# Patient Record
Sex: MALE | Race: WHITE | HISPANIC OR LATINO | Employment: OTHER | URBAN - METROPOLITAN AREA
[De-identification: names, ages, dates, MRNs, and addresses within clinical notes are randomized per-mention and may not be internally consistent; named-entity substitution may affect disease eponyms.]

---

## 2017-01-11 ENCOUNTER — TRANSCRIBE ORDERS (OUTPATIENT)
Dept: ADMINISTRATIVE | Facility: HOSPITAL | Age: 42
End: 2017-01-11

## 2017-01-11 ENCOUNTER — HOSPITAL ENCOUNTER (OUTPATIENT)
Dept: RADIOLOGY | Age: 42
Discharge: HOME | End: 2017-01-11
Payer: COMMERCIAL

## 2017-01-11 ENCOUNTER — ALLSCRIPTS OFFICE VISIT (OUTPATIENT)
Dept: OTHER | Facility: OTHER | Age: 42
End: 2017-01-11

## 2017-01-11 ENCOUNTER — TRANSCRIBE ORDERS (OUTPATIENT)
Dept: ADMINISTRATIVE | Age: 42
End: 2017-01-11

## 2017-01-11 DIAGNOSIS — R68.84 JAW PAIN: ICD-10-CM

## 2017-01-11 DIAGNOSIS — M25.50 PAIN IN JOINT: ICD-10-CM

## 2017-01-11 DIAGNOSIS — M25.562 PAIN IN LEFT KNEE: ICD-10-CM

## 2017-01-11 DIAGNOSIS — R06.83 SNORING: Primary | ICD-10-CM

## 2017-01-11 DIAGNOSIS — G43.909 MIGRAINE WITHOUT STATUS MIGRAINOSUS, NOT INTRACTABLE: ICD-10-CM

## 2017-01-11 DIAGNOSIS — R79.1 ABNORMAL COAGULATION PROFILE: ICD-10-CM

## 2017-01-11 DIAGNOSIS — Z13.220 ENCOUNTER FOR SCREENING FOR LIPOID DISORDERS: ICD-10-CM

## 2017-01-11 DIAGNOSIS — D72.829 ELEVATED WHITE BLOOD CELL COUNT: ICD-10-CM

## 2017-01-11 DIAGNOSIS — R73.9 HYPERGLYCEMIA: ICD-10-CM

## 2017-01-11 DIAGNOSIS — M79.671 PAIN OF RIGHT FOOT: ICD-10-CM

## 2017-01-11 DIAGNOSIS — M65.331 TRIGGER MIDDLE FINGER OF RIGHT HAND: ICD-10-CM

## 2017-01-11 DIAGNOSIS — M65.341 TRIGGER RING FINGER OF RIGHT HAND: ICD-10-CM

## 2017-01-11 PROCEDURE — 73650 X-RAY EXAM OF HEEL: CPT

## 2017-01-11 PROCEDURE — 73562 X-RAY EXAM OF KNEE 3: CPT

## 2017-01-11 PROCEDURE — 70110 X-RAY EXAM OF JAW 4/> VIEWS: CPT

## 2017-01-17 ENCOUNTER — ALLSCRIPTS OFFICE VISIT (OUTPATIENT)
Dept: OTHER | Facility: OTHER | Age: 42
End: 2017-01-17

## 2017-01-18 ENCOUNTER — APPOINTMENT (OUTPATIENT)
Dept: LAB | Age: 42
End: 2017-01-18
Payer: COMMERCIAL

## 2017-01-18 ENCOUNTER — TRANSCRIBE ORDERS (OUTPATIENT)
Dept: ADMINISTRATIVE | Age: 42
End: 2017-01-18

## 2017-01-18 DIAGNOSIS — Z13.220 ENCOUNTER FOR SCREENING FOR LIPOID DISORDERS: ICD-10-CM

## 2017-01-18 DIAGNOSIS — R68.84 JAW PAIN: ICD-10-CM

## 2017-01-18 DIAGNOSIS — G43.909 MIGRAINE WITHOUT STATUS MIGRAINOSUS, NOT INTRACTABLE: ICD-10-CM

## 2017-01-18 DIAGNOSIS — M25.562 PAIN IN LEFT KNEE: ICD-10-CM

## 2017-01-18 DIAGNOSIS — M79.671 PAIN OF RIGHT FOOT: ICD-10-CM

## 2017-01-18 DIAGNOSIS — R68.84 JAW PAIN: Primary | ICD-10-CM

## 2017-01-18 DIAGNOSIS — M65.341 TRIGGER RING FINGER OF RIGHT HAND: ICD-10-CM

## 2017-01-18 DIAGNOSIS — M65.331 TRIGGER MIDDLE FINGER OF RIGHT HAND: ICD-10-CM

## 2017-01-18 DIAGNOSIS — R79.1 ABNORMAL COAGULATION PROFILE: ICD-10-CM

## 2017-01-18 DIAGNOSIS — M25.50 PAIN IN JOINT: ICD-10-CM

## 2017-01-18 LAB
ALBUMIN SERPL BCP-MCNC: 4.2 G/DL (ref 3.5–5)
ALP SERPL-CCNC: 83 U/L (ref 46–116)
ALT SERPL W P-5'-P-CCNC: 28 U/L (ref 12–78)
ANION GAP SERPL CALCULATED.3IONS-SCNC: 6 MMOL/L (ref 4–13)
AST SERPL W P-5'-P-CCNC: 10 U/L (ref 5–45)
BILIRUB SERPL-MCNC: 0.51 MG/DL (ref 0.2–1)
BUN SERPL-MCNC: 18 MG/DL (ref 5–25)
CALCIUM SERPL-MCNC: 9.1 MG/DL (ref 8.3–10.1)
CHLORIDE SERPL-SCNC: 107 MMOL/L (ref 100–108)
CHOLEST SERPL-MCNC: 210 MG/DL (ref 50–200)
CO2 SERPL-SCNC: 27 MMOL/L (ref 21–32)
CREAT SERPL-MCNC: 0.81 MG/DL (ref 0.6–1.3)
CRP SERPL QL: <3 MG/L
ERYTHROCYTE [DISTWIDTH] IN BLOOD BY AUTOMATED COUNT: 12.3 % (ref 11.6–15.1)
ERYTHROCYTE [SEDIMENTATION RATE] IN BLOOD: 2 MM/HOUR (ref 0–10)
GFR SERPL CREATININE-BSD FRML MDRD: >60 ML/MIN/1.73SQ M
GLUCOSE SERPL-MCNC: 121 MG/DL (ref 65–140)
HCT VFR BLD AUTO: 46.4 % (ref 36.5–49.3)
HDLC SERPL-MCNC: 60 MG/DL (ref 40–60)
HGB BLD-MCNC: 15.8 G/DL (ref 12–17)
INR PPP: 1.02 (ref 0.86–1.16)
LDLC SERPL CALC-MCNC: 124 MG/DL (ref 0–100)
MAGNESIUM SERPL-MCNC: 2.2 MG/DL (ref 1.6–2.6)
MCH RBC QN AUTO: 29.5 PG (ref 26.8–34.3)
MCHC RBC AUTO-ENTMCNC: 34.1 G/DL (ref 31.4–37.4)
MCV RBC AUTO: 87 FL (ref 82–98)
PLATELET # BLD AUTO: 258 THOUSANDS/UL (ref 149–390)
PMV BLD AUTO: 10.3 FL (ref 8.9–12.7)
POTASSIUM SERPL-SCNC: 4.2 MMOL/L (ref 3.5–5.3)
PROT SERPL-MCNC: 7.7 G/DL (ref 6.4–8.2)
PROTHROMBIN TIME: 13.5 SECONDS (ref 12–14.3)
RBC # BLD AUTO: 5.35 MILLION/UL (ref 3.88–5.62)
SODIUM SERPL-SCNC: 140 MMOL/L (ref 136–145)
TRIGL SERPL-MCNC: 131 MG/DL
WBC # BLD AUTO: 13.66 THOUSAND/UL (ref 4.31–10.16)

## 2017-01-18 PROCEDURE — 85027 COMPLETE CBC AUTOMATED: CPT

## 2017-01-18 PROCEDURE — 85652 RBC SED RATE AUTOMATED: CPT

## 2017-01-18 PROCEDURE — 86618 LYME DISEASE ANTIBODY: CPT

## 2017-01-18 PROCEDURE — 80053 COMPREHEN METABOLIC PANEL: CPT

## 2017-01-18 PROCEDURE — 85610 PROTHROMBIN TIME: CPT

## 2017-01-18 PROCEDURE — 86140 C-REACTIVE PROTEIN: CPT

## 2017-01-18 PROCEDURE — 80061 LIPID PANEL: CPT

## 2017-01-18 PROCEDURE — 36415 COLL VENOUS BLD VENIPUNCTURE: CPT

## 2017-01-18 PROCEDURE — 83735 ASSAY OF MAGNESIUM: CPT

## 2017-01-20 LAB
B BURGDOR IGG SER IA-ACNC: 0.26
B BURGDOR IGM SER IA-ACNC: 0.42

## 2017-01-23 ENCOUNTER — ALLSCRIPTS OFFICE VISIT (OUTPATIENT)
Dept: OTHER | Facility: OTHER | Age: 42
End: 2017-01-23

## 2017-01-24 ENCOUNTER — HOSPITAL ENCOUNTER (OUTPATIENT)
Dept: RADIOLOGY | Age: 42
Discharge: HOME | End: 2017-01-24
Payer: COMMERCIAL

## 2017-01-24 DIAGNOSIS — R68.84 JAW PAIN: ICD-10-CM

## 2017-01-24 PROCEDURE — 70487 CT MAXILLOFACIAL W/DYE: CPT

## 2017-01-24 RX ADMIN — IOHEXOL 100 ML: 350 INJECTION, SOLUTION INTRAVENOUS at 14:18

## 2017-01-27 ENCOUNTER — HOSPITAL ENCOUNTER (OUTPATIENT)
Dept: SLEEP CENTER | Facility: CLINIC | Age: 42
Discharge: HOME | End: 2017-01-27
Payer: COMMERCIAL

## 2017-01-27 DIAGNOSIS — R06.83 SNORING: ICD-10-CM

## 2017-01-27 PROCEDURE — 95810 POLYSOM 6/> YRS 4/> PARAM: CPT

## 2017-02-01 ENCOUNTER — TRANSCRIBE ORDERS (OUTPATIENT)
Dept: SLEEP CENTER | Facility: CLINIC | Age: 42
End: 2017-02-01

## 2017-02-01 DIAGNOSIS — G47.33 OSA (OBSTRUCTIVE SLEEP APNEA): Primary | ICD-10-CM

## 2017-02-14 ENCOUNTER — APPOINTMENT (OUTPATIENT)
Dept: LAB | Age: 42
End: 2017-02-14
Payer: COMMERCIAL

## 2017-02-14 DIAGNOSIS — D72.829 ELEVATED WHITE BLOOD CELL COUNT: ICD-10-CM

## 2017-02-14 DIAGNOSIS — R73.9 HYPERGLYCEMIA: ICD-10-CM

## 2017-02-14 LAB
BASOPHILS # BLD AUTO: 0.03 THOUSANDS/ΜL (ref 0–0.1)
BASOPHILS NFR BLD AUTO: 0 % (ref 0–1)
EOSINOPHIL # BLD AUTO: 0.32 THOUSAND/ΜL (ref 0–0.61)
EOSINOPHIL NFR BLD AUTO: 4 % (ref 0–6)
ERYTHROCYTE [DISTWIDTH] IN BLOOD BY AUTOMATED COUNT: 12.4 % (ref 11.6–15.1)
EST. AVERAGE GLUCOSE BLD GHB EST-MCNC: 105 MG/DL
HBA1C MFR BLD: 5.3 % (ref 4.2–6.3)
HCT VFR BLD AUTO: 48 % (ref 36.5–49.3)
HGB BLD-MCNC: 16 G/DL (ref 12–17)
LYMPHOCYTES # BLD AUTO: 2.12 THOUSANDS/ΜL (ref 0.6–4.47)
LYMPHOCYTES NFR BLD AUTO: 25 % (ref 14–44)
MCH RBC QN AUTO: 29.7 PG (ref 26.8–34.3)
MCHC RBC AUTO-ENTMCNC: 33.3 G/DL (ref 31.4–37.4)
MCV RBC AUTO: 89 FL (ref 82–98)
MONOCYTES # BLD AUTO: 0.44 THOUSAND/ΜL (ref 0.17–1.22)
MONOCYTES NFR BLD AUTO: 5 % (ref 4–12)
NEUTROPHILS # BLD AUTO: 5.43 THOUSANDS/ΜL (ref 1.85–7.62)
NEUTS SEG NFR BLD AUTO: 66 % (ref 43–75)
NRBC BLD AUTO-RTO: 0 /100 WBCS
PLATELET # BLD AUTO: 262 THOUSANDS/UL (ref 149–390)
PMV BLD AUTO: 10.3 FL (ref 8.9–12.7)
RBC # BLD AUTO: 5.38 MILLION/UL (ref 3.88–5.62)
WBC # BLD AUTO: 8.36 THOUSAND/UL (ref 4.31–10.16)

## 2017-02-14 PROCEDURE — 85025 COMPLETE CBC W/AUTO DIFF WBC: CPT

## 2017-02-14 PROCEDURE — 36415 COLL VENOUS BLD VENIPUNCTURE: CPT

## 2017-02-14 PROCEDURE — 83036 HEMOGLOBIN GLYCOSYLATED A1C: CPT

## 2017-02-15 ENCOUNTER — ALLSCRIPTS OFFICE VISIT (OUTPATIENT)
Dept: OTHER | Facility: OTHER | Age: 42
End: 2017-02-15

## 2017-02-27 ENCOUNTER — TRANSCRIBE ORDERS (OUTPATIENT)
Dept: SLEEP CENTER | Facility: CLINIC | Age: 42
End: 2017-02-27

## 2017-02-27 ENCOUNTER — HOSPITAL ENCOUNTER (OUTPATIENT)
Dept: SLEEP CENTER | Facility: CLINIC | Age: 42
Discharge: HOME | End: 2017-02-27
Payer: COMMERCIAL

## 2017-02-27 DIAGNOSIS — G47.33 OBSTRUCTIVE SLEEP APNEA (ADULT) (PEDIATRIC): Primary | ICD-10-CM

## 2017-02-27 DIAGNOSIS — G47.33 OSA (OBSTRUCTIVE SLEEP APNEA): ICD-10-CM

## 2017-03-15 ENCOUNTER — HOSPITAL ENCOUNTER (OUTPATIENT)
Dept: SLEEP CENTER | Facility: CLINIC | Age: 42
Discharge: HOME | End: 2017-03-15
Payer: COMMERCIAL

## 2017-03-15 DIAGNOSIS — G47.33 OBSTRUCTIVE SLEEP APNEA (ADULT) (PEDIATRIC): ICD-10-CM

## 2017-03-15 PROCEDURE — 95811 POLYSOM 6/>YRS CPAP 4/> PARM: CPT

## 2017-03-27 ENCOUNTER — HOSPITAL ENCOUNTER (OUTPATIENT)
Dept: SLEEP CENTER | Facility: CLINIC | Age: 42
Discharge: HOME | End: 2017-03-27
Payer: COMMERCIAL

## 2017-03-27 ENCOUNTER — TRANSCRIBE ORDERS (OUTPATIENT)
Dept: SLEEP CENTER | Facility: CLINIC | Age: 42
End: 2017-03-27

## 2017-03-27 DIAGNOSIS — Z99.89 OSA ON CPAP: Primary | ICD-10-CM

## 2017-03-27 DIAGNOSIS — G47.33 OSA ON CPAP: Primary | ICD-10-CM

## 2017-03-27 DIAGNOSIS — G47.33 OBSTRUCTIVE SLEEP APNEA (ADULT) (PEDIATRIC): ICD-10-CM

## 2017-04-07 ENCOUNTER — ALLSCRIPTS OFFICE VISIT (OUTPATIENT)
Dept: OTHER | Facility: OTHER | Age: 42
End: 2017-04-07

## 2017-08-10 ENCOUNTER — APPOINTMENT (OUTPATIENT)
Dept: LAB | Age: 42
End: 2017-08-10
Payer: COMMERCIAL

## 2017-08-10 ENCOUNTER — TRANSCRIBE ORDERS (OUTPATIENT)
Dept: ADMINISTRATIVE | Age: 42
End: 2017-08-10

## 2017-08-10 DIAGNOSIS — Z00.8 HEALTH EXAMINATION IN POPULATION SURVEY: ICD-10-CM

## 2017-08-10 DIAGNOSIS — Z00.8 HEALTH EXAMINATION IN POPULATION SURVEY: Primary | ICD-10-CM

## 2017-08-10 LAB
CHOLEST SERPL-MCNC: 191 MG/DL (ref 50–200)
EST. AVERAGE GLUCOSE BLD GHB EST-MCNC: 117 MG/DL
HBA1C MFR BLD: 5.7 % (ref 4.2–6.3)
HDLC SERPL-MCNC: 50 MG/DL (ref 40–60)
LDLC SERPL CALC-MCNC: 116 MG/DL (ref 0–100)
TRIGL SERPL-MCNC: 126 MG/DL

## 2017-08-10 PROCEDURE — 80061 LIPID PANEL: CPT

## 2017-08-10 PROCEDURE — 36415 COLL VENOUS BLD VENIPUNCTURE: CPT

## 2017-08-10 PROCEDURE — 83036 HEMOGLOBIN GLYCOSYLATED A1C: CPT

## 2017-09-05 ENCOUNTER — ALLSCRIPTS OFFICE VISIT (OUTPATIENT)
Dept: OTHER | Facility: OTHER | Age: 42
End: 2017-09-05

## 2017-09-05 DIAGNOSIS — G47.33 OBSTRUCTIVE SLEEP APNEA: ICD-10-CM

## 2017-09-05 DIAGNOSIS — G43.909 MIGRAINE WITHOUT STATUS MIGRAINOSUS, NOT INTRACTABLE: ICD-10-CM

## 2017-09-25 ENCOUNTER — TRANSCRIBE ORDERS (OUTPATIENT)
Dept: SLEEP CENTER | Facility: CLINIC | Age: 42
End: 2017-09-25

## 2017-09-25 DIAGNOSIS — G47.33 OSA ON CPAP: Primary | ICD-10-CM

## 2017-09-25 DIAGNOSIS — Z99.89 OSA ON CPAP: Primary | ICD-10-CM

## 2017-09-30 ENCOUNTER — TRANSCRIBE ORDERS (OUTPATIENT)
Dept: SLEEP CENTER | Facility: CLINIC | Age: 42
End: 2017-09-30

## 2017-09-30 ENCOUNTER — HOSPITAL ENCOUNTER (OUTPATIENT)
Dept: SLEEP CENTER | Facility: CLINIC | Age: 42
Discharge: HOME | End: 2017-09-30
Payer: COMMERCIAL

## 2017-09-30 DIAGNOSIS — G47.33 OSA (OBSTRUCTIVE SLEEP APNEA): Primary | ICD-10-CM

## 2017-09-30 DIAGNOSIS — Z99.89 OSA ON CPAP: ICD-10-CM

## 2017-09-30 DIAGNOSIS — G47.33 OSA ON CPAP: ICD-10-CM

## 2018-01-03 ENCOUNTER — TRANSCRIBE ORDERS (OUTPATIENT)
Dept: LAB | Facility: CLINIC | Age: 43
End: 2018-01-03

## 2018-01-03 DIAGNOSIS — G47.33 OBSTRUCTIVE SLEEP APNEA: ICD-10-CM

## 2018-01-03 DIAGNOSIS — G43.909 MIGRAINE WITHOUT STATUS MIGRAINOSUS, NOT INTRACTABLE: ICD-10-CM

## 2018-01-04 ENCOUNTER — APPOINTMENT (OUTPATIENT)
Dept: LAB | Facility: CLINIC | Age: 43
End: 2018-01-04
Payer: COMMERCIAL

## 2018-01-04 LAB
ALBUMIN SERPL BCP-MCNC: 3.8 G/DL (ref 3.5–5)
ALP SERPL-CCNC: 115 U/L (ref 46–116)
ALT SERPL W P-5'-P-CCNC: 46 U/L (ref 12–78)
ANION GAP SERPL CALCULATED.3IONS-SCNC: 4 MMOL/L (ref 4–13)
AST SERPL W P-5'-P-CCNC: 23 U/L (ref 5–45)
BASOPHILS # BLD AUTO: 0.02 THOUSANDS/ΜL (ref 0–0.1)
BASOPHILS NFR BLD AUTO: 0 % (ref 0–1)
BILIRUB SERPL-MCNC: 0.51 MG/DL (ref 0.2–1)
BUN SERPL-MCNC: 24 MG/DL (ref 5–25)
CALCIUM SERPL-MCNC: 9.4 MG/DL (ref 8.3–10.1)
CHLORIDE SERPL-SCNC: 105 MMOL/L (ref 100–108)
CO2 SERPL-SCNC: 29 MMOL/L (ref 21–32)
CREAT SERPL-MCNC: 0.84 MG/DL (ref 0.6–1.3)
EOSINOPHIL # BLD AUTO: 0.15 THOUSAND/ΜL (ref 0–0.61)
EOSINOPHIL NFR BLD AUTO: 2 % (ref 0–6)
ERYTHROCYTE [DISTWIDTH] IN BLOOD BY AUTOMATED COUNT: 12.4 % (ref 11.6–15.1)
GFR SERPL CREATININE-BSD FRML MDRD: 106 ML/MIN/1.73SQ M
GLUCOSE P FAST SERPL-MCNC: 95 MG/DL (ref 65–99)
HCT VFR BLD AUTO: 45.9 % (ref 36.5–49.3)
HGB BLD-MCNC: 15.6 G/DL (ref 12–17)
LYMPHOCYTES # BLD AUTO: 1.72 THOUSANDS/ΜL (ref 0.6–4.47)
LYMPHOCYTES NFR BLD AUTO: 17 % (ref 14–44)
MCH RBC QN AUTO: 29.9 PG (ref 26.8–34.3)
MCHC RBC AUTO-ENTMCNC: 34 G/DL (ref 31.4–37.4)
MCV RBC AUTO: 88 FL (ref 82–98)
MONOCYTES # BLD AUTO: 0.89 THOUSAND/ΜL (ref 0.17–1.22)
MONOCYTES NFR BLD AUTO: 9 % (ref 4–12)
NEUTROPHILS # BLD AUTO: 7.29 THOUSANDS/ΜL (ref 1.85–7.62)
NEUTS SEG NFR BLD AUTO: 72 % (ref 43–75)
NRBC BLD AUTO-RTO: 0 /100 WBCS
PLATELET # BLD AUTO: 301 THOUSANDS/UL (ref 149–390)
PMV BLD AUTO: 10.2 FL (ref 8.9–12.7)
POTASSIUM SERPL-SCNC: 4.4 MMOL/L (ref 3.5–5.3)
PROT SERPL-MCNC: 8 G/DL (ref 6.4–8.2)
RBC # BLD AUTO: 5.21 MILLION/UL (ref 3.88–5.62)
SODIUM SERPL-SCNC: 138 MMOL/L (ref 136–145)
WBC # BLD AUTO: 10.09 THOUSAND/UL (ref 4.31–10.16)

## 2018-01-04 PROCEDURE — 80053 COMPREHEN METABOLIC PANEL: CPT

## 2018-01-04 PROCEDURE — 85025 COMPLETE CBC W/AUTO DIFF WBC: CPT

## 2018-01-05 ENCOUNTER — APPOINTMENT (OUTPATIENT)
Dept: RADIOLOGY | Age: 43
End: 2018-01-05
Payer: COMMERCIAL

## 2018-01-05 ENCOUNTER — GENERIC CONVERSION - ENCOUNTER (OUTPATIENT)
Dept: INTERNAL MEDICINE CLINIC | Facility: CLINIC | Age: 43
End: 2018-01-05

## 2018-01-05 ENCOUNTER — ALLSCRIPTS OFFICE VISIT (OUTPATIENT)
Dept: OTHER | Facility: OTHER | Age: 43
End: 2018-01-05

## 2018-01-05 ENCOUNTER — TRANSCRIBE ORDERS (OUTPATIENT)
Dept: ADMINISTRATIVE | Age: 43
End: 2018-01-05

## 2018-01-05 DIAGNOSIS — G43.909 MIGRAINE WITHOUT STATUS MIGRAINOSUS, NOT INTRACTABLE: ICD-10-CM

## 2018-01-05 DIAGNOSIS — M62.838 OTHER MUSCLE SPASM: ICD-10-CM

## 2018-01-05 DIAGNOSIS — R07.89 OTHER CHEST PAIN: ICD-10-CM

## 2018-01-05 DIAGNOSIS — K21.9 GASTRO-ESOPHAGEAL REFLUX DISEASE WITHOUT ESOPHAGITIS: ICD-10-CM

## 2018-01-05 PROCEDURE — 72050 X-RAY EXAM NECK SPINE 4/5VWS: CPT

## 2018-01-06 ENCOUNTER — TRANSCRIBE ORDERS (OUTPATIENT)
Dept: SLEEP CENTER | Facility: CLINIC | Age: 43
End: 2018-01-06

## 2018-01-06 ENCOUNTER — HOSPITAL ENCOUNTER (OUTPATIENT)
Dept: SLEEP CENTER | Facility: CLINIC | Age: 43
Discharge: HOME | End: 2018-01-07
Payer: COMMERCIAL

## 2018-01-06 DIAGNOSIS — G47.33 OSA (OBSTRUCTIVE SLEEP APNEA): Primary | ICD-10-CM

## 2018-01-06 NOTE — PROGRESS NOTES
Follow-Up Note - [address]  38 y o  male  :1975  EMD:[id number]    CC: I saw this patient for follow-up in clinic today for his Sleep Disordered Breathing, Coexisting Sleep and Medical Problems  Past medical, Family, Social History, ROS and medications were reviewed  Herewith my findings in summary  Full Details are available on request      HPI:  With respect to  positive airway pressure therapy Compliance data confirms use > 4 hours 27% of the time  However, on days used he averages almost 5 hours per night  He was unable to use CPAP for 16 out of the past 30 days because of respiratory infection  The AHI is 1 4/hour  pressure of 13 5 cm H2Oat 90th percentile  He reports:   - tolerating PAP with no adverse effects and experiencing no majordifficulties; Soraya Haskell - and is benefiting from use; he has less drowsiness and no headaches       Sleep Routine: Penny Mora reports getting sufficient sleep; he is having no difficulty initiating or maintaining sleep  Soraya Haskell He awakens feeling   refreshed  He denied excessive drowsiness and He rated himself at 9/24 on the [place] sleepiness scale  However, he is no longer dozing off inadvertently as in the past   He has GERD for which she was started on Prilosec  Mood is stable on medication  On Exam:   Physical findings are essentially unchanged from previous  Impression :   1  Obstructive Sleep Apnea  2  Insomnia  3  Headaches  4  GERD  5  The above are improved  6  Rhinosinusitis  7  Comorbidities: autlined previously]    Plan:  1  Treatment with  PAP is medically necessary and Penny Mora is agreable to continue use  2  Pressure setting: [Continue at 12-16 cm in auto titrating mode]   3  Instruction on care of equipment and strategies to improve comfort with use of PAP were discussed  A prescription to replace supplies as needed was provided and care coordinated with the DME provider     4  Need for compliance with therapy and weight reduction were emphasized  5  Follow-up is advised in [1 year] or sooner if needed [to monitor progress and to adjust therapy]  Thank you for allowing me to participate in the care of this patient      Sincerely,    Wendy Gutierres MD  Board Certified Specialist

## 2018-01-12 VITALS
BODY MASS INDEX: 35 KG/M2 | OXYGEN SATURATION: 98 % | HEART RATE: 67 BPM | TEMPERATURE: 97.1 F | HEIGHT: 67 IN | DIASTOLIC BLOOD PRESSURE: 68 MMHG | WEIGHT: 223 LBS | SYSTOLIC BLOOD PRESSURE: 116 MMHG

## 2018-01-13 VITALS
TEMPERATURE: 97.5 F | HEART RATE: 66 BPM | OXYGEN SATURATION: 98 % | SYSTOLIC BLOOD PRESSURE: 112 MMHG | DIASTOLIC BLOOD PRESSURE: 78 MMHG | BODY MASS INDEX: 34.09 KG/M2 | WEIGHT: 217.2 LBS | HEIGHT: 67 IN

## 2018-01-13 VITALS
HEIGHT: 67 IN | OXYGEN SATURATION: 98 % | BODY MASS INDEX: 33.93 KG/M2 | WEIGHT: 216.2 LBS | SYSTOLIC BLOOD PRESSURE: 110 MMHG | RESPIRATION RATE: 16 BRPM | HEART RATE: 64 BPM | DIASTOLIC BLOOD PRESSURE: 78 MMHG | TEMPERATURE: 97.8 F

## 2018-01-14 VITALS
RESPIRATION RATE: 18 BRPM | BODY MASS INDEX: 35.25 KG/M2 | HEART RATE: 84 BPM | WEIGHT: 224.6 LBS | DIASTOLIC BLOOD PRESSURE: 80 MMHG | HEIGHT: 67 IN | TEMPERATURE: 98.8 F | OXYGEN SATURATION: 98 % | SYSTOLIC BLOOD PRESSURE: 130 MMHG

## 2018-01-14 VITALS
DIASTOLIC BLOOD PRESSURE: 71 MMHG | BODY MASS INDEX: 35.94 KG/M2 | SYSTOLIC BLOOD PRESSURE: 120 MMHG | HEART RATE: 55 BPM | WEIGHT: 229 LBS | HEIGHT: 67 IN

## 2018-01-14 VITALS
SYSTOLIC BLOOD PRESSURE: 110 MMHG | BODY MASS INDEX: 33.65 KG/M2 | OXYGEN SATURATION: 98 % | DIASTOLIC BLOOD PRESSURE: 74 MMHG | TEMPERATURE: 98.7 F | HEIGHT: 68 IN | HEART RATE: 64 BPM | WEIGHT: 222 LBS | RESPIRATION RATE: 14 BRPM

## 2018-01-23 VITALS
BODY MASS INDEX: 33.19 KG/M2 | HEART RATE: 74 BPM | SYSTOLIC BLOOD PRESSURE: 118 MMHG | OXYGEN SATURATION: 98 % | WEIGHT: 219 LBS | HEIGHT: 68 IN | DIASTOLIC BLOOD PRESSURE: 82 MMHG | TEMPERATURE: 97.8 F

## 2018-03-07 NOTE — PROGRESS NOTES
History of Present Illness    Revaccination   Vaccine Information: Vaccine(s) Given (names): Adacel  Spoke with patient regarding vaccine out of temperature range and risks and benefits of revaccination  Action(s): Pt will be revaccinated  Appointment scheduled: [id number] QS  Other Information: [id number] kz - Patient contacted  Appointment scheduled  kz    Revaccination Completed: [id number]  Active Problems    1  Trigger middle finger of right hand (727 03) (M65 [id number])   2  Trigger ring finger of right hand (727 03) (M65 341)    Immunizations  Influenza --- Nathalie Grow: Permanently Deferred: Pt refuses, [id number]; Norma Bacon: Temporarily Deferred:  Pt requests deferral   Tdap --- Nathalie Grow: 11-Feb-2014     Current Meds   1  Naproxen 500 MG Oral Tablet; TAKE 1 TABLET EVERY 12 HOURS WITH FOOD AS   NEEDED   2  NyQuil LIQD    Allergies    1   No Known Drug Allergies    Future Appointments    Date/Time Provider Specialty Site   01/11/2017 03:45 PM Jake Guerin [facility]   01/23/2017 09:00 AM Jake Guerin [facility] Internal Medicine [facility]     Signatures   Electronically signed by : Tripp Wilhelm MD; Jan 12 2017  8:50AM EST

## 2018-03-08 ENCOUNTER — OFFICE VISIT (OUTPATIENT)
Dept: INTERNAL MEDICINE CLINIC | Facility: CLINIC | Age: 43
End: 2018-03-08
Payer: COMMERCIAL

## 2018-03-08 VITALS
SYSTOLIC BLOOD PRESSURE: 112 MMHG | WEIGHT: 221.2 LBS | HEIGHT: 69 IN | OXYGEN SATURATION: 98 % | DIASTOLIC BLOOD PRESSURE: 78 MMHG | TEMPERATURE: 98.7 F | BODY MASS INDEX: 32.76 KG/M2 | HEART RATE: 74 BPM

## 2018-03-08 DIAGNOSIS — G43.909 MIGRAINE WITHOUT STATUS MIGRAINOSUS, NOT INTRACTABLE, UNSPECIFIED MIGRAINE TYPE: Primary | ICD-10-CM

## 2018-03-08 DIAGNOSIS — F32.89 OTHER DEPRESSION: ICD-10-CM

## 2018-03-08 DIAGNOSIS — M62.838 NECK MUSCLE SPASM: ICD-10-CM

## 2018-03-08 DIAGNOSIS — G47.33 MILD OBSTRUCTIVE SLEEP APNEA: ICD-10-CM

## 2018-03-08 DIAGNOSIS — K21.9 GASTROESOPHAGEAL REFLUX DISEASE, ESOPHAGITIS PRESENCE NOT SPECIFIED: ICD-10-CM

## 2018-03-08 DIAGNOSIS — F41.8 DEPRESSION WITH ANXIETY: ICD-10-CM

## 2018-03-08 PROBLEM — M25.562 CHRONIC PAIN OF LEFT KNEE: Status: ACTIVE | Noted: 2017-01-11

## 2018-03-08 PROBLEM — M25.50 JOINT PAIN: Status: ACTIVE | Noted: 2017-01-11

## 2018-03-08 PROBLEM — G89.29 HEEL PAIN, CHRONIC, RIGHT: Status: ACTIVE | Noted: 2017-01-11

## 2018-03-08 PROBLEM — R07.89 ATYPICAL CHEST PAIN: Status: ACTIVE | Noted: 2018-01-05

## 2018-03-08 PROBLEM — H93.8X1 CONGESTION OF RIGHT EAR: Status: ACTIVE | Noted: 2018-03-08

## 2018-03-08 PROBLEM — M77.12 LATERAL EPICONDYLITIS OF LEFT ELBOW: Status: ACTIVE | Noted: 2017-01-11

## 2018-03-08 PROBLEM — R68.84 PAIN IN MANDIBLE: Status: ACTIVE | Noted: 2017-01-11

## 2018-03-08 PROBLEM — G89.29 CHRONIC PAIN OF LEFT KNEE: Status: ACTIVE | Noted: 2017-01-11

## 2018-03-08 PROBLEM — M79.671 HEEL PAIN, CHRONIC, RIGHT: Status: ACTIVE | Noted: 2017-01-11

## 2018-03-08 PROBLEM — R07.89 ATYPICAL CHEST PAIN: Status: RESOLVED | Noted: 2018-01-05 | Resolved: 2018-03-08

## 2018-03-08 PROBLEM — R79.1 ELEVATED INR: Status: ACTIVE | Noted: 2017-01-11

## 2018-03-08 PROCEDURE — 99214 OFFICE O/P EST MOD 30 MIN: CPT | Performed by: PHYSICIAN ASSISTANT

## 2018-03-08 RX ORDER — TOPIRAMATE 50 MG/1
50 TABLET, FILM COATED ORAL DAILY
Qty: 90 TABLET | Refills: 1 | Status: SHIPPED | OUTPATIENT
Start: 2018-03-08 | End: 2020-08-21 | Stop reason: ALTCHOICE

## 2018-03-08 RX ORDER — METHOCARBAMOL 500 MG/1
500 TABLET, FILM COATED ORAL DAILY
Qty: 30 TABLET | Refills: 0 | Status: ON HOLD | OUTPATIENT
Start: 2018-03-08 | End: 2018-12-26 | Stop reason: ALTCHOICE

## 2018-03-08 RX ORDER — BUTALBITAL, ACETAMINOPHEN AND CAFFEINE 50; 325; 40 MG/1; MG/1; MG/1
1 TABLET ORAL DAILY PRN
Qty: 30 TABLET | Refills: 0 | Status: SHIPPED | OUTPATIENT
Start: 2018-03-08

## 2018-03-08 RX ORDER — OMEPRAZOLE 20 MG/1
20 TABLET, DELAYED RELEASE ORAL DAILY
Qty: 90 TABLET | Refills: 1 | Status: SHIPPED | OUTPATIENT
Start: 2018-03-08 | End: 2018-08-28 | Stop reason: ALTCHOICE

## 2018-03-08 RX ORDER — FLUOXETINE 20 MG/1
1 TABLET, FILM COATED ORAL DAILY
COMMUNITY
Start: 2017-01-23 | End: 2018-03-08

## 2018-03-08 RX ORDER — TOPIRAMATE 50 MG/1
1 TABLET, FILM COATED ORAL DAILY
COMMUNITY
Start: 2017-02-15 | End: 2018-03-08 | Stop reason: SDUPTHER

## 2018-03-08 RX ORDER — FLUOXETINE HYDROCHLORIDE 40 MG/1
40 CAPSULE ORAL DAILY
Qty: 30 CAPSULE | Refills: 5 | Status: SHIPPED | OUTPATIENT
Start: 2018-03-08 | End: 2018-08-28 | Stop reason: ALTCHOICE

## 2018-03-08 RX ORDER — METHOCARBAMOL 500 MG/1
1 TABLET, FILM COATED ORAL DAILY
COMMUNITY
Start: 2018-01-05 | End: 2018-03-08 | Stop reason: SDUPTHER

## 2018-03-08 RX ORDER — BUTALBITAL, ACETAMINOPHEN AND CAFFEINE 50; 325; 40 MG/1; MG/1; MG/1
1 TABLET ORAL DAILY PRN
COMMUNITY
End: 2018-03-08 | Stop reason: SDUPTHER

## 2018-03-08 RX ORDER — OMEPRAZOLE 20 MG/1
1 TABLET, DELAYED RELEASE ORAL DAILY
COMMUNITY
Start: 2018-01-05 | End: 2018-03-08 | Stop reason: SDUPTHER

## 2018-03-08 RX ORDER — FLUOXETINE 20 MG/1
20 TABLET, FILM COATED ORAL DAILY
Qty: 90 TABLET | Refills: 1 | Status: CANCELLED | OUTPATIENT
Start: 2018-03-08
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
## 2018-03-08 NOTE — ASSESSMENT & PLAN NOTE
Chronic stable improved with Topamax which he is taking 50 mg daily    Continue with Fioricet as needed for breakthrough

## 2018-03-08 NOTE — ASSESSMENT & PLAN NOTE
Continue with neck stretches and range-of-motion exercises  Can use Robaxin as needed    Discussed if back muscle spasms persist may need physical therapy

## 2018-03-08 NOTE — ASSESSMENT & PLAN NOTE
Does not appear infected  Start claritin D to be used as directed for next 5 days to help with symptoms  May use ocean nasal spray or OTC flonase to help with congestion as well
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
## 2018-03-08 NOTE — ASSESSMENT & PLAN NOTE
Symptoms well controlled at this time with Prilosec  Discussed with time gradual titration from Prilosec once daily to as needed Zantac use     May restart Prilosec if GERD symptoms return after switching to Zantac

## 2018-03-08 NOTE — ASSESSMENT & PLAN NOTE
Patient previously had a large degree of anxiety that had improved after initiating Prozac therapy  Patient has been on 20 mg daily for some time and tolerating well but still getting some breakthrough symptoms  Discussed with patient increasing to 40 mg daily patient verbalized understanding and is willing  We will increase to 40 mg daily  New scripts sent to pharmacy  Report back if any worsening of symptoms otherwise follow up with labs

## 2018-03-16 ENCOUNTER — OFFICE VISIT (OUTPATIENT)
Dept: URGENT CARE | Facility: CLINIC | Age: 43
End: 2018-03-16
Payer: COMMERCIAL

## 2018-03-16 VITALS
SYSTOLIC BLOOD PRESSURE: 135 MMHG | WEIGHT: 220.8 LBS | BODY MASS INDEX: 34.65 KG/M2 | RESPIRATION RATE: 16 BRPM | OXYGEN SATURATION: 97 % | DIASTOLIC BLOOD PRESSURE: 79 MMHG | HEART RATE: 71 BPM | HEIGHT: 67 IN | TEMPERATURE: 97.8 F

## 2018-03-16 DIAGNOSIS — J06.9 UPPER RESPIRATORY TRACT INFECTION, UNSPECIFIED TYPE: Primary | ICD-10-CM

## 2018-03-16 LAB — S PYO AG THROAT QL: NEGATIVE

## 2018-03-16 PROCEDURE — 99214 OFFICE O/P EST MOD 30 MIN: CPT | Performed by: PHYSICIAN ASSISTANT

## 2018-03-16 PROCEDURE — 87430 STREP A AG IA: CPT | Performed by: PHYSICIAN ASSISTANT

## 2018-03-16 PROCEDURE — 99213 OFFICE O/P EST LOW 20 MIN: CPT | Performed by: PHYSICIAN ASSISTANT

## 2018-03-16 PROCEDURE — 87070 CULTURE OTHR SPECIMN AEROBIC: CPT | Performed by: PHYSICIAN ASSISTANT

## 2018-03-16 PROCEDURE — S9088 SERVICES PROVIDED IN URGENT: HCPCS | Performed by: PHYSICIAN ASSISTANT

## 2018-03-16 RX ORDER — DEXTROMETHORPHAN HYDROBROMIDE AND PROMETHAZINE HYDROCHLORIDE 15; 6.25 MG/5ML; MG/5ML
5 SYRUP ORAL 4 TIMES DAILY PRN
Qty: 118 ML | Refills: 0 | Status: SHIPPED | OUTPATIENT
Start: 2018-03-16 | End: 2018-04-04 | Stop reason: ALTCHOICE

## 2018-03-16 NOTE — PATIENT INSTRUCTIONS
Negative Rapid strep here  Post nasal drip, viral infection  Need to dry that up with sudafed otc  Will check throat culture and call if it is positive  Follow up with PCP in 3-5 days  Proceed to  ER if symptoms worsen

## 2018-03-16 NOTE — PROGRESS NOTES
[facility]        NAME: Gregor Pardo is a 38 y o  male  : 1975    MRN: [id number]  DATE: 2018  TIME: 10:19 AM    Assessment and Plan   Upper respiratory tract infection, unspecified type [J06 9]  1  Upper respiratory tract infection, unspecified type           Patient Instructions     Negative Rapid strep here  Post nasal drip, viral infection  Need to dry that up with sudafed otc  Will check throat culture and call if it is positive  Follow up with PCP in 3-5 days  Proceed to  ER if symptoms worsen  Chief Complaint     Chief Complaint   Patient presents with    Sore Throat     started last night    Cough     started last night    Cold Like Symptoms     chest congestion         History of Present Illness       38year-old male complains of sore throat cough and congestion for 2 days  He had a fever yesterday  Taking DayQuil with help  No nausea vomiting  Says his chest feels congested  Start hurts to swallow  No other medicines over-the-counter          Review of Systems   Review of Systems      Current Medications       Current Outpatient Prescriptions:     butalbital-acetaminophen-caffeine (FIORICET,ESGIC) -40 mg per tablet, Take 1 tablet by mouth daily as needed for migraine For on set of headache, Disp: 30 tablet, Rfl: 0    FLUoxetine (PROzac) 40 MG capsule, Take 1 capsule (40 mg total) by mouth daily, Disp: 30 capsule, Rfl: 5    methocarbamol (ROBAXIN) 500 mg tablet, Take 1 tablet (500 mg total) by mouth daily, Disp: 30 tablet, Rfl: 0    omeprazole (PRILOSEC OTC) 20 MG tablet, Take 1 tablet (20 mg total) by mouth daily, Disp: 90 tablet, Rfl: 1    topiramate (TOPAMAX) 50 MG tablet, Take 1 tablet (50 mg total) by mouth daily, Disp: 90 tablet, Rfl: 1    Current Allergies     Allergies as of 2018    (No Known Allergies)            The following portions of the patient's history were reviewed and updated as appropriate: allergies, current medications, past family history, past medical history, past social history, past surgical history and problem list      Past Medical History:   Diagnosis Date    Depression with anxiety     last assessed: 5/12/2014    Febrile illness     last assessed: 12/2/2015    Fracture, finger     last assessed: 5/22/2015    Leukocytosis     last assessed: 1/23/2017    Sciatica, left side     last assessed: 12/3/2014    Steroid-induced hyperglycemia     last assessed: 2/15/2017       History reviewed  No pertinent surgical history  Family History   Problem Relation Age of Onset    Diabetes Mother     Hypertension Mother     Diabetes Father     Gout Father     Hypertension Father     Cancer Maternal Grandmother     Rosacea Family          Medications have been verified  Objective   /79   Pulse 71   Temp 97 8 °F (36 6 °C) (Tympanic)   Resp 16   Ht 5' 7" (1 702 m)   Wt 100 kg (220 lb 12 8 oz)   SpO2 97%   BMI 34 58 kg/m²        Physical Exam     Physical Exam   Constitutional: He appears well-developed and well-nourished  No distress  HENT:   Right Ear: Tympanic membrane, external ear and ear canal normal    Left Ear: Tympanic membrane, external ear and ear canal normal    Nose: Nose normal  Right sinus exhibits no maxillary sinus tenderness and no frontal sinus tenderness  Left sinus exhibits no maxillary sinus tenderness and no frontal sinus tenderness  Mouth/Throat: Oropharynx is clear and moist  No oropharyngeal exudate, posterior oropharyngeal edema or posterior oropharyngeal erythema  Mild postnasal drip no erythema or exudates   Eyes: Conjunctivae and EOM are normal  Pupils are equal, round, and reactive to light  No scleral icterus  Neck: Normal range of motion  Neck supple  Cardiovascular: Normal rate, regular rhythm and normal heart sounds  Pulmonary/Chest: Effort normal and breath sounds normal  No respiratory distress  He has no wheezes  He has no rales  Abdominal: Soft   Bowel sounds are normal  He exhibits no distension and no mass  There is no tenderness  There is no rebound and no guarding  Lymphadenopathy:     He has no cervical adenopathy  Skin: Skin is warm and dry  No rash noted

## 2018-03-18 LAB — BACTERIA THROAT CULT: NORMAL

## 2018-04-04 ENCOUNTER — APPOINTMENT (EMERGENCY)
Dept: RADIOLOGY | Facility: HOSPITAL | Age: 43
End: 2018-04-04
Payer: COMMERCIAL

## 2018-04-04 ENCOUNTER — APPOINTMENT (EMERGENCY)
Dept: CT IMAGING | Facility: HOSPITAL | Age: 43
End: 2018-04-04
Payer: COMMERCIAL

## 2018-04-04 ENCOUNTER — HOSPITAL ENCOUNTER (EMERGENCY)
Facility: HOSPITAL | Age: 43
Discharge: HOME | End: 2018-04-04
Attending: EMERGENCY MEDICINE | Admitting: EMERGENCY MEDICINE
Payer: COMMERCIAL

## 2018-04-04 VITALS
HEART RATE: 77 BPM | HEIGHT: 67 IN | DIASTOLIC BLOOD PRESSURE: 78 MMHG | SYSTOLIC BLOOD PRESSURE: 146 MMHG | TEMPERATURE: 99.3 F | WEIGHT: 225 LBS | RESPIRATION RATE: 16 BRPM | OXYGEN SATURATION: 98 % | BODY MASS INDEX: 35.31 KG/M2

## 2018-04-04 DIAGNOSIS — M79.18 MUSCULAR ABDOMINAL PAIN IN RIGHT FLANK: Primary | ICD-10-CM

## 2018-04-04 LAB
BILIRUB UR QL STRIP: NEGATIVE
CLARITY UR: CLEAR
COLOR UR: YELLOW
GLUCOSE UR STRIP-MCNC: NEGATIVE MG/DL
HGB UR QL STRIP.AUTO: NEGATIVE
KETONES UR STRIP-MCNC: NEGATIVE MG/DL
LEUKOCYTE ESTERASE UR QL STRIP: NEGATIVE
NITRITE UR QL STRIP: NEGATIVE
PH UR STRIP.AUTO: 5.5 [PH] (ref 4.5–8)
PROT UR STRIP-MCNC: NEGATIVE MG/DL
SP GR UR STRIP.AUTO: 1.02 (ref 1–1.03)
UROBILINOGEN UR QL STRIP.AUTO: 0.2 E.U./DL

## 2018-04-04 PROCEDURE — 71046 X-RAY EXAM CHEST 2 VIEWS: CPT

## 2018-04-04 PROCEDURE — 74176 CT ABD & PELVIS W/O CONTRAST: CPT

## 2018-04-04 PROCEDURE — 99284 EMERGENCY DEPT VISIT MOD MDM: CPT

## 2018-04-04 PROCEDURE — 96372 THER/PROPH/DIAG INJ SC/IM: CPT

## 2018-04-04 PROCEDURE — 81003 URINALYSIS AUTO W/O SCOPE: CPT

## 2018-04-04 RX ORDER — KETOROLAC TROMETHAMINE 30 MG/ML
15 INJECTION, SOLUTION INTRAMUSCULAR; INTRAVENOUS ONCE
Status: COMPLETED | OUTPATIENT
Start: 2018-04-04 | End: 2018-04-04

## 2018-04-04 RX ADMIN — KETOROLAC TROMETHAMINE 15 MG: 30 INJECTION, SOLUTION INTRAMUSCULAR at 16:47

## 2018-04-04 NOTE — DISCHARGE INSTRUCTIONS
Musculoskeletal Pain   WHAT YOU NEED TO KNOW:   Muscle pain can be dull, achy, or sharp  You may have pain and tenderness to the touch as well  It can occur anywhere on your body and is often brought on by exercise  Muscle pain may occur from an injury, such as a sprain, tendonitis, or bone fracture  Muscle pain can also be the result of medical conditions, such as polymyositis, fibromyalgia, and connective tissue disorders  DISCHARGE INSTRUCTIONS:   Self care:   · Rest  as directed and avoid activity that causes pain  You may be able to return to normal activity when you can move without pain  Follow directions for rest and activity  You are at risk for injury for 3 weeks after your symptoms go away  · Ice  your painful muscle area to decrease pain and swelling  Use an ice pack, or put ice in a plastic bag and cover it with a towel  Always  put a cloth between the ice and your skin  Apply the ice as often as directed for the first 24 to 48 hours  · Compression  with a splint, brace, or elastic bandage helps decrease pain and swelling  This may be needed for muscle pain in arms or legs  A splint, brace, or bandage will also help protect the painful area when you move around  · Elevate  a painful arm or leg to reduce swelling and pain  Elevate your limb while you are sitting or lying down  Prop a painful leg on pillows to keep it above the level of your heart  Medicines:   · NSAIDs  help decrease swelling and pain or fever  This medicine is available with or without a doctor's order  NSAIDs can cause stomach bleeding or kidney problems in certain people  If you take blood thinner medicine, always ask your healthcare provider if NSAIDs are safe for you  Always read the medicine label and follow directions  · Acetaminophen  is used to decrease pain  It is available without a doctor's order  Ask your healthcare provider how much to take and when to take it  Follow directions   Acetaminophen can cause liver damage if not taken correctly  Do not take more than one medicine that contains acetaminophen unless directed  · Muscle relaxers  help relax your muscles to decrease pain and muscle spasms  · Steroids  may be given to decrease redness, pain, and swelling  · Take your medicine as directed  Contact your healthcare provider if you think your medicine is not helping or if you have side effects  Tell him if you are allergic to any medicine  Keep a list of the medicines, vitamins, and herbs you take  Include the amounts, and when and why you take them  Bring the list or the pill bottles to follow-up visits  Carry your medicine list with you in case of an emergency  Follow up with your healthcare provider as directed: You may need more tests to help healthcare providers find the cause of your muscle pain  You may need physical therapy to learn muscle strengthening exercises  Write down your questions so you remember to ask them during your visits  Contact your healthcare provider if:   · You have a fever  · You have trouble sleeping because of your pain  · Your painful area becomes more tender, red, and warm to the touch  · You have decreased movement of the painful area  · You have questions or concerns about your condition or care  Return to the emergency department if:   · You have increased severe pain when you move the painful muscle area  · You lose feeling in your painful muscle area  · You have new or worse swelling in the painful area  Your skin may feel tight  · You have increased muscle pain or pain that does not improve with treatment  © 2017 [employer] Information is for End User's use only and may not be sold, redistributed or otherwise used for commercial purposes  All illustrations and images included in CareNotes® are the copyrighted property of [employer]  or Kirk Barlow  The above information is an  only  It is not intended as medical advice for individual conditions or treatments  Talk to your doctor, nurse or pharmacist before following any medical regimen to see if it is safe and effective for you

## 2018-04-04 NOTE — ED PROVIDER NOTES
History  Chief Complaint   Patient presents with    Back Pain     fall 2 weeks ago on the ice, patient c/o having right sided progressive pain since that time  no hitting head and denies LOC  fell onto left side  worse pain with inspiration     This is a 40-year-old male patient slipped on ice 2 weeks ago landing on his right side  He states he has pain on his right flank that is been increasing with time and now has a cough without production  Any type of movement seems to hurt his right flank  He states in his may take a deep breath it hurts in his flank  He has taken nothing for the pain nothing seems to make it better  He does not have any urgency frequency dysuria hematuria  No difficulty breathing no chest pain  No fever no chills no headache blurred vision double vision  Patient denies any pain over his right chest wall  At this time patient will have a CT of the abdomen and pelvis due to the flank pain  Also have a chest x-ray to make sure he did not developed a pneumonia  Prior to Admission Medications   Prescriptions Last Dose Informant Patient Reported? Taking?    FLUoxetine (PROzac) 40 MG capsule 4/3/2018 at Unknown time  No Yes   Sig: Take 1 capsule (40 mg total) by mouth daily   butalbital-acetaminophen-caffeine (FIORICET,ESGIC) -40 mg per tablet Past Week at Unknown time  No Yes   Sig: Take 1 tablet by mouth daily as needed for migraine For on set of headache   methocarbamol (ROBAXIN) 500 mg tablet More than a month at Unknown time  No No   Sig: Take 1 tablet (500 mg total) by mouth daily   omeprazole (PRILOSEC OTC) 20 MG tablet 4/3/2018 at Unknown time  No Yes   Sig: Take 1 tablet (20 mg total) by mouth daily   topiramate (TOPAMAX) 50 MG tablet 4/4/2018 at Unknown time  No Yes   Sig: Take 1 tablet (50 mg total) by mouth daily      Facility-Administered Medications: None       Past Medical History:   Diagnosis Date    Depression with anxiety     last assessed: 5/12/2014  Febrile illness     last assessed: 12/2/2015    Fracture, finger     last assessed: 5/22/2015    Leukocytosis     last assessed: 1/23/2017    Migraine     Sciatica, left side     last assessed: 12/3/2014    Steroid-induced hyperglycemia     last assessed: 2/15/2017       History reviewed  No pertinent surgical history  Family History   Problem Relation Age of Onset    Diabetes Mother     Hypertension Mother     Diabetes Father     Gout Father     Hypertension Father     Cancer Maternal Grandmother     Rosacea Family      I have reviewed and agree with the history as documented  Social History   Substance Use Topics    Smoking status: Never Smoker    Smokeless tobacco: Never Used    Alcohol use Yes      Comment: social (1 time per year)        Review of Systems   All other systems reviewed and are negative  Physical Exam  ED Triage Vitals [04/04/18 1512]   Temperature Pulse Respirations Blood Pressure SpO2   99 3 °F (37 4 °C) 77 16 146/78 98 %      Temp Source Heart Rate Source Patient Position - Orthostatic VS BP Location FiO2 (%)   Oral Monitor Lying Left arm --      Pain Score       8           Orthostatic Vital Signs  Vitals:    04/04/18 1512   BP: 146/78   Pulse: 77   Patient Position - Orthostatic VS: Lying       Physical Exam   Constitutional: He appears well-developed and well-nourished  HENT:   Head: Normocephalic and atraumatic  Right Ear: External ear normal    Left Ear: External ear normal    Nose: Nose normal    Mouth/Throat: Oropharynx is clear and moist    Eyes: Conjunctivae are normal  Pupils are equal, round, and reactive to light  Neck: Normal range of motion  Neck supple  Cardiovascular: Normal rate and regular rhythm  Pulmonary/Chest: Effort normal and breath sounds normal    Abdominal: Soft  Bowel sounds are normal  There is no tenderness  Neurological: He is alert  Skin: Skin is warm  Psychiatric: He has a normal mood and affect   His behavior is normal    Nursing note and vitals reviewed  ED Medications  Medications   ketorolac (TORADOL) injection 15 mg (15 mg Intramuscular Given 4/4/18 1647)       Diagnostic Studies  Results Reviewed     Procedure Component Value Units Date/Time    ED Urine Macroscopic [[id number]]  (Normal) Collected:  04/04/18 1651    Lab Status:  Final result Specimen:  Urine Updated:  04/04/18 1652     Color, UA Yellow     Clarity, UA Clear     pH, UA 5 5     Leukocytes, UA Negative     Nitrite, UA Negative     Protein, UA Negative mg/dl      Glucose, UA Negative mg/dl      Ketones, UA Negative mg/dl      Urobilinogen, UA 0 2 E U /dl      Bilirubin, UA Negative     Blood, UA Negative     Specific Gravity, UA 1 025    Narrative:       CL[facility] RESULT                 CT renal stone study abdomen pelvis without contrast   Final Result by Uday Ponce MD (04/04 1704)      No acute findings  A low dose renal stone protocol was performed therefore portions of the upper abdomen are excluded  Workstation performed: [id number]         XR chest 2 views   Final Result by Katty Ruiz MD (04/04 1648)      No acute cardiopulmonary disease  Workstation performed: [id number]                    Procedures  Procedures       Phone Contacts  ED Phone Contact    ED Course  ED Course                                MDM  Number of Diagnoses or Management Options  Diagnosis management comments: This is a 40-year-old male patient who fell 2 weeks shows complaining of right flank pain and has a cough  He was given 15 mg Toradol IM this the 1st time he medicate himself for pain and he has no pain whatsoever  His urine is negative his CT scan is negative and chest x-ray shows no acute findings  I believe this is more of a musculoskeletal pulled    He was given medications for home and follow up with his family doctor    CritCare Time    Disposition  Final diagnoses:   Muscular abdominal pain in right flank     Time reflects when diagnosis was documented in both MDM as applicable and the Disposition within this note     Time User Action Codes Description Comment    4/4/2018  5:28 PM [place], [address] [M79 1] Muscular abdominal pain in right flank       ED Disposition     ED Disposition Condition Comment    Discharge  [facility] discharge to home/self care  Condition at discharge: Good        Follow-up Information     Follow up With Specialties Details Why Rubén Sarkar MD Internal Medicine Schedule an appointment as soon as possible for a visit  [address] [place] [address]  [phone]          Patient's Medications   Discharge Prescriptions    DICLOFENAC SODIUM (VOLTAREN) 50 MG EC TABLET    Take 1 tablet (50 mg total) by mouth 2 (two) times a day       Start Date: 4/4/2018  End Date: --       Order Dose: 50 mg       Quantity: 10 tablet    Refills: 0     No discharge procedures on file      ED Provider  Electronically Signed by           Bibi Hope PA-C  04/04/18 5896

## 2018-08-21 ENCOUNTER — TRANSCRIBE ORDERS (OUTPATIENT)
Dept: ADMINISTRATIVE | Facility: HOSPITAL | Age: 43
End: 2018-08-21

## 2018-08-21 ENCOUNTER — LAB (OUTPATIENT)
Dept: LAB | Facility: HOSPITAL | Age: 43
End: 2018-08-21

## 2018-08-21 DIAGNOSIS — Z00.8 HEALTH EXAMINATION IN POPULATION SURVEYS: ICD-10-CM

## 2018-08-21 DIAGNOSIS — Z00.8 HEALTH EXAMINATION IN POPULATION SURVEYS: Primary | ICD-10-CM

## 2018-08-21 LAB
CHOLEST SERPL-MCNC: 206 MG/DL (ref 50–200)
HDLC SERPL-MCNC: 49 MG/DL (ref 40–60)
LDLC SERPL CALC-MCNC: 91 MG/DL (ref 0–100)
NONHDLC SERPL-MCNC: 157 MG/DL
TRIGL SERPL-MCNC: 332 MG/DL

## 2018-08-21 PROCEDURE — 83036 HEMOGLOBIN GLYCOSYLATED A1C: CPT

## 2018-08-21 PROCEDURE — 36415 COLL VENOUS BLD VENIPUNCTURE: CPT

## 2018-08-21 PROCEDURE — 80061 LIPID PANEL: CPT

## 2018-08-22 LAB
EST. AVERAGE GLUCOSE BLD GHB EST-MCNC: 108 MG/DL
HBA1C MFR BLD: 5.4 % (ref 4.2–6.3)

## 2018-08-24 NOTE — PROGRESS NOTES
Please contact patient and see that he has follow up scheduled in 1-2 weeks to discuss lab results  Also have him start low fat low cholesterol diet  Can take OTC fish oil to help reduce triglycerides

## 2018-08-28 ENCOUNTER — OFFICE VISIT (OUTPATIENT)
Dept: INTERNAL MEDICINE CLINIC | Age: 43
End: 2018-08-28
Payer: COMMERCIAL

## 2018-08-28 VITALS
HEIGHT: 68 IN | SYSTOLIC BLOOD PRESSURE: 102 MMHG | OXYGEN SATURATION: 97 % | TEMPERATURE: 98.2 F | WEIGHT: 220.2 LBS | DIASTOLIC BLOOD PRESSURE: 68 MMHG | HEART RATE: 79 BPM | BODY MASS INDEX: 33.37 KG/M2

## 2018-08-28 DIAGNOSIS — E78.1 HYPERTRIGLYCERIDEMIA: ICD-10-CM

## 2018-08-28 DIAGNOSIS — K21.9 GASTROESOPHAGEAL REFLUX DISEASE WITHOUT ESOPHAGITIS: ICD-10-CM

## 2018-08-28 DIAGNOSIS — J06.9 VIRAL URI WITH COUGH: Primary | ICD-10-CM

## 2018-08-28 DIAGNOSIS — G43.709 CHRONIC MIGRAINE WITHOUT AURA WITHOUT STATUS MIGRAINOSUS, NOT INTRACTABLE: ICD-10-CM

## 2018-08-28 PROBLEM — R68.84 PAIN IN MANDIBLE: Status: RESOLVED | Noted: 2017-01-11 | Resolved: 2018-08-28

## 2018-08-28 PROBLEM — H93.8X1 CONGESTION OF RIGHT EAR: Status: RESOLVED | Noted: 2018-03-08 | Resolved: 2018-08-28

## 2018-08-28 PROCEDURE — 99214 OFFICE O/P EST MOD 30 MIN: CPT | Performed by: INTERNAL MEDICINE

## 2018-08-28 PROCEDURE — 3008F BODY MASS INDEX DOCD: CPT | Performed by: INTERNAL MEDICINE

## 2018-08-28 NOTE — ASSESSMENT & PLAN NOTE
Will defer on starting antibiotic therapy at this time  Recommend continuing with over-the-counter medications for symptom relief

## 2018-08-28 NOTE — ASSESSMENT & PLAN NOTE
Elevation likely due to a nonfasting blood work  In reviewing previous years lipid panels, he has been pretty consistent and low risk for cardiovascular complications  He has not made any significant changes in his lifestyle  Recommend continuing lifestyle modifications and defer on starting any lipid lowering medications at this time  Will recheck lipid panel in 8-10 months

## 2018-08-28 NOTE — PROGRESS NOTES
Assessment/Plan:    Viral URI with cough    Will defer on starting antibiotic therapy at this time  Recommend continuing with over-the-counter medications for symptom relief  Hypertriglyceridemia   Elevation likely due to a nonfasting blood work  In reviewing previous years lipid panels, he has been pretty consistent and low risk for cardiovascular complications  He has not made any significant changes in his lifestyle  Recommend continuing lifestyle modifications and defer on starting any lipid lowering medications at this time  Will recheck lipid panel in 8-10 months  GERD (gastroesophageal reflux disease)    Continue with over-the-counter antacid as needed  Diagnoses and all orders for this visit:    Viral URI with cough    Hypertriglyceridemia    Chronic migraine without aura without status migrainosus, not intractable    Gastroesophageal reflux disease without esophagitis        Time spent during encounter: 25 minutes (reviewing labs and counseling on lifestyle modifications)  Subjective:      Patient ID: Jeff La is a 38 y o  male  26-year-old male is seen today for evaluation for abnormal labs  In reviewing laboratory studies, A1c was 5 4% however he did have elevation in his lipid to which his total cholesterol was 206 and triglycerides were elevated at 332  He does admit to not fasting prior to laboratory studies and submitted labs in the afternoon after work  He also reports having symptoms of sore throat since yesterday  Sore Throat    This is a new problem  The current episode started yesterday  The problem has been unchanged  There has been no fever  The patient is experiencing no pain  Associated symptoms include coughing and headaches  Pertinent negatives include no abdominal pain, congestion, diarrhea, drooling, ear discharge, ear pain, hoarse voice, plugged ear sensation, neck pain, shortness of breath, stridor, swollen glands, trouble swallowing or vomiting  Treatments tried: DayQuil  The treatment provided mild relief  Migraine    This is a chronic problem  The current episode started more than 1 year ago  The problem occurs monthly  The problem has been unchanged  The pain is located in the bilateral region  The pain does not radiate  The quality of the pain is described as band-like and throbbing  The pain is mild  Associated symptoms include coughing and a sore throat  Pertinent negatives include no abdominal pain, dizziness, ear pain, fever, nausea, neck pain, numbness, rhinorrhea, sinus pressure, swollen glands, vomiting or weakness  Treatments tried: Topiramate for prophylaxis, Fioricet for breakthrough headaches  The treatment provided moderate relief  His past medical history is significant for migraine headaches  Heartburn   He complains of coughing and a sore throat  He reports no abdominal pain, no chest pain, no choking, no hoarse voice, no nausea or no wheezing  This is a chronic problem  The current episode started more than 1 year ago  The problem occurs occasionally  The problem has been unchanged  The symptoms are aggravated by certain foods  Pertinent negatives include no fatigue  He has tried an antacid for the symptoms  The treatment provided moderate relief  The following portions of the patient's history were reviewed and updated as appropriate: allergies, current medications, past family history, past medical history, past social history, past surgical history and problem list     Review of Systems   Constitutional: Negative for activity change, appetite change, chills, diaphoresis, fatigue and fever  HENT: Positive for postnasal drip and sore throat  Negative for congestion, drooling, ear discharge, ear pain, hoarse voice, rhinorrhea, sinus pain, sinus pressure, sneezing and trouble swallowing  Eyes: Negative for visual disturbance  Respiratory: Positive for cough   Negative for apnea, choking, chest tightness, shortness of breath, wheezing and stridor  Cardiovascular: Negative for chest pain, palpitations and leg swelling  Gastrointestinal: Negative for abdominal distention, abdominal pain, anal bleeding, blood in stool, constipation, diarrhea, nausea and vomiting  Endocrine: Negative for cold intolerance and heat intolerance  Genitourinary: Negative for difficulty urinating, dysuria and hematuria  Musculoskeletal: Negative  Negative for neck pain  Skin: Negative  Neurological: Positive for headaches  Negative for dizziness, weakness, light-headedness and numbness  Hematological: Negative for adenopathy  Psychiatric/Behavioral: Negative for agitation, sleep disturbance and suicidal ideas  All other systems reviewed and are negative  Past Medical History:   Diagnosis Date    Depression with anxiety     last assessed: 5/12/2014    Febrile illness     last assessed: 12/2/2015    Fracture, finger     last assessed: 5/22/2015    Leukocytosis     last assessed: 1/23/2017    Migraine     Sciatica, left side     last assessed: 12/3/2014    Steroid-induced hyperglycemia     last assessed: 2/15/2017         Current Outpatient Prescriptions:     butalbital-acetaminophen-caffeine (FIORICET,ESGIC) -40 mg per tablet, Take 1 tablet by mouth daily as needed for migraine For on set of headache, Disp: 30 tablet, Rfl: 0    methocarbamol (ROBAXIN) 500 mg tablet, Take 1 tablet (500 mg total) by mouth daily, Disp: 30 tablet, Rfl: 0    topiramate (TOPAMAX) 50 MG tablet, Take 1 tablet (50 mg total) by mouth daily, Disp: 90 tablet, Rfl: 1    No Known Allergies    Social History   No past surgical history on file    Family History   Problem Relation Age of Onset    Diabetes Mother     Hypertension Mother     Diabetes Father     Gout Father     Hypertension Father     Cancer Maternal Grandmother     Rosacea Family        Objective:  /68 (BP Location: Left arm, Patient Position: Sitting, Cuff Size: Standard)   Pulse 79   Temp 98 2 °F (36 8 °C) (Tympanic)   Ht 5' 7 72" (1 72 m)   Wt 99 9 kg (220 lb 3 2 oz)   SpO2 97%   BMI 33 76 kg/m²     Recent Results (from the past 1344 hour(s))   Hemoglobin A1C    Collection Time: 08/21/18  4:42 PM   Result Value Ref Range    Hemoglobin A1C 5 4 4 2 - 6 3 %     mg/dl   Lipid panel    Collection Time: 08/21/18  4:42 PM   Result Value Ref Range    Cholesterol 206 (H) 50 - 200 mg/dL    Triglycerides 332 (H) <=150 mg/dL    HDL, Direct 49 40 - 60 mg/dL    LDL Calculated 91 0 - 100 mg/dL    Non-HDL-Chol (CHOL-HDL) 157 mg/dl            Physical Exam   Constitutional: He is oriented to person, place, and time  He appears well-developed and well-nourished  No distress  HENT:   Head: Normocephalic and atraumatic  Eyes: Conjunctivae and EOM are normal  Pupils are equal, round, and reactive to light  Right eye exhibits no discharge  Left eye exhibits no discharge  No scleral icterus  Neck: Normal range of motion  Neck supple  No JVD present  No thyromegaly present  Cardiovascular: Normal rate, regular rhythm, normal heart sounds and intact distal pulses  Exam reveals no gallop and no friction rub  No murmur heard  Pulmonary/Chest: Effort normal and breath sounds normal  No respiratory distress  He has no wheezes  He has no rales  He exhibits no tenderness  Abdominal: Soft  Bowel sounds are normal  He exhibits no distension and no mass  There is no tenderness  There is no rebound and no guarding  Musculoskeletal: Normal range of motion  He exhibits no edema, tenderness or deformity  Lymphadenopathy:     He has no cervical adenopathy  Neurological: He is alert and oriented to person, place, and time  He has normal reflexes  No cranial nerve deficit  Coordination normal    Skin: Skin is warm and dry  No rash noted  He is not diaphoretic  No erythema  No pallor  Psychiatric: He has a normal mood and affect   His behavior is normal  Judgment and thought content normal    Nursing note and vitals reviewed

## 2018-11-13 ENCOUNTER — OFFICE VISIT (OUTPATIENT)
Dept: OBGYN CLINIC | Facility: CLINIC | Age: 43
End: 2018-11-13
Payer: COMMERCIAL

## 2018-11-13 VITALS
HEART RATE: 67 BPM | SYSTOLIC BLOOD PRESSURE: 111 MMHG | HEIGHT: 68 IN | WEIGHT: 218 LBS | BODY MASS INDEX: 33.04 KG/M2 | DIASTOLIC BLOOD PRESSURE: 69 MMHG

## 2018-11-13 DIAGNOSIS — M65.341 TRIGGER RING FINGER OF RIGHT HAND: Primary | ICD-10-CM

## 2018-11-13 DIAGNOSIS — M65.331 TRIGGER MIDDLE FINGER OF RIGHT HAND: ICD-10-CM

## 2018-11-13 PROCEDURE — 99213 OFFICE O/P EST LOW 20 MIN: CPT | Performed by: ORTHOPAEDIC SURGERY

## 2018-11-13 PROCEDURE — 20550 NJX 1 TENDON SHEATH/LIGAMENT: CPT | Performed by: ORTHOPAEDIC SURGERY

## 2018-11-13 RX ORDER — LIDOCAINE HYDROCHLORIDE 10 MG/ML
0.5 INJECTION, SOLUTION INFILTRATION; PERINEURAL
Status: COMPLETED | OUTPATIENT
Start: 2018-11-13 | End: 2018-11-13

## 2018-11-13 RX ORDER — TRIAMCINOLONE ACETONIDE 40 MG/ML
20 INJECTION, SUSPENSION INTRA-ARTICULAR; INTRAMUSCULAR
Status: COMPLETED | OUTPATIENT
Start: 2018-11-13 | End: 2018-11-13

## 2018-11-13 RX ADMIN — LIDOCAINE HYDROCHLORIDE 0.5 ML: 10 INJECTION, SOLUTION INFILTRATION; PERINEURAL at 08:49

## 2018-11-13 RX ADMIN — TRIAMCINOLONE ACETONIDE 20 MG: 40 INJECTION, SUSPENSION INTRA-ARTICULAR; INTRAMUSCULAR at 08:49

## 2018-11-13 NOTE — PATIENT INSTRUCTIONS

## 2018-11-13 NOTE — PROGRESS NOTES
CHIEF COMPLAINT:  No chief complaint on file  SUBJECTIVE:  Angelica Chan is a 38y o  year old RHD male who presents to the office for clicking and locking of his right ring and long fingers  Patient states this has been ongoing since 2015  Patient states that he is in so much pain he is no longer able to grab things  Pt states that the symptoms worsen when he is driving  He states that his fingers get stuck around the steering wheel  Patient was previously scheduled for surgery with Dr Newell Given but canceled due to his work schedule  Patient has had 2 cortisone injections for each trigger finger by Dr Nweell Given in the past       PAST MEDICAL HISTORY:  Past Medical History:   Diagnosis Date    Depression with anxiety     last assessed: 5/12/2014    Febrile illness     last assessed: 12/2/2015    Fracture, finger     last assessed: 5/22/2015    Leukocytosis     last assessed: 1/23/2017    Migraine     Sciatica, left side     last assessed: 12/3/2014    Steroid-induced hyperglycemia     last assessed: 2/15/2017       PAST SURGICAL HISTORY:  History reviewed  No pertinent surgical history      FAMILY HISTORY:  Family History   Problem Relation Age of Onset    Diabetes Mother     Hypertension Mother     Diabetes Father     Gout Father     Hypertension Father     Cancer Maternal Grandmother     Rosacea Family        SOCIAL HISTORY:  Social History   Substance Use Topics    Smoking status: Never Smoker    Smokeless tobacco: Never Used    Alcohol use Yes      Comment: social (1 time per year)       MEDICATIONS:    Current Outpatient Prescriptions:     butalbital-acetaminophen-caffeine (FIORICET,ESGIC) -40 mg per tablet, Take 1 tablet by mouth daily as needed for migraine For on set of headache, Disp: 30 tablet, Rfl: 0    methocarbamol (ROBAXIN) 500 mg tablet, Take 1 tablet (500 mg total) by mouth daily, Disp: 30 tablet, Rfl: 0    topiramate (TOPAMAX) 50 MG tablet, Take 1 tablet (50 mg total) by mouth daily, Disp: 90 tablet, Rfl: 1    ALLERGIES:  No Known Allergies    REVIEW OF SYSTEMS:  Review of Systems   Constitutional: Negative for chills, fever and unexpected weight change  HENT: Negative for hearing loss, nosebleeds and sore throat  Eyes: Negative for pain, redness and visual disturbance  Respiratory: Negative for cough, shortness of breath and wheezing  Cardiovascular: Negative for chest pain, palpitations and leg swelling  Gastrointestinal: Negative for abdominal pain, nausea and vomiting  Endocrine: Negative for polydipsia and polyuria  Genitourinary: Negative for dysuria and hematuria  Musculoskeletal: Negative for arthralgias, joint swelling and myalgias  Skin: Negative for rash and wound  Neurological: Negative for light-headedness, numbness and headaches  Psychiatric/Behavioral: Negative for decreased concentration, dysphoric mood and suicidal ideas  The patient is not nervous/anxious  VITALS:  Vitals:    11/13/18 0819   BP: 111/69   Pulse: 67       LABS:  HgA1c:   Lab Results   Component Value Date    HGBA1C 5 4 08/21/2018     BMP:   Lab Results   Component Value Date    GLUCOSE 120 12/02/2015    CALCIUM 9 4 01/04/2018     12/02/2015    K 4 4 01/04/2018    CO2 29 01/04/2018     01/04/2018    BUN 24 01/04/2018    CREATININE 0 84 01/04/2018       _____________________________________________________  PHYSICAL EXAMINATION:  General: well developed and well nourished, alert, oriented times 3 and appears comfortable  Psychiatric: Normal  HEENT: Trachea Midline, No torticollis  Pulmonary: No audible wheezing or respiratory distress   Skin: No masses, erthema, lacerations, fluctation, ulcerations  Neurovascular: Sensation Intact to the Median, Ulnar, Radial Nerve, Motor Intact to the Median, Ulnar, Radial Nerve and Pulses Intact    MUSCULOSKELETAL EXAMINATION:  right ring finger:  Positive palpable nodule over the A1 pulley    Positive tenderness greater than the long finger to palpation over A1 pulley  Negative catching  Positive clicking  right long finger:  Positive palpable nodule over the A1 pulley  Positive tenderness to palpation over A1 pulley  Negative catching  Negative clicking         ___________________________________________________  STUDIES REVIEWED:  No studies reviewed         PROCEDURES PERFORMED:  Hand/upper extremity injection  Date/Time: 11/13/2018 8:49 AM  Consent given by: patient  Site marked: site marked  Timeout: Immediately prior to procedure a time out was called to verify the correct patient, procedure, equipment, support staff and site/side marked as required   Supporting Documentation  Indications: tendon swelling and pain   Procedure Details  Condition:trigger finger Location: long finger - R long A1   Preparation: Patient was prepped and draped in the usual sterile fashion  Ultrasound guidance: no  Medications administered: 0 5 mL lidocaine 1 %; 20 mg triamcinolone acetonide 40 mg/mL  Patient tolerance: patient tolerated the procedure well with no immediate complications  Dressing:  Sterile dressing applied   Hand/upper extremity injection  Date/Time: 11/13/2018 8:49 AM  Consent given by: patient  Site marked: site marked  Timeout: Immediately prior to procedure a time out was called to verify the correct patient, procedure, equipment, support staff and site/side marked as required   Supporting Documentation  Indications: tendon swelling and pain   Procedure Details  Condition:trigger finger Location: ring finger - R ring A1   Preparation: Patient was prepped and draped in the usual sterile fashion  Ultrasound guidance: no  Medications administered: 0 5 mL lidocaine 1 %; 20 mg triamcinolone acetonide 40 mg/mL  Patient tolerance: patient tolerated the procedure well with no immediate complications  Dressing:  Sterile dressing applied            _____________________________________________________  ASSESSMENT/PLAN:    Trigger finger of the right ring and long fingers  * CSI was administered today in right ring and long finger A1 pulleys without complications  * Pt was advised to apply heat for stiffness   * Pt will follow up in 2 weeks        Follow Up:  No Follow-up on file  To Do Next Visit:  Re-evaluation of current issue    General Discussions:  Trigger Finger: The anatomy and physiology of trigger finger was discussed with the patient today in the office  Edema and increased contact pressure within the flexor tendons at the A1 pulley can cause pain, crepitation, and triggering or locking of the digit resulting in limitation of function  Symptoms can occur at anytime but are typically worse in the morning or after a brief rest from repetitive activity  Treatment options include resting/nighttime MP blocking splints to decrease edema, oral anti-inflammatory medications, home or formal therapy exercises, up to 2 steroid injections within the tendon sheath, or surgical release  While majority of patients do respond to conservative treatment, up to 20% may require surgical release         Scribe Attestation    I,:   Radha Rob am acting as a scribe while in the presence of the attending physician :        I,:   Sarahy Sal MD personally performed the services described in this documentation    as scribed in my presence :

## 2018-11-21 ENCOUNTER — TELEPHONE (OUTPATIENT)
Dept: INTERNAL MEDICINE CLINIC | Age: 43
End: 2018-11-21

## 2018-11-21 DIAGNOSIS — K04.7 DENTAL ABSCESS: Primary | ICD-10-CM

## 2018-11-21 RX ORDER — AMOXICILLIN 875 MG/1
875 TABLET, COATED ORAL 2 TIMES DAILY
Qty: 20 TABLET | Refills: 0 | Status: SHIPPED | OUTPATIENT
Start: 2018-11-21 | End: 2018-12-01

## 2018-11-28 ENCOUNTER — OFFICE VISIT (OUTPATIENT)
Dept: OBGYN CLINIC | Facility: CLINIC | Age: 43
End: 2018-11-28
Payer: COMMERCIAL

## 2018-11-28 VITALS
WEIGHT: 218 LBS | HEIGHT: 68 IN | BODY MASS INDEX: 33.04 KG/M2 | HEART RATE: 66 BPM | DIASTOLIC BLOOD PRESSURE: 73 MMHG | SYSTOLIC BLOOD PRESSURE: 134 MMHG

## 2018-11-28 DIAGNOSIS — M65.331 TRIGGER MIDDLE FINGER OF RIGHT HAND: Primary | ICD-10-CM

## 2018-11-28 DIAGNOSIS — M65.341 TRIGGER RING FINGER OF RIGHT HAND: ICD-10-CM

## 2018-11-28 PROCEDURE — 99212 OFFICE O/P EST SF 10 MIN: CPT | Performed by: ORTHOPAEDIC SURGERY

## 2018-11-28 NOTE — PROGRESS NOTES
CHIEF COMPLAINT:  Chief Complaint   Patient presents with    Right Hand - Clicking, Locking       SUBJECTIVE:  Shandra Jacinto is a 38y o  year old RHD   male who presents for f/u to right long and ring trigger fingers  He had a cortisone injection into both right long and ring finger A1 pulleys 2 weeks ago  He states this was his second injection into these fingers  Sometimes the long finger still triggers  It is not painful  PAST MEDICAL HISTORY:  Past Medical History:   Diagnosis Date    Depression with anxiety     last assessed: 5/12/2014    Febrile illness     last assessed: 12/2/2015    Fracture, finger     last assessed: 5/22/2015    Leukocytosis     last assessed: 1/23/2017    Migraine     Sciatica, left side     last assessed: 12/3/2014    Steroid-induced hyperglycemia     last assessed: 2/15/2017       PAST SURGICAL HISTORY:  History reviewed  No pertinent surgical history      FAMILY HISTORY:  Family History   Problem Relation Age of Onset    Diabetes Mother     Hypertension Mother     Diabetes Father     Gout Father     Hypertension Father     Cancer Maternal Grandmother     Rosacea Family        SOCIAL HISTORY:  Social History   Substance Use Topics    Smoking status: Never Smoker    Smokeless tobacco: Never Used    Alcohol use Yes      Comment: social (1 time per year)       MEDICATIONS:    Current Outpatient Prescriptions:     amoxicillin (AMOXIL) 875 mg tablet, Take 1 tablet (875 mg total) by mouth 2 (two) times a day for 10 days, Disp: 20 tablet, Rfl: 0    butalbital-acetaminophen-caffeine (FIORICET,ESGIC) -40 mg per tablet, Take 1 tablet by mouth daily as needed for migraine For on set of headache, Disp: 30 tablet, Rfl: 0    methocarbamol (ROBAXIN) 500 mg tablet, Take 1 tablet (500 mg total) by mouth daily, Disp: 30 tablet, Rfl: 0    topiramate (TOPAMAX) 50 MG tablet, Take 1 tablet (50 mg total) by mouth daily, Disp: 90 tablet, Rfl: 1    ALLERGIES:  No Known Allergies    REVIEW OF SYSTEMS:  Review of Systems   Constitutional: Negative for chills, fever and unexpected weight change  HENT: Negative for hearing loss, nosebleeds and sore throat  Eyes: Negative for pain, redness and visual disturbance  Respiratory: Negative for cough, shortness of breath and wheezing  Cardiovascular: Negative for chest pain, palpitations and leg swelling  Gastrointestinal: Negative for abdominal pain, nausea and vomiting  Endocrine: Negative for polydipsia and polyuria  Genitourinary: Negative for dysuria and hematuria  Musculoskeletal: Negative for arthralgias, joint swelling and myalgias  Skin: Negative for rash and wound  Neurological: Negative for dizziness, numbness and headaches  Psychiatric/Behavioral: Negative for decreased concentration and suicidal ideas  The patient is not nervous/anxious  VITALS:  Vitals:    11/28/18 1034   BP: 134/73   Pulse: 66       LABS:  HgA1c:   Lab Results   Component Value Date    HGBA1C 5 4 08/21/2018     BMP:   Lab Results   Component Value Date    GLUCOSE 120 12/02/2015    CALCIUM 9 4 01/04/2018     12/02/2015    K 4 4 01/04/2018    CO2 29 01/04/2018     01/04/2018    BUN 24 01/04/2018    CREATININE 0 84 01/04/2018       _____________________________________________________  PHYSICAL EXAMINATION:  General: well developed and well nourished, alert, oriented times 3 and appears comfortable  Psychiatric: Normal  HEENT: Trachea Midline, No torticollis  Pulmonary: No audible wheezing or respiratory distress   Skin: No masses, erthema, lacerations, fluctation, ulcerations  Neurovascular: Sensation Intact to the Median, Ulnar, Radial Nerve, Motor Intact to the Median, Ulnar, Radial Nerve and Pulses Intact    MUSCULOSKELETAL EXAMINATION:  right ring and long fingers:  Negative palpable nodule over the A1 pulley  Negative tenderness to palpation over A1 pulley  Positive catching  Positive clicking    Long finger was worse than the ring finger      ___________________________________________________  STUDIES REVIEWED:  No studies reviewed  PROCEDURES PERFORMED:  Procedures  No Procedures performed today    _____________________________________________________  ASSESSMENT/PLAN:    Right long and ring trigger fingers  * Great improvement after cortisone injection given 2 weeks ago  He still has some catching and clicking, but it is no longer painful  Surgical treatment was discussed but the patient declined at this time  He will call us if he changes his mind  * Topical heat and NSAIDS may be helpful    Diagnoses and all orders for this visit:    Trigger middle finger of right hand    Trigger ring finger of right hand        Follow Up:  Return if symptoms worsen or fail to improve  To Do Next Visit:  Re-evaluation of current issue    General Discussions:  Trigger Finger: The anatomy and physiology of trigger finger was discussed with the patient today in the office  Edema and increased contact pressure within the flexor tendons at the A1 pulley can cause pain, crepitation, and triggering or locking of the digit resulting in limitation of function  Symptoms can occur at anytime but are typically worse in the morning or after a brief rest from repetitive activity  Treatment options include resting/nighttime MP blocking splints to decrease edema, oral anti-inflammatory medications, home or formal therapy exercises, up to 2 steroid injections within the tendon sheath, or surgical release  While majority of patients do respond to conservative treatment, up to 20% may require surgical release       Scribe Attestation    I,:   Eduardo Hwang PA-C am acting as a scribe while in the presence of the attending physician :        I,:   Evan Warner MD personally performed the services described in this documentation    as scribed in my presence :

## 2018-12-20 ENCOUNTER — OFFICE VISIT (OUTPATIENT)
Dept: OBGYN CLINIC | Facility: CLINIC | Age: 43
End: 2018-12-20
Payer: COMMERCIAL

## 2018-12-20 VITALS
HEART RATE: 60 BPM | WEIGHT: 218 LBS | BODY MASS INDEX: 33.04 KG/M2 | HEIGHT: 68 IN | DIASTOLIC BLOOD PRESSURE: 80 MMHG | SYSTOLIC BLOOD PRESSURE: 125 MMHG

## 2018-12-20 DIAGNOSIS — M65.341 TRIGGER RING FINGER OF RIGHT HAND: Primary | ICD-10-CM

## 2018-12-20 DIAGNOSIS — M65.331 TRIGGER MIDDLE FINGER OF RIGHT HAND: ICD-10-CM

## 2018-12-20 PROCEDURE — 99214 OFFICE O/P EST MOD 30 MIN: CPT | Performed by: ORTHOPAEDIC SURGERY

## 2018-12-20 RX ORDER — LIDOCAINE HYDROCHLORIDE AND EPINEPHRINE 10; 10 MG/ML; UG/ML
10 INJECTION, SOLUTION INFILTRATION; PERINEURAL ONCE
Status: CANCELLED | OUTPATIENT
Start: 2018-12-20 | End: 2018-12-20

## 2018-12-20 RX ORDER — HYDROCODONE BITARTRATE AND ACETAMINOPHEN 5; 325 MG/1; MG/1
1 TABLET ORAL EVERY 6 HOURS PRN
Qty: 20 TABLET | Refills: 0 | Status: SHIPPED | OUTPATIENT
Start: 2018-12-20 | End: 2018-12-30

## 2018-12-20 NOTE — PROGRESS NOTES
CHIEF COMPLAINT:  Chief Complaint   Patient presents with    Right Hand - Follow-up       SUBJECTIVE:  Gaby Bella is a 37y o  year old RHD male  who presents to the office with recurrent pain clicking and locking of his right ring and long fingers  Pt states that this has been going on for about 5 years  Pt has 2 prior CSI before being seen by our practice  We administered a CSI on 11/13/18 which relieved most of the pain a, clicking and locking  Pt states that the pain has now returned with occasional clicking and locking  The patient denies any cardiac or pulmonary issues  Denies diabetes  Denies any history of MI, gastric ulcers, kidney or liver issues  Denies blood thinners  PAST MEDICAL HISTORY:  Past Medical History:   Diagnosis Date    Depression with anxiety     last assessed: 5/12/2014    Febrile illness     last assessed: 12/2/2015    Fracture, finger     last assessed: 5/22/2015    Leukocytosis     last assessed: 1/23/2017    Migraine     Sciatica, left side     last assessed: 12/3/2014    Steroid-induced hyperglycemia     last assessed: 2/15/2017       PAST SURGICAL HISTORY:  History reviewed  No pertinent surgical history      FAMILY HISTORY:  Family History   Problem Relation Age of Onset    Diabetes Mother     Hypertension Mother     Diabetes Father     Gout Father     Hypertension Father     Cancer Maternal Grandmother     Rosacea Family        SOCIAL HISTORY:  Social History   Substance Use Topics    Smoking status: Never Smoker    Smokeless tobacco: Never Used    Alcohol use Yes      Comment: social (1 time per year)       MEDICATIONS:    Current Outpatient Prescriptions:     butalbital-acetaminophen-caffeine (FIORICET,ESGIC) -40 mg per tablet, Take 1 tablet by mouth daily as needed for migraine For on set of headache, Disp: 30 tablet, Rfl: 0    methocarbamol (ROBAXIN) 500 mg tablet, Take 1 tablet (500 mg total) by mouth daily, Disp: 30 tablet, Rfl: 0    topiramate (TOPAMAX) 50 MG tablet, Take 1 tablet (50 mg total) by mouth daily, Disp: 90 tablet, Rfl: 1    ALLERGIES:  No Known Allergies    REVIEW OF SYSTEMS:  Review of Systems   Constitutional: Negative for chills, fever and unexpected weight change  HENT: Negative for hearing loss, nosebleeds and sore throat  Eyes: Negative for pain, redness and visual disturbance  Respiratory: Negative for cough, shortness of breath and wheezing  Cardiovascular: Negative for chest pain, palpitations and leg swelling  Gastrointestinal: Negative for abdominal pain, nausea and vomiting  Endocrine: Negative for polydipsia and polyuria  Genitourinary: Negative for dysuria and hematuria  Musculoskeletal: Negative for arthralgias, joint swelling and myalgias  Skin: Negative for rash and wound  Neurological: Negative for light-headedness, numbness and headaches  Psychiatric/Behavioral: Negative for decreased concentration, dysphoric mood and suicidal ideas  The patient is not nervous/anxious  VITALS:  Vitals:    12/20/18 1023   BP: 125/80   Pulse: 60       LABS:  HgA1c:   Lab Results   Component Value Date    HGBA1C 5 4 08/21/2018     BMP:   Lab Results   Component Value Date    GLUCOSE 120 12/02/2015    CALCIUM 9 4 01/04/2018     12/02/2015    K 4 4 01/04/2018    CO2 29 01/04/2018     01/04/2018    BUN 24 01/04/2018    CREATININE 0 84 01/04/2018       _____________________________________________________  PHYSICAL EXAMINATION:  General: well developed and well nourished, alert, oriented times 3 and appears comfortable  Psychiatric: Normal  HEENT: Trachea Midline, No torticollis  Cardiac:  Regular rate and rhythm  Pulmonary: No respiratory distress  Lung sounds clear to auscultation       Skin: No masses, erthema, lacerations, fluctation, ulcerations  Neurovascular: Sensation Intact to the Median, Ulnar, Radial Nerve, Motor Intact to the Median, Ulnar, Radial Nerve and Pulses Intact    MUSCULOSKELETAL EXAMINATION:  right long finger:  Positive palpable nodule over the A1 pulley  Positive tenderness to palpation over A1 pulley  Positive catching and locking  Positive clicking  right ring finger:  Positive palpable nodule over the A1 pulley  Positive tenderness to palpation over A1 pulley  Negative catching  Positive clicking         ___________________________________________________  STUDIES REVIEWED:  No studies reviewed  PROCEDURES PERFORMED:  Procedures  No Procedures performed today    _____________________________________________________  ASSESSMENT/PLAN:    Trigger finger of the right ring and long fingers   *surgery scheduled: trigger finger release right ring and long fingers   *Anesthesia: local only   * PT/OT ordered for wound care   * Post op medication was sent to pharmacy    Surgery medication instructions: You will stop eating and drinking at midnight the night before your surgery, but you may continue to take your normal medications with a small sip of water  In the morning on the day of your surgery, we would like you to take the following medications (as long as you have never been told to avoid Tylenol or NSAIDs like ibuprofen, Naproxen, Aleve, Advil, etc):   Ibuprofen 600mg one tablet by mouth   Tylenol 500mg one tablet by mouth    After surgery, we would like you to take Ibuprofen 600mg one tablet by mouth every 6 hours with food (at breakfast, lunch and dinner)  AND Tylenol 500 mg one tablet by mouth every 6 hours  (at breakfast, lunch and dinner) for 5-7 days after your surgery  Please take these medication EVERYDAY after surgery for 5-7 days, and not just as needed  You can take these medications at the same time  Taking these medications after surgery will limit your need for prescription pain medication        We will also prescribe a narcotic pain medication for a limited time after surgery that you can take as needed for moderate or severe pain          Diagnoses and all orders for this visit:    Trigger ring finger of right hand  -     Ambulatory referral to PT/OT hand therapy    Trigger middle finger of right hand  -     Ambulatory referral to PT/OT hand therapy        Follow Up:  No Follow-up on file  To Do Next Visit:  Re-evaluation of current issue remove sutures        Operative Discussions:  Trigger Finger Release: The anatomy and physiology of trigger finger was discussed with the patient today in the office  Edema and increased contact pressure within the flexor tendons at the A1 pulley can cause pain, crepitation, and limitation of function  Treatment options include resting MP blocking splints to decrease edema, oral anti-inflammatory medications, home or formal therapy exercises, up to 2 steroid injections or surgical release  While majority of patients do respond to conservative treatment, up to 20% may require surgical release  The patient has elected release of the trigger finger  The patient has elected to undergo a release of the A1 pulley (trigger finger)  A small incision will be made over the palmar aspect of the hand, the tendon sheath holding the flexor tendons will be released  In the postoperative period, light activities are allowed immediately, driving is allowed when narcotic medication has stopped, and the incision may get wet after 2 days  Heavy activities (lifting more than approximately 10 pounds) will be allowed after the follow up appointment in 1-2 weeks  While the pain and discomfort within the wrist typically improves rapidly, some residual discomfort may be present for up to 6 weeks  The nodule that is typically palpable in the palmar aspect of the hand will not be removed, as this would necessitate removal of a portion of the flexor tendon, however the catching, clicking, and locking should resolve  Approximate success rate is 98%  The risks and benefits of the procedure were explained to the patient, which include, but are not limited to: Bleeding, infection, recurrence, pain, scar, damage to tendons, damage to nerves, and damage to blood vessels, need for future surgery and complications related to anesthesia  If bony work is done, risks also include malunion and nonunion  These risks, along with alternative conservative treatment options, and postoperative protocols were voiced back and understood by the patient  All questions were answered to the patient's satisfaction  The patient agrees to comply with a standard postoperative protocol, and is willing to proceed  Education was provided via written and auditory forms  There were no barriers to learning  Written handouts regarding wound care, incision and scar care, and general preoperative information, as well as risks and benefits were provided to the patient      Scribe Attestation    I,:   Shweta Pavon am acting as a scribe while in the presence of the attending physician :        I,:   Jennifer Mae MD personally performed the services described in this documentation    as scribed in my presence :

## 2018-12-20 NOTE — H&P
CHIEF COMPLAINT:  Chief Complaint   Patient presents with    Right Hand - Follow-up       SUBJECTIVE:  Maricruz Larose is a 37y o  year old RHD male  who presents to the office with recurrent pain clicking and locking of his right ring and long fingers  Pt states that this has been going on for about 5 years  Pt has 2 prior CSI before being seen by our practice  We administered a CSI on 11/13/18 which relieved most of the pain a, clicking and locking  Pt states that the pain has now returned with occasional clicking and locking  The patient denies any cardiac or pulmonary issues  Denies diabetes  Denies any history of MI, gastric ulcers, kidney or liver issues  Denies blood thinners  PAST MEDICAL HISTORY:  Past Medical History:   Diagnosis Date    Depression with anxiety     last assessed: 5/12/2014    Febrile illness     last assessed: 12/2/2015    Fracture, finger     last assessed: 5/22/2015    Leukocytosis     last assessed: 1/23/2017    Migraine     Sciatica, left side     last assessed: 12/3/2014    Steroid-induced hyperglycemia     last assessed: 2/15/2017       PAST SURGICAL HISTORY:  History reviewed  No pertinent surgical history      FAMILY HISTORY:  Family History   Problem Relation Age of Onset    Diabetes Mother     Hypertension Mother     Diabetes Father     Gout Father     Hypertension Father     Cancer Maternal Grandmother     Rosacea Family        SOCIAL HISTORY:  Social History   Substance Use Topics    Smoking status: Never Smoker    Smokeless tobacco: Never Used    Alcohol use Yes      Comment: social (1 time per year)       MEDICATIONS:    Current Outpatient Prescriptions:     butalbital-acetaminophen-caffeine (FIORICET,ESGIC) -40 mg per tablet, Take 1 tablet by mouth daily as needed for migraine For on set of headache, Disp: 30 tablet, Rfl: 0    methocarbamol (ROBAXIN) 500 mg tablet, Take 1 tablet (500 mg total) by mouth daily, Disp: 30 tablet, Rfl: 0    topiramate (TOPAMAX) 50 MG tablet, Take 1 tablet (50 mg total) by mouth daily, Disp: 90 tablet, Rfl: 1    ALLERGIES:  No Known Allergies    REVIEW OF SYSTEMS:  Review of Systems   Constitutional: Negative for chills, fever and unexpected weight change  HENT: Negative for hearing loss, nosebleeds and sore throat  Eyes: Negative for pain, redness and visual disturbance  Respiratory: Negative for cough, shortness of breath and wheezing  Cardiovascular: Negative for chest pain, palpitations and leg swelling  Gastrointestinal: Negative for abdominal pain, nausea and vomiting  Endocrine: Negative for polydipsia and polyuria  Genitourinary: Negative for dysuria and hematuria  Musculoskeletal: Negative for arthralgias, joint swelling and myalgias  Skin: Negative for rash and wound  Neurological: Negative for light-headedness, numbness and headaches  Psychiatric/Behavioral: Negative for decreased concentration, dysphoric mood and suicidal ideas  The patient is not nervous/anxious  VITALS:  Vitals:    12/20/18 1023   BP: 125/80   Pulse: 60       LABS:  HgA1c:   Lab Results   Component Value Date    HGBA1C 5 4 08/21/2018     BMP:   Lab Results   Component Value Date    GLUCOSE 120 12/02/2015    CALCIUM 9 4 01/04/2018     12/02/2015    K 4 4 01/04/2018    CO2 29 01/04/2018     01/04/2018    BUN 24 01/04/2018    CREATININE 0 84 01/04/2018       _____________________________________________________  PHYSICAL EXAMINATION:  General: well developed and well nourished, alert, oriented times 3 and appears comfortable  Psychiatric: Normal  HEENT: Trachea Midline, No torticollis  Cardiac:  Regular rate and rhythm  Pulmonary: No respiratory distress  Lung sounds clear to auscultation       Skin: No masses, erthema, lacerations, fluctation, ulcerations  Neurovascular: Sensation Intact to the Median, Ulnar, Radial Nerve, Motor Intact to the Median, Ulnar, Radial Nerve and Pulses Intact    MUSCULOSKELETAL EXAMINATION:  right long finger:  Positive palpable nodule over the A1 pulley  Positive tenderness to palpation over A1 pulley  Positive catching and locking  Positive clicking  right ring finger:  Positive palpable nodule over the A1 pulley  Positive tenderness to palpation over A1 pulley  Negative catching  Positive clicking         ___________________________________________________  STUDIES REVIEWED:  No studies reviewed  PROCEDURES PERFORMED:  Procedures  No Procedures performed today    _____________________________________________________  ASSESSMENT/PLAN:    Trigger finger of the right ring and long fingers   *surgery scheduled: trigger finger release right ring and long fingers   *Anesthesia: local only   * PT/OT ordered for wound care   * Post op medication was sent to pharmacy    Surgery medication instructions: You will stop eating and drinking at midnight the night before your surgery, but you may continue to take your normal medications with a small sip of water  In the morning on the day of your surgery, we would like you to take the following medications (as long as you have never been told to avoid Tylenol or NSAIDs like ibuprofen, Naproxen, Aleve, Advil, etc):   Ibuprofen 600mg one tablet by mouth   Tylenol 500mg one tablet by mouth    After surgery, we would like you to take Ibuprofen 600mg one tablet by mouth every 6 hours with food (at breakfast, lunch and dinner)  AND Tylenol 500 mg one tablet by mouth every 6 hours  (at breakfast, lunch and dinner) for 5-7 days after your surgery  Please take these medication EVERYDAY after surgery for 5-7 days, and not just as needed  You can take these medications at the same time  Taking these medications after surgery will limit your need for prescription pain medication        We will also prescribe a narcotic pain medication for a limited time after surgery that you can take as needed for moderate or severe pain          Diagnoses and all orders for this visit:    Trigger ring finger of right hand  -     Ambulatory referral to PT/OT hand therapy    Trigger middle finger of right hand  -     Ambulatory referral to PT/OT hand therapy        Follow Up:  No Follow-up on file  To Do Next Visit:  Re-evaluation of current issue remove sutures        Operative Discussions:  Trigger Finger Release: The anatomy and physiology of trigger finger was discussed with the patient today in the office  Edema and increased contact pressure within the flexor tendons at the A1 pulley can cause pain, crepitation, and limitation of function  Treatment options include resting MP blocking splints to decrease edema, oral anti-inflammatory medications, home or formal therapy exercises, up to 2 steroid injections or surgical release  While majority of patients do respond to conservative treatment, up to 20% may require surgical release  The patient has elected release of the trigger finger  The patient has elected to undergo a release of the A1 pulley (trigger finger)  A small incision will be made over the palmar aspect of the hand, the tendon sheath holding the flexor tendons will be released  In the postoperative period, light activities are allowed immediately, driving is allowed when narcotic medication has stopped, and the incision may get wet after 2 days  Heavy activities (lifting more than approximately 10 pounds) will be allowed after the follow up appointment in 1-2 weeks  While the pain and discomfort within the wrist typically improves rapidly, some residual discomfort may be present for up to 6 weeks  The nodule that is typically palpable in the palmar aspect of the hand will not be removed, as this would necessitate removal of a portion of the flexor tendon, however the catching, clicking, and locking should resolve  Approximate success rate is 98%  The risks and benefits of the procedure were explained to the patient, which include, but are not limited to: Bleeding, infection, recurrence, pain, scar, damage to tendons, damage to nerves, and damage to blood vessels, need for future surgery and complications related to anesthesia  If bony work is done, risks also include malunion and nonunion  These risks, along with alternative conservative treatment options, and postoperative protocols were voiced back and understood by the patient  All questions were answered to the patient's satisfaction  The patient agrees to comply with a standard postoperative protocol, and is willing to proceed  Education was provided via written and auditory forms  There were no barriers to learning  Written handouts regarding wound care, incision and scar care, and general preoperative information, as well as risks and benefits were provided to the patient      Scribe Attestation    I,:   Patel Ferrell am acting as a scribe while in the presence of the attending physician :        I,:   Joel Esparza MD personally performed the services described in this documentation    as scribed in my presence :

## 2018-12-24 ENCOUNTER — APPOINTMENT (EMERGENCY)
Dept: CT IMAGING | Facility: HOSPITAL | Age: 43
End: 2018-12-24
Payer: COMMERCIAL

## 2018-12-24 ENCOUNTER — HOSPITAL ENCOUNTER (EMERGENCY)
Facility: HOSPITAL | Age: 43
Discharge: HOME | End: 2018-12-24
Attending: EMERGENCY MEDICINE
Payer: COMMERCIAL

## 2018-12-24 VITALS
SYSTOLIC BLOOD PRESSURE: 127 MMHG | RESPIRATION RATE: 78 BRPM | BODY MASS INDEX: 34.15 KG/M2 | OXYGEN SATURATION: 97 % | HEIGHT: 67 IN | WEIGHT: 217.59 LBS | HEART RATE: 65 BPM | TEMPERATURE: 98 F | DIASTOLIC BLOOD PRESSURE: 77 MMHG

## 2018-12-24 DIAGNOSIS — R07.89 CHEST WALL PAIN: Primary | ICD-10-CM

## 2018-12-24 LAB
ALBUMIN SERPL BCP-MCNC: 3.7 G/DL (ref 3.5–5)
ALP SERPL-CCNC: 84 U/L (ref 46–116)
ALT SERPL W P-5'-P-CCNC: 24 U/L (ref 12–78)
ANION GAP SERPL CALCULATED.3IONS-SCNC: 8 MMOL/L (ref 4–13)
APTT PPP: 31 SECONDS (ref 26–38)
AST SERPL W P-5'-P-CCNC: 18 U/L (ref 5–45)
ATRIAL RATE: 60 BPM
BASOPHILS # BLD AUTO: 0.04 THOUSANDS/ΜL (ref 0–0.1)
BASOPHILS NFR BLD AUTO: 1 % (ref 0–1)
BILIRUB DIRECT SERPL-MCNC: 0.11 MG/DL (ref 0–0.2)
BILIRUB SERPL-MCNC: 0.5 MG/DL (ref 0.2–1)
BUN SERPL-MCNC: 21 MG/DL (ref 5–25)
CALCIUM SERPL-MCNC: 9 MG/DL (ref 8.3–10.1)
CHLORIDE SERPL-SCNC: 105 MMOL/L (ref 100–108)
CO2 SERPL-SCNC: 28 MMOL/L (ref 21–32)
CREAT SERPL-MCNC: 0.89 MG/DL (ref 0.6–1.3)
EOSINOPHIL # BLD AUTO: 0.32 THOUSAND/ΜL (ref 0–0.61)
EOSINOPHIL NFR BLD AUTO: 5 % (ref 0–6)
ERYTHROCYTE [DISTWIDTH] IN BLOOD BY AUTOMATED COUNT: 12.1 % (ref 11.6–15.1)
GFR SERPL CREATININE-BSD FRML MDRD: 103 ML/MIN/1.73SQ M
GLUCOSE SERPL-MCNC: 97 MG/DL (ref 65–140)
HCT VFR BLD AUTO: 47.3 % (ref 36.5–49.3)
HGB BLD-MCNC: 15.7 G/DL (ref 12–17)
IMM GRANULOCYTES # BLD AUTO: 0.02 THOUSAND/UL (ref 0–0.2)
IMM GRANULOCYTES NFR BLD AUTO: 0 % (ref 0–2)
INR PPP: 0.96 (ref 0.86–1.17)
LYMPHOCYTES # BLD AUTO: 1.93 THOUSANDS/ΜL (ref 0.6–4.47)
LYMPHOCYTES NFR BLD AUTO: 29 % (ref 14–44)
MCH RBC QN AUTO: 29.1 PG (ref 26.8–34.3)
MCHC RBC AUTO-ENTMCNC: 33.2 G/DL (ref 31.4–37.4)
MCV RBC AUTO: 88 FL (ref 82–98)
MONOCYTES # BLD AUTO: 0.56 THOUSAND/ΜL (ref 0.17–1.22)
MONOCYTES NFR BLD AUTO: 8 % (ref 4–12)
NEUTROPHILS # BLD AUTO: 3.87 THOUSANDS/ΜL (ref 1.85–7.62)
NEUTS SEG NFR BLD AUTO: 57 % (ref 43–75)
NRBC BLD AUTO-RTO: 0 /100 WBCS
P AXIS: 48 DEGREES
PLATELET # BLD AUTO: 234 THOUSANDS/UL (ref 149–390)
PMV BLD AUTO: 9.7 FL (ref 8.9–12.7)
POTASSIUM SERPL-SCNC: 3.9 MMOL/L (ref 3.5–5.3)
PR INTERVAL: 146 MS
PROT SERPL-MCNC: 6.9 G/DL (ref 6.4–8.2)
PROTHROMBIN TIME: 12.7 SECONDS (ref 11.8–14.2)
QRS AXIS: -33 DEGREES
QRSD INTERVAL: 102 MS
QT INTERVAL: 428 MS
QTC INTERVAL: 428 MS
RBC # BLD AUTO: 5.4 MILLION/UL (ref 3.88–5.62)
SODIUM SERPL-SCNC: 141 MMOL/L (ref 136–145)
T WAVE AXIS: 12 DEGREES
TROPONIN I SERPL-MCNC: <0.02 NG/ML
TROPONIN I SERPL-MCNC: <0.02 NG/ML
VENTRICULAR RATE: 60 BPM
WBC # BLD AUTO: 6.74 THOUSAND/UL (ref 4.31–10.16)

## 2018-12-24 PROCEDURE — 36415 COLL VENOUS BLD VENIPUNCTURE: CPT | Performed by: PHYSICIAN ASSISTANT

## 2018-12-24 PROCEDURE — 85610 PROTHROMBIN TIME: CPT | Performed by: PHYSICIAN ASSISTANT

## 2018-12-24 PROCEDURE — 99285 EMERGENCY DEPT VISIT HI MDM: CPT

## 2018-12-24 PROCEDURE — 80048 BASIC METABOLIC PNL TOTAL CA: CPT | Performed by: PHYSICIAN ASSISTANT

## 2018-12-24 PROCEDURE — 84484 ASSAY OF TROPONIN QUANT: CPT | Performed by: PHYSICIAN ASSISTANT

## 2018-12-24 PROCEDURE — 80076 HEPATIC FUNCTION PANEL: CPT | Performed by: PHYSICIAN ASSISTANT

## 2018-12-24 PROCEDURE — 96360 HYDRATION IV INFUSION INIT: CPT

## 2018-12-24 PROCEDURE — 85025 COMPLETE CBC W/AUTO DIFF WBC: CPT | Performed by: PHYSICIAN ASSISTANT

## 2018-12-24 PROCEDURE — 85730 THROMBOPLASTIN TIME PARTIAL: CPT | Performed by: PHYSICIAN ASSISTANT

## 2018-12-24 PROCEDURE — 71275 CT ANGIOGRAPHY CHEST: CPT

## 2018-12-24 PROCEDURE — 93010 ELECTROCARDIOGRAM REPORT: CPT | Performed by: INTERNAL MEDICINE

## 2018-12-24 PROCEDURE — 93005 ELECTROCARDIOGRAM TRACING: CPT

## 2018-12-24 RX ORDER — HYDROCODONE BITARTRATE AND ACETAMINOPHEN 5; 325 MG/1; MG/1
1 TABLET ORAL EVERY 6 HOURS PRN
Qty: 12 TABLET | Refills: 0 | Status: ON HOLD | OUTPATIENT
Start: 2018-12-24 | End: 2018-12-26 | Stop reason: SDUPTHER

## 2018-12-24 RX ADMIN — SODIUM CHLORIDE 1000 ML: 0.9 INJECTION, SOLUTION INTRAVENOUS at 06:51

## 2018-12-24 RX ADMIN — IOHEXOL 85 ML: 350 INJECTION, SOLUTION INTRAVENOUS at 07:47

## 2018-12-24 NOTE — ED PROVIDER NOTES
History  Chief Complaint   Patient presents with    Chest Pain     Pt presents to ED with 2 days of chest pain worsening today  Pt states pain is a tightness and radiates to L arm  Pt denies sob, n/v, or dizziness      37 y o  male with past medical history significant for depression, GERD, migraine headaches and hypertriglyceridemia presents to ED with chief complaint of chest pain  Onset of symptoms reported as 2 days ago  Location of symptoms reported left chest  Quality is reported as tightness  Severity is reported as moderate  Associated symptoms: denies cough, denies sob, denies diaphoresis, denies dizziness or syncope, denies lower extremity edema, denies fevers, denies nausea or vomiting  Modifying factors: deep breath exacerbates pain  Context: denies recent fall, injury or trauma  Denies prior similar episodes in the past   Is a non smoker  Reports father with history of cardiac arrhythmia but no premature CAD in 1st degree family member  Reviewed past medical history and visits via EPIC:  Patient last seen in ED on 4/4/2018 for evaluation of muscular flank pain  History provided by:  Patient   used: No        Prior to Admission Medications   Prescriptions Last Dose Informant Patient Reported? Taking?    HYDROcodone-acetaminophen (NORCO) 5-325 mg per tablet   No No   Sig: Take 1 tablet by mouth every 6 (six) hours as needed for pain for up to 10 days To be taken after surgery Max Daily Amount: 4 tablets   butalbital-acetaminophen-caffeine (FIORICET,ESGIC) -40 mg per tablet  Self No No   Sig: Take 1 tablet by mouth daily as needed for migraine For on set of headache   methocarbamol (ROBAXIN) 500 mg tablet  Self No No   Sig: Take 1 tablet (500 mg total) by mouth daily   topiramate (TOPAMAX) 50 MG tablet  Self No No   Sig: Take 1 tablet (50 mg total) by mouth daily      Facility-Administered Medications: None       Past Medical History:   Diagnosis Date    Depression with anxiety     last assessed: 5/12/2014    Febrile illness     last assessed: 12/2/2015    Fracture, finger     last assessed: 5/22/2015    Leukocytosis     last assessed: 1/23/2017    Migraine     Sciatica, left side     last assessed: 12/3/2014    Steroid-induced hyperglycemia     last assessed: 2/15/2017       History reviewed  No pertinent surgical history  Family History   Problem Relation Age of Onset    Diabetes Mother     Hypertension Mother     Diabetes Father     Gout Father     Hypertension Father     Cancer Maternal Grandmother     Rosacea Family      I have reviewed and agree with the history as documented  Social History   Substance Use Topics    Smoking status: Never Smoker    Smokeless tobacco: Never Used    Alcohol use Yes      Comment: social (1 time per year)        Review of Systems   Constitutional: Negative for activity change, appetite change, chills, diaphoresis, fatigue, fever and unexpected weight change  HENT: Negative for congestion, dental problem, drooling, ear discharge, ear pain, facial swelling, hearing loss, mouth sores, nosebleeds, postnasal drip, rhinorrhea, sinus pain, sinus pressure, sneezing, sore throat, tinnitus, trouble swallowing and voice change  Eyes: Negative for photophobia, pain, discharge, redness, itching and visual disturbance  Respiratory: Positive for chest tightness  Negative for apnea, cough, choking, shortness of breath, wheezing and stridor  Cardiovascular: Positive for chest pain  Negative for palpitations and leg swelling  Gastrointestinal: Negative for abdominal distention, abdominal pain, anal bleeding, blood in stool, constipation, diarrhea, nausea and vomiting  Endocrine: Negative for cold intolerance, heat intolerance, polydipsia, polyphagia and polyuria  Genitourinary: Negative for decreased urine volume, difficulty urinating, dysuria, flank pain, frequency, hematuria, testicular pain and urgency  Musculoskeletal: Negative for arthralgias, back pain, gait problem, joint swelling, myalgias, neck pain and neck stiffness  Skin: Negative for color change, pallor, rash and wound  Allergic/Immunologic: Negative for environmental allergies, food allergies and immunocompromised state  Neurological: Negative for dizziness, tremors, seizures, syncope, facial asymmetry, speech difficulty, weakness, light-headedness, numbness and headaches  Hematological: Negative for adenopathy  Does not bruise/bleed easily  Psychiatric/Behavioral: Negative for agitation, behavioral problems, confusion, decreased concentration, hallucinations, sleep disturbance and suicidal ideas  The patient is not hyperactive  All other systems reviewed and are negative  Physical Exam  Physical Exam   Constitutional: He is oriented to person, place, and time  He appears well-developed and well-nourished  No distress  /79 (BP Location: Right arm)   Pulse 57   Temp 98 °F (36 7 °C) (Oral)   Resp 16   Ht 5' 7" (1 702 m)   Wt 98 7 kg (217 lb 9 5 oz)   SpO2 97%   BMI 34 08 kg/m²      HENT:   Head: Normocephalic and atraumatic  Right Ear: External ear normal    Left Ear: External ear normal    Nose: Nose normal    Mouth/Throat: Oropharynx is clear and moist  No oropharyngeal exudate  Eyes: Pupils are equal, round, and reactive to light  Conjunctivae and EOM are normal  Right eye exhibits no discharge  Left eye exhibits no discharge  No scleral icterus  Neck: Normal range of motion  Neck supple  No JVD present  No tracheal deviation present  No thyromegaly present  Cardiovascular: Normal rate, regular rhythm, S1 normal, S2 normal and intact distal pulses  Pulmonary/Chest: Effort normal and breath sounds normal  No stridor  No respiratory distress  He has no wheezes  He has no rales  He exhibits tenderness  There is tenderness to palpation to left anterior lower chest all  No overlying rash  No bony crepitus  Abdominal: Soft  Bowel sounds are normal  He exhibits no distension and no mass  There is no tenderness  There is no rebound and no guarding  No hernia  Musculoskeletal: Normal range of motion  He exhibits no edema, tenderness or deformity  Lymphadenopathy:     He has no cervical adenopathy  Neurological: He is alert and oriented to person, place, and time  He displays normal reflexes  No cranial nerve deficit or sensory deficit  He exhibits normal muscle tone  Coordination normal    Skin: Skin is warm and dry  Capillary refill takes less than 2 seconds  No rash noted  He is not diaphoretic  No erythema  No pallor  Psychiatric: He has a normal mood and affect  His behavior is normal  Judgment and thought content normal    Nursing note and vitals reviewed        Vital Signs  ED Triage Vitals [12/24/18 0622]   Temperature Pulse Respirations Blood Pressure SpO2   98 °F (36 7 °C) 57 16 119/79 97 %      Temp Source Heart Rate Source Patient Position - Orthostatic VS BP Location FiO2 (%)   Oral Monitor Lying Right arm --      Pain Score       8           Vitals:    12/24/18 0622 12/24/18 0800 12/24/18 1039   BP: 119/79 131/76 127/77   Pulse: 57 65    Patient Position - Orthostatic VS: Lying Lying Lying       Visual Acuity      ED Medications  Medications   sodium chloride 0 9 % bolus 1,000 mL (0 mL Intravenous Stopped 12/24/18 0751)   iohexol (OMNIPAQUE) 350 MG/ML injection (MULTI-DOSE) 85 mL (85 mL Intravenous Given 12/24/18 0747)       Diagnostic Studies  Results Reviewed     Procedure Component Value Units Date/Time    Troponin I repeat in 3 hrs [[id number]]  (Normal) Collected:  12/24/18 0948    Lab Status:  Final result Specimen:  Blood from Hand, Right Updated:  12/24/18 1019     Troponin I <0 02 ng/mL     Troponin I [[id number]]  (Normal) Collected:  12/24/18 0649    Lab Status:  Final result Specimen:  Blood from Arm, Right Updated:  12/24/18 0719     Troponin I <0 02 ng/mL     Protime-INR [[id number]] (Normal) Collected:  12/24/18 0649    Lab Status:  Final result Specimen:  Blood from Arm, Right Updated:  12/24/18 0717     Protime 12 7 seconds      INR 0 96    APTT [[id number]]  (Normal) Collected:  12/24/18 0649    Lab Status:  Final result Specimen:  Blood from Arm, Right Updated:  12/24/18 0717     PTT 31 seconds     Hepatic function panel [[id number]]  (Normal) Collected:  12/24/18 0649    Lab Status:  Final result Specimen:  Blood from Arm, Right Updated:  12/24/18 0716     Total Bilirubin 0 50 mg/dL      Bilirubin, Direct 0 11 mg/dL      Alkaline Phosphatase 84 U/L      AST 18 U/L      ALT 24 U/L      Total Protein 6 9 g/dL      Albumin 3 7 g/dL     Basic metabolic panel [[id number]] Collected:  12/24/18 0649    Lab Status:  Final result Specimen:  Blood from Arm, Right Updated:  12/24/18 0716     Sodium 141 mmol/L      Potassium 3 9 mmol/L      Chloride 105 mmol/L      CO2 28 mmol/L      ANION GAP 8 mmol/L      BUN 21 mg/dL      Creatinine 0 89 mg/dL      Glucose 97 mg/dL      Calcium 9 0 mg/dL      eGFR 103 ml/min/1 73sq m     Narrative:         National Kidney Disease Education Program recommendations are as follows:  GFR calculation is accurate only with a steady state creatinine  Chronic Kidney disease less than 60 ml/min/1 73 sq  meters  Kidney failure less than 15 ml/min/1 73 sq  meters      CBC and differential [[id number]] Collected:  12/24/18 0649    Lab Status:  Final result Specimen:  Blood from Arm, Right Updated:  12/24/18 0658     WBC 6 74 Thousand/uL      RBC 5 40 Million/uL      Hemoglobin 15 7 g/dL      Hematocrit 47 3 %      MCV 88 fL      MCH 29 1 pg      MCHC 33 2 g/dL      RDW 12 1 %      MPV 9 7 fL      Platelets 406 Thousands/uL      nRBC 0 /100 WBCs      Neutrophils Relative 57 %      Immat GRANS % 0 %      Lymphocytes Relative 29 %      Monocytes Relative 8 %      Eosinophils Relative 5 %      Basophils Relative 1 %      Neutrophils Absolute 3 87 Thousands/µL      Immature Grans Absolute 0 02 Thousand/uL      Lymphocytes Absolute 1 93 Thousands/µL      Monocytes Absolute 0 56 Thousand/µL      Eosinophils Absolute 0 32 Thousand/µL      Basophils Absolute 0 04 Thousands/µL                  CTA ED chest PE study   Final Result by Lazarus Harts, MD (12/24 3980)      No pulmonary embolus or CT evidence of acute intrathoracic process                    Workstation performed: [id number]                    Procedures  ECG 12 Lead Documentation  Date/Time: 12/24/2018 6:27 AM  Performed by: Shannon Oro  Authorized by: Shannon Oro     Indications / Diagnosis:  C/p  ECG reviewed by me, the ED Provider: yes    Patient location:  ED  Previous ECG:     Previous ECG:  Compared to current    Comparison ECG info:  Dec 2, 2015    Similarity:  No change    Comparison to cardiac monitor: Yes    Interpretation:     Interpretation: normal    Rate:     ECG rate:  60    ECG rate assessment: normal    Rhythm:     Rhythm: sinus rhythm    Ectopy:     Ectopy: none    QRS:     QRS axis:  Left    QRS intervals:  Normal  Conduction:     Conduction: abnormal      Abnormal conduction: incomplete RBBB    ST segments:     ST segments:  Normal  T waves:     T waves: normal      ECG 12 Lead Documentation  Date/Time: 12/24/2018 9:53 AM  Performed by: Shannon Oro  Authorized by: Bennye Halsted P     Indications / Diagnosis:  C/p  ECG reviewed by me, the ED Provider: yes    Patient location:  ED  Previous ECG:     Previous ECG:  Compared to current    Similarity:  No change    Comparison to cardiac monitor: Yes    Interpretation:     Interpretation: normal    Rate:     ECG rate:  56    ECG rate assessment: bradycardic    Rhythm:     Rhythm: sinus bradycardia    Ectopy:     Ectopy: none    QRS:     QRS axis:  Normal    QRS intervals:  Normal  Conduction:     Conduction: abnormal      Abnormal conduction: incomplete RBBB    ST segments:     ST segments:  Normal  T waves:     T waves: normal             Phone Contacts  ED Phone Contact    ED Course  ED Course as of Dec 24 1529   Mon Dec 24, 2018   1007 Repeat ekg unchanged from initial ekg  1031 Re-peat troponin normal no change  Discussed risks benefits with patient  Discussed low risk of MACE based on cardiac decision making and lab/ct/ekg results today  Discussed will treat for musculoskeletal chest wall pain but recommend folow up with PCP and cardiology in 2-3 days for further evaluation of symptoms  Reviewed reasons to return to ed  Patient verbalized understanding of diagnosis and agreement with discharge plan of care as well as understanding of reasons to return to ed  HEART Risk Score      Most Recent Value   History  0 Filed at: 12/24/2018 0803   ECG  1 Filed at: 12/24/2018 0803   Age  0 Filed at: 12/24/2018 0803   Risk Factors  1 Filed at: 12/24/2018 0803   Troponin  0 Filed at: 12/24/2018 0803   Heart Score Risk Calculator   History  0 Filed at: 12/24/2018 0803   ECG  1 Filed at: 12/24/2018 0803   Age  0 Filed at: 12/24/2018 0803   Risk Factors  1 Filed at: 12/24/2018 0803   Troponin  0 Filed at: 12/24/2018 0803   HEART Score  2 Filed at: 12/24/2018 6873   HEART Score  2 Filed at: 12/24/2018 0803                    EMMETT Risk Score      Most Recent Value   Age >= 72  0 Filed at: 12/24/2018 8317   Known CAD (stenosis >= 50%)  0 Filed at: 12/24/2018 5083   Recent (<=24 hrs) Service Angina  1 Filed at: 12/24/2018 0803   ST Deviation >= 0 5 mm  0 Filed at: 12/24/2018 0803   3+ CAD Risk Factors (FHx, HTN, HLP, DM, Smoker)  0 Filed at: 12/24/2018 3303   Aspirin Use Past 7 Days  0 Filed at: 12/24/2018 7400   Elevated Cardiac Markers  0 Filed at: 12/24/2018 0803   EMMETT Risk Score (Calculated)  1 Filed at: 12/24/2018 6492              MDM  Number of Diagnoses or Management Options  Diagnosis management comments: Ddx includes but is not limited to:  1  ACS/USA  2  Pneumothorax  3  Pneumonia  4  Pleural effusion  5  Pericarditis  6  Pericardial effusion  7   Chest wall pain/costochondritis  8  Esophageal spasm  9  Pulmonary embolism  10  Bronchitis/bronchospasm  11  Aortic dissection  Plan cardiac workup including ekg, labs, ct chest   Determination of disposition to made based upon risk factors, workup results and patient's ED coarse  Amount and/or Complexity of Data Reviewed  Clinical lab tests: ordered and reviewed  Tests in the radiology section of CPT®: ordered and reviewed  Tests in the medicine section of CPT®: ordered and reviewed  Discussion of test results with the performing providers: yes  Review and summarize past medical records: yes  Independent visualization of images, tracings, or specimens: yes    Patient Progress  Patient progress: stable    CritCare Time    Disposition  Final diagnoses:   Chest wall pain     Time reflects when diagnosis was documented in both MDM as applicable and the Disposition within this note     Time User Action Codes Description Comment    12/24/2018 10:33 AM Mary Jo Hooker Add [R07 [id number]] Chest wall pain       ED Disposition     ED Disposition Condition Comment    Discharge  Ling Russo discharge to home/self care      Condition at discharge: Stable        Follow-up Information     Follow up With Specialties Details Why Contact Info Additional Information    Komal Khan PA-C Internal Medicine, Physician Assistant Call in 2 days for further evaluation of symptoms [address]  [facility]        [address] [facility] Emergency Department Emergency Medicine Go to If symptoms worsen [address] [facility] [address]  [phone] [place] ED, [address] [place], [place], [address] [facility],  Cardiology Call in 2 days for further evaluation of symptoms [address] [facility] Emergency Department Emergency Medicine Go to If symptoms worsen [address] [address] [address] ED, [address] [facility], [place], [place], [address]          Discharge Medication List as of 12/24/2018 10:34 AM      START taking these medications    Details   !! HYDROcodone-acetaminophen (NORCO) 5-325 mg per tablet Take 1 tablet by mouth every 6 (six) hours as needed for pain (chest wall pain/initial rx ) for up to 3 days Max Daily Amount: 4 tablets, Starting Mon 12/24/2018, Until Thu 12/27/2018, Print       !! - Potential duplicate medications found  Please discuss with provider  CONTINUE these medications which have NOT CHANGED    Details   butalbital-acetaminophen-caffeine (FIORICET,ESGIC) -40 mg per tablet Take 1 tablet by mouth daily as needed for migraine For on set of headache, Starting Thu 3/8/2018, Print      !! HYDROcodone-acetaminophen (NORCO) 5-325 mg per tablet Take 1 tablet by mouth every 6 (six) hours as needed for pain for up to 10 days To be taken after surgery Max Daily Amount: 4 tablets, Starting Thu 12/20/2018, Until Sun 12/30/2018, Normal      methocarbamol (ROBAXIN) 500 mg tablet Take 1 tablet (500 mg total) by mouth daily, Starting Thu 3/8/2018, Normal      topiramate (TOPAMAX) 50 MG tablet Take 1 tablet (50 mg total) by mouth daily, Starting Thu 3/8/2018, Normal       !! - Potential duplicate medications found  Please discuss with provider  No discharge procedures on file      ED Provider  Electronically Signed by           Larissa Merlos PA-C  12/24/18 2616

## 2018-12-24 NOTE — DISCHARGE INSTRUCTIONS
Chest Wall Pain   WHAT YOU NEED TO KNOW:   Chest wall pain may be caused by problems with the muscles, cartilage, or bones of the chest wall  Chest wall pain may also be caused by pain that spreads to your chest from another part of your body  The pain may be aching, severe, dull, or sharp  It may come and go, or it may be constant  The pain may be worse when you move in certain ways, breathe deeply, or cough  DISCHARGE INSTRUCTIONS:   Call 911 if:   · You have any of the following signs of a heart attack:      ¨ Squeezing, pressure, or pain in your chest that lasts longer than 5 minutes or returns    ¨ Discomfort or pain in your back, neck, jaw, stomach, or arm     ¨ Trouble breathing    ¨ Nausea or vomiting    ¨ Lightheadedness or a sudden cold sweat, especially with chest pain or trouble breathing    Return to the emergency department if:   · You have severe pain  Contact your healthcare provider if:   · You develop a rash  · You have other new symptoms  · Your pain does not improve, even with treatment  · You have questions or concerns about your condition or care  Medicines: You may need any of the following:  · NSAIDs , such as ibuprofen, help decrease swelling, pain, and fever  This medicine is available with or without a doctor's order  NSAIDs can cause stomach bleeding or kidney problems in certain people  If you take blood thinner medicine, always ask your healthcare provider if NSAIDs are safe for you  Always read the medicine label and follow directions  · Acetaminophen  decreases pain  It is available without a doctor's order  Ask how much to take and how often to take it  Follow directions  Acetaminophen can cause liver damage if not taken correctly  · A cream  may be applied to your chest to decrease pain  · Take your medicine as directed  Contact your healthcare provider if you think your medicine is not helping or if you have side effects   Tell him of her if you are allergic to any medicine  Keep a list of the medicines, vitamins, and herbs you take  Include the amounts, and when and why you take them  Bring the list or the pill bottles to follow-up visits  Carry your medicine list with you in case of an emergency  Follow up with your healthcare provider as directed:  Write down your questions so you remember to ask them during your visits  Self-care:   · Rest  as needed  Avoid activities that make your chest wall pain worse  · Apply heat  on your chest for 20 to 30 minutes every 2 hours for as many days as directed  Heat helps decrease pain and muscle spasms  · Apply ice  on your chest for 15 to 20 minutes every hour or as directed  Use an ice pack, or put crushed ice in a plastic bag  Cover it with a towel  Ice helps prevent tissue damage and decreases swelling and pain  © 2017 [employer]  Information is for End User's use only and may not be sold, redistributed or otherwise used for commercial purposes  All illustrations and images included in CareNotes® are the copyrighted property of A D [employer] M , Inc  or [employer]low  The above information is an  only  It is not intended as medical advice for individual conditions or treatments  Talk to your doctor, nurse or pharmacist before following any medical regimen to see if it is safe and effective for you

## 2018-12-26 ENCOUNTER — HOSPITAL ENCOUNTER (OUTPATIENT)
Facility: HOSPITAL | Age: 43
Setting detail: OUTPATIENT SURGERY
Discharge: HOME | End: 2018-12-26
Attending: ORTHOPAEDIC SURGERY | Admitting: ORTHOPAEDIC SURGERY
Payer: COMMERCIAL

## 2018-12-26 VITALS
SYSTOLIC BLOOD PRESSURE: 123 MMHG | HEART RATE: 57 BPM | HEIGHT: 67 IN | BODY MASS INDEX: 34.91 KG/M2 | RESPIRATION RATE: 17 BRPM | OXYGEN SATURATION: 97 % | WEIGHT: 222.44 LBS | DIASTOLIC BLOOD PRESSURE: 78 MMHG | TEMPERATURE: 97.7 F

## 2018-12-26 PROCEDURE — 26055 INCISE FINGER TENDON SHEATH: CPT | Performed by: ORTHOPAEDIC SURGERY

## 2018-12-26 RX ORDER — ACETAMINOPHEN 500 MG
500 TABLET ORAL EVERY 6 HOURS PRN
COMMUNITY
End: 2022-03-04 | Stop reason: ALTCHOICE

## 2018-12-26 RX ORDER — TRAMADOL HYDROCHLORIDE 50 MG/1
50 TABLET ORAL EVERY 6 HOURS SCHEDULED
Status: DISCONTINUED | OUTPATIENT
Start: 2018-12-26 | End: 2018-12-26 | Stop reason: HOSPADM

## 2018-12-26 RX ORDER — MAGNESIUM HYDROXIDE 1200 MG/15ML
LIQUID ORAL AS NEEDED
Status: DISCONTINUED | OUTPATIENT
Start: 2018-12-26 | End: 2018-12-26 | Stop reason: HOSPADM

## 2018-12-26 RX ORDER — ONDANSETRON 2 MG/ML
4 INJECTION INTRAMUSCULAR; INTRAVENOUS EVERY 6 HOURS PRN
Status: DISCONTINUED | OUTPATIENT
Start: 2018-12-26 | End: 2018-12-26 | Stop reason: HOSPADM

## 2018-12-26 RX ORDER — LIDOCAINE HYDROCHLORIDE AND EPINEPHRINE 10; 10 MG/ML; UG/ML
10 INJECTION, SOLUTION INFILTRATION; PERINEURAL ONCE
Status: DISCONTINUED | OUTPATIENT
Start: 2018-12-26 | End: 2018-12-26

## 2018-12-26 RX ORDER — ACETAMINOPHEN 325 MG/1
650 TABLET ORAL EVERY 6 HOURS PRN
Status: DISCONTINUED | OUTPATIENT
Start: 2018-12-26 | End: 2018-12-26 | Stop reason: HOSPADM

## 2018-12-26 RX ADMIN — SODIUM BICARBONATE: 84 INJECTION, SOLUTION INTRAVENOUS at 08:26

## 2018-12-26 NOTE — DISCHARGE INSTRUCTIONS
After surgery, we would like you to take Ibuprofen 600mg one tablet by mouth every 6 hours with food (at breakfast, lunch and dinner)  AND Tylenol 500 mg one tablet by mouth every 6 hours  (at breakfast, lunch and dinner) for 5-7 days after your surgery  Please take these medication EVERYDAY after surgery for 5-7 days, and not just as needed  You can take these medications at the same time  Taking these medications after surgery will limit your need for prescription pain medication  We will also prescribe a narcotic pain medication for a limited time after surgery that you can take as needed for moderate or severe pain  Please follow up with Physical therapy to work on ROM  Trigger Finger   WHAT YOU NEED TO KNOW:   Trigger finger is when your finger or thumb gets stuck in a bent position and snaps, pops, or clicks when you straighten it  DISCHARGE INSTRUCTIONS:   Medicines:   · NSAIDs:  These medicines decrease pain and swelling  NSAIDs can be bought without a doctor's order  Ask which medicine is right for you and how much to take  Take as directed  NSAIDs can cause stomach bleeding or kidney problems if not taken correctly  · Take your medicine as directed  Contact your healthcare provider if you think your medicine is not helping or if you have side effects  Tell him of her if you are allergic to any medicine  Keep a list of the medicines, vitamins, and herbs you take  Include the amounts, and when and why you take them  Bring the list or the pill bottles to follow-up visits  Carry your medicine list with you in case of an emergency  Follow up with your healthcare provider or hand specialist as directed:  Write down your questions so you remember to ask them during your visits  Physical therapy:  A physical therapist teaches you exercises to help improve movement and strength, and to decrease pain     Splint:  You may need to wear a splint for up to 6 weeks to keep your finger straight  This will help your finger joints rest and prevent you from bending your finger while you sleep  Contact your healthcare provider or hand specialist if:   · Your symptoms do not go away or they return, even after treatment  · The pain, swelling, or stiffness interferes with your daily activities  · You have more trouble moving your finger  · Your finger is tingling  · You have questions or concerns about your condition or care  Return to the emergency department if:   · You cannot move your finger at all  · Your finger is numb  © 2017 [address] [facility] Information is for End User's use only and may not be sold, redistributed or otherwise used for commercial purposes  All illustrations and images included in CareNotes® are the copyrighted property of A [employer] M [employer]  or [employer]low  The above information is an  only  It is not intended as medical advice for individual conditions or treatments  Talk to your doctor, nurse or pharmacist before following any medical regimen to see if it is safe and effective for you

## 2018-12-26 NOTE — OP NOTE
PATIENT NAME: Paddy Tam RECORD NO:  [id number]    PROCEDURE DATE:  18    :  1975    SURGEON:  FAUZIA Carter , Ph D     Maria Esther Cheek: Sanaz Benson DIAGNOSIS:  Trigger digit right Long and Ring Fingers    POSTOPERATIVE DIAGNOSIS: Trigger digit right Long and Ring Fingers    PROCEDURE PERFORMED:  Trigger release right Long and Ring Fingers    ANESTHESIA:  local     COMPLICATIONS: None     TOURNIQUET TIME: none used     DISPOSITION: Patient was sent to the PACU in stable condition  INDICATIONS: Patient is an 37 y o  male with symptomatic triggering of the right Long and Ring Fingers  The patient failed conservative management and opted for surgical release  After informing the patient of the risks and benefits of trigger digit release, consent was obtained for surgical intervention  PROCEDURE:  The patient was identified in the preoperative screening area  Consent was signed and verified after identifying the correct operative site  The patient was taken back to the operating room where MAC sedation and local infiltration of the surgical site was performed  The patients right upper extremity was prepped and draped in normal sterile fashion with chlorhexidine solution  The right arm was then elevated, exsanguinated with an Esmarch and tourniquet placed about the forearm was insufflated to 250 mmHg  The Esmarch was removed  A 1 5cm incision was made centered over the A-1 pulley on the palmar surface of the Long Finger  The subcutaneous tissue was dissected bluntly down to the level of the flexor sheath taking care to protect the neurovascular structures  The A-1 pulley was identified and was released from proximal to distal   Once release was completed the tendons were examined and found to be intact  Following release of the long finger of the ring finger then was released in a similar fashion    Following releases, the tourniquet was deflated and the digits demonstrated good capillary refill and color  The patient demonstrated flexion and extension of the digits without evidence of triggering  The wounds were irrigated with copious amounts of saline solution and was closed with nylon suture in an interrupted horizontal mattress fashion  Findings  Long Finger: There was no significant longitudinal degenerative tears of the FDS tendon  The synovium appeared Moderately thickened  and was left Undisturbed  The A-1 pulley was moderately thickened  Ring Finger: There was no significant longitudinal degenerative tears of the FDS tendon  The synovium appeared Normal  and was left Undisturbed  The A-1 pulley was moderately thickened  The patient tolerated the procedure well  The wound was dressed in a sterile dressing  The patient was then sent to the PACU in stable condition  As no first assistant was provided by the hospital, it was elected to use a physician's assistant to stabilize the extremity and aid in instrumentation          Perri Gleason MD 12/26/18 8:38 AM

## 2018-12-26 NOTE — H&P (VIEW-ONLY)
CHIEF COMPLAINT:  Chief Complaint   Patient presents with    Right Hand - Follow-up       SUBJECTIVE:  Javier Hylton is a 37y o  year old RHD male  who presents to the office with recurrent pain clicking and locking of his right ring and long fingers  Pt states that this has been going on for about 5 years  Pt has 2 prior CSI before being seen by our practice  We administered a CSI on 11/13/18 which relieved most of the pain a, clicking and locking  Pt states that the pain has now returned with occasional clicking and locking  The patient denies any cardiac or pulmonary issues  Denies diabetes  Denies any history of MI, gastric ulcers, kidney or liver issues  Denies blood thinners  PAST MEDICAL HISTORY:  Past Medical History:   Diagnosis Date    Depression with anxiety     last assessed: 5/12/2014    Febrile illness     last assessed: 12/2/2015    Fracture, finger     last assessed: 5/22/2015    Leukocytosis     last assessed: 1/23/2017    Migraine     Sciatica, left side     last assessed: 12/3/2014    Steroid-induced hyperglycemia     last assessed: 2/15/2017       PAST SURGICAL HISTORY:  History reviewed  No pertinent surgical history      FAMILY HISTORY:  Family History   Problem Relation Age of Onset    Diabetes Mother     Hypertension Mother     Diabetes Father     Gout Father     Hypertension Father     Cancer Maternal Grandmother     Rosacea Family        SOCIAL HISTORY:  Social History   Substance Use Topics    Smoking status: Never Smoker    Smokeless tobacco: Never Used    Alcohol use Yes      Comment: social (1 time per year)       MEDICATIONS:    Current Outpatient Prescriptions:     butalbital-acetaminophen-caffeine (FIORICET,ESGIC) -40 mg per tablet, Take 1 tablet by mouth daily as needed for migraine For on set of headache, Disp: 30 tablet, Rfl: 0    methocarbamol (ROBAXIN) 500 mg tablet, Take 1 tablet (500 mg total) by mouth daily, Disp: 30 tablet, Rfl: 0    topiramate (TOPAMAX) 50 MG tablet, Take 1 tablet (50 mg total) by mouth daily, Disp: 90 tablet, Rfl: 1    ALLERGIES:  No Known Allergies    REVIEW OF SYSTEMS:  Review of Systems   Constitutional: Negative for chills, fever and unexpected weight change  HENT: Negative for hearing loss, nosebleeds and sore throat  Eyes: Negative for pain, redness and visual disturbance  Respiratory: Negative for cough, shortness of breath and wheezing  Cardiovascular: Negative for chest pain, palpitations and leg swelling  Gastrointestinal: Negative for abdominal pain, nausea and vomiting  Endocrine: Negative for polydipsia and polyuria  Genitourinary: Negative for dysuria and hematuria  Musculoskeletal: Negative for arthralgias, joint swelling and myalgias  Skin: Negative for rash and wound  Neurological: Negative for light-headedness, numbness and headaches  Psychiatric/Behavioral: Negative for decreased concentration, dysphoric mood and suicidal ideas  The patient is not nervous/anxious  VITALS:  Vitals:    12/20/18 1023   BP: 125/80   Pulse: 60       LABS:  HgA1c:   Lab Results   Component Value Date    HGBA1C 5 4 08/21/2018     BMP:   Lab Results   Component Value Date    GLUCOSE 120 12/02/2015    CALCIUM 9 4 01/04/2018     12/02/2015    K 4 4 01/04/2018    CO2 29 01/04/2018     01/04/2018    BUN 24 01/04/2018    CREATININE 0 84 01/04/2018       _____________________________________________________  PHYSICAL EXAMINATION:  General: well developed and well nourished, alert, oriented times 3 and appears comfortable  Psychiatric: Normal  HEENT: Trachea Midline, No torticollis  Cardiac:  Regular rate and rhythm  Pulmonary: No respiratory distress  Lung sounds clear to auscultation       Skin: No masses, erthema, lacerations, fluctation, ulcerations  Neurovascular: Sensation Intact to the Median, Ulnar, Radial Nerve, Motor Intact to the Median, Ulnar, Radial Nerve and Pulses Intact    MUSCULOSKELETAL EXAMINATION:  right long finger:  Positive palpable nodule over the A1 pulley  Positive tenderness to palpation over A1 pulley  Positive catching and locking  Positive clicking  right ring finger:  Positive palpable nodule over the A1 pulley  Positive tenderness to palpation over A1 pulley  Negative catching  Positive clicking         ___________________________________________________  STUDIES REVIEWED:  No studies reviewed  PROCEDURES PERFORMED:  Procedures  No Procedures performed today    _____________________________________________________  ASSESSMENT/PLAN:    Trigger finger of the right ring and long fingers   *surgery scheduled: trigger finger release right ring and long fingers   *Anesthesia: local only   * PT/OT ordered for wound care   * Post op medication was sent to pharmacy    Surgery medication instructions: You will stop eating and drinking at midnight the night before your surgery, but you may continue to take your normal medications with a small sip of water  In the morning on the day of your surgery, we would like you to take the following medications (as long as you have never been told to avoid Tylenol or NSAIDs like ibuprofen, Naproxen, Aleve, Advil, etc):   Ibuprofen 600mg one tablet by mouth   Tylenol 500mg one tablet by mouth    After surgery, we would like you to take Ibuprofen 600mg one tablet by mouth every 6 hours with food (at breakfast, lunch and dinner)  AND Tylenol 500 mg one tablet by mouth every 6 hours  (at breakfast, lunch and dinner) for 5-7 days after your surgery  Please take these medication EVERYDAY after surgery for 5-7 days, and not just as needed  You can take these medications at the same time  Taking these medications after surgery will limit your need for prescription pain medication        We will also prescribe a narcotic pain medication for a limited time after surgery that you can take as needed for moderate or severe pain          Diagnoses and all orders for this visit:    Trigger ring finger of right hand  -     Ambulatory referral to PT/OT hand therapy    Trigger middle finger of right hand  -     Ambulatory referral to PT/OT hand therapy        Follow Up:  No Follow-up on file  To Do Next Visit:  Re-evaluation of current issue remove sutures        Operative Discussions:  Trigger Finger Release: The anatomy and physiology of trigger finger was discussed with the patient today in the office  Edema and increased contact pressure within the flexor tendons at the A1 pulley can cause pain, crepitation, and limitation of function  Treatment options include resting MP blocking splints to decrease edema, oral anti-inflammatory medications, home or formal therapy exercises, up to 2 steroid injections or surgical release  While majority of patients do respond to conservative treatment, up to 20% may require surgical release  The patient has elected release of the trigger finger  The patient has elected to undergo a release of the A1 pulley (trigger finger)  A small incision will be made over the palmar aspect of the hand, the tendon sheath holding the flexor tendons will be released  In the postoperative period, light activities are allowed immediately, driving is allowed when narcotic medication has stopped, and the incision may get wet after 2 days  Heavy activities (lifting more than approximately 10 pounds) will be allowed after the follow up appointment in 1-2 weeks  While the pain and discomfort within the wrist typically improves rapidly, some residual discomfort may be present for up to 6 weeks  The nodule that is typically palpable in the palmar aspect of the hand will not be removed, as this would necessitate removal of a portion of the flexor tendon, however the catching, clicking, and locking should resolve  Approximate success rate is 98%  The risks and benefits of the procedure were explained to the patient, which include, but are not limited to: Bleeding, infection, recurrence, pain, scar, damage to tendons, damage to nerves, and damage to blood vessels, need for future surgery and complications related to anesthesia  If bony work is done, risks also include malunion and nonunion  These risks, along with alternative conservative treatment options, and postoperative protocols were voiced back and understood by the patient  All questions were answered to the patient's satisfaction  The patient agrees to comply with a standard postoperative protocol, and is willing to proceed  Education was provided via written and auditory forms  There were no barriers to learning  Written handouts regarding wound care, incision and scar care, and general preoperative information, as well as risks and benefits were provided to the patient      Scribe Attestation    I,:   Aminata Rosas am acting as a scribe while in the presence of the attending physician :        I,:   Silver Ojeda MD personally performed the services described in this documentation    as scribed in my presence :

## 2018-12-28 ENCOUNTER — EVALUATION (OUTPATIENT)
Dept: OCCUPATIONAL THERAPY | Facility: CLINIC | Age: 43
End: 2018-12-28
Payer: COMMERCIAL

## 2018-12-28 DIAGNOSIS — M65.341 TRIGGER RING FINGER OF RIGHT HAND: Primary | ICD-10-CM

## 2018-12-28 DIAGNOSIS — M65.331 TRIGGER MIDDLE FINGER OF RIGHT HAND: ICD-10-CM

## 2018-12-28 PROCEDURE — G8990 OTHER PT/OT CURRENT STATUS: HCPCS | Performed by: OCCUPATIONAL THERAPIST

## 2018-12-28 PROCEDURE — 97110 THERAPEUTIC EXERCISES: CPT | Performed by: OCCUPATIONAL THERAPIST

## 2018-12-28 PROCEDURE — G8991 OTHER PT/OT GOAL STATUS: HCPCS | Performed by: OCCUPATIONAL THERAPIST

## 2018-12-28 PROCEDURE — 97165 OT EVAL LOW COMPLEX 30 MIN: CPT | Performed by: OCCUPATIONAL THERAPIST

## 2018-12-28 NOTE — PROGRESS NOTES
OT Evaluation     Today's date: 2018  Patient name: Rosalia Haddad  : 1975  MRN: [id number]  Referring provider: Mick Finley MD  Dx:   Encounter Diagnosis     ICD-10-CM    1  Trigger ring finger of right hand M65 341    2  Trigger middle finger of right hand M65 331        Start Time: 3083  Stop Time: 1000  Total time in clinic (min): 35 minutes    Assessment  Assessment details: 36 yo RHD male POD2  Bulky dressing removed  Scant blood noted when dressing was removed  Sutures intact  No signs/symptoms of infection  Educated on same  AROM improved following edema massage and completion of TGE's  Educated on wound care  Patient did not feel OT was indicated  He is anxious to RTW  Impairments: impaired physical strength, lacks appropriate home exercise program and pain with function  Understanding of Dx/Px/POC: excellent  Plan  Plan details: OT not indicated     Patient would benefit from: OT eval and skilled occupational therapy  Planned modality interventions: thermotherapy: hydrocollator packs  Planned therapy interventions: patient education, manual therapy, massage, therapeutic training and therapeutic exercise  Treatment plan discussed with: patient        Subjective Evaluation    History of Present Illness  Date of surgery: 2018  Mechanism of injury: Patient reports that the cortisone injections were not working and his trigger fingers were getting worse  Recurrent probem    Quality of life: good    Pain  Current pain rating: 3  At worst pain ratin  Location: volar aspect of MP joints  Quality: dull ache  Relieving factors: medications, ice and support    Social Support  Lives in: multiple-level home  Lives with: spouse and young children    Employment status: working  Hand dominance: right  Exercise history: run, lift weights  Life stress: high      Diagnostic Tests  X-ray: normal  Treatments  Previous treatment: injection treatment  Patient Goals  Patient goals for therapy: decreased edema, decreased pain, independence with ADLs/IADLs and return to work          Objective    Flowsheet Rows      Most Recent Value   PT/OT G-Codes   Current Score  54   Projected Score  66   FOTO information reviewed  Yes   Assessment Type  Evaluation   G code set  Other PT/OT Primary   Other PT Primary Current Status ()  CK   Other PT Primary Goal Status ()  CJ          Precautions:  Universal     Daily Treatment Diary     Manual                                                                                   Exercise Diary                                                                                                                                                                                                                                                                                      Modalities

## 2019-01-02 ENCOUNTER — OFFICE VISIT (OUTPATIENT)
Dept: OBGYN CLINIC | Facility: CLINIC | Age: 44
End: 2019-01-02

## 2019-01-02 VITALS
HEIGHT: 67 IN | SYSTOLIC BLOOD PRESSURE: 151 MMHG | BODY MASS INDEX: 34.84 KG/M2 | WEIGHT: 222 LBS | DIASTOLIC BLOOD PRESSURE: 81 MMHG | HEART RATE: 63 BPM

## 2019-01-02 DIAGNOSIS — M65.341 TRIGGER RING FINGER OF RIGHT HAND: Primary | ICD-10-CM

## 2019-01-02 DIAGNOSIS — Z48.89 AFTERCARE FOLLOWING SURGERY: ICD-10-CM

## 2019-01-02 DIAGNOSIS — M65.331 TRIGGER MIDDLE FINGER OF RIGHT HAND: ICD-10-CM

## 2019-01-02 PROCEDURE — 99024 POSTOP FOLLOW-UP VISIT: CPT | Performed by: ORTHOPAEDIC SURGERY

## 2019-01-02 NOTE — PROGRESS NOTES
SUBJECTIVE:  Mary White is a 37y o  year old male who presents for follow up after surgery for trigger release right long and ring fingers done on  12/26/2018  Today patient has some tenderness over the proximal phalanx of his right long and ring fingers  Patient states he no longer has any clicking or locking  Patient is very happy with the results of his surgery  VITALS:  Vitals:    01/02/19 0946   BP: 151/81   Pulse: 63       PHYSICAL EXAMINATION:  General: well developed and well nourished, alert, oriented times 3 and appears comfortable  Psychiatric: Normal    MUSCULOSKELETAL EXAMINATION:  Right hand   Incision: Clean, dry, with sutures intact  Range of Motion:  Normal range of motion of all fingers and wrist  Neurovascular status: Neuro intact, good cap refill      STUDIES REVIEWED:  No studies reviewed  PROCEDURES PERFORMED:  Procedures  No Procedures performed today      ASSESSMENT/PLAN:    Trigger finger release right long and ring fingers performed 12/26/2018  * sutures removed today without complications  * patient may resume all normal activity without restrictions    FOLLOW UP:  No Follow-up on file        TO DO AT NEXT VISIT:  Re-evaluation of current issue      Scribe Attestation    I,:   Geovanna Tse am acting as a scribe while in the presence of the attending physician :        I,:   Evan Warner MD personally performed the services described in this documentation    as scribed in my presence :

## 2019-01-04 ENCOUNTER — TELEPHONE (OUTPATIENT)
Dept: INTERNAL MEDICINE CLINIC | Facility: CLINIC | Age: 44
End: 2019-01-04

## 2019-01-04 NOTE — TELEPHONE ENCOUNTER
Could we please schedule this patient for follow up    Recently seen in ED for CP and needs chronic condition follow up thanks violet

## 2019-01-07 NOTE — TELEPHONE ENCOUNTER
Called patient and request to schedule appt to f/u chroni conditions   Patient said that after he reviews his schedule he will call back to schedule appt

## 2019-01-31 ENCOUNTER — OFFICE VISIT (OUTPATIENT)
Dept: OBGYN CLINIC | Facility: CLINIC | Age: 44
End: 2019-01-31
Payer: COMMERCIAL

## 2019-01-31 ENCOUNTER — APPOINTMENT (OUTPATIENT)
Dept: RADIOLOGY | Facility: CLINIC | Age: 44
End: 2019-01-31
Payer: COMMERCIAL

## 2019-01-31 VITALS
HEART RATE: 78 BPM | HEIGHT: 67 IN | BODY MASS INDEX: 34.84 KG/M2 | WEIGHT: 222 LBS | DIASTOLIC BLOOD PRESSURE: 76 MMHG | SYSTOLIC BLOOD PRESSURE: 122 MMHG

## 2019-01-31 DIAGNOSIS — M65.312 TRIGGER THUMB OF LEFT HAND: Primary | ICD-10-CM

## 2019-01-31 DIAGNOSIS — M79.645 THUMB PAIN, LEFT: ICD-10-CM

## 2019-01-31 PROCEDURE — 99024 POSTOP FOLLOW-UP VISIT: CPT | Performed by: ORTHOPAEDIC SURGERY

## 2019-01-31 PROCEDURE — 20550 NJX 1 TENDON SHEATH/LIGAMENT: CPT | Performed by: ORTHOPAEDIC SURGERY

## 2019-01-31 PROCEDURE — 73140 X-RAY EXAM OF FINGER(S): CPT

## 2019-01-31 RX ORDER — TRIAMCINOLONE ACETONIDE 40 MG/ML
20 INJECTION, SUSPENSION INTRA-ARTICULAR; INTRAMUSCULAR
Status: COMPLETED | OUTPATIENT
Start: 2019-01-31 | End: 2019-01-31

## 2019-01-31 RX ORDER — LIDOCAINE HYDROCHLORIDE 10 MG/ML
0.5 INJECTION, SOLUTION INFILTRATION; PERINEURAL
Status: COMPLETED | OUTPATIENT
Start: 2019-01-31 | End: 2019-01-31

## 2019-01-31 RX ADMIN — TRIAMCINOLONE ACETONIDE 20 MG: 40 INJECTION, SUSPENSION INTRA-ARTICULAR; INTRAMUSCULAR at 13:02

## 2019-01-31 RX ADMIN — LIDOCAINE HYDROCHLORIDE 0.5 ML: 10 INJECTION, SOLUTION INFILTRATION; PERINEURAL at 13:02

## 2019-01-31 NOTE — PROGRESS NOTES
CHIEF COMPLAINT:  Chief Complaint   Patient presents with    Left Thumb - Pain       SUBJECTIVE:  Ling Russo is a 37y o  year old  male who presents to the office for evaluation of his left thumb  Pt statres that he has has pain and a feeling of weakness at the base of his thumb for about 1 week  Pt states that he has tenderness to palpation on the  Proximal aspect of the base of his right thumb  Patient states that he is having burning pain with activity  Patient states he is not taking any medication for this pain  Pt had left ring and long trigger finger release performed on 1226/18   Pt is experiencing a clicking sensation in his right ring finger in the area of his proximal phalanx       PAST MEDICAL HISTORY:  Past Medical History:   Diagnosis Date    Depression with anxiety     last assessed: 5/12/2014    Febrile illness     last assessed: 12/2/2015    Fracture, finger     last assessed: 5/22/2015    Leukocytosis     last assessed: 1/23/2017    Migraine     Sciatica, left side     last assessed: 12/3/2014    Steroid-induced hyperglycemia     last assessed: 2/15/2017       PAST SURGICAL HISTORY:  Past Surgical History:   Procedure Laterality Date    RI INCISE FINGER TENDON SHEATH Right 12/26/2018    Procedure: LONG AND RING TRIGGER FINGER RELEASES;  Surgeon: Criss Amezcua MD;  Location: [facility];  Service: Orthopedics       FAMILY HISTORY:  Family History   Problem Relation Age of Onset    Diabetes Mother     Hypertension Mother     Diabetes Father     Gout Father     Hypertension Father     Cancer Maternal Grandmother     Rosacea Family        SOCIAL HISTORY:  Social History   Substance Use Topics    Smoking status: Never Smoker    Smokeless tobacco: Never Used    Alcohol use Yes      Comment: special occasions beer       MEDICATIONS:    Current Outpatient Prescriptions:     acetaminophen (TYLENOL) 500 mg tablet, Take 500 mg by mouth every 6 (six) hours as needed for mild pain, Disp: , Rfl:     butalbital-acetaminophen-caffeine (FIORICET,ESGIC) -40 mg per tablet, Take 1 tablet by mouth daily as needed for migraine For on set of headache, Disp: 30 tablet, Rfl: 0    topiramate (TOPAMAX) 50 MG tablet, Take 1 tablet (50 mg total) by mouth daily, Disp: 90 tablet, Rfl: 1    ALLERGIES:  No Known Allergies    REVIEW OF SYSTEMS:  Review of Systems   Constitutional: Negative for chills, fever and unexpected weight change  HENT: Negative for hearing loss, nosebleeds and sore throat  Eyes: Negative for pain, redness and visual disturbance  Respiratory: Negative for cough, shortness of breath and wheezing  Cardiovascular: Negative for chest pain, palpitations and leg swelling  Gastrointestinal: Negative for abdominal pain, nausea and vomiting  Endocrine: Negative for polydipsia and polyuria  Genitourinary: Negative for dysuria and hematuria  Musculoskeletal: Negative for arthralgias, joint swelling and myalgias  Skin: Negative for rash and wound  Neurological: Negative for light-headedness, numbness and headaches  Psychiatric/Behavioral: Negative for decreased concentration, dysphoric mood and suicidal ideas  The patient is not nervous/anxious          VITALS:  Vitals:    01/31/19 1023   BP: 122/76   Pulse: 78       LABS:  HgA1c:   Lab Results   Component Value Date    HGBA1C 5 4 08/21/2018     BMP:   Lab Results   Component Value Date    GLUCOSE 120 12/02/2015    CALCIUM 9 0 12/24/2018     12/02/2015    K 3 9 12/24/2018    CO2 28 12/24/2018     12/24/2018    BUN 21 12/24/2018    CREATININE 0 89 12/24/2018       _____________________________________________________  PHYSICAL EXAMINATION:  General: well developed and well nourished, alert, oriented times 3 and appears comfortable  Psychiatric: Normal  HEENT: Trachea Midline, No torticollis  Pulmonary: No audible wheezing or respiratory distress   Skin: No masses, erthema, lacerations, fluctation, ulcerations  Neurovascular: Sensation Intact to the Median, Ulnar, Radial Nerve, Motor Intact to the Median, Ulnar, Radial Nerve and Pulses Intact    MUSCULOSKELETAL EXAMINATION:  right thumb: very minimal palpable nodule over the A1 pulley  Positive tenderness to palpation over A1 pulley  Negative clicking  Negative catching       Left hand  Fully healed incisions from ring and long finger trigger release  No clicking or locking ring or long fingers  Palpable thickening of the flexor tendons noted at level of proximal phalanx       ___________________________________________________  STUDIES REVIEWED:  Images obtained today of the left thumb 3 views demonstrate no fractures or dislocations      PROCEDURES PERFORMED:  Hand/upper extremity injection  Date/Time: 1/31/2019 1:02 PM  Consent given by: patient  Site marked: site marked  Timeout: Immediately prior to procedure a time out was called to verify the correct patient, procedure, equipment, support staff and site/side marked as required   Supporting Documentation  Indications: tendon swelling and pain   Procedure Details  Condition:trigger finger Preparation: Patient was prepped and draped in the usual sterile fashion  Ultrasound guidance: no  Medications administered: 0 5 mL lidocaine 1 %; 20 mg triamcinolone acetonide 40 mg/mL  Patient tolerance: patient tolerated the procedure well with no immediate complications  Dressing:  Sterile dressing applied            _____________________________________________________  ASSESSMENT/PLAN:    Left thumb flexor tendonitis  * CSI was administered into the left thumb A1 pulley without complications  * Pt was advised to apply heat for discomfort  * Pt will follow up in the office in 2 weeks for reevaluation    S/P right ring and long finger trigger release performed on  12/6/18 with relief of pain and locking of the right ring finger and all pain clicking and locking of right long finger  * Pt was advised that if he has increased clicking or locking or pain from the thickening of his right ring finger flexor tendons he is to call the office and we will revaluate him for a CSI or surgical intervention  Follow Up:  Return in about 2 weeks (around 2/14/2019)        To Do Next Visit:  Re-evaluation of current issue      Scribe Attestation    I,:   Adán Amezquita am acting as a scribe while in the presence of the attending physician :        I,:   Harsh Sanford MD personally performed the services described in this documentation    as scribed in my presence :

## 2019-02-14 ENCOUNTER — OFFICE VISIT (OUTPATIENT)
Dept: OBGYN CLINIC | Facility: CLINIC | Age: 44
End: 2019-02-14
Payer: COMMERCIAL

## 2019-02-14 VITALS
HEART RATE: 62 BPM | HEIGHT: 67 IN | SYSTOLIC BLOOD PRESSURE: 125 MMHG | WEIGHT: 222 LBS | DIASTOLIC BLOOD PRESSURE: 76 MMHG | BODY MASS INDEX: 34.84 KG/M2

## 2019-02-14 DIAGNOSIS — M65.312 TRIGGER THUMB OF LEFT HAND: ICD-10-CM

## 2019-02-14 DIAGNOSIS — M65.331 TRIGGER MIDDLE FINGER OF RIGHT HAND: ICD-10-CM

## 2019-02-14 DIAGNOSIS — M65.341 TRIGGER RING FINGER OF RIGHT HAND: Primary | ICD-10-CM

## 2019-02-14 PROCEDURE — 99212 OFFICE O/P EST SF 10 MIN: CPT | Performed by: ORTHOPAEDIC SURGERY

## 2019-02-14 PROCEDURE — 20600 DRAIN/INJ JOINT/BURSA W/O US: CPT | Performed by: ORTHOPAEDIC SURGERY

## 2019-02-14 RX ORDER — TRIAMCINOLONE ACETONIDE 40 MG/ML
20 INJECTION, SUSPENSION INTRA-ARTICULAR; INTRAMUSCULAR
Status: COMPLETED | OUTPATIENT
Start: 2019-02-14 | End: 2019-02-14

## 2019-02-14 RX ORDER — LIDOCAINE HYDROCHLORIDE 10 MG/ML
0.5 INJECTION, SOLUTION INFILTRATION; PERINEURAL
Status: COMPLETED | OUTPATIENT
Start: 2019-02-14 | End: 2019-02-14

## 2019-02-14 RX ADMIN — LIDOCAINE HYDROCHLORIDE 0.5 ML: 10 INJECTION, SOLUTION INFILTRATION; PERINEURAL at 10:04

## 2019-02-14 RX ADMIN — TRIAMCINOLONE ACETONIDE 20 MG: 40 INJECTION, SUSPENSION INTRA-ARTICULAR; INTRAMUSCULAR at 10:04

## 2019-02-14 NOTE — PROGRESS NOTES
CHIEF COMPLAINT:  Chief Complaint   Patient presents with    Left Thumb - Follow-up       SUBJECTIVE:  Macy Parikh is a 37y o  year old  male who presents for follow up to the left trigger thumb s/p cortisone injection 2 weeks ago  He has minimal improvement after the cortisone injection  He has a continued click but no locking  This causes pain when he tightly  an object  On the right, he is s/p right ring and long finger trigger released on 12/6/18 with continued mild pain in the palm  He has been doing epsom soaking          PAST MEDICAL HISTORY:  Past Medical History:   Diagnosis Date    Depression with anxiety     last assessed: 5/12/2014    Febrile illness     last assessed: 12/2/2015    Fracture, finger     last assessed: 5/22/2015    Leukocytosis     last assessed: 1/23/2017    Migraine     Sciatica, left side     last assessed: 12/3/2014    Steroid-induced hyperglycemia     last assessed: 2/15/2017       PAST SURGICAL HISTORY:  Past Surgical History:   Procedure Laterality Date    [place] INCISE FINGER TENDON SHEATH Right 12/26/2018    Procedure: LONG AND RING TRIGGER FINGER RELEASES;  Surgeon: Nelson Crawford MD;  Location: [facility];  Service: Orthopedics       FAMILY HISTORY:  Family History   Problem Relation Age of Onset    Diabetes Mother     Hypertension Mother     Diabetes Father     Gout Father     Hypertension Father     Cancer Maternal Grandmother     Rosacea Family        SOCIAL HISTORY:  Social History     Tobacco Use    Smoking status: Never Smoker    Smokeless tobacco: Never Used   Substance Use Topics    Alcohol use: Yes     Comment: special occasions beer    Drug use: No       MEDICATIONS:    Current Outpatient Medications:     acetaminophen (TYLENOL) 500 mg tablet, Take 500 mg by mouth every 6 (six) hours as needed for mild pain, Disp: , Rfl:     butalbital-acetaminophen-caffeine (FIORICET,ESGIC) -40 mg per tablet, Take 1 tablet by mouth daily as needed for migraine For on set of headache, Disp: 30 tablet, Rfl: 0    topiramate (TOPAMAX) 50 MG tablet, Take 1 tablet (50 mg total) by mouth daily, Disp: 90 tablet, Rfl: 1    ALLERGIES:  No Known Allergies    REVIEW OF SYSTEMS:  Review of Systems   Constitutional: Negative for chills, fever and unexpected weight change  HENT: Negative for hearing loss, nosebleeds and sore throat  Eyes: Negative for pain, redness and visual disturbance  Respiratory: Negative for cough, shortness of breath and wheezing  Cardiovascular: Negative for chest pain, palpitations and leg swelling  Gastrointestinal: Negative for abdominal pain, nausea and vomiting  Endocrine: Negative for polydipsia and polyuria  Genitourinary: Negative for dysuria and hematuria  Musculoskeletal: Negative for arthralgias, joint swelling and myalgias  Skin: Negative for rash and wound  Neurological: Negative for dizziness, numbness and headaches  Psychiatric/Behavioral: Negative for decreased concentration and suicidal ideas  The patient is not nervous/anxious          VITALS:  Vitals:    02/14/19 0929   BP: 125/76   Pulse: 62       LABS:  HgA1c:   Lab Results   Component Value Date    HGBA1C 5 4 08/21/2018     BMP:   Lab Results   Component Value Date    GLUCOSE 120 12/02/2015    CALCIUM 9 0 12/24/2018     12/02/2015    K 3 9 12/24/2018    CO2 28 12/24/2018     12/24/2018    BUN 21 12/24/2018    CREATININE 0 89 12/24/2018       _____________________________________________________  PHYSICAL EXAMINATION:  General: well developed and well nourished, alert, oriented times 3 and appears comfortable  Psychiatric: Normal  HEENT: Trachea Midline, No torticollis  Pulmonary: No audible wheezing or respiratory distress   Skin: No masses, erthema, lacerations, fluctation, ulcerations  Neurovascular: Sensation Intact to the Median, Ulnar, Radial Nerve, Motor Intact to the Median, Ulnar, Radial Nerve and Pulses Intact    MUSCULOSKELETAL EXAMINATION:  Right long finger: No clicking or locking noted  No scar tenderness  Right ring finger  Positive tenderness over the incision on the palm  Scar tissue tenderness  No click or locking  No mass  Left thumb   Positive palpable nodule over the A1 pulley  Positive mild tenderness to palpation over A1 pulley  Positive occasional  clicking  Negative catching    ___________________________________________________  STUDIES REVIEWED:  No studies reviewed  PROCEDURES PERFORMED:  Right ring finger cortisone injection for scar discomfort  Date/Time: 2/14/2019 10:04 AM  Consent given by: patient  Supporting Documentation  Indications: pain   Procedure Details  Condition comment: scar cortisone injection in the palm   Needle size: 25 G  Medications administered: 0 5 mL lidocaine 1 %; 20 mg triamcinolone acetonide 40 mg/mL    Patient tolerance: patient tolerated the procedure well with no immediate complications  Dressing:  Sterile dressing applied              _____________________________________________________  ASSESSMENT/PLAN:  Right ring finger scar tissue tenderness s/p trigger finger release in December 2018  * Cortisone injection given today  * Continue topical heat     Left trigger thumb  * CSI offered, but the patient declined  Declined surgery at this time  Continue topical heat  * F/u prn     Follow Up:  Return if symptoms worsen or fail to improve        To Do Next Visit:  Re-evaluation of current issue        Scribe Attestation    I,:   Bambi Bill PA-C am acting as a scribe while in the presence of the attending physician :        I,:   Ibrahima Harper MD personally performed the services described in this documentation    as scribed in my presence :

## 2019-03-08 ENCOUNTER — TELEPHONE (OUTPATIENT)
Dept: OBGYN CLINIC | Facility: HOSPITAL | Age: 44
End: 2019-03-08

## 2019-03-08 NOTE — TELEPHONE ENCOUNTER
I spoke with patient and he is again unable to bend his middle finger and his having a lot of pain and swelling  Please advise how to proceed  Questioning second opinion?

## 2019-03-08 NOTE — TELEPHONE ENCOUNTER
Patient's wife left a voicemail asking for a call back regarding the patient's hand  They are having some concerns and may want to discuss who would be best for a second opinion  He had surgery on 12/26 and is having a lot of pain

## 2019-03-11 NOTE — TELEPHONE ENCOUNTER
I tried calling the patient back but there was no answer  Please have the patient come in for another evaluation  Thank you

## 2019-07-18 ENCOUNTER — OFFICE VISIT (OUTPATIENT)
Dept: INTERNAL MEDICINE CLINIC | Facility: CLINIC | Age: 44
End: 2019-07-18
Payer: COMMERCIAL

## 2019-07-18 VITALS
WEIGHT: 217.2 LBS | SYSTOLIC BLOOD PRESSURE: 116 MMHG | HEART RATE: 66 BPM | OXYGEN SATURATION: 98 % | BODY MASS INDEX: 32.92 KG/M2 | DIASTOLIC BLOOD PRESSURE: 80 MMHG | HEIGHT: 68 IN | TEMPERATURE: 97.7 F

## 2019-07-18 DIAGNOSIS — E78.1 HYPERTRIGLYCERIDEMIA: ICD-10-CM

## 2019-07-18 DIAGNOSIS — R79.1 ELEVATED INR: ICD-10-CM

## 2019-07-18 DIAGNOSIS — R42 DIZZINESS: Primary | ICD-10-CM

## 2019-07-18 LAB
ALBUMIN SERPL BCP-MCNC: 4 G/DL (ref 3.5–5)
ALP SERPL-CCNC: 89 U/L (ref 46–116)
ALT SERPL W P-5'-P-CCNC: 27 U/L (ref 12–78)
ANION GAP SERPL CALCULATED.3IONS-SCNC: 7 MMOL/L (ref 4–13)
AST SERPL W P-5'-P-CCNC: 12 U/L (ref 5–45)
BASOPHILS # BLD AUTO: 0.02 THOUSANDS/ΜL (ref 0–0.1)
BASOPHILS NFR BLD AUTO: 0 % (ref 0–1)
BILIRUB SERPL-MCNC: 0.59 MG/DL (ref 0.2–1)
BILIRUB UR QL STRIP: NEGATIVE
BUN SERPL-MCNC: 20 MG/DL (ref 5–25)
CALCIUM SERPL-MCNC: 9.3 MG/DL (ref 8.3–10.1)
CHLORIDE SERPL-SCNC: 108 MMOL/L (ref 100–108)
CLARITY UR: CLEAR
CO2 SERPL-SCNC: 24 MMOL/L (ref 21–32)
COLOR UR: NORMAL
CREAT SERPL-MCNC: 0.88 MG/DL (ref 0.6–1.3)
EOSINOPHIL # BLD AUTO: 0.12 THOUSAND/ΜL (ref 0–0.61)
EOSINOPHIL NFR BLD AUTO: 2 % (ref 0–6)
ERYTHROCYTE [DISTWIDTH] IN BLOOD BY AUTOMATED COUNT: 12.7 % (ref 11.6–15.1)
GFR SERPL CREATININE-BSD FRML MDRD: 104 ML/MIN/1.73SQ M
GLUCOSE SERPL-MCNC: 93 MG/DL (ref 65–140)
GLUCOSE UR STRIP-MCNC: NEGATIVE MG/DL
HCT VFR BLD AUTO: 48.2 % (ref 36.5–49.3)
HGB BLD-MCNC: 15.4 G/DL (ref 12–17)
HGB UR QL STRIP.AUTO: NEGATIVE
IMM GRANULOCYTES # BLD AUTO: 0.02 THOUSAND/UL (ref 0–0.2)
IMM GRANULOCYTES NFR BLD AUTO: 0 % (ref 0–2)
INR PPP: 1.01 (ref 0.84–1.19)
KETONES UR STRIP-MCNC: NEGATIVE MG/DL
LEUKOCYTE ESTERASE UR QL STRIP: NEGATIVE
LYMPHOCYTES # BLD AUTO: 1.57 THOUSANDS/ΜL (ref 0.6–4.47)
LYMPHOCYTES NFR BLD AUTO: 25 % (ref 14–44)
MCH RBC QN AUTO: 29.1 PG (ref 26.8–34.3)
MCHC RBC AUTO-ENTMCNC: 32 G/DL (ref 31.4–37.4)
MCV RBC AUTO: 91 FL (ref 82–98)
MONOCYTES # BLD AUTO: 0.52 THOUSAND/ΜL (ref 0.17–1.22)
MONOCYTES NFR BLD AUTO: 8 % (ref 4–12)
NEUTROPHILS # BLD AUTO: 3.94 THOUSANDS/ΜL (ref 1.85–7.62)
NEUTS SEG NFR BLD AUTO: 65 % (ref 43–75)
NITRITE UR QL STRIP: NEGATIVE
NRBC BLD AUTO-RTO: 0 /100 WBCS
PH UR STRIP.AUTO: 6.5 [PH]
PLATELET # BLD AUTO: 261 THOUSANDS/UL (ref 149–390)
PMV BLD AUTO: 9.9 FL (ref 8.9–12.7)
POTASSIUM SERPL-SCNC: 4.1 MMOL/L (ref 3.5–5.3)
PROT SERPL-MCNC: 6.8 G/DL (ref 6.4–8.2)
PROT UR STRIP-MCNC: NEGATIVE MG/DL
PROTHROMBIN TIME: 12.9 SECONDS (ref 11.6–14.5)
RBC # BLD AUTO: 5.3 MILLION/UL (ref 3.88–5.62)
SODIUM SERPL-SCNC: 139 MMOL/L (ref 136–145)
SP GR UR STRIP.AUTO: 1.02 (ref 1–1.03)
TSH SERPL DL<=0.05 MIU/L-ACNC: 1.36 UIU/ML (ref 0.36–3.74)
UROBILINOGEN UR QL STRIP.AUTO: 1 E.U./DL
WBC # BLD AUTO: 6.19 THOUSAND/UL (ref 4.31–10.16)

## 2019-07-18 PROCEDURE — 99214 OFFICE O/P EST MOD 30 MIN: CPT | Performed by: NURSE PRACTITIONER

## 2019-07-18 PROCEDURE — 85025 COMPLETE CBC W/AUTO DIFF WBC: CPT | Performed by: NURSE PRACTITIONER

## 2019-07-18 PROCEDURE — 80053 COMPREHEN METABOLIC PANEL: CPT | Performed by: NURSE PRACTITIONER

## 2019-07-18 PROCEDURE — 85610 PROTHROMBIN TIME: CPT | Performed by: NURSE PRACTITIONER

## 2019-07-18 PROCEDURE — 36415 COLL VENOUS BLD VENIPUNCTURE: CPT | Performed by: NURSE PRACTITIONER

## 2019-07-18 PROCEDURE — 84443 ASSAY THYROID STIM HORMONE: CPT | Performed by: NURSE PRACTITIONER

## 2019-07-18 PROCEDURE — 3008F BODY MASS INDEX DOCD: CPT | Performed by: NURSE PRACTITIONER

## 2019-07-18 PROCEDURE — 81003 URINALYSIS AUTO W/O SCOPE: CPT | Performed by: NURSE PRACTITIONER

## 2019-07-18 NOTE — ASSESSMENT & PLAN NOTE
[place] hallpike maneuver negative, feel that patient's dizziness may be related to dehydration  Will get updated blood work which will consist of CBC, CMP, lipid panel, TSH and will get urinalysis  Advised patient that he should stay well hydrated while working and incorporate Gatorade or electrolyte drink  Discussed red flag warning signs with patient when he should report back to the office

## 2019-07-18 NOTE — ASSESSMENT & PLAN NOTE
Will get updated lipid panel, fasting  Recommend healthy lifestyle choices for your cholesterol  Low fat/low cholesterol diet  Limit/avoid red meat  Eat more lean meat - chicken breast, ground turkey, fish  Exercise 30 mins at least 3 times a week as tolerated

## 2019-07-18 NOTE — PROGRESS NOTES
Assessment/Plan:    Hypertriglyceridemia  Will get updated lipid panel, fasting  Recommend healthy lifestyle choices for your cholesterol  Low fat/low cholesterol diet  Limit/avoid red meat  Eat more lean meat - chicken breast, ground turkey, fish  Exercise 30 mins at least 3 times a week as tolerated  Dizziness  [place] hallpike maneuver negative, feel that patient's dizziness may be related to dehydration  Will get updated blood work which will consist of CBC, CMP, lipid panel, TSH and will get urinalysis  Advised patient that he should stay well hydrated while working and incorporate Gatorade or electrolyte drink  Discussed red flag warning signs with patient when he should report back to the office  BMI Counseling: Body mass index is 33 03 kg/m²  Discussed the patient's BMI with him  The BMI is above average  BMI counseling and education was provided to the patient  Nutrition recommendations include reducing portion sizes, decreasing overall calorie intake, 3-5 servings of fruits/vegetables daily, reducing fast food intake, consuming healthier snacks, decreasing soda and/or juice intake, moderation in carbohydrate intake, increasing intake of lean protein, reducing intake of saturated fat and trans fat and reducing intake of cholesterol  Exercise recommendations include exercising 3-5 times per week  Diagnoses and all orders for this visit:    Dizziness  -     CBC and differential  -     Comprehensive metabolic panel; Future  -     TSH, 3rd generation with Free T4 reflex  -     UA w Reflex to Microscopic w Reflex to Culture    Hypertriglyceridemia  -     Lipid panel          Subjective:      Patient ID: Eduardo King is a 37 y o  male  Pt is here today with a chief complaint of intermittent lightheadedness and dizziness for two weeks  Pt states the dizziness does not change with position  Pt states the dizziness has occurred everyday, but the episodes are variable in length   On average he states he has about 3 episodes per day  Pt describes the dizziness as feeling like he's "had too many beers " He states he feels off balance but reports he has not had any difficulty with gait  He states the dizziness does not occur with drinking alcohol  Pt states he has not had alcohol over the past 2 weeks  Pt works as a  and has been outside in the heat a lot, but states he feels as though he is drinking enough water  Reports drinking at least two gallons of water daily  Pt states he has had dizziness in the past many years ago that was very similar to this but he was never worked up for it because it had resolved on its own  Pt states he occasionally has blurred vision when he has an episode of dizziness  Pt denies any new medications or changes to medications  Pt denies any recent illnesses  Pt denies any nausea, vomiting, headaches, presyncopal episodes, chest pain, palpitations, SOB, slurred speech, weakness, numbness, tingling  Dizziness   This is a new problem  The current episode started 1 to 4 weeks ago  The problem has been unchanged  Associated symptoms include a visual change (blurred vision with episodes)  Pertinent negatives include no abdominal pain, arthralgias, chest pain, chills, congestion, coughing, diaphoresis, fatigue, fever, headaches, myalgias, nausea, neck pain, numbness, rash, sore throat, urinary symptoms, vertigo, vomiting or weakness  Nothing aggravates the symptoms  He has tried nothing for the symptoms  The following portions of the patient's history were reviewed and updated as appropriate: allergies, current medications, past family history, past medical history, past social history, past surgical history and problem list     Review of Systems   Constitutional: Negative for activity change, appetite change, chills, diaphoresis, fatigue and fever     HENT: Negative for congestion, ear discharge, ear pain, postnasal drip, rhinorrhea, sinus pressure, sinus pain and sore throat  Eyes: Negative for pain, discharge, itching and visual disturbance  Respiratory: Negative for cough, chest tightness, shortness of breath and wheezing  Cardiovascular: Negative for chest pain, palpitations and leg swelling  Gastrointestinal: Negative for abdominal pain, constipation, diarrhea, nausea and vomiting  Endocrine: Negative for polydipsia, polyphagia and polyuria  Genitourinary: Negative for difficulty urinating, dysuria and urgency  Musculoskeletal: Negative for arthralgias, back pain, myalgias and neck pain  Skin: Negative for rash and wound  Allergic/Immunologic: Negative for environmental allergies  Neurological: Positive for dizziness and light-headedness  Negative for vertigo, syncope, speech difficulty, weakness, numbness and headaches           Past Medical History:   Diagnosis Date    Depression with anxiety     last assessed: 5/12/2014    Febrile illness     last assessed: 12/2/2015    Fracture, finger     last assessed: 5/22/2015    Leukocytosis     last assessed: 1/23/2017    Migraine     Sciatica, left side     last assessed: 12/3/2014    Steroid-induced hyperglycemia     last assessed: 2/15/2017         Current Outpatient Medications:     acetaminophen (TYLENOL) 500 mg tablet, Take 500 mg by mouth every 6 (six) hours as needed for mild pain, Disp: , Rfl:     butalbital-acetaminophen-caffeine (FIORICET,ESGIC) -40 mg per tablet, Take 1 tablet by mouth daily as needed for migraine For on set of headache, Disp: 30 tablet, Rfl: 0    topiramate (TOPAMAX) 50 MG tablet, Take 1 tablet (50 mg total) by mouth daily, Disp: 90 tablet, Rfl: 1    No Known Allergies    Social History   Past Surgical History:   Procedure Laterality Date    [place] INCISE FINGER TENDON SHEATH Right 12/26/2018    Procedure: LONG AND RING TRIGGER FINGER RELEASES;  Surgeon: Suzanna Godinez MD;  Location: [facility];  Service: Orthopedics     Family History   Problem Relation Age of Onset    Diabetes Mother     Hypertension Mother     Diabetes Father     Gout Father     Hypertension Father     Cancer Maternal Grandmother     Rosacea Family        Objective:  /80 (BP Location: Left arm, Patient Position: Sitting, Cuff Size: Large)   Pulse 66   Temp 97 7 °F (36 5 °C) (Oral)   Ht 5' 8" (1 727 m)   Wt 98 5 kg (217 lb 3 2 oz)   SpO2 98%   BMI 33 03 kg/m²        Physical Exam   Constitutional: He is oriented to person, place, and time  He appears well-developed and well-nourished  No distress  HENT:   Head: Normocephalic and atraumatic  Right Ear: External ear normal    Left Ear: External ear normal    Nose: Nose normal    Mouth/Throat: Oropharynx is clear and moist  No oropharyngeal exudate  Eyes: Pupils are equal, round, and reactive to light  Conjunctivae and EOM are normal  Right eye exhibits no discharge  Left eye exhibits no discharge  No evidence of nystagmus   Neck: Normal range of motion  Neck supple  No thyromegaly present  Cardiovascular: Normal rate, regular rhythm, normal heart sounds and intact distal pulses  Exam reveals no gallop and no friction rub  No murmur heard  Pulmonary/Chest: Effort normal and breath sounds normal  No stridor  No respiratory distress  He has no wheezes  He has no rales  Abdominal: Soft  Bowel sounds are normal  He exhibits no distension  There is no tenderness  Musculoskeletal: Normal range of motion  Strength is intact in upper and lower extremities, gait normal   Lymphadenopathy:     He has no cervical adenopathy  Neurological: He is alert and oriented to person, place, and time  He displays normal reflexes  No cranial nerve deficit or sensory deficit  He exhibits normal muscle tone  Negative [place] hallpike   Skin: Skin is warm and dry  No rash noted  He is not diaphoretic  No erythema  Good skin turgor   Psychiatric: He has a normal mood and affect   His behavior is normal  Judgment and thought content normal

## 2019-07-20 ENCOUNTER — APPOINTMENT (OUTPATIENT)
Dept: LAB | Age: 44
End: 2019-07-20
Payer: COMMERCIAL

## 2019-07-20 LAB
CHOLEST SERPL-MCNC: 185 MG/DL (ref 50–200)
HDLC SERPL-MCNC: 49 MG/DL (ref 40–60)
LDLC SERPL CALC-MCNC: 108 MG/DL (ref 0–100)
NONHDLC SERPL-MCNC: 136 MG/DL
TRIGL SERPL-MCNC: 141 MG/DL

## 2019-07-20 PROCEDURE — 36415 COLL VENOUS BLD VENIPUNCTURE: CPT | Performed by: NURSE PRACTITIONER

## 2019-07-20 PROCEDURE — 80061 LIPID PANEL: CPT | Performed by: NURSE PRACTITIONER

## 2019-08-29 ENCOUNTER — OFFICE VISIT (OUTPATIENT)
Dept: INTERNAL MEDICINE CLINIC | Age: 44
End: 2019-08-29
Payer: COMMERCIAL

## 2019-08-29 VITALS
BODY MASS INDEX: 33.51 KG/M2 | DIASTOLIC BLOOD PRESSURE: 68 MMHG | SYSTOLIC BLOOD PRESSURE: 112 MMHG | HEART RATE: 85 BPM | OXYGEN SATURATION: 96 % | TEMPERATURE: 99.2 F | WEIGHT: 220.4 LBS

## 2019-08-29 DIAGNOSIS — J06.9 VIRAL URI WITH COUGH: Primary | ICD-10-CM

## 2019-08-29 DIAGNOSIS — J01.00 ACUTE NON-RECURRENT MAXILLARY SINUSITIS: ICD-10-CM

## 2019-08-29 PROCEDURE — 99213 OFFICE O/P EST LOW 20 MIN: CPT | Performed by: FAMILY MEDICINE

## 2019-08-29 RX ORDER — AMOXICILLIN 875 MG/1
875 TABLET, COATED ORAL 2 TIMES DAILY
Qty: 14 TABLET | Refills: 0 | Status: SHIPPED | OUTPATIENT
Start: 2019-08-29 | End: 2019-09-05

## 2019-08-29 RX ORDER — AMOXICILLIN 875 MG/1
875 TABLET, COATED ORAL 2 TIMES DAILY
Qty: 14 TABLET | Refills: 0 | Status: SHIPPED | OUTPATIENT
Start: 2019-08-29 | End: 2019-08-29 | Stop reason: SDUPTHER

## 2019-08-29 NOTE — PROGRESS NOTES
Assessment/Plan:    No problem-specific Assessment & Plan notes found for this encounter  Problem List Items Addressed This Visit        Respiratory    Viral URI with cough - Primary    Relevant Medications    amoxicillin (AMOXIL) 875 mg tablet    Acute non-recurrent maxillary sinusitis              Continue with Mucinex D and add over-the-counter Zyrtec or Claritin daily as needed  Complete 1 week of antibiotics for sinus issues  Cough likely triggered from postnasal drip  Okay to do warm water gargles  BMI Counseling: Body mass index is 33 51 kg/m²  Discussed the patient's BMI with him  The BMI is above average  BMI counseling and education was provided to the patient  Exercise recommendations include exercising 3-5 times per week  Subjective:      Patient ID: Arpita Blackburn is a 37 y o  male  HPI  Five day history of upper respiratory tract symptoms with productive cough, face pressure and pain and significant postnasal drip  Had a sore throat but now resolved  Taking over-the-counter Mucinex D with minimal relief and cough did keep him up at night  No shortness of breath but had mild wheezing yesterday  Not a smoker    Works as a  and was working in the brain yesterday which she feels exacerbated his symptoms  The following portions of the patient's history were reviewed and updated as appropriate: allergies, current medications, past family history, past medical history, past social history, past surgical history and problem list     Review of Systems      Constitutional:  Denies fever or chills   Eyes:  Denies change in visual acuity   HENT:  Denies nasal congestion or sore throat   Respiratory:  See HPI  Cardiovascular:  Denies palpitations or chest pain  GI:  Denies abdominal pain, nausea, or vomiting  Integument:  Denies rash   Neurologic:  Denies headache or focal weakness        Objective:      /68 (BP Location: Left arm, Patient Position: Sitting, Cuff Size: Standard) Pulse 85   Temp 99 2 °F (37 3 °C) (Tympanic)   Wt 100 kg (220 lb 6 4 oz) Comment: shoes on  SpO2 96%   BMI 33 51 kg/m²          Physical Exam      Constitutional:  Well developed, well nourished, no acute distress, non-toxic appearance   Eyes:  PERRL, conjunctiva normal , non icteric sclera  HENT:  Atraumatic, oropharynx moist   Postnasal drip noted in posterior pharynx  Neck-  supple , bilateral tympanic membranes without erythema, tender to palpation over right frontal and maxillary sinus    Respiratory:  CTA b/l, normal breath sounds, no rales, no wheezing   Cardiovascular:  RRR, no murmurs, no LE edema b/l  GI:  Soft, nondistended, normal bowel sounds x 4, nontender, no organomegaly, no mass, no rebound, no guarding   Neurologic:  no focal deficits noted   Psychiatric:  Speech and behavior appropriate , AAO x 3  Lymph, no cervical lymph node enlargement

## 2019-09-18 ENCOUNTER — HOSPITAL ENCOUNTER (EMERGENCY)
Facility: HOSPITAL | Age: 44
Discharge: HOME | End: 2019-09-18
Attending: EMERGENCY MEDICINE | Admitting: EMERGENCY MEDICINE
Payer: COMMERCIAL

## 2019-09-18 VITALS
OXYGEN SATURATION: 97 % | TEMPERATURE: 97.5 F | DIASTOLIC BLOOD PRESSURE: 75 MMHG | RESPIRATION RATE: 16 BRPM | SYSTOLIC BLOOD PRESSURE: 123 MMHG | HEART RATE: 67 BPM

## 2019-09-18 DIAGNOSIS — T15.92XA FOREIGN BODY, EYE, LEFT, INITIAL ENCOUNTER: Primary | ICD-10-CM

## 2019-09-18 PROCEDURE — 99283 EMERGENCY DEPT VISIT LOW MDM: CPT

## 2019-09-18 PROCEDURE — 99282 EMERGENCY DEPT VISIT SF MDM: CPT | Performed by: EMERGENCY MEDICINE

## 2019-09-18 RX ORDER — TETRACAINE HYDROCHLORIDE 5 MG/ML
2 SOLUTION OPHTHALMIC ONCE
Status: COMPLETED | OUTPATIENT
Start: 2019-09-18 | End: 2019-09-18

## 2019-09-18 RX ORDER — TOBRAMYCIN 3 MG/ML
1 SOLUTION/ DROPS OPHTHALMIC
Status: DISCONTINUED | OUTPATIENT
Start: 2019-09-18 | End: 2019-09-19 | Stop reason: HOSPADM

## 2019-09-18 RX ADMIN — TETRACAINE HYDROCHLORIDE 2 DROP: 5 SOLUTION OPHTHALMIC at 22:05

## 2019-09-18 RX ADMIN — TOBRAMYCIN 1 DROP: 3 SOLUTION/ DROPS OPHTHALMIC at 22:38

## 2019-09-18 RX ADMIN — FLUORESCEIN SODIUM 1 STRIP: 1 STRIP OPHTHALMIC at 22:05

## 2019-09-19 NOTE — ED PROVIDER NOTES
History  Chief Complaint   Patient presents with    Foreign Body in Eye     states something flew into his left eye tonight and feels like it is still there     HPI  36 yo M presents with left eye foreign body sensation  He states he was sitting outside when a piece of dust flew in his eye just prior to arrival  Eye became red and itching  No visual changes  Prior to Admission Medications   Prescriptions Last Dose Informant Patient Reported? Taking?   acetaminophen (TYLENOL) 500 mg tablet  Self Yes No   Sig: Take 500 mg by mouth every 6 (six) hours as needed for mild pain   butalbital-acetaminophen-caffeine (FIORICET,ESGIC) -40 mg per tablet  Self No No   Sig: Take 1 tablet by mouth daily as needed for migraine For on set of headache   topiramate (TOPAMAX) 50 MG tablet  Self No No   Sig: Take 1 tablet (50 mg total) by mouth daily      Facility-Administered Medications: None       Past Medical History:   Diagnosis Date    Depression with anxiety     last assessed: 5/12/2014    Febrile illness     last assessed: 12/2/2015    Fracture, finger     last assessed: 5/22/2015    Leukocytosis     last assessed: 1/23/2017    Migraine     Sciatica, left side     last assessed: 12/3/2014    Steroid-induced hyperglycemia     last assessed: 2/15/2017       Past Surgical History:   Procedure Laterality Date    [place] INCISE FINGER TENDON SHEATH Right 12/26/2018    Procedure: LONG AND RING TRIGGER FINGER RELEASES;  Surgeon: Mile Hull MD;  Location: [facility];  Service: Orthopedics       Family History   Problem Relation Age of Onset    Diabetes Mother     Hypertension Mother     Diabetes Father     Gout Father     Hypertension Father     Cancer Maternal Grandmother     Rosacea Family      I have reviewed and agree with the history as documented      Social History     Tobacco Use    Smoking status: Never Smoker    Smokeless tobacco: Never Used   Substance Use Topics    Alcohol use: Yes     Comment: special occasions beer    Drug use: No        Review of Systems   Constitutional: Negative for chills and fever  HENT: Negative for dental problem and ear pain  Eyes: Positive for pain and redness  Respiratory: Negative for cough and shortness of breath  Cardiovascular: Negative for chest pain and palpitations  Gastrointestinal: Negative for abdominal pain and nausea  Endocrine: Negative for polydipsia and polyphagia  Genitourinary: Negative for dysuria and frequency  Musculoskeletal: Negative for arthralgias and joint swelling  Skin: Negative for color change and rash  Neurological: Negative for dizziness and headaches  Psychiatric/Behavioral: Negative for behavioral problems and confusion  All other systems reviewed and are negative  Physical Exam  Physical Exam   Constitutional: He is oriented to person, place, and time  He appears well-developed and well-nourished  No distress  HENT:   Head: Atraumatic  Right Ear: External ear normal    Left Ear: External ear normal    Nose: Nose normal    Eyes: Pupils are equal, round, and reactive to light  EOM are normal    R eye with conjunctival injection, IOP 15, FB visualized at 9 o'clock, unable to remove, negative uriel sign   Neck: Normal range of motion  Neck supple  No JVD present  Cardiovascular: Normal rate, regular rhythm and normal heart sounds  No murmur heard  Pulmonary/Chest: Effort normal and breath sounds normal  No respiratory distress  He has no wheezes  Abdominal: Soft  Bowel sounds are normal  He exhibits no distension  There is no tenderness  Musculoskeletal: Normal range of motion  He exhibits no edema  Neurological: He is alert and oriented to person, place, and time  No cranial nerve deficit  Skin: Skin is warm and dry  Capillary refill takes less than 2 seconds  He is not diaphoretic  Psychiatric: He has a normal mood and affect   His behavior is normal    Nursing note and vitals reviewed  Vital Signs  ED Triage Vitals [09/18/19 2136]   Temperature Pulse Respirations Blood Pressure SpO2   97 5 °F (36 4 °C) 67 16 123/75 97 %      Temp Source Heart Rate Source Patient Position - Orthostatic VS BP Location FiO2 (%)   Oral Monitor Sitting Left arm --      Pain Score       8           Vitals:    09/18/19 2136   BP: 123/75   Pulse: 67   Patient Position - Orthostatic VS: Sitting         Visual Acuity  Visual Acuity      Most Recent Value   Visual acuity R eye is  20/40   Visual acuity Left eye is  20/30   Visual acuity in both eyes is  20/25          ED Medications  Medications   tobramycin (TOBREX) 0 3 % ophthalmic solution 1 drop (1 drop Left Eye Given 9/18/19 2238)   tetracaine 0 5 % ophthalmic solution 2 drop (2 drops Both Eyes Given 9/18/19 2205)   fluorescein sodium sterile ophthalmic strip 1 strip (1 strip Both Eyes Given by Other 9/18/19 2205)       Diagnostic Studies  Results Reviewed     None                 No orders to display              Procedures  Procedures       ED Course                               MDM  Foreign body visualized, unable to remove in the ED, normal visual acuity, will Rx antibiotic drops and refer to [facility] in the morning  Disposition  Final diagnoses:   Foreign body, eye, left, initial encounter     Time reflects when diagnosis was documented in both MDM as applicable and the Disposition within this note     Time User Action Codes Description Comment    9/18/2019 10:36 PM Vangie Linn [facility] Foreign body, eye, left, initial encounter       ED Disposition     ED Disposition Condition Date/Time Comment    Discharge Stable Wed Sep 18, 2019 10:36 PM Hair Garcia discharge to home/self care              Follow-up Information     Follow up With Specialties Details Why [address] Ophthalmology Call  for eye appointment [address]  [phone]            Discharge Medication List as of 9/18/2019 10:36 PM      CONTINUE these medications which have NOT CHANGED    Details   acetaminophen (TYLENOL) 500 mg tablet Take 500 mg by mouth every 6 (six) hours as needed for mild pain, Historical Med      butalbital-acetaminophen-caffeine (FIORICET,ESGIC) -40 mg per tablet Take 1 tablet by mouth daily as needed for migraine For on set of headache, Starting u 3/8/2018, Print      topiramate (TOPAMAX) 50 MG tablet Take 1 tablet (50 mg total) by mouth daily, Starting u 3/8/2018, Normal           No discharge procedures on file      ED Provider  Electronically Signed by           Jose Antonio Cali MD  09/18/19 1682

## 2019-09-19 NOTE — ED NOTES
Patient called back and reported he did not get prescription for antibiotic eye drops  Prescription for tobramycin 0 3% solution 2 drops left eye every 4 hours x 5 days  Dispense 7 ml no refills called into [facility] at [phone]  Instructed to complete as directed and follow up with ophthalmology        Amanda Lubin PA-C  09/19/19 [id number]

## 2019-10-07 ENCOUNTER — TELEPHONE (OUTPATIENT)
Dept: INTERNAL MEDICINE CLINIC | Facility: CLINIC | Age: 44
End: 2019-10-07

## 2019-10-07 ENCOUNTER — OFFICE VISIT (OUTPATIENT)
Dept: INTERNAL MEDICINE CLINIC | Facility: CLINIC | Age: 44
End: 2019-10-07
Payer: COMMERCIAL

## 2019-10-07 VITALS
SYSTOLIC BLOOD PRESSURE: 112 MMHG | DIASTOLIC BLOOD PRESSURE: 74 MMHG | OXYGEN SATURATION: 97 % | HEIGHT: 67 IN | HEART RATE: 68 BPM | TEMPERATURE: 98.2 F | BODY MASS INDEX: 34.52 KG/M2

## 2019-10-07 DIAGNOSIS — R22.42 MASS OF LEFT FOOT: Primary | ICD-10-CM

## 2019-10-07 PROBLEM — J06.9 VIRAL URI WITH COUGH: Status: RESOLVED | Noted: 2018-08-28 | Resolved: 2019-10-07

## 2019-10-07 PROBLEM — J01.00 ACUTE NON-RECURRENT MAXILLARY SINUSITIS: Status: RESOLVED | Noted: 2019-08-29 | Resolved: 2019-10-07

## 2019-10-07 PROCEDURE — 99213 OFFICE O/P EST LOW 20 MIN: CPT | Performed by: NURSE PRACTITIONER

## 2019-10-07 RX ORDER — TRIAMCINOLONE ACETONIDE 1 MG/G
CREAM TOPICAL 2 TIMES DAILY
Qty: 30 G | Refills: 0 | Status: SHIPPED | OUTPATIENT
Start: 2019-10-07 | End: 2019-12-05

## 2019-10-07 NOTE — PROGRESS NOTES
Assessment/Plan:       Problem List Items Addressed This Visit        Other    Mass of left foot - Primary     Start triamcinolone cream to affected area twice a day for 10-14 days     Go for xray    Follow up in 2 weeks for re-evaluation     May need to consider soft tissue US and possible referral to derm vs podiatry         Relevant Medications    triamcinolone (KENALOG) 0 1 % cream    Other Relevant Orders    XR foot 3+ vw left            Subjective:      Patient ID: Christiano Garrett is a 37 y o  male  HPI     Patient presents today for mass on the dorsal side of his left foot  He noticed a black discoloration a few years ago  Skin felt rough and callused and continued to worsen over the years  Initially he thought that it was from his work boot  Pver the last few month he started with burning of the area  Whenever something touches it he has severe burning on the foot  He also notes that the lump is significantly larger  He denies any trauma or injury to the foot  He has tried using neosporin, soaking with epsom salt all without relief  The following portions of the patient's history were reviewed and updated as appropriate: allergies, current medications, past family history, past medical history, past social history, past surgical history and problem list     Review of Systems   Constitutional: Negative for chills and fever  Musculoskeletal: Negative for arthralgias, gait problem, joint swelling and myalgias  Skin: Positive for color change (mass with color change on left foot)  Negative for rash           Past Medical History:   Diagnosis Date    Depression with anxiety     last assessed: 5/12/2014    Febrile illness     last assessed: 12/2/2015    Fracture, finger     last assessed: 5/22/2015    Leukocytosis     last assessed: 1/23/2017    Migraine     Sciatica, left side     last assessed: 12/3/2014    Steroid-induced hyperglycemia     last assessed: 2/15/2017         Current Outpatient Medications:     acetaminophen (TYLENOL) 500 mg tablet, Take 500 mg by mouth every 6 (six) hours as needed for mild pain, Disp: , Rfl:     butalbital-acetaminophen-caffeine (FIORICET,ESGIC) -40 mg per tablet, Take 1 tablet by mouth daily as needed for migraine For on set of headache, Disp: 30 tablet, Rfl: 0    topiramate (TOPAMAX) 50 MG tablet, Take 1 tablet (50 mg total) by mouth daily, Disp: 90 tablet, Rfl: 1    No Known Allergies    Social History   Past Surgical History:   Procedure Laterality Date    [place] INCISE FINGER TENDON SHEATH Right 12/26/2018    Procedure: LONG AND RING TRIGGER FINGER RELEASES;  Surgeon: Sarah Carver MD;  Location: [facility];  Service: Orthopedics     Family History   Problem Relation Age of Onset    Diabetes Mother     Hypertension Mother     Diabetes Father     Gout Father     Hypertension Father     Cancer Maternal Grandmother     Rosacea Family        Objective:  /74 (BP Location: Left arm, Patient Position: Sitting, Cuff Size: Large)   Pulse 68   Temp 98 2 °F (36 8 °C) (Oral)   Ht 5' 7" (1 702 m)   SpO2 97%   BMI 34 52 kg/m²      Physical Exam   Constitutional: He is oriented to person, place, and time  He appears well-developed and well-nourished  No distress  HENT:   Head: Normocephalic and atraumatic  Eyes: Pupils are equal, round, and reactive to light  Conjunctivae and EOM are normal    Cardiovascular: Normal rate, regular rhythm and normal heart sounds  No murmur heard  Pulmonary/Chest: Effort normal and breath sounds normal  No respiratory distress  He has no wheezes  Musculoskeletal: He exhibits no edema  Left ankle: Normal         Left foot: There is tenderness  There is normal range of motion  Feet:    Neurological: He is alert and oriented to person, place, and time  Skin: Skin is warm and dry  He is not diaphoretic  Psychiatric: He has a normal mood and affect  His behavior is normal    Vitals reviewed

## 2019-10-08 NOTE — ASSESSMENT & PLAN NOTE
Start triamcinolone cream to affected area twice a day for 10-14 days     Go for xray    Follow up in 2 weeks for re-evaluation     May need to consider soft tissue US and possible referral to derm vs podiatry

## 2019-10-11 ENCOUNTER — APPOINTMENT (OUTPATIENT)
Dept: RADIOLOGY | Age: 44
End: 2019-10-11
Payer: COMMERCIAL

## 2019-10-11 DIAGNOSIS — R22.42 MASS OF LEFT FOOT: ICD-10-CM

## 2019-10-11 PROCEDURE — 73630 X-RAY EXAM OF FOOT: CPT

## 2019-10-30 ENCOUNTER — OFFICE VISIT (OUTPATIENT)
Dept: INTERNAL MEDICINE CLINIC | Facility: CLINIC | Age: 44
End: 2019-10-30
Payer: COMMERCIAL

## 2019-10-30 VITALS
HEART RATE: 67 BPM | OXYGEN SATURATION: 98 % | BODY MASS INDEX: 34.21 KG/M2 | DIASTOLIC BLOOD PRESSURE: 80 MMHG | WEIGHT: 218 LBS | SYSTOLIC BLOOD PRESSURE: 118 MMHG | HEIGHT: 67 IN | TEMPERATURE: 98.3 F

## 2019-10-30 DIAGNOSIS — R22.42 MASS OF LEFT FOOT: ICD-10-CM

## 2019-10-30 DIAGNOSIS — M65.331 TRIGGER MIDDLE FINGER OF RIGHT HAND: ICD-10-CM

## 2019-10-30 DIAGNOSIS — M65.341 TRIGGER RING FINGER OF RIGHT HAND: Primary | ICD-10-CM

## 2019-10-30 PROBLEM — M65.312 TRIGGER THUMB OF LEFT HAND: Status: RESOLVED | Noted: 2019-02-14 | Resolved: 2019-10-30

## 2019-10-30 PROCEDURE — 3008F BODY MASS INDEX DOCD: CPT | Performed by: INTERNAL MEDICINE

## 2019-10-30 PROCEDURE — 99213 OFFICE O/P EST LOW 20 MIN: CPT | Performed by: INTERNAL MEDICINE

## 2019-10-30 NOTE — PROGRESS NOTES
Assessment/Plan:    Trigger ring finger of right hand  Will refer to a hand surgery for further evaluation  Mass of left foot  Will refer to Podiatry for removal        Diagnoses and all orders for this visit:    Trigger ring finger of right hand  -     Ambulatory referral to Hand Surgery; Future    Trigger middle finger of right hand  -     Ambulatory referral to Hand Surgery; Future    Mass of left foot  -     Ambulatory referral to Podiatry; Future                Subjective:      Patient ID: Aguila Herrera is a 37 y o  male  37year old male is seen today for follow up  Regarding left foot swelling/lump, XR showed a soft tissue swelling  He has been applying a topical steroid cream without improvement/decrease in lump size  He does report that is tender with a burning sensation  Denies any erythema or infectious symptoms  He also has been having recurrence of a trigger finger in the right ring finger, digit 4  He did have surgery year over 1 year ago  The following portions of the patient's history were reviewed and updated as appropriate: allergies, current medications, past family history, past medical history, past social history, past surgical history and problem list     Review of Systems   Constitutional: Negative  Negative for chills, fatigue and fever  HENT: Negative for congestion, ear pain, postnasal drip, rhinorrhea and sore throat  Eyes: Negative  Respiratory: Negative for cough, chest tightness, shortness of breath and wheezing  Cardiovascular: Negative for chest pain and palpitations  Gastrointestinal: Negative for abdominal distention, abdominal pain, blood in stool, constipation, diarrhea and nausea  Endocrine: Negative  Genitourinary: Negative for difficulty urinating, dysuria and hematuria  Musculoskeletal: Negative  Skin: Negative  Allergic/Immunologic: Negative for environmental allergies and food allergies  Neurological: Negative  Hematological: Negative for adenopathy  Psychiatric/Behavioral: Negative for agitation, behavioral problems, confusion and sleep disturbance  Past Medical History:   Diagnosis Date    Depression with anxiety     last assessed: 5/12/2014    Febrile illness     last assessed: 12/2/2015    Fracture, finger     last assessed: 5/22/2015    Leukocytosis     last assessed: 1/23/2017    Migraine     Sciatica, left side     last assessed: 12/3/2014    Steroid-induced hyperglycemia     last assessed: 2/15/2017         Current Outpatient Medications:     acetaminophen (TYLENOL) 500 mg tablet, Take 500 mg by mouth every 6 (six) hours as needed for mild pain, Disp: , Rfl:     butalbital-acetaminophen-caffeine (FIORICET,ESGIC) -40 mg per tablet, Take 1 tablet by mouth daily as needed for migraine For on set of headache, Disp: 30 tablet, Rfl: 0    topiramate (TOPAMAX) 50 MG tablet, Take 1 tablet (50 mg total) by mouth daily, Disp: 90 tablet, Rfl: 1    triamcinolone (KENALOG) 0 1 % cream, Apply topically 2 (two) times a day for 10 days, Disp: 30 g, Rfl: 0    No Known Allergies    Social History   Past Surgical History:   Procedure Laterality Date    [place] INCISE FINGER TENDON SHEATH Right 12/26/2018    Procedure: LONG AND RING TRIGGER FINGER RELEASES;  Surgeon: Florian Parker MD;  [facility];  Service: Orthopedics     Family History   Problem Relation Age of Onset    Diabetes Mother     Hypertension Mother     Breast cancer Mother     Diabetes Father     Gout Father     Hypertension Father     Cancer Maternal Grandmother     Rosacea Family        Objective:  /80 (BP Location: Left arm, Patient Position: Sitting, Cuff Size: Standard)   Pulse 67   Temp 98 3 °F (36 8 °C) (Oral)   Ht 5' 7" (1 702 m)   Wt 98 9 kg (218 lb)   SpO2 98%   BMI 34 14 kg/m²     No results found for this or any previous visit (from the past 1344 hour(s))           Physical Exam   Constitutional: He is oriented to person, place, and time  He appears well-developed and well-nourished  No distress  HENT:   Head: Normocephalic and atraumatic  Eyes: Pupils are equal, round, and reactive to light  Conjunctivae and EOM are normal  Right eye exhibits no discharge  Left eye exhibits no discharge  No scleral icterus  Neck: Normal range of motion  Neck supple  No JVD present  No thyromegaly present  Cardiovascular: Normal rate, regular rhythm, normal heart sounds and intact distal pulses  Exam reveals no gallop and no friction rub  No murmur heard  Pulmonary/Chest: Effort normal and breath sounds normal  No respiratory distress  He has no wheezes  He has no rales  He exhibits no tenderness  Abdominal: Soft  Bowel sounds are normal  He exhibits no distension and no mass  There is no tenderness  There is no rebound and no guarding  Musculoskeletal: Normal range of motion  He exhibits no edema, tenderness or deformity  Lymphadenopathy:     He has no cervical adenopathy  Neurological: He is alert and oriented to person, place, and time  He has normal reflexes  No cranial nerve deficit  Coordination normal    Skin: Skin is warm and dry  No rash noted  He is not diaphoretic  No erythema  No pallor  Psychiatric: He has a normal mood and affect  His behavior is normal  Judgment and thought content normal    Nursing note and vitals reviewed

## 2019-12-05 ENCOUNTER — APPOINTMENT (OUTPATIENT)
Dept: RADIOLOGY | Age: 44
End: 2019-12-05
Payer: COMMERCIAL

## 2019-12-05 ENCOUNTER — OFFICE VISIT (OUTPATIENT)
Dept: INTERNAL MEDICINE CLINIC | Age: 44
End: 2019-12-05
Payer: COMMERCIAL

## 2019-12-05 VITALS
OXYGEN SATURATION: 97 % | WEIGHT: 221.4 LBS | HEIGHT: 68 IN | TEMPERATURE: 97.9 F | HEART RATE: 74 BPM | SYSTOLIC BLOOD PRESSURE: 118 MMHG | BODY MASS INDEX: 33.56 KG/M2 | DIASTOLIC BLOOD PRESSURE: 84 MMHG

## 2019-12-05 DIAGNOSIS — M25.521 RIGHT ELBOW PAIN: ICD-10-CM

## 2019-12-05 DIAGNOSIS — M25.521 RIGHT ELBOW PAIN: Primary | ICD-10-CM

## 2019-12-05 PROCEDURE — 73080 X-RAY EXAM OF ELBOW: CPT

## 2019-12-05 PROCEDURE — 3008F BODY MASS INDEX DOCD: CPT | Performed by: INTERNAL MEDICINE

## 2019-12-05 PROCEDURE — 99213 OFFICE O/P EST LOW 20 MIN: CPT | Performed by: INTERNAL MEDICINE

## 2019-12-05 PROCEDURE — 1036F TOBACCO NON-USER: CPT | Performed by: INTERNAL MEDICINE

## 2019-12-05 RX ORDER — MELOXICAM 15 MG/1
15 TABLET ORAL DAILY
Qty: 15 TABLET | Refills: 1 | Status: SHIPPED | OUTPATIENT
Start: 2019-12-05 | End: 2020-08-21 | Stop reason: SDUPTHER

## 2019-12-05 NOTE — PROGRESS NOTES
Assessment/Plan:  No problem-specific Assessment & Plan notes found for this encounter  Diagnoses and all orders for this visit:    Right elbow pain  -     XR elbow 3+ vw right; Future  -     meloxicam (MOBIC) 15 mg tablet; Take 1 tablet (15 mg total) by mouth daily  -     diclofenac sodium (VOLTAREN) 1 %; Apply 2 g topically 4 (four) times a day        Subjective:   Chief Complaint   Patient presents with    Elbow Pain     right side elbow pain, x2 months pain has been getting worse, not currently taking anything for pain, denied trama or injury to area         Patient ID: Gabriel Reed is a 53 y o  male  HPI  This is a very pleasant 53 years young gentleman who is here for the right elbow pain which is slowly getting worse for last 3 months but the it is to the point that the he is having problem with the lifting any weight with the arm  bending, pulling and pushing it   Denying any tick bite denying any trauma, no fever or chills no swelling no other joint in no previous history of joint pain  Associated with the pain is some numbness and tingling in the 3 medial fingers  No loss of of muscles or power  The following portions of the patient's history were reviewed and updated as appropriate: allergies, current medications, past family history, past medical history, past social history, past surgical history and problem list     Review of Systems   Constitutional: Negative for appetite change, fatigue and fever  HENT: Negative for congestion, ear pain, hearing loss, nosebleeds, sneezing, tinnitus and voice change  Eyes: Negative for pain, discharge and redness  Respiratory: Negative for cough, chest tightness and wheezing  Cardiovascular: Negative for chest pain, palpitations and leg swelling  Gastrointestinal: Negative for abdominal pain, blood in stool, constipation, diarrhea, nausea and vomiting  Genitourinary: Negative for difficulty urinating, dysuria, hematuria and urgency  Musculoskeletal: Negative for arthralgias, back pain, gait problem and joint swelling  Right elbow pain   Skin: Negative for rash and wound  Allergic/Immunologic: Negative for environmental allergies  Neurological: Negative for dizziness, tremors, seizures, weakness, light-headedness and numbness  Hematological: Negative for adenopathy  Does not bruise/bleed easily  Psychiatric/Behavioral: Negative for behavioral problems and confusion  The patient is not nervous/anxious            Past Medical History:   Diagnosis Date    Depression with anxiety     last assessed: 5/12/2014    Febrile illness     last assessed: 12/2/2015    Fracture, finger     last assessed: 5/22/2015    Leukocytosis     last assessed: 1/23/2017    Migraine     Sciatica, left side     last assessed: 12/3/2014    Steroid-induced hyperglycemia     last assessed: 2/15/2017         Current Outpatient Medications:     acetaminophen (TYLENOL) 500 mg tablet, Take 500 mg by mouth every 6 (six) hours as needed for mild pain, Disp: , Rfl:     butalbital-acetaminophen-caffeine (FIORICET,ESGIC) -40 mg per tablet, Take 1 tablet by mouth daily as needed for migraine For on set of headache, Disp: 30 tablet, Rfl: 0    meloxicam (MOBIC) 15 mg tablet, Take 1 tablet (15 mg total) by mouth daily, Disp: 15 tablet, Rfl: 1    topiramate (TOPAMAX) 50 MG tablet, Take 1 tablet (50 mg total) by mouth daily (Patient not taking: Reported on 12/5/2019), Disp: 90 tablet, Rfl: 1    No Known Allergies    Social History   Past Surgical History:   Procedure Laterality Date    [place] INCISE FINGER TENDON SHEATH Right 12/26/2018    Procedure: LONG AND RING TRIGGER FINGER RELEASES;  Surgeon: Meaghan Perea MD;  Location: [facility];  Service: Orthopedics     Family History   Problem Relation Age of Onset    Diabetes Mother     Hypertension Mother     Breast cancer Mother     Diabetes Father     Gout Father     Hypertension Father     Cancer Maternal Grandmother     Rosacea Family        Objective:  /84 (BP Location: Left arm, Patient Position: Sitting, Cuff Size: Large)   Pulse 74   Temp 97 9 °F (36 6 °C) (Tympanic)   Ht 5' 8 11" (1 73 m) Comment: shoes on  Wt 100 kg (221 lb 6 4 oz) Comment: shoes on  SpO2 97%   BMI 33 56 kg/m²        Physical Exam   Constitutional: He is oriented to person, place, and time  He appears well-developed and well-nourished  HENT:   Right Ear: External ear normal    Mouth/Throat: Oropharynx is clear and moist    Eyes: Pupils are equal, round, and reactive to light  Conjunctivae and EOM are normal    Neck: Normal range of motion  No JVD present  No thyromegaly present  Cardiovascular: Normal rate, regular rhythm, normal heart sounds and intact distal pulses  Pulmonary/Chest: Breath sounds normal    Abdominal: Soft  Bowel sounds are normal    Musculoskeletal: Normal range of motion  Painful flexion extension both active and passive and against the resistance  Significantly tender at the insertion of triceps  No swelling of the joint itself no olecranon bursitis   Lymphadenopathy:     He has no cervical adenopathy  Neurological: He is alert and oriented to person, place, and time  He has normal reflexes  Skin: Skin is dry  Psychiatric: He has a normal mood and affect  His behavior is normal  Judgment and thought content normal          No results found for this or any previous visit (from the past 672 hour(s))

## 2020-01-02 ENCOUNTER — OFFICE VISIT (OUTPATIENT)
Dept: OBGYN CLINIC | Facility: OTHER | Age: 45
End: 2020-01-02
Payer: COMMERCIAL

## 2020-01-02 ENCOUNTER — APPOINTMENT (OUTPATIENT)
Dept: RADIOLOGY | Facility: OTHER | Age: 45
End: 2020-01-02
Payer: COMMERCIAL

## 2020-01-02 VITALS
BODY MASS INDEX: 35.31 KG/M2 | HEART RATE: 82 BPM | HEIGHT: 68 IN | SYSTOLIC BLOOD PRESSURE: 120 MMHG | WEIGHT: 233 LBS | DIASTOLIC BLOOD PRESSURE: 80 MMHG

## 2020-01-02 DIAGNOSIS — M70.21 OLECRANON BURSITIS OF RIGHT ELBOW: ICD-10-CM

## 2020-01-02 DIAGNOSIS — M65.331 TRIGGER MIDDLE FINGER OF RIGHT HAND: ICD-10-CM

## 2020-01-02 DIAGNOSIS — M65.341 TRIGGER RING FINGER OF RIGHT HAND: ICD-10-CM

## 2020-01-02 DIAGNOSIS — G56.23 CUBITAL TUNNEL SYNDROME OF BOTH UPPER EXTREMITIES: ICD-10-CM

## 2020-01-02 DIAGNOSIS — M25.522 PAIN IN LEFT ELBOW: Primary | ICD-10-CM

## 2020-01-02 DIAGNOSIS — M25.522 PAIN IN LEFT ELBOW: ICD-10-CM

## 2020-01-02 PROCEDURE — 73080 X-RAY EXAM OF ELBOW: CPT

## 2020-01-02 PROCEDURE — 99214 OFFICE O/P EST MOD 30 MIN: CPT | Performed by: ORTHOPAEDIC SURGERY

## 2020-01-02 NOTE — PROGRESS NOTES
Assessment  Diagnoses and all orders for this visit:    Pain in left elbow    Cubital tunnel syndrome of both upper extremities    Pain in right elbow    Olecranon bursitis - right    Trigger finger right long and ring      Discussion and Plan:    The patient has an examination consistent with Bilateral cubital tunnel syndrome  I have discussed with the patient the pathophysiology of this diagnosis and reviewed how the examination correlates with this diagnosis  Treatment options were discussed at length and after discussing these treatment options, the patient was referred to hand therapy  Patient was provided with a hand out instructing him with night time splinting  I recommend that he get an elbow pad for the olecranon bursitis  I will referred the patient to my colleague Dr Herber Zuñiga for further treatment  Subjective:   Patient ID: Dary Pa is a 53 y o  male      HPI    The patient presents with a chief complaint of bilateral elbow pain  The pain began several month(s) ago and is not associated with an acute injury  The patient describes the pain as aching, burning and dull in intensity,  intermittent in timing, and localizes the pain to the posteromedial aspects of the elbow  The pain is worse with prolonged flexion and when leaning on the elbow and relieved by rest   The pain is associated with numbness and tingling in the B/L ring and small fingers  The pain is not associated with constitutional symptoms  The patient is awoken at night by the pain  Patient also had a previous right ring and long trigger finger releases done by Dr Adrianna Gonzales which he states have started to lock up on him again  He states he wakes with the fingers locked           The following portions of the patient's history were reviewed and updated as appropriate: allergies, current medications, past family history, past medical history, past social history, past surgical history and problem list     Review of Systems   Constitutional: Negative for chills and fever  HENT: Negative for drooling and hearing loss  Eyes: Negative for visual disturbance  Respiratory: Negative for cough and shortness of breath  Cardiovascular: Negative for chest pain  Gastrointestinal: Negative for abdominal pain  Skin: Negative for rash  Psychiatric/Behavioral: Negative for agitation  Objective:  /80   Pulse 82   Ht 5' 8 11" (1 73 m)   Wt 106 kg (233 lb)   BMI 35 31 kg/m²       Right Elbow Exam     Tenderness   Right elbow tenderness location: olecranon  Range of Motion   The patient has normal right elbow ROM  Muscle Strength   The patient has normal right elbow strength  Tests   Varus: negative  Valgus: negative  Tinel's sign (cubital tunnel): positive    Other   Erythema: absent  Sensation: normal  Pulse: present      Left Elbow Exam     Tenderness   The patient is experiencing no tenderness  Range of Motion   The patient has normal left elbow ROM  Muscle Strength   The patient has normal left elbow strength  Tests   Varus: negative  Valgus: negative  Tinel's sign (cubital tunnel): positive    Other   Erythema: absent  Sensation: normal  Pulse: present        right long and ring finger:    Positive palpable nodule over the A1 pulley  Negative tenderness to palpation over A1 pulley  Negative catching  Negative clicking  Physical Exam   Constitutional: He appears well-developed and well-nourished  HENT:   Head: Normocephalic  Eyes: Pupils are equal, round, and reactive to light  Pulmonary/Chest: Effort normal    Skin: Skin is warm and dry  Psychiatric: He has a normal mood and affect  Vitals reviewed  I have personally reviewed pertinent films in PACS and my interpretation is as follows    B/L elbow x-rays demonstrate no fracture or dislocation, small olecranon spur with small calcification of the right elbow    Scribe Attestation    I,:   Beata Alvares am acting as a scribe while in the presence of the attending physician :        I,:   Cintia Mahoney MD personally performed the services described in this documentation    as scribed in my presence :

## 2020-01-08 ENCOUNTER — TELEPHONE (OUTPATIENT)
Dept: GASTROENTEROLOGY | Facility: CLINIC | Age: 45
End: 2020-01-08

## 2020-01-08 ENCOUNTER — OFFICE VISIT (OUTPATIENT)
Dept: GASTROENTEROLOGY | Facility: AMBULARY SURGERY CENTER | Age: 45
End: 2020-01-08
Payer: COMMERCIAL

## 2020-01-08 VITALS
BODY MASS INDEX: 35.94 KG/M2 | SYSTOLIC BLOOD PRESSURE: 112 MMHG | HEIGHT: 67 IN | TEMPERATURE: 98.8 F | HEART RATE: 86 BPM | WEIGHT: 229 LBS | RESPIRATION RATE: 18 BRPM | DIASTOLIC BLOOD PRESSURE: 70 MMHG

## 2020-01-08 DIAGNOSIS — R10.13 EPIGASTRIC PAIN: Primary | ICD-10-CM

## 2020-01-08 DIAGNOSIS — K92.1 MELENA: ICD-10-CM

## 2020-01-08 PROCEDURE — 99244 OFF/OP CNSLTJ NEW/EST MOD 40: CPT | Performed by: INTERNAL MEDICINE

## 2020-01-08 RX ORDER — OMEPRAZOLE 40 MG/1
40 CAPSULE, DELAYED RELEASE ORAL DAILY
Qty: 30 CAPSULE | Refills: 3 | Status: SHIPPED | OUTPATIENT
Start: 2020-01-08 | End: 2021-02-26 | Stop reason: SDUPTHER

## 2020-01-08 NOTE — PROGRESS NOTES
Consultation - [facility]) [facility]  America Nixon 53 y o  male MRN: [id number]  Unit/Bed#:  Encounter: [id number]        Consults    ASSESSMENT/PLAN:      1  Epigastric pain/black colored stools-in setting of NSAID use, suspect may have peptic ulcer disease versus gastritis  -will start on omeprazole 40 mg daily, to be taken 30 minutes before breakfast   -stop the use of Mobic, stop the use of any other NSAIDs  Take acetaminophen instead   -check CBC, CMP   - Advised to avoid fatty foods, chocolates, caffeine, alcohol and any other triggering foods  Avoid eating for at least 3 hours before going to bed  -will plan for EGD to assess for peptic ulcer disease   -discussed with patient that should he have recurrent melena, hematemesis, he should go to the emergency room  ______________________________________________________________________    Reason for Consult / Principal Problem: [unfilled]    HPI: America Nixon is a 53y o  year old male with history of migraines, presents for evaluation of epigastric discomfort and black colored stools for the past several months  Patient reports that he had black color stool for almost 2 weeks, this has not resolved  He does report daily Mobic use, no other NSAIDs  Denies hematemesis, coffee-ground emesis  No unintentional weight loss or loss of appetite  Rare alcohol use  He has been taking Tums with mild symptomatic improvement  No other acid suppressive medications  No family history of colon cancer, no previous colonoscopy  He does report having had an EGD many years ago, unsure of the results  Labs done in July were notable for normal liver function tests, hemoglobin of 15, platelets of 459, INR was normal       Review of Systems: The remainder of the review of systems was negative except for the pertinent positives noted in HPI       Historical Information   Past Medical History:   Diagnosis Date    Depression with anxiety     last assessed: 5/12/2014    Febrile illness     last assessed: 12/2/2015    Fracture, finger     last assessed: 5/22/2015    Leukocytosis     last assessed: 1/23/2017    Migraine     Sciatica, left side     last assessed: 12/3/2014    Steroid-induced hyperglycemia     last assessed: 2/15/2017     Past Surgical History:   Procedure Laterality Date    AL INCISE FINGER TENDON SHEATH Right 12/26/2018    Procedure: LONG AND RING TRIGGER FINGER RELEASES;  Surgeon: Jaycob Warner MD;  Location: [facility];  Service: Orthopedics     Social History   Social History     Substance and Sexual Activity   Alcohol Use Yes    Frequency: Monthly or less    Comment: special occasions beer     Social History     Substance and Sexual Activity   Drug Use No     Social History     Tobacco Use   Smoking Status Never Smoker   Smokeless Tobacco Never Used     Family History   Problem Relation Age of Onset    Diabetes Mother     Hypertension Mother     Breast cancer Mother     Diabetes Father     Gout Father     Hypertension Father     Cancer Maternal Grandmother     Rosacea Family        Meds/Allergies       (Not in a hospital admission)  No current facility-administered medications for this visit  No Known Allergies    Objective     Blood pressure 112/70, pulse 86, temperature 98 8 °F (37 1 °C), temperature source Tympanic, resp  rate 18, height 5' 7" (1 702 m), weight 104 kg (229 lb)  [unfilled]    PHYSICAL EXAM     GEN: well nourished, well developed, no acute distress  HEENT: anicteric, MMM,  CV: RRR, no m/r/g  CHEST: CTA b/l, no WRR  ABD: +BS, soft, NT/ND, no hepatosplenomegaly  EXT: no c/c/e  SKIN: no rashes,  NEURO: aaox3    Lab Results:   No visits with results within 1 Day(s) from this visit     Latest known visit with results is:   Office Visit on 07/18/2019   Component Date Value    WBC 07/18/2019 6 19     RBC 07/18/2019 5 30     Hemoglobin 07/18/2019 15 4     Hematocrit 07/18/2019 48 2     MCV 07/18/2019 91     MCH 07/18/2019 29 1     MCHC 07/18/2019 32 0     RDW 07/18/2019 12 7     MPV 07/18/2019 9 9     Platelets 84/24/1215 261     nRBC 07/18/2019 0     Neutrophils Relative 07/18/2019 65     Immat GRANS % 07/18/2019 0     Lymphocytes Relative 07/18/2019 25     Monocytes Relative 07/18/2019 8     Eosinophils Relative 07/18/2019 2     Basophils Relative 07/18/2019 0     Neutrophils Absolute 07/18/2019 3 94     Immature Grans Absolute 07/18/2019 0 02     Lymphocytes Absolute 07/18/2019 1 57     Monocytes Absolute 07/18/2019 0 52     Eosinophils Absolute 07/18/2019 0 12     Basophils Absolute 07/18/2019 0 02     Cholesterol 07/20/2019 185     Triglycerides 07/20/2019 141     HDL, Direct 07/20/2019 49     LDL Calculated 07/20/2019 108*    Non-HDL-Chol (CHOL-HDL) 07/20/2019 136     TSH 3RD GENERATON 07/18/2019 1 360     Color, UA 07/18/2019 Dk Yellow     Clarity, UA 07/18/2019 Clear     Specific Gravity, UA 07/18/2019 1 024     pH, UA 07/18/2019 6 5     Leukocytes, UA 07/18/2019 Negative     Nitrite, UA 07/18/2019 Negative     Protein, UA 07/18/2019 Negative     Glucose, UA 07/18/2019 Negative     Ketones, UA 07/18/2019 Negative     Urobilinogen, UA 07/18/2019 1 0     Bilirubin, UA 07/18/2019 Negative     Blood, UA 07/18/2019 Negative     Sodium 07/18/2019 139     Potassium 07/18/2019 4 1     Chloride 07/18/2019 108     CO2 07/18/2019 24     ANION GAP 07/18/2019 7     BUN 07/18/2019 20     Creatinine 07/18/2019 0 88     Glucose 07/18/2019 93     Calcium 07/18/2019 9 3     AST 07/18/2019 12     ALT 07/18/2019 27     Alkaline Phosphatase 07/18/2019 89     Total Protein 07/18/2019 6 8     Albumin 07/18/2019 4 0     Total Bilirubin 07/18/2019 0 59     eGFR 07/18/2019 104     Protime 07/18/2019 12 9     INR 07/18/2019 1 01      Imaging Studies: I have personally reviewed pertinent films in PACS

## 2020-01-08 NOTE — H&P (VIEW-ONLY)
Consultation - [facility]  Gabriel Reed 53 y o  male MRN: [id number]  Unit/Bed#:  Encounter: [id number]        Consults    ASSESSMENT/PLAN:      1  Epigastric pain/black colored stools-in setting of NSAID use, suspect may have peptic ulcer disease versus gastritis  -will start on omeprazole 40 mg daily, to be taken 30 minutes before breakfast   -stop the use of Mobic, stop the use of any other NSAIDs  Take acetaminophen instead   -check CBC, CMP   - Advised to avoid fatty foods, chocolates, caffeine, alcohol and any other triggering foods  Avoid eating for at least 3 hours before going to bed  -will plan for EGD to assess for peptic ulcer disease   -discussed with patient that should he have recurrent melena, hematemesis, he should go to the emergency room  ______________________________________________________________________    Reason for Consult / Principal Problem: [unfilled]    HPI: Gabriel Reed is a 53y o  year old male with history of migraines, presents for evaluation of epigastric discomfort and black colored stools for the past several months  Patient reports that he had black color stool for almost 2 weeks, this has not resolved  He does report daily Mobic use, no other NSAIDs  Denies hematemesis, coffee-ground emesis  No unintentional weight loss or loss of appetite  Rare alcohol use  He has been taking Tums with mild symptomatic improvement  No other acid suppressive medications  No family history of colon cancer, no previous colonoscopy  He does report having had an EGD many years ago, unsure of the results  Labs done in July were notable for normal liver function tests, hemoglobin of 15, platelets of 648, INR was normal       Review of Systems: The remainder of the review of systems was negative except for the pertinent positives noted in HPI       Historical Information   Past Medical History:   Diagnosis Date    Depression with anxiety     last assessed: 5/12/2014    Febrile illness     last assessed: 12/2/2015    Fracture, finger     last assessed: 5/22/2015    Leukocytosis     last assessed: 1/23/2017    Migraine     Sciatica, left side     last assessed: 12/3/2014    Steroid-induced hyperglycemia     last assessed: 2/15/2017     Past Surgical History:   Procedure Laterality Date    LA INCISE FINGER TENDON SHEATH Right 12/26/2018    Procedure: LONG AND RING TRIGGER FINGER RELEASES;  Surgeon: Inga Jimenez MD;  Location: [facility];  Service: Orthopedics     Social History   Social History     Substance and Sexual Activity   Alcohol Use Yes    Frequency: Monthly or less    Comment: special occasions beer     Social History     Substance and Sexual Activity   Drug Use No     Social History     Tobacco Use   Smoking Status Never Smoker   Smokeless Tobacco Never Used     Family History   Problem Relation Age of Onset    Diabetes Mother     Hypertension Mother     Breast cancer Mother     Diabetes Father     Gout Father     Hypertension Father     Cancer Maternal Grandmother     Rosacea Family        Meds/Allergies       (Not in a hospital admission)  No current facility-administered medications for this visit  No Known Allergies    Objective     Blood pressure 112/70, pulse 86, temperature 98 8 °F (37 1 °C), temperature source Tympanic, resp  rate 18, height 5' 7" (1 702 m), weight 104 kg (229 lb)  [unfilled]    PHYSICAL EXAM     GEN: well nourished, well developed, no acute distress  HEENT: anicteric, MMM,  CV: RRR, no m/r/g  CHEST: CTA b/l, no WRR  ABD: +BS, soft, NT/ND, no hepatosplenomegaly  EXT: no c/c/e  SKIN: no rashes,  NEURO: aaox3    Lab Results:   No visits with results within 1 Day(s) from this visit     Latest known visit with results is:   Office Visit on 07/18/2019   Component Date Value    WBC 07/18/2019 6 19     RBC 07/18/2019 5 30     Hemoglobin 07/18/2019 15 4     Hematocrit 07/18/2019 48 2     MCV 07/18/2019 91     MCH 07/18/2019 29 1     MCHC 07/18/2019 32 0     RDW 07/18/2019 12 7     MPV 07/18/2019 9 9     Platelets 77/75/0813 261     nRBC 07/18/2019 0     Neutrophils Relative 07/18/2019 65     Immat GRANS % 07/18/2019 0     Lymphocytes Relative 07/18/2019 25     Monocytes Relative 07/18/2019 8     Eosinophils Relative 07/18/2019 2     Basophils Relative 07/18/2019 0     Neutrophils Absolute 07/18/2019 3 94     Immature Grans Absolute 07/18/2019 0 02     Lymphocytes Absolute 07/18/2019 1 57     Monocytes Absolute 07/18/2019 0 52     Eosinophils Absolute 07/18/2019 0 12     Basophils Absolute 07/18/2019 0 02     Cholesterol 07/20/2019 185     Triglycerides 07/20/2019 141     HDL, Direct 07/20/2019 49     LDL Calculated 07/20/2019 108*    Non-HDL-Chol (CHOL-HDL) 07/20/2019 136     TSH 3RD GENERATON 07/18/2019 1 360     Color, UA 07/18/2019 Dk Yellow     Clarity, UA 07/18/2019 Clear     Specific Gravity, UA 07/18/2019 1 024     pH, UA 07/18/2019 6 5     Leukocytes, UA 07/18/2019 Negative     Nitrite, UA 07/18/2019 Negative     Protein, UA 07/18/2019 Negative     Glucose, UA 07/18/2019 Negative     Ketones, UA 07/18/2019 Negative     Urobilinogen, UA 07/18/2019 1 0     Bilirubin, UA 07/18/2019 Negative     Blood, UA 07/18/2019 Negative     Sodium 07/18/2019 139     Potassium 07/18/2019 4 1     Chloride 07/18/2019 108     CO2 07/18/2019 24     ANION GAP 07/18/2019 7     BUN 07/18/2019 20     Creatinine 07/18/2019 0 88     Glucose 07/18/2019 93     Calcium 07/18/2019 9 3     AST 07/18/2019 12     ALT 07/18/2019 27     Alkaline Phosphatase 07/18/2019 89     Total Protein 07/18/2019 6 8     Albumin 07/18/2019 4 0     Total Bilirubin 07/18/2019 0 59     eGFR 07/18/2019 104     Protime 07/18/2019 12 9     INR 07/18/2019 1 01      Imaging Studies: I have personally reviewed pertinent films in PACS

## 2020-01-08 NOTE — LETTER
January 10, 2020     Referral [address] [address]    Patient: Maryse Peck   YOB: 1975   Date of Visit: 1/8/2020       Dear Dr Immanuel Nolan:    Thank you for referring Vega Munoz to me for evaluation  Below are my notes for this consultation  If you have questions, please do not hesitate to call me  I look forward to following your patient along with you  Sincerely,        Hallie Coon MD        CC: RONEY Wilson MD  1/8/2020  3:39 PM  Sign at close encounter  Consultation - [facility]  Maryse Peck 53 y o  male MRN: [id number]  Unit/Bed#:  Encounter: [id number]        Consults    ASSESSMENT/PLAN:      1  Epigastric pain/black colored stools-in setting of NSAID use, suspect may have peptic ulcer disease versus gastritis  -will start on omeprazole 40 mg daily, to be taken 30 minutes before breakfast   -stop the use of Mobic, stop the use of any other NSAIDs  Take acetaminophen instead   -check CBC, CMP   - Advised to avoid fatty foods, chocolates, caffeine, alcohol and any other triggering foods  Avoid eating for at least 3 hours before going to bed  -will plan for EGD to assess for peptic ulcer disease   -discussed with patient that should he have recurrent melena, hematemesis, he should go to the emergency room  ______________________________________________________________________    Reason for Consult / Principal Problem: [unfilled]    HPI: Maryse Peck is a 53y o  year old male with history of migraines, presents for evaluation of epigastric discomfort and black colored stools for the past several months  Patient reports that he had black color stool for almost 2 weeks, this has not resolved  He does report daily Mobic use, no other NSAIDs  Denies hematemesis, coffee-ground emesis  No unintentional weight loss or loss of appetite  Rare alcohol use    He has been taking Tums with mild symptomatic improvement  No other acid suppressive medications  No family history of colon cancer, no previous colonoscopy  He does report having had an EGD many years ago, unsure of the results  Labs done in July were notable for normal liver function tests, hemoglobin of 15, platelets of 693, INR was normal       Review of Systems: The remainder of the review of systems was negative except for the pertinent positives noted in HPI  Historical Information   Past Medical History:   Diagnosis Date    Depression with anxiety     last assessed: 5/12/2014    Febrile illness     last assessed: 12/2/2015    Fracture, finger     last assessed: 5/22/2015    Leukocytosis     last assessed: 1/23/2017    Migraine     Sciatica, left side     last assessed: 12/3/2014    Steroid-induced hyperglycemia     last assessed: 2/15/2017     Past Surgical History:   Procedure Laterality Date    ID INCISE FINGER TENDON SHEATH Right 12/26/2018    Procedure: LONG AND RING TRIGGER FINGER RELEASES;  Surgeon: Cheryl Mirza MD;  Location: [facility];  Service: Orthopedics     Social History   Social History     Substance and Sexual Activity   Alcohol Use Yes    Frequency: Monthly or less    Comment: special occasions beer     Social History     Substance and Sexual Activity   Drug Use No     Social History     Tobacco Use   Smoking Status Never Smoker   Smokeless Tobacco Never Used     Family History   Problem Relation Age of Onset    Diabetes Mother     Hypertension Mother     Breast cancer Mother     Diabetes Father     Gout Father     Hypertension Father     Cancer Maternal Grandmother     Rosacea Family        Meds/Allergies       (Not in a hospital admission)  No current facility-administered medications for this visit  No Known Allergies    Objective     Blood pressure 112/70, pulse 86, temperature 98 8 °F (37 1 °C), temperature source Tympanic, resp   rate 18, height 5' 7" (1 702 m), weight 104 kg (229 lb)     [unfilled]    PHYSICAL EXAM     GEN: well nourished, well developed, no acute distress  HEENT: anicteric, MMM,  CV: RRR, no m/r/g  CHEST: CTA b/l, no WRR  ABD: +BS, soft, NT/ND, no hepatosplenomegaly  EXT: no c/c/e  SKIN: no rashes,  NEURO: aaox3    Lab Results:   No visits with results within 1 Day(s) from this visit     Latest known visit with results is:   Office Visit on 07/18/2019   Component Date Value    WBC 07/18/2019 6 19     RBC 07/18/2019 5 30     Hemoglobin 07/18/2019 15 4     Hematocrit 07/18/2019 48 2     MCV 07/18/2019 91     MCH 07/18/2019 29 1     MCHC 07/18/2019 32 0     RDW 07/18/2019 12 7     MPV 07/18/2019 9 9     Platelets 95/26/4405 261     nRBC 07/18/2019 0     Neutrophils Relative 07/18/2019 65     Immat GRANS % 07/18/2019 0     Lymphocytes Relative 07/18/2019 25     Monocytes Relative 07/18/2019 8     Eosinophils Relative 07/18/2019 2     Basophils Relative 07/18/2019 0     Neutrophils Absolute 07/18/2019 3 94     Immature Grans Absolute 07/18/2019 0 02     Lymphocytes Absolute 07/18/2019 1 57     Monocytes Absolute 07/18/2019 0 52     Eosinophils Absolute 07/18/2019 0 12     Basophils Absolute 07/18/2019 0 02     Cholesterol 07/20/2019 185     Triglycerides 07/20/2019 141     HDL, Direct 07/20/2019 49     LDL Calculated 07/20/2019 108*    Non-HDL-Chol (CHOL-HDL) 07/20/2019 136     TSH 3RD GENERATON 07/18/2019 1 360     Color, UA 07/18/2019 Dk Yellow     Clarity, UA 07/18/2019 Clear     Specific Gravity, UA 07/18/2019 1 024     pH, UA 07/18/2019 6 5     Leukocytes, UA 07/18/2019 Negative     Nitrite, UA 07/18/2019 Negative     Protein, UA 07/18/2019 Negative     Glucose, UA 07/18/2019 Negative     Ketones, UA 07/18/2019 Negative     Urobilinogen, UA 07/18/2019 1 0     Bilirubin, UA 07/18/2019 Negative     Blood, UA 07/18/2019 Negative     Sodium 07/18/2019 139     Potassium 07/18/2019 4 1     Chloride 07/18/2019 108     CO2 07/18/2019 24  ANION GAP 07/18/2019 7     BUN 07/18/2019 20     Creatinine 07/18/2019 0 88     Glucose 07/18/2019 93     Calcium 07/18/2019 9 3     AST 07/18/2019 12     ALT 07/18/2019 27     Alkaline Phosphatase 07/18/2019 89     Total Protein 07/18/2019 6 8     Albumin 07/18/2019 4 0     Total Bilirubin 07/18/2019 0 59     eGFR 07/18/2019 104     Protime 07/18/2019 12 9     INR 07/18/2019 1 01      Imaging Studies: I have personally reviewed pertinent films in PACS

## 2020-01-21 RX ORDER — DIPHENOXYLATE HYDROCHLORIDE AND ATROPINE SULFATE 2.5; .025 MG/1; MG/1
1 TABLET ORAL DAILY
COMMUNITY

## 2020-01-21 NOTE — PRE-PROCEDURE INSTRUCTIONS
Pre-Surgery Instructions:   Medication Instructions    acetaminophen (TYLENOL) 500 mg tablet Patient was instructed by Physician and understands   butalbital-acetaminophen-caffeine (FIORICET,ESGIC) -40 mg per tablet Patient was instructed by Physician and understands   meloxicam (MOBIC) 15 mg tablet Patient was instructed by Physician and understands   multivitamin [facility]) TABS Patient was instructed by Physician and understands   omeprazole (PriLOSEC) 40 MG capsule Patient was instructed by Physician and understands   topiramate (TOPAMAX) 50 MG tablet Patient was instructed by Physician and understands

## 2020-01-22 ENCOUNTER — ANESTHESIA (OUTPATIENT)
Dept: GASTROENTEROLOGY | Facility: AMBULARY SURGERY CENTER | Age: 45
End: 2020-01-22

## 2020-01-22 ENCOUNTER — ANESTHESIA EVENT (OUTPATIENT)
Dept: GASTROENTEROLOGY | Facility: AMBULARY SURGERY CENTER | Age: 45
End: 2020-01-22

## 2020-01-22 ENCOUNTER — HOSPITAL ENCOUNTER (OUTPATIENT)
Dept: GASTROENTEROLOGY | Facility: AMBULARY SURGERY CENTER | Age: 45
Setting detail: OUTPATIENT SURGERY
Discharge: HOME | End: 2020-01-22
Attending: INTERNAL MEDICINE | Admitting: INTERNAL MEDICINE
Payer: COMMERCIAL

## 2020-01-22 VITALS
HEART RATE: 55 BPM | TEMPERATURE: 96.3 F | WEIGHT: 229 LBS | BODY MASS INDEX: 35.94 KG/M2 | DIASTOLIC BLOOD PRESSURE: 62 MMHG | RESPIRATION RATE: 18 BRPM | OXYGEN SATURATION: 98 % | SYSTOLIC BLOOD PRESSURE: 113 MMHG | HEIGHT: 67 IN

## 2020-01-22 DIAGNOSIS — K92.1 MELENA: ICD-10-CM

## 2020-01-22 DIAGNOSIS — R10.13 EPIGASTRIC PAIN: ICD-10-CM

## 2020-01-22 PROCEDURE — 88305 TISSUE EXAM BY PATHOLOGIST: CPT | Performed by: PATHOLOGY

## 2020-01-22 PROCEDURE — 43239 EGD BIOPSY SINGLE/MULTIPLE: CPT | Performed by: INTERNAL MEDICINE

## 2020-01-22 RX ORDER — PROPOFOL 10 MG/ML
INJECTION, EMULSION INTRAVENOUS AS NEEDED
Status: DISCONTINUED | OUTPATIENT
Start: 2020-01-22 | End: 2020-01-22 | Stop reason: SURG

## 2020-01-22 RX ORDER — SODIUM CHLORIDE, SODIUM LACTATE, POTASSIUM CHLORIDE, CALCIUM CHLORIDE 600; 310; 30; 20 MG/100ML; MG/100ML; MG/100ML; MG/100ML
125 INJECTION, SOLUTION INTRAVENOUS CONTINUOUS
Status: DISCONTINUED | OUTPATIENT
Start: 2020-01-22 | End: 2020-01-26 | Stop reason: HOSPADM

## 2020-01-22 RX ORDER — LIDOCAINE HYDROCHLORIDE 10 MG/ML
INJECTION, SOLUTION EPIDURAL; INFILTRATION; INTRACAUDAL; PERINEURAL AS NEEDED
Status: DISCONTINUED | OUTPATIENT
Start: 2020-01-22 | End: 2020-01-22 | Stop reason: SURG

## 2020-01-22 RX ORDER — PROPOFOL 10 MG/ML
INJECTION, EMULSION INTRAVENOUS CONTINUOUS PRN
Status: DISCONTINUED | OUTPATIENT
Start: 2020-01-22 | End: 2020-01-22 | Stop reason: SURG

## 2020-01-22 RX ADMIN — PROPOFOL 100 MG: 10 INJECTION, EMULSION INTRAVENOUS at 12:27

## 2020-01-22 RX ADMIN — SODIUM CHLORIDE, SODIUM LACTATE, POTASSIUM CHLORIDE, AND CALCIUM CHLORIDE 125 ML/HR: .6; .31; .03; .02 INJECTION, SOLUTION INTRAVENOUS at 11:39

## 2020-01-22 RX ADMIN — PROPOFOL 150 MCG/KG/MIN: 10 INJECTION, EMULSION INTRAVENOUS at 12:27

## 2020-01-22 RX ADMIN — LIDOCAINE HYDROCHLORIDE 50 MG: 10 INJECTION, SOLUTION EPIDURAL; INFILTRATION; INTRACAUDAL; PERINEURAL at 12:27

## 2020-01-22 NOTE — INTERVAL H&P NOTE
H&P reviewed  After examining the patient I find no changes in the patients condition since the H&P had been written      Vitals:    01/22/20 1129   BP: 116/75   Pulse: 60   Resp: 18   Temp: (!) 96 3 °F (35 7 °C)   SpO2: 98%

## 2020-01-24 PROBLEM — K29.70 GASTRITIS: Status: ACTIVE | Noted: 2020-01-24

## 2020-02-04 ENCOUNTER — TELEPHONE (OUTPATIENT)
Dept: GASTROENTEROLOGY | Facility: CLINIC | Age: 45
End: 2020-02-04

## 2020-02-04 NOTE — LETTER
February 4, 2020     [address] [place] [address]  [id number]      Dear   Violetta Magaña: We have attempted to reach you regarding your results  We ask that you please contact our office upon receipt of this letter to receive your results  Thank you in advance for your cooperation and assistance          Sincerely,   Alicia Quintana [facility] Staff  [phone]

## 2020-02-04 NOTE — TELEPHONE ENCOUNTER
----- Message from Yumiko Croft MD sent at 2/4/2020  8:23 AM EST -----  Please inform the patient that there is no evidence of celiac disease, no evidence of H pylori on gastric biopsies  Continue PPI as previously recommended    No need for repeat EGD

## 2020-02-05 ENCOUNTER — OFFICE VISIT (OUTPATIENT)
Dept: OBGYN CLINIC | Facility: CLINIC | Age: 45
End: 2020-02-05
Payer: COMMERCIAL

## 2020-02-05 VITALS
HEART RATE: 59 BPM | SYSTOLIC BLOOD PRESSURE: 121 MMHG | DIASTOLIC BLOOD PRESSURE: 79 MMHG | BODY MASS INDEX: 36.26 KG/M2 | WEIGHT: 231 LBS | HEIGHT: 67 IN

## 2020-02-05 DIAGNOSIS — M25.521 PAIN IN RIGHT ELBOW: ICD-10-CM

## 2020-02-05 DIAGNOSIS — M70.21 OLECRANON BURSITIS OF RIGHT ELBOW: ICD-10-CM

## 2020-02-05 DIAGNOSIS — G56.23 CUBITAL TUNNEL SYNDROME OF BOTH UPPER EXTREMITIES: Primary | ICD-10-CM

## 2020-02-05 DIAGNOSIS — M25.522 PAIN IN LEFT ELBOW: ICD-10-CM

## 2020-02-05 PROCEDURE — 3008F BODY MASS INDEX DOCD: CPT | Performed by: SURGERY

## 2020-02-05 PROCEDURE — 1036F TOBACCO NON-USER: CPT | Performed by: SURGERY

## 2020-02-05 PROCEDURE — 99214 OFFICE O/P EST MOD 30 MIN: CPT | Performed by: SURGERY

## 2020-02-05 RX ORDER — METHYLPREDNISOLONE 4 MG/1
TABLET ORAL
Qty: 1 EACH | Refills: 1 | Status: SHIPPED | OUTPATIENT
Start: 2020-02-05 | End: 2020-05-15 | Stop reason: ALTCHOICE

## 2020-02-05 RX ORDER — MELOXICAM 15 MG/1
15 TABLET ORAL DAILY
Qty: 30 TABLET | Refills: 3 | Status: SHIPPED | OUTPATIENT
Start: 2020-02-05 | End: 2020-08-21 | Stop reason: ALTCHOICE

## 2020-02-05 NOTE — PROGRESS NOTES
ASSESSMENT/PLAN:      53 y o  male with bilateral ulnar neuritis/cubital tunnel syndrome  OT was ordered today for ulnar nerve glides  Bilateral upper extremity EMG was ordered today  He may continue the use of the towel splint at night  A medrol doespak and Mobic was prescribed today and sent to his pharmacy electronically  He was advised to take the oral steroid fist, then the Mobic once the Oral steroid is completed  He was provided with 1 refill of the Medrol dosepak which he can repeat in aprox  3 weeks time if needed  Again he was advised he would have to stop the Mobic before repeating the oral steroid  Follow up in the office once the EMG is complete  The patient verbalized understanding of exam findings and treatment plan  We engaged in the shared decision-making process and treatment options were discussed at length with the patient  Surgical and conservative management discussed today along with risks and benefits  Diagnoses and all orders for this visit:    Cubital tunnel syndrome of both upper extremities  -     Ambulatory referral to Hand Surgery  -     Ambulatory referral to PT/OT hand therapy; Future  -     EMG 2 Limb Upper Extremity; Future    Pain in right elbow    Pain in left elbow  -     Ambulatory referral to Hand Surgery    Olecranon bursitis of right elbow  -     Ambulatory referral to Hand Surgery    Other orders  -     Cancel: XR elbow 3+ vw right; Future  -     methylPREDNISolone 4 MG tablet therapy pack; Use as directed on package  -     meloxicam (MOBIC) 15 mg tablet; Take 1 tablet (15 mg total) by mouth daily      Follow Up:  Return after EMG        To Do Next Visit:  Re-evaluation of current issue    ____________________________________________________________________________________________________________________________________________      CHIEF COMPLAINT:  Chief Complaint   Patient presents with    Right Elbow - Pain       SUBJECTIVE:  Pillo Corrales is a 53y o  year old RHD male who presents to the office today for bilateral elbow pain with associated numbness and tingling  Nahed states that he has been experiencing numbness and tingling to his ulnar 3 digits for a few months  He states that this is intermitted and is worse at night  He states his right side is worse then his left  He has been wearing a towel splint at night to keep his elbows straight, without improvement in his symptoms  He states that he has not attended OT at this time as he was not aware he was suppose to  He is not taking anything for pain control at this time  I have personally reviewed all the relevant PMH, PSH, SH, FH, Medications and allergies       PAST MEDICAL HISTORY:  Past Medical History:   Diagnosis Date    Elbow tendonitis     right possible bursitis    GERD (gastroesophageal reflux disease)     Migraine        PAST SURGICAL HISTORY:  Past Surgical History:   Procedure Laterality Date    EGD  2005    [place] INCISE FINGER TENDON SHEATH Right 12/26/2018    Procedure: LONG AND RING TRIGGER FINGER RELEASES;  Surgeon: Jaycob Warner MD;  Location: [facility] OR;  Service: Orthopedics       FAMILY HISTORY:  Family History   Problem Relation Age of Onset    Diabetes Mother     Hypertension Mother     Breast cancer Mother     Diabetes Father     Gout Father     Hypertension Father     Cancer Maternal Grandmother     Rosacea Family        SOCIAL HISTORY:  Social History     Tobacco Use    Smoking status: Never Smoker    Smokeless tobacco: Never Used   Substance Use Topics    Alcohol use: Yes     Frequency: Monthly or less     Comment: special occasions beer    Drug use: No       MEDICATIONS:    Current Outpatient Medications:     acetaminophen (TYLENOL) 500 mg tablet, Take 500 mg by mouth every 6 (six) hours as needed for mild pain, Disp: , Rfl:     butalbital-acetaminophen-caffeine (FIORICET,ESGIC) -40 mg per tablet, Take 1 tablet by mouth daily as needed for migraine For on set of headache, Disp: 30 tablet, Rfl: 0    meloxicam (MOBIC) 15 mg tablet, Take 1 tablet (15 mg total) by mouth daily, Disp: 15 tablet, Rfl: 1    multivitamin (THERAGRAN) TABS, Take 1 tablet by mouth daily, Disp: , Rfl:     omeprazole (PriLOSEC) 40 MG capsule, Take 1 capsule (40 mg total) by mouth daily, Disp: 30 capsule, Rfl: 3    topiramate (TOPAMAX) 50 MG tablet, Take 1 tablet (50 mg total) by mouth daily, Disp: 90 tablet, Rfl: 1    ALLERGIES:  No Known Allergies    REVIEW OF SYSTEMS:  Review of Systems   Constitutional: Negative for chills, fever and unexpected weight change  HENT: Negative for hearing loss, nosebleeds and sore throat  Eyes: Negative for pain, redness and visual disturbance  Respiratory: Negative for cough, shortness of breath and wheezing  Cardiovascular: Negative for chest pain, palpitations and leg swelling  Gastrointestinal: Negative for abdominal pain, nausea and vomiting  Endocrine: Negative for polydipsia and polyuria  Genitourinary: Negative for difficulty urinating and hematuria  Musculoskeletal: Negative for arthralgias, joint swelling and myalgias  Skin: Negative for rash and wound  Neurological: Positive for numbness  Negative for dizziness and headaches  Psychiatric/Behavioral: Negative for decreased concentration, dysphoric mood and suicidal ideas  The patient is not nervous/anxious          VITALS:  Vitals:    02/05/20 0821   BP: 121/79   Pulse: 59       LABS:  HgA1c:   Lab Results   Component Value Date    HGBA1C 5 4 08/21/2018     BMP:   Lab Results   Component Value Date    GLUCOSE 120 12/02/2015    CALCIUM 9 3 07/18/2019     12/02/2015    K 4 1 07/18/2019    CO2 24 07/18/2019     07/18/2019    BUN 20 07/18/2019    CREATININE 0 88 07/18/2019       _____________________________________________________  PHYSICAL EXAMINATION:  General: well developed and well nourished, alert, oriented times 3 and appears comfortable  Psychiatric: Normal  HEENT: Normocephalic, Atraumatic Trachea Midline, No torticollis  Pulmonary: No audible wheezing or respiratory distress   Cardiovascular: No pitting edema, 2+ radial pulse   Skin: No masses, erythema, lacerations, fluctation, ulcerations  Neurovascular: Sensation Intact to the Median, Ulnar, Radial Nerve, Motor Intact to the Median, Ulnar, Radial Nerve and Pulses Intact  Musculoskeletal: Normal, except as noted in detailed exam and in HPI  MUSCULOSKELETAL EXAMINATION:    Bilateral Ulnar Nerve Exam:  Negative intrinsic atrophy  Negative  deformity at the elbow  Full range of motion with flexion and extension of the elbow  Positive ulnar nerve compression test at the elbow  Positive tinels over the ulnar nerve at the elbow  Tender to palpation cubital tunnel, right worse then left  Bilateral Carpal Tunnel Exam:  Negative thenar atrophy  Negative phalen's test  Negative carpal tunnel compression  Negative tinels over median nerve at the wrist   Opposition strength 5/5  Abduction strength 5/5       ___________________________________________________  STUDIES REVIEWED:  I have personally reviewed AP lateral and oblique radiographs of bilateral elbow which demonstrates no acute fracture dislocation     Does have small osteophyte formation at the insertion of the triceps        PROCEDURES PERFORMED:  Procedures  No Procedures performed today    _____________________________________________________      [facility]    I,:   Libby Myers am acting as a scribe while in the presence of the attending physician :        I,:   Delfin Frankel, MD personally performed the services described in this documentation    as scribed in my presence :

## 2020-03-02 ENCOUNTER — TELEPHONE (OUTPATIENT)
Dept: NEUROLOGY | Facility: CLINIC | Age: 45
End: 2020-03-02

## 2020-03-10 ENCOUNTER — TELEPHONE (OUTPATIENT)
Dept: OBGYN CLINIC | Facility: CLINIC | Age: 45
End: 2020-03-10

## 2020-03-23 ENCOUNTER — HOSPITAL ENCOUNTER (OUTPATIENT)
Dept: NEUROLOGY | Facility: CLINIC | Age: 45
Discharge: HOME | End: 2020-03-23

## 2020-03-23 DIAGNOSIS — G56.23 CUBITAL TUNNEL SYNDROME OF BOTH UPPER EXTREMITIES: ICD-10-CM

## 2020-03-27 ENCOUNTER — HOSPITAL ENCOUNTER (EMERGENCY)
Facility: HOSPITAL | Age: 45
Discharge: HOME | End: 2020-03-27
Attending: EMERGENCY MEDICINE | Admitting: EMERGENCY MEDICINE
Payer: COMMERCIAL

## 2020-03-27 VITALS
SYSTOLIC BLOOD PRESSURE: 164 MMHG | BODY MASS INDEX: 36.1 KG/M2 | TEMPERATURE: 98.2 F | HEIGHT: 67 IN | OXYGEN SATURATION: 98 % | HEART RATE: 100 BPM | WEIGHT: 230 LBS | DIASTOLIC BLOOD PRESSURE: 90 MMHG | RESPIRATION RATE: 16 BRPM

## 2020-03-27 DIAGNOSIS — S61.412A LACERATION OF LEFT HAND: Primary | ICD-10-CM

## 2020-03-27 PROCEDURE — 99282 EMERGENCY DEPT VISIT SF MDM: CPT

## 2020-03-27 PROCEDURE — 90471 IMMUNIZATION ADMIN: CPT

## 2020-03-27 PROCEDURE — 90715 TDAP VACCINE 7 YRS/> IM: CPT | Performed by: EMERGENCY MEDICINE

## 2020-03-27 PROCEDURE — 12002 RPR S/N/AX/GEN/TRNK2.6-7.5CM: CPT | Performed by: EMERGENCY MEDICINE

## 2020-03-27 PROCEDURE — 99284 EMERGENCY DEPT VISIT MOD MDM: CPT | Performed by: EMERGENCY MEDICINE

## 2020-03-27 RX ORDER — CEPHALEXIN 500 MG/1
500 CAPSULE ORAL EVERY 6 HOURS SCHEDULED
Qty: 20 CAPSULE | Refills: 0 | Status: SHIPPED | OUTPATIENT
Start: 2020-03-27 | End: 2020-04-01

## 2020-03-27 RX ORDER — LIDOCAINE HYDROCHLORIDE 10 MG/ML
10 INJECTION, SOLUTION EPIDURAL; INFILTRATION; INTRACAUDAL; PERINEURAL ONCE
Status: COMPLETED | OUTPATIENT
Start: 2020-03-27 | End: 2020-03-27

## 2020-03-27 RX ADMIN — LIDOCAINE HYDROCHLORIDE 10 ML: 10 INJECTION, SOLUTION EPIDURAL; INFILTRATION; INTRACAUDAL; PERINEURAL at 16:25

## 2020-03-27 RX ADMIN — TETANUS TOXOID, REDUCED DIPHTHERIA TOXOID AND ACELLULAR PERTUSSIS VACCINE, ADSORBED 0.5 ML: 5; 2.5; 8; 8; 2.5 SUSPENSION INTRAMUSCULAR at 16:25

## 2020-03-27 NOTE — ED PROVIDER NOTES
History  Chief Complaint   Patient presents with    Hand Laceration     pt with laceration to left hand from refrigerator     38-year-old male presenting to the emergency department for evaluation of left hand laceration  Patient was moving a refrigerator, cut the hypothenar eminence of his left hand  No numbness weakness tingling  Full range of motion the pinky finger  Unknown when his last tetanus was          Prior to Admission Medications   Prescriptions Last Dose Informant Patient Reported?  Taking?   acetaminophen (TYLENOL) 500 mg tablet  Self Yes No   Sig: Take 500 mg by mouth every 6 (six) hours as needed for mild pain   butalbital-acetaminophen-caffeine (FIORICET,ESGIC) -40 mg per tablet  Self No No   Sig: Take 1 tablet by mouth daily as needed for migraine For on set of headache   meloxicam (MOBIC) 15 mg tablet   No No   Sig: Take 1 tablet (15 mg total) by mouth daily   meloxicam (MOBIC) 15 mg tablet   No No   Sig: Take 1 tablet (15 mg total) by mouth daily   methylPREDNISolone 4 MG tablet therapy pack   No No   Sig: Use as directed on package   multivitamin (THERAGRAN) TABS   Yes No   Sig: Take 1 tablet by mouth daily   omeprazole (PriLOSEC) 40 MG capsule   No No   Sig: Take 1 capsule (40 mg total) by mouth daily   topiramate (TOPAMAX) 50 MG tablet  Self No No   Sig: Take 1 tablet (50 mg total) by mouth daily      Facility-Administered Medications: None       Past Medical History:   Diagnosis Date    Elbow tendonitis     right possible bursitis    GERD (gastroesophageal reflux disease)     Migraine        Past Surgical History:   Procedure Laterality Date    EGD  2005    [place] INCISE FINGER TENDON SHEATH Right 12/26/2018    Procedure: LONG AND RING TRIGGER FINGER RELEASES;  Surgeon: Olive Bravo MD;  Location: [facility] OR;  Service: Orthopedics       Family History   Problem Relation Age of Onset    Diabetes Mother     Hypertension Mother     Breast cancer Mother     Diabetes Father    [facility] Gout Father     Hypertension Father     Cancer Maternal Grandmother     Rosacea Family      I have reviewed and agree with the history as documented  E-Cigarette/Vaping     E-Cigarette/Vaping Substances     Social History     Tobacco Use    Smoking status: Never Smoker    Smokeless tobacco: Never Used   Substance Use Topics    Alcohol use: Yes     Frequency: Monthly or less     Comment: special occasions beer    Drug use: No       Review of Systems   Constitutional: Negative for appetite change, chills, fatigue and fever  HENT: Negative for sneezing and sore throat  Eyes: Negative for visual disturbance  Respiratory: Negative for cough, choking, chest tightness, shortness of breath and wheezing  Cardiovascular: Negative for chest pain and palpitations  Gastrointestinal: Negative for abdominal pain, constipation, diarrhea, nausea and vomiting  Genitourinary: Negative for difficulty urinating and dysuria  Skin: Positive for wound  Neurological: Negative for dizziness, weakness, light-headedness, numbness and headaches  All other systems reviewed and are negative  Physical Exam  Physical Exam   Constitutional: He is oriented to person, place, and time  He appears well-developed and well-nourished  No distress  HENT:   Head: Normocephalic and atraumatic  Mouth/Throat: Oropharynx is clear and moist    Eyes: Pupils are equal, round, and reactive to light  EOM are normal    Neck: No JVD present  No tracheal deviation present  Cardiovascular: Normal rate, regular rhythm, normal heart sounds and intact distal pulses  Exam reveals no gallop and no friction rub  No murmur heard  Pulmonary/Chest: Effort normal and breath sounds normal  No respiratory distress  He has no wheezes  He has no rales  Abdominal: Soft  Bowel sounds are normal  He exhibits no distension  There is no tenderness  There is no rebound and no guarding     Neurological: He is alert and oriented to person, place, and time  No cranial nerve deficit  He exhibits normal muscle tone  Skin: Skin is warm and dry  He is not diaphoretic  No pallor  There is a 3 cm linear laceration over the distal hypothenar eminence  There is no apparent involvement of the tendons or ligaments of the hand  Patient has full flexion both isolated and non isolated of the 5th finger  Psychiatric: He has a normal mood and affect  His behavior is normal    Nursing note and vitals reviewed  Vital Signs  ED Triage Vitals   Temperature Pulse Respirations Blood Pressure SpO2   03/27/20 1542 03/27/20 1542 03/27/20 1542 03/27/20 1544 03/27/20 1542   98 2 °F (36 8 °C) 100 16 164/90 98 %      Temp Source Heart Rate Source Patient Position - Orthostatic VS BP Location FiO2 (%)   03/27/20 1542 03/27/20 1542 -- -- --   Oral Monitor         Pain Score       --                  Vitals:    03/27/20 1542 03/27/20 1544   BP:  164/90   Pulse: 100          Visual Acuity      ED Medications  Medications   lidocaine (PF) (XYLOCAINE-MPF) 1 % injection 10 mL (10 mL Infiltration Given 3/27/20 1625)   tetanus-diphtheria-acellular pertussis (BOOSTRIX) IM injection 0 5 mL (0 5 mL Intramuscular Given 3/27/20 1625)       Diagnostic Studies  Results Reviewed     None                 No orders to display              Procedures  Laceration repair  Date/Time: 3/27/2020 5:03 PM  Performed by: Alfredo Johnson MD  Authorized by: Alfredo Johnson MD   Consent: Verbal consent obtained  Consent given by: patient  Patient identity confirmed: verbally with patient  Body area: upper extremity  Location details: left hand  Laceration length: 3 cm  Contamination: The wound is contaminated    Foreign bodies: no foreign bodies  Tendon involvement: none  Nerve involvement: none  Vascular damage: no  Anesthesia: local infiltration    Anesthesia:  Local Anesthetic: lidocaine 1% without epinephrine    Wound Dehiscence:  Superficial Wound Dehiscence: simple closure      Procedure Details:  Irrigation solution: saline  Irrigation method: jet lavage  Amount of cleaning: extensive  Skin closure: 4-0 nylon  Number of sutures: 4  Technique: simple  Approximation: close  Approximation difficulty: simple  Dressing: 4x4 sterile gauze               ED Course                                 MDM  Number of Diagnoses or Management Options  Diagnosis management comments: 41-year-old male with laceration  Postop described above  Mouth examination, will place on antibiotic prophylaxis against infection, reviewed wound care instructions and return to ER precautions  Will discharge with return for suture removal in 10-14 days         Disposition  Final diagnoses:   Laceration of left hand     Time reflects when diagnosis was documented in both MDM as applicable and the Disposition within this note     Time User Action Codes Description Comment    3/27/2020  5:04 PM Cory Petersen Add [[id number]] Laceration of left hand       ED Disposition     ED Disposition Condition Date/Time Comment    Discharge Stable Fri Mar 27, 2020  5:05 PM Francois Payan discharge to home/self care  Follow-up Information     Follow up With Specialties Details Why Contact Info Additional Information    Cooper Dial PA-C Internal Medicine, Physician Assistant   [address] [place] [address] Emergency Department Emergency Medicine In 10 days For suture removal [address] ED, [address] [place], [place], [address]          Patient's Medications   Discharge Prescriptions    CEPHALEXIN (KEFLEX) 500 MG CAPSULE    Take 1 capsule (500 mg total) by mouth every 6 (six) hours for 5 days       Start Date: 3/27/2020 End Date: 4/1/2020       Order Dose: 500 mg       Quantity: 20 capsule    Refills: 0     No discharge procedures on file      PDMP Review     None          ED Provider  Electronically Signed by           Jaqueline Camilo MD  03/27/20 6547

## 2020-04-09 ENCOUNTER — TELEPHONE (OUTPATIENT)
Dept: NEUROLOGY | Facility: CLINIC | Age: 45
End: 2020-04-09

## 2020-05-15 ENCOUNTER — OFFICE VISIT (OUTPATIENT)
Dept: INTERNAL MEDICINE CLINIC | Facility: CLINIC | Age: 45
End: 2020-05-15
Payer: COMMERCIAL

## 2020-05-15 VITALS
BODY MASS INDEX: 36.26 KG/M2 | HEART RATE: 60 BPM | HEIGHT: 67 IN | SYSTOLIC BLOOD PRESSURE: 118 MMHG | WEIGHT: 231 LBS | TEMPERATURE: 98.1 F | OXYGEN SATURATION: 98 % | DIASTOLIC BLOOD PRESSURE: 80 MMHG

## 2020-05-15 DIAGNOSIS — H57.89 RED EYE: ICD-10-CM

## 2020-05-15 DIAGNOSIS — H53.142 PHOTOPHOBIA OF LEFT EYE: Primary | ICD-10-CM

## 2020-05-15 PROCEDURE — 1036F TOBACCO NON-USER: CPT | Performed by: PHYSICIAN ASSISTANT

## 2020-05-15 PROCEDURE — 3008F BODY MASS INDEX DOCD: CPT | Performed by: PHYSICIAN ASSISTANT

## 2020-05-15 PROCEDURE — 99213 OFFICE O/P EST LOW 20 MIN: CPT | Performed by: PHYSICIAN ASSISTANT

## 2020-06-03 ENCOUNTER — HOSPITAL ENCOUNTER (OUTPATIENT)
Dept: NEUROLOGY | Facility: CLINIC | Age: 45
Discharge: HOME | End: 2020-06-03
Payer: COMMERCIAL

## 2020-06-03 PROCEDURE — 95911 NRV CNDJ TEST 9-10 STUDIES: CPT | Performed by: PSYCHIATRY & NEUROLOGY

## 2020-06-03 PROCEDURE — 95886 MUSC TEST DONE W/N TEST COMP: CPT | Performed by: PSYCHIATRY & NEUROLOGY

## 2020-06-05 NOTE — RESULT ENCOUNTER NOTE
Patient's EMG shows carpal tunnel syndrome  Appears this was ordered by ortho but it does not look like patient kept his ortho follow up  Could you please contact him and instruct him to follow up with ortho to discuss tx options for CTS

## 2020-06-25 ENCOUNTER — OFFICE VISIT (OUTPATIENT)
Dept: OBGYN CLINIC | Facility: CLINIC | Age: 45
End: 2020-06-25
Payer: COMMERCIAL

## 2020-06-25 VITALS
HEIGHT: 67 IN | BODY MASS INDEX: 36.26 KG/M2 | DIASTOLIC BLOOD PRESSURE: 80 MMHG | WEIGHT: 231 LBS | SYSTOLIC BLOOD PRESSURE: 117 MMHG | HEART RATE: 76 BPM

## 2020-06-25 DIAGNOSIS — M77.12 LATERAL EPICONDYLITIS OF LEFT ELBOW: ICD-10-CM

## 2020-06-25 DIAGNOSIS — G56.23 CUBITAL TUNNEL SYNDROME OF BOTH UPPER EXTREMITIES: Primary | ICD-10-CM

## 2020-06-25 DIAGNOSIS — M77.8 ELBOW TENDINITIS: ICD-10-CM

## 2020-06-25 PROCEDURE — 1036F TOBACCO NON-USER: CPT | Performed by: SURGERY

## 2020-06-25 PROCEDURE — 99213 OFFICE O/P EST LOW 20 MIN: CPT | Performed by: SURGERY

## 2020-06-25 PROCEDURE — 3008F BODY MASS INDEX DOCD: CPT | Performed by: SURGERY

## 2020-06-27 ENCOUNTER — HOSPITAL ENCOUNTER (EMERGENCY)
Facility: HOSPITAL | Age: 45
Discharge: HOME | End: 2020-06-28
Attending: EMERGENCY MEDICINE
Payer: COMMERCIAL

## 2020-06-27 DIAGNOSIS — B34.9 VIRAL SYNDROME: ICD-10-CM

## 2020-06-27 DIAGNOSIS — R19.7 DIARRHEA: ICD-10-CM

## 2020-06-27 DIAGNOSIS — R10.9 ABDOMINAL PAIN: Primary | ICD-10-CM

## 2020-06-27 LAB
BASOPHILS # BLD AUTO: 0.02 THOUSANDS/ΜL (ref 0–0.1)
BASOPHILS NFR BLD AUTO: 0 % (ref 0–1)
EOSINOPHIL # BLD AUTO: 0.11 THOUSAND/ΜL (ref 0–0.61)
EOSINOPHIL NFR BLD AUTO: 1 % (ref 0–6)
ERYTHROCYTE [DISTWIDTH] IN BLOOD BY AUTOMATED COUNT: 12.2 % (ref 11.6–15.1)
HCT VFR BLD AUTO: 47.7 % (ref 36.5–49.3)
HGB BLD-MCNC: 15.9 G/DL (ref 12–17)
IMM GRANULOCYTES # BLD AUTO: 0.04 THOUSAND/UL (ref 0–0.2)
IMM GRANULOCYTES NFR BLD AUTO: 0 % (ref 0–2)
LYMPHOCYTES # BLD AUTO: 0.88 THOUSANDS/ΜL (ref 0.6–4.47)
LYMPHOCYTES NFR BLD AUTO: 9 % (ref 14–44)
MCH RBC QN AUTO: 29.1 PG (ref 26.8–34.3)
MCHC RBC AUTO-ENTMCNC: 33.3 G/DL (ref 31.4–37.4)
MCV RBC AUTO: 87 FL (ref 82–98)
MONOCYTES # BLD AUTO: 0.71 THOUSAND/ΜL (ref 0.17–1.22)
MONOCYTES NFR BLD AUTO: 7 % (ref 4–12)
NEUTROPHILS # BLD AUTO: 8.55 THOUSANDS/ΜL (ref 1.85–7.62)
NEUTS SEG NFR BLD AUTO: 83 % (ref 43–75)
NRBC BLD AUTO-RTO: 0 /100 WBCS
PLATELET # BLD AUTO: 224 THOUSANDS/UL (ref 149–390)
PMV BLD AUTO: 10 FL (ref 8.9–12.7)
RBC # BLD AUTO: 5.47 MILLION/UL (ref 3.88–5.62)
WBC # BLD AUTO: 10.31 THOUSAND/UL (ref 4.31–10.16)

## 2020-06-27 PROCEDURE — 36415 COLL VENOUS BLD VENIPUNCTURE: CPT | Performed by: PHYSICIAN ASSISTANT

## 2020-06-27 PROCEDURE — 99284 EMERGENCY DEPT VISIT MOD MDM: CPT

## 2020-06-27 PROCEDURE — 93005 ELECTROCARDIOGRAM TRACING: CPT

## 2020-06-27 PROCEDURE — 85025 COMPLETE CBC W/AUTO DIFF WBC: CPT | Performed by: PHYSICIAN ASSISTANT

## 2020-06-27 PROCEDURE — 80053 COMPREHEN METABOLIC PANEL: CPT | Performed by: PHYSICIAN ASSISTANT

## 2020-06-27 PROCEDURE — 83690 ASSAY OF LIPASE: CPT | Performed by: PHYSICIAN ASSISTANT

## 2020-06-27 PROCEDURE — 96374 THER/PROPH/DIAG INJ IV PUSH: CPT

## 2020-06-27 PROCEDURE — 99284 EMERGENCY DEPT VISIT MOD MDM: CPT | Performed by: PHYSICIAN ASSISTANT

## 2020-06-27 RX ORDER — KETOROLAC TROMETHAMINE 30 MG/ML
30 INJECTION, SOLUTION INTRAMUSCULAR; INTRAVENOUS ONCE
Status: COMPLETED | OUTPATIENT
Start: 2020-06-27 | End: 2020-06-27

## 2020-06-27 RX ADMIN — KETOROLAC TROMETHAMINE 30 MG: 30 INJECTION, SOLUTION INTRAMUSCULAR at 23:45

## 2020-06-28 ENCOUNTER — APPOINTMENT (EMERGENCY)
Dept: CT IMAGING | Facility: HOSPITAL | Age: 45
End: 2020-06-28
Payer: COMMERCIAL

## 2020-06-28 VITALS
SYSTOLIC BLOOD PRESSURE: 124 MMHG | HEIGHT: 67 IN | DIASTOLIC BLOOD PRESSURE: 80 MMHG | RESPIRATION RATE: 19 BRPM | TEMPERATURE: 98.9 F | HEART RATE: 73 BPM | OXYGEN SATURATION: 96 % | WEIGHT: 230.82 LBS | BODY MASS INDEX: 36.23 KG/M2

## 2020-06-28 LAB
ALBUMIN SERPL BCP-MCNC: 3.8 G/DL (ref 3.5–5)
ALP SERPL-CCNC: 90 U/L (ref 46–116)
ALT SERPL W P-5'-P-CCNC: 37 U/L (ref 12–78)
ANION GAP SERPL CALCULATED.3IONS-SCNC: 12 MMOL/L (ref 4–13)
AST SERPL W P-5'-P-CCNC: 16 U/L (ref 5–45)
ATRIAL RATE: 80 BPM
BILIRUB SERPL-MCNC: 0.6 MG/DL (ref 0.2–1)
BILIRUB UR QL STRIP: NEGATIVE
BUN SERPL-MCNC: 14 MG/DL (ref 5–25)
CALCIUM SERPL-MCNC: 9.3 MG/DL (ref 8.3–10.1)
CHLORIDE SERPL-SCNC: 105 MMOL/L (ref 100–108)
CLARITY UR: CLEAR
CO2 SERPL-SCNC: 24 MMOL/L (ref 21–32)
COLOR UR: YELLOW
CREAT SERPL-MCNC: 0.95 MG/DL (ref 0.6–1.3)
GFR SERPL CREATININE-BSD FRML MDRD: 96 ML/MIN/1.73SQ M
GLUCOSE SERPL-MCNC: 115 MG/DL (ref 65–140)
GLUCOSE UR STRIP-MCNC: NEGATIVE MG/DL
HGB UR QL STRIP.AUTO: NEGATIVE
KETONES UR STRIP-MCNC: NEGATIVE MG/DL
LEUKOCYTE ESTERASE UR QL STRIP: NEGATIVE
LIPASE SERPL-CCNC: 124 U/L (ref 73–393)
NITRITE UR QL STRIP: NEGATIVE
P AXIS: 58 DEGREES
PH UR STRIP.AUTO: 5.5 [PH]
POTASSIUM SERPL-SCNC: 3.7 MMOL/L (ref 3.5–5.3)
PR INTERVAL: 144 MS
PROT SERPL-MCNC: 7.1 G/DL (ref 6.4–8.2)
PROT UR STRIP-MCNC: NEGATIVE MG/DL
QRS AXIS: -53 DEGREES
QRSD INTERVAL: 100 MS
QT INTERVAL: 362 MS
QTC INTERVAL: 417 MS
SODIUM SERPL-SCNC: 141 MMOL/L (ref 136–145)
SP GR UR STRIP.AUTO: <=1.005 (ref 1–1.03)
T WAVE AXIS: 33 DEGREES
UROBILINOGEN UR QL STRIP.AUTO: 0.2 E.U./DL
VENTRICULAR RATE: 80 BPM

## 2020-06-28 PROCEDURE — 81003 URINALYSIS AUTO W/O SCOPE: CPT | Performed by: PHYSICIAN ASSISTANT

## 2020-06-28 PROCEDURE — 74177 CT ABD & PELVIS W/CONTRAST: CPT

## 2020-06-28 PROCEDURE — 93010 ELECTROCARDIOGRAM REPORT: CPT | Performed by: INTERNAL MEDICINE

## 2020-06-28 RX ORDER — DICYCLOMINE HCL 20 MG
20 TABLET ORAL 3 TIMES DAILY PRN
Qty: 21 TABLET | Refills: 0 | Status: SHIPPED | OUTPATIENT
Start: 2020-06-28 | End: 2022-03-04 | Stop reason: ALTCHOICE

## 2020-06-28 RX ORDER — ACETAMINOPHEN 325 MG/1
650 TABLET ORAL ONCE
Status: COMPLETED | OUTPATIENT
Start: 2020-06-28 | End: 2020-06-28

## 2020-06-28 RX ORDER — DICYCLOMINE HCL 20 MG
20 TABLET ORAL ONCE
Status: COMPLETED | OUTPATIENT
Start: 2020-06-28 | End: 2020-06-28

## 2020-06-28 RX ADMIN — DICYCLOMINE HYDROCHLORIDE 20 MG: 20 TABLET ORAL at 02:36

## 2020-06-28 RX ADMIN — IOHEXOL 100 ML: 350 INJECTION, SOLUTION INTRAVENOUS at 00:27

## 2020-06-28 RX ADMIN — ACETAMINOPHEN 650 MG: 325 TABLET, FILM COATED ORAL at 02:36

## 2020-06-29 ENCOUNTER — HOSPITAL ENCOUNTER (EMERGENCY)
Facility: HOSPITAL | Age: 45
Discharge: HOME | End: 2020-06-29
Attending: EMERGENCY MEDICINE
Payer: COMMERCIAL

## 2020-06-29 ENCOUNTER — APPOINTMENT (EMERGENCY)
Dept: CT IMAGING | Facility: HOSPITAL | Age: 45
End: 2020-06-29
Payer: COMMERCIAL

## 2020-06-29 VITALS
SYSTOLIC BLOOD PRESSURE: 111 MMHG | RESPIRATION RATE: 18 BRPM | WEIGHT: 231.48 LBS | TEMPERATURE: 98 F | BODY MASS INDEX: 36.26 KG/M2 | HEART RATE: 51 BPM | DIASTOLIC BLOOD PRESSURE: 63 MMHG | OXYGEN SATURATION: 97 %

## 2020-06-29 DIAGNOSIS — R10.12 LEFT UPPER QUADRANT ABDOMINAL PAIN: ICD-10-CM

## 2020-06-29 DIAGNOSIS — K29.70 GASTRITIS: Primary | ICD-10-CM

## 2020-06-29 LAB
ALBUMIN SERPL BCP-MCNC: 3.6 G/DL (ref 3.5–5)
ALP SERPL-CCNC: 78 U/L (ref 46–116)
ALT SERPL W P-5'-P-CCNC: 32 U/L (ref 12–78)
ANION GAP SERPL CALCULATED.3IONS-SCNC: 6 MMOL/L (ref 4–13)
AST SERPL W P-5'-P-CCNC: 14 U/L (ref 5–45)
ATRIAL RATE: 53 BPM
ATRIAL RATE: 60 BPM
BASOPHILS # BLD AUTO: 0.03 THOUSANDS/ΜL (ref 0–0.1)
BASOPHILS NFR BLD AUTO: 0 % (ref 0–1)
BILIRUB DIRECT SERPL-MCNC: 0.09 MG/DL (ref 0–0.2)
BILIRUB SERPL-MCNC: 0.3 MG/DL (ref 0.2–1)
BUN SERPL-MCNC: 10 MG/DL (ref 5–25)
CALCIUM SERPL-MCNC: 9.4 MG/DL (ref 8.3–10.1)
CHLORIDE SERPL-SCNC: 106 MMOL/L (ref 100–108)
CO2 SERPL-SCNC: 28 MMOL/L (ref 21–32)
CREAT SERPL-MCNC: 0.92 MG/DL (ref 0.6–1.3)
EOSINOPHIL # BLD AUTO: 0.18 THOUSAND/ΜL (ref 0–0.61)
EOSINOPHIL NFR BLD AUTO: 2 % (ref 0–6)
ERYTHROCYTE [DISTWIDTH] IN BLOOD BY AUTOMATED COUNT: 12.2 % (ref 11.6–15.1)
GFR SERPL CREATININE-BSD FRML MDRD: 99 ML/MIN/1.73SQ M
GLUCOSE SERPL-MCNC: 107 MG/DL (ref 65–140)
HCT VFR BLD AUTO: 47.4 % (ref 36.5–49.3)
HGB BLD-MCNC: 15.8 G/DL (ref 12–17)
IMM GRANULOCYTES # BLD AUTO: 0.03 THOUSAND/UL (ref 0–0.2)
IMM GRANULOCYTES NFR BLD AUTO: 0 % (ref 0–2)
LACTATE SERPL-SCNC: 1 MMOL/L (ref 0.5–2)
LIPASE SERPL-CCNC: 131 U/L (ref 73–393)
LYMPHOCYTES # BLD AUTO: 1.67 THOUSANDS/ΜL (ref 0.6–4.47)
LYMPHOCYTES NFR BLD AUTO: 19 % (ref 14–44)
MCH RBC QN AUTO: 29.4 PG (ref 26.8–34.3)
MCHC RBC AUTO-ENTMCNC: 33.3 G/DL (ref 31.4–37.4)
MCV RBC AUTO: 88 FL (ref 82–98)
MONOCYTES # BLD AUTO: 0.74 THOUSAND/ΜL (ref 0.17–1.22)
MONOCYTES NFR BLD AUTO: 9 % (ref 4–12)
NEUTROPHILS # BLD AUTO: 6.1 THOUSANDS/ΜL (ref 1.85–7.62)
NEUTS SEG NFR BLD AUTO: 70 % (ref 43–75)
NRBC BLD AUTO-RTO: 0 /100 WBCS
P AXIS: 52 DEGREES
P AXIS: 63 DEGREES
PLATELET # BLD AUTO: 232 THOUSANDS/UL (ref 149–390)
PMV BLD AUTO: 9.7 FL (ref 8.9–12.7)
POTASSIUM SERPL-SCNC: 4 MMOL/L (ref 3.5–5.3)
PR INTERVAL: 134 MS
PR INTERVAL: 150 MS
PROT SERPL-MCNC: 7 G/DL (ref 6.4–8.2)
QRS AXIS: -29 DEGREES
QRS AXIS: -34 DEGREES
QRSD INTERVAL: 90 MS
QRSD INTERVAL: 92 MS
QT INTERVAL: 416 MS
QT INTERVAL: 426 MS
QTC INTERVAL: 390 MS
QTC INTERVAL: 426 MS
RBC # BLD AUTO: 5.37 MILLION/UL (ref 3.88–5.62)
SODIUM SERPL-SCNC: 140 MMOL/L (ref 136–145)
T WAVE AXIS: 13 DEGREES
T WAVE AXIS: 19 DEGREES
TROPONIN I SERPL-MCNC: <0.02 NG/ML
VENTRICULAR RATE: 53 BPM
VENTRICULAR RATE: 60 BPM
WBC # BLD AUTO: 8.75 THOUSAND/UL (ref 4.31–10.16)

## 2020-06-29 PROCEDURE — 36415 COLL VENOUS BLD VENIPUNCTURE: CPT | Performed by: PHYSICIAN ASSISTANT

## 2020-06-29 PROCEDURE — 96374 THER/PROPH/DIAG INJ IV PUSH: CPT

## 2020-06-29 PROCEDURE — 93005 ELECTROCARDIOGRAM TRACING: CPT

## 2020-06-29 PROCEDURE — 96375 TX/PRO/DX INJ NEW DRUG ADDON: CPT

## 2020-06-29 PROCEDURE — 99284 EMERGENCY DEPT VISIT MOD MDM: CPT

## 2020-06-29 PROCEDURE — 83605 ASSAY OF LACTIC ACID: CPT | Performed by: PHYSICIAN ASSISTANT

## 2020-06-29 PROCEDURE — 93010 ELECTROCARDIOGRAM REPORT: CPT | Performed by: INTERNAL MEDICINE

## 2020-06-29 PROCEDURE — 80048 BASIC METABOLIC PNL TOTAL CA: CPT | Performed by: PHYSICIAN ASSISTANT

## 2020-06-29 PROCEDURE — 74177 CT ABD & PELVIS W/CONTRAST: CPT

## 2020-06-29 PROCEDURE — C9113 INJ PANTOPRAZOLE SODIUM, VIA: HCPCS | Performed by: PHYSICIAN ASSISTANT

## 2020-06-29 PROCEDURE — 85025 COMPLETE CBC W/AUTO DIFF WBC: CPT | Performed by: PHYSICIAN ASSISTANT

## 2020-06-29 PROCEDURE — 99285 EMERGENCY DEPT VISIT HI MDM: CPT | Performed by: PHYSICIAN ASSISTANT

## 2020-06-29 PROCEDURE — 96361 HYDRATE IV INFUSION ADD-ON: CPT

## 2020-06-29 PROCEDURE — 80076 HEPATIC FUNCTION PANEL: CPT | Performed by: PHYSICIAN ASSISTANT

## 2020-06-29 PROCEDURE — 84484 ASSAY OF TROPONIN QUANT: CPT | Performed by: PHYSICIAN ASSISTANT

## 2020-06-29 PROCEDURE — 83690 ASSAY OF LIPASE: CPT | Performed by: PHYSICIAN ASSISTANT

## 2020-06-29 RX ORDER — SUCRALFATE 1 G/1
1 TABLET ORAL 4 TIMES DAILY
Qty: 40 TABLET | Refills: 0 | Status: SHIPPED | OUTPATIENT
Start: 2020-06-29 | End: 2020-07-22 | Stop reason: SDUPTHER

## 2020-06-29 RX ORDER — HYDROMORPHONE HCL/PF 1 MG/ML
0.5 SYRINGE (ML) INJECTION ONCE
Status: COMPLETED | OUTPATIENT
Start: 2020-06-29 | End: 2020-06-29

## 2020-06-29 RX ORDER — ONDANSETRON 2 MG/ML
4 INJECTION INTRAMUSCULAR; INTRAVENOUS ONCE
Status: COMPLETED | OUTPATIENT
Start: 2020-06-29 | End: 2020-06-29

## 2020-06-29 RX ORDER — OMEPRAZOLE 20 MG/1
40 CAPSULE, DELAYED RELEASE ORAL DAILY
Qty: 60 CAPSULE | Refills: 0 | Status: SHIPPED | OUTPATIENT
Start: 2020-06-29 | End: 2020-07-22 | Stop reason: SDUPTHER

## 2020-06-29 RX ORDER — PANTOPRAZOLE SODIUM 40 MG/1
40 INJECTION, POWDER, FOR SOLUTION INTRAVENOUS ONCE
Status: COMPLETED | OUTPATIENT
Start: 2020-06-29 | End: 2020-06-29

## 2020-06-29 RX ORDER — SUCRALFATE ORAL 1 G/10ML
1000 SUSPENSION ORAL ONCE
Status: COMPLETED | OUTPATIENT
Start: 2020-06-29 | End: 2020-06-29

## 2020-06-29 RX ORDER — OXYCODONE HYDROCHLORIDE AND ACETAMINOPHEN 5; 325 MG/1; MG/1
1 TABLET ORAL EVERY 6 HOURS PRN
Qty: 15 TABLET | Refills: 0 | Status: SHIPPED | OUTPATIENT
Start: 2020-06-29 | End: 2020-08-21 | Stop reason: ALTCHOICE

## 2020-06-29 RX ADMIN — ONDANSETRON 4 MG: 2 INJECTION INTRAMUSCULAR; INTRAVENOUS at 07:51

## 2020-06-29 RX ADMIN — PANTOPRAZOLE SODIUM 40 MG: 40 INJECTION, POWDER, FOR SOLUTION INTRAVENOUS at 07:50

## 2020-06-29 RX ADMIN — IOHEXOL 100 ML: 350 INJECTION, SOLUTION INTRAVENOUS at 08:30

## 2020-06-29 RX ADMIN — HYDROMORPHONE HYDROCHLORIDE 0.5 MG: 1 INJECTION, SOLUTION INTRAMUSCULAR; INTRAVENOUS; SUBCUTANEOUS at 07:50

## 2020-06-29 RX ADMIN — SODIUM CHLORIDE 1000 ML: 0.9 INJECTION, SOLUTION INTRAVENOUS at 07:51

## 2020-06-29 RX ADMIN — SUCRALFATE 1000 MG: 1 SUSPENSION ORAL at 07:55

## 2020-06-29 NOTE — ED PROVIDER NOTES
History  Chief Complaint   Patient presents with    Abdominal Pain     c/o left upper abdominal pain with diarrhea   states "pain is getting worse"     53 y o  male with past medical history significant for GERD and migraine headaches  presents to ED with chief complaint of abdominal pain  Onset of symptoms reported as 2 days ago  Location of symptoms reported as left side of the abdomen  Quality is reported as sharp pain  Severity is reported as moderate to severe  Associated symptoms: positive for non bloody diarrhea  Denies nausea or vomiting, denies chest pain, denies sob  Denies dysuria, hematuria or urinary retention  Modifying factors: has been taking omeprazole as prescribed without improvement  Has been taking bentyl without improvement  Context: patient reports he developed left sided abdominal pain starting 2 days ago  Reports it was associated with some non bloody diarrhea  Seen in ED early in the morning on 6/27/2020 - had ct which showed some diverticulosis without diverticulitis but no other abnormalities  Treated with bentyl with no improvement in pain  Patient states pain is getting worse  History of GERD with EGD in 1/2020 which showed mild gastritis  Denies recent fall, injury or trauma to the area  Reviewed past medical history and visits via EPIC:  Patient last seen in ed on 6/27/2020 for evaluation of abdominal pain  History provided by:  Patient   used: No        Prior to Admission Medications   Prescriptions Last Dose Informant Patient Reported?  Taking?   acetaminophen (TYLENOL) 500 mg tablet  Self Yes No   Sig: Take 500 mg by mouth every 6 (six) hours as needed for mild pain   butalbital-acetaminophen-caffeine (FIORICET,ESGIC) -40 mg per tablet  Self No No   Sig: Take 1 tablet by mouth daily as needed for migraine For on set of headache   dicyclomine (BENTYL) 20 mg tablet 6/29/2020 at Unknown time  No Yes   Sig: Take 1 tablet (20 mg total) by mouth 3 (three) times a day as needed (for abdominal cramping) for up to 7 days   meloxicam (MOBIC) 15 mg tablet  Self No No   Sig: Take 1 tablet (15 mg total) by mouth daily   meloxicam (MOBIC) 15 mg tablet  Self No No   Sig: Take 1 tablet (15 mg total) by mouth daily   multivitamin (THERAGRAN) TABS  Self Yes No   Sig: Take 1 tablet by mouth daily   omeprazole (PriLOSEC) 40 MG capsule 6/29/2020 at Unknown time Self No Yes   Sig: Take 1 capsule (40 mg total) by mouth daily   topiramate (TOPAMAX) 50 MG tablet  Self No No   Sig: Take 1 tablet (50 mg total) by mouth daily      Facility-Administered Medications: None       Past Medical History:   Diagnosis Date    Elbow tendonitis     right possible bursitis    GERD (gastroesophageal reflux disease)     Migraine        Past Surgical History:   Procedure Laterality Date    EGD  2005    NV INCISE FINGER TENDON SHEATH Right 12/26/2018    Procedure: LONG AND RING TRIGGER FINGER RELEASES;  Surgeon: Nas White MD;  Location: [facility];  Service: Orthopedics       Family History   Problem Relation Age of Onset    Diabetes Mother     Hypertension Mother     Breast cancer Mother     Diabetes Father     Gout Father     Hypertension Father     Cancer Maternal Grandmother     Rosacea Family      I have reviewed and agree with the history as documented  E-Cigarette/Vaping    E-Cigarette Use Never User      E-Cigarette/Vaping Substances     Social History     Tobacco Use    Smoking status: Never Smoker    Smokeless tobacco: Never Used   Substance Use Topics    Alcohol use: Yes     Frequency: Monthly or less     Comment: special occasions beer    Drug use: No       Review of Systems   Constitutional: Negative for activity change, appetite change, chills, diaphoresis, fatigue, fever and unexpected weight change     HENT: Negative for congestion, dental problem, drooling, ear discharge, ear pain, facial swelling, hearing loss, mouth sores, nosebleeds, postnasal drip, rhinorrhea, sinus pressure, sinus pain, sneezing, sore throat, tinnitus, trouble swallowing and voice change  Eyes: Negative for photophobia, pain, discharge, redness, itching and visual disturbance  Respiratory: Negative for apnea, cough, choking, chest tightness, shortness of breath, wheezing and stridor  Cardiovascular: Negative for chest pain, palpitations and leg swelling  Gastrointestinal: Positive for abdominal pain and diarrhea  Negative for abdominal distention, anal bleeding, blood in stool, constipation, nausea and vomiting  Endocrine: Negative for cold intolerance, heat intolerance, polydipsia, polyphagia and polyuria  Genitourinary: Negative for difficulty urinating, dysuria, flank pain, frequency, hematuria and urgency  Musculoskeletal: Negative for arthralgias, back pain, gait problem, joint swelling, myalgias, neck pain and neck stiffness  Skin: Negative for color change, pallor, rash and wound  Allergic/Immunologic: Negative for environmental allergies, food allergies and immunocompromised state  Neurological: Negative for dizziness, tremors, seizures, syncope, facial asymmetry, speech difficulty, weakness, light-headedness, numbness and headaches  Hematological: Negative for adenopathy  Does not bruise/bleed easily  Psychiatric/Behavioral: Negative for agitation, confusion and hallucinations  The patient is not nervous/anxious  All other systems reviewed and are negative  Physical Exam  Physical Exam   Constitutional: He is oriented to person, place, and time  He appears well-developed and well-nourished  No distress  /83 (BP Location: Right arm)   Pulse 58   Temp 98 °F (36 7 °C) (Oral)   Resp 18   Wt 105 kg (231 lb 7 7 oz)   SpO2 96%   BMI 36 26 kg/m²    HENT:   Head: Normocephalic and atraumatic     Right Ear: External ear normal    Left Ear: External ear normal    Nose: Nose normal    Mouth/Throat: Oropharynx is clear and moist  No oropharyngeal exudate  Eyes: Pupils are equal, round, and reactive to light  Conjunctivae and EOM are normal  Right eye exhibits no discharge  Left eye exhibits no discharge  No scleral icterus  Neck: Normal range of motion  Neck supple  No tracheal deviation present  No thyromegaly present  Cardiovascular: Normal rate, regular rhythm and intact distal pulses  Pulmonary/Chest: Effort normal and breath sounds normal  No stridor  No respiratory distress  He has no wheezes  He has no rales  He exhibits no tenderness  Abdominal: Soft  Bowel sounds are normal  He exhibits no distension and no mass  There is tenderness  There is no rebound and no guarding  Tenderness to palpation to LUQ and epigastric area of abdomen  No pulsatile masses  Musculoskeletal: Normal range of motion  He exhibits no edema, tenderness or deformity  Lymphadenopathy:     He has no cervical adenopathy  Neurological: He is alert and oriented to person, place, and time  He displays normal reflexes  No cranial nerve deficit or sensory deficit  He exhibits normal muscle tone  Coordination normal    Skin: Skin is warm and dry  Capillary refill takes less than 2 seconds  No rash noted  He is not diaphoretic  No erythema  No pallor  Psychiatric: He has a normal mood and affect  His behavior is normal  Judgment and thought content normal    Nursing note and vitals reviewed        Vital Signs  ED Triage Vitals   Temperature Pulse Respirations Blood Pressure SpO2   06/29/20 0720 06/29/20 0716 06/29/20 0716 06/29/20 0716 06/29/20 0716   98 °F (36 7 °C) 58 18 116/83 96 %      Temp Source Heart Rate Source Patient Position - Orthostatic VS BP Location FiO2 (%)   06/29/20 0720 06/29/20 0716 06/29/20 0716 06/29/20 0716 --   Oral Monitor Sitting Right arm       Pain Score       06/29/20 0716       9           Vitals:    06/29/20 0716 06/29/20 0815 06/29/20 0945   BP: 116/83 118/63 111/63   Pulse: 58 (!) 52 (!) 51   Patient Position - Orthostatic VS: Sitting  Sitting         Visual Acuity      ED Medications  Medications   sodium chloride 0 9 % bolus 1,000 mL (0 mL Intravenous Stopped 6/29/20 0851)   ondansetron (ZOFRAN) injection 4 mg (4 mg Intravenous Given 6/29/20 0751)   HYDROmorphone (DILAUDID) injection 0 5 mg (0 5 mg Intravenous Given 6/29/20 0750)   sucralfate (CARAFATE) oral suspension 1,000 mg (1,000 mg Oral Given 6/29/20 0755)   pantoprazole (PROTONIX) injection 40 mg (40 mg Intravenous Given 6/29/20 0750)   iohexol (OMNIPAQUE) 350 MG/ML injection (MULTI-DOSE) 100 mL (100 mL Intravenous Given 6/29/20 0830)       Diagnostic Studies  Results Reviewed     Procedure Component Value Units Date/Time    Troponin I [[id number]]  (Normal) Collected:  06/29/20 0802    Lab Status:  Final result Specimen:  Blood from Arm, Right Updated:  06/29/20 0828     Troponin I <0 02 ng/mL     Lactic acid [[id number]]  (Normal) Collected:  06/29/20 0749    Lab Status:  Final result Specimen:  Blood from Arm, Right Updated:  06/29/20 8265     LACTIC ACID 1 0 mmol/L     Narrative:       Result may be elevated if tourniquet was used during collection      Basic metabolic panel [[id number]] Collected:  06/29/20 0749    Lab Status:  Final result Specimen:  Blood from Arm, Right Updated:  06/29/20 0814     Sodium 140 mmol/L      Potassium 4 0 mmol/L      Chloride 106 mmol/L      CO2 28 mmol/L      ANION GAP 6 mmol/L      BUN 10 mg/dL      Creatinine 0 92 mg/dL      Glucose 107 mg/dL      Calcium 9 4 mg/dL      eGFR 99 ml/min/1 73sq m     Narrative:       [place] guidelines for Chronic Kidney Disease (CKD):     Stage 1 with normal or high GFR (GFR > 90 mL/min/1 73 square meters)    Stage 2 Mild CKD (GFR = 60-89 mL/min/1 73 square meters)    Stage 3A Moderate CKD (GFR = 45-59 mL/min/1 73 square meters)    Stage 3B Moderate CKD (GFR = 30-44 mL/min/1 73 square meters)    Stage 4 Severe CKD (GFR = 15-29 mL/min/1 73 square meters)    Stage 5 End Stage CKD (GFR <15 mL/min/1 73 square meters)  Note: GFR calculation is accurate only with a steady state creatinine    Hepatic function panel [[id number]]  (Normal) Collected:  06/29/20 0749    Lab Status:  Final result Specimen:  Blood from Arm, Right Updated:  06/29/20 0814     Total Bilirubin 0 30 mg/dL      Bilirubin, Direct 0 09 mg/dL      Alkaline Phosphatase 78 U/L      AST 14 U/L      ALT 32 U/L      Total Protein 7 0 g/dL      Albumin 3 6 g/dL     Lipase [[id number]]  (Normal) Collected:  06/29/20 0749    Lab Status:  Final result Specimen:  Blood from Arm, Right Updated:  06/29/20 0814     Lipase 131 u/L     CBC and differential [[id number]] Collected:  06/29/20 0749    Lab Status:  Final result Specimen:  Blood from Arm, Right Updated:  06/29/20 0757     WBC 8 75 Thousand/uL      RBC 5 37 Million/uL      Hemoglobin 15 8 g/dL      Hematocrit 47 4 %      MCV 88 fL      MCH 29 4 pg      MCHC 33 3 g/dL      RDW 12 2 %      MPV 9 7 fL      Platelets 942 Thousands/uL      nRBC 0 /100 WBCs      Neutrophils Relative 70 %      Immat GRANS % 0 %      Lymphocytes Relative 19 %      Monocytes Relative 9 %      Eosinophils Relative 2 %      Basophils Relative 0 %      Neutrophils Absolute 6 10 Thousands/µL      Immature Grans Absolute 0 03 Thousand/uL      Lymphocytes Absolute 1 67 Thousands/µL      Monocytes Absolute 0 74 Thousand/µL      Eosinophils Absolute 0 18 Thousand/µL      Basophils Absolute 0 03 Thousands/µL                  CT abdomen pelvis with contrast   Final Result by Carla Ram MD (06/29 2377)      No acute intra-abdominal pathology  Uncomplicated colonic diverticulosis              Workstation performed: [id number]                    Procedures  ECG 12 Lead Documentation Only  Date/Time: 6/29/2020 8:03 AM  Performed by: Laurel Christopher PA-C  Authorized by: Laurel Christopher PA-C     Indications / Diagnosis:  Abd pn  ECG reviewed by me, the ED Provider: yes    Patient location:  ED  Previous ECG:     Previous ECG:  Compared to current    Comparison ECG info:  6/27/20    Similarity:  No change    Comparison to cardiac monitor: Yes    Interpretation:     Interpretation: normal    Rate:     ECG rate:  53    ECG rate assessment: normal    Rhythm:     Rhythm: sinus rhythm    Ectopy:     Ectopy: none    QRS:     QRS axis:  Normal    QRS intervals:  Normal  Conduction:     Conduction: normal    ST segments:     ST segments:  Normal  T waves:     T waves: normal               ED Course       US AUDIT      Most Recent Value   Initial Alcohol Screen: US AUDIT-C    1  How often do you have a drink containing alcohol?  0 Filed at: 06/29/2020 0950   2  How many drinks containing alcohol do you have on a typical day you are drinking? 0 Filed at: 06/29/2020 0950   3a  Male UNDER 65: How often do you have five or more drinks on one occasion? 0 Filed at: 06/29/2020 0950   3b  FEMALE Any Age, or MALE 65+: How often do you have 4 or more drinks on one occassion? 0 Filed at: 06/29/2020 0950   Audit-C Score  0 Filed at: 06/29/2020 3839            HEART Risk Score      Most Recent Value   Heart Score Risk Calculator   History  0 Filed at: 06/30/2020 8847   ECG  0 Filed at: 06/30/2020 1175   Age  1 Filed at: 06/30/2020 0731   Risk Factors  1 Filed at: 06/30/2020 8419   Troponin  0 Filed at: 06/30/2020 4260   HEART Score  2 Filed at: 06/30/2020 5445            MANUEL/DAST-10      Most Recent Value   How many times in the past year have you    Used an illegal drug or used a prescription medication for non-medical reasons?   Never Filed at: 06/29/2020 0950                                MDM  Number of Diagnoses or Management Options  Gastritis: new and requires workup  Left upper quadrant abdominal pain: new and requires workup  Diagnosis management comments: Differential diagnosis includes but is not limited to appendicitis, diverticulitis, gastroenteritis, gastritis, cholecystitis, pancreatitis, mesenteric adenitis, kidney stone, pyelonephritis, UTI  Plan workup including labs, will pursue repeat ct scan abd/pelvis for comparison as pain is getting worse and patient had diveritculosis on prior ct and may have been developing diverticulitis in the early stages  Will treat for gastritis with carafate, protonix, and add dilaudid for pain  Amount and/or Complexity of Data Reviewed  Clinical lab tests: ordered and reviewed  Tests in the radiology section of CPT®: ordered and reviewed  Tests in the medicine section of CPT®: ordered and reviewed  Discussion of test results with the performing providers: yes  Review and summarize past medical records: yes  Independent visualization of images, tracings, or specimens: yes    Risk of Complications, Morbidity, and/or Mortality  General comments: Lab results reviewed  Troponin normal less than 0 02  CBC demonstrates normal white blood cell count of 8 7, hemoglobin of 15 8 and hematocrit of 47 4 normal   No anemia  Basic metabolic panel was reviewed, BUN of 10 and creatinine 0 92 are normal   No renal failure  Hepatic function panel was reviewed, AST 14 ALT of 32 are normal   Lactic acid normal at 1 0  No evidence of ischemia  CT scan of the abdomen pelvis images independently visualized interpreted by me  Radiology report was reviewed:No acute intra-abdominal pathology  Uncomplicated colonic diverticulosis  I discussed all test results with patient at bedside  He was given Dilaudid and Protonix here in the emergency department along with Carafate  He reports improvement in his symptoms  I reviewed all of his test results  I discussed with him his symptoms are most consistent with gastritis  Discussed diagnosis and expectant course  Discussed treatment plan including increasing omeprazole from 20 mg a day to 40 mg a day and adding Carafate  Will add analgesic pain medication  Standard narcotic precautions were given    Discussed need for follow-up with primary care physician and Gastroenterology in 3-5 days for recheck and further evaluation of symptoms  Standard narcotic precautions were given  Reviewed reasons to return to ed  Patient verbalized understanding of diagnosis and agreement with discharge plan of care as well as understanding of reasons to return to ed  I have reasonably determine that electronically prescribing a controlled substance would be impractical for the patient to obtain the controlled substance prescribed by electronic prescription or would cause an untimely delay resulting in an adverse impact on the patient's medical condition   Patient Progress  Patient progress: stable        Disposition  Final diagnoses:   Gastritis   Left upper quadrant abdominal pain     Time reflects when diagnosis was documented in both MDM as applicable and the Disposition within this note     Time User Action Codes Description Comment    6/29/2020  9:24 AM Tanisha Sacks Add [K29 70] Gastritis     6/29/2020  9:24 AM Tanisha Sacks Add [R10 12] Left upper quadrant abdominal pain       ED Disposition     ED Disposition Condition Date/Time Comment    Discharge Stable Mon Jun 29, 2020  9:24 AM Humza Neely discharge to home/self care              Follow-up Information     Follow up With Specialties Details Why Contact Info Additional Information    Mazin Montes PA-C Internal Medicine, Physician Assistant Call in 1 day for further evaluation of symptoms [address] [facility] Emergency Department Emergency Medicine Go to  If symptoms worsen [address] [facility]  [phone] [place] ED, [address] [place], [place], [address] Dawn MD Gastroenterology Call in 1 day for further evaluation of symptoms [address]  [place] [facility] [place] [id number]             Discharge Medication List as of 6/29/2020  9:27 AM START taking these medications    Details   !! omeprazole (PriLOSEC) 20 mg delayed release capsule Take 2 capsules (40 mg total) by mouth daily, Starting Mon 6/29/2020, Until Wed 7/29/2020, Print      oxyCODONE-acetaminophen (PERCOCET) 5-325 mg per tablet Take 1 tablet by mouth every 6 (six) hours as needed for moderate pain (abdominal pain/dx gastritis/initial rx ) Label no driving no etoh  Initial rx  Dx:Max Daily Amount: 4 tablets, Starting Mon 6/29/2020, Print      sucralfate (CARAFATE) 1 g tablet Take 1 tablet (1 g total) by mouth 4 (four) times a day for 10 days Chew/crush or dissolve tablets in water prior to swallowing, Starting Mon 6/29/2020, Until Thu 7/9/2020, Print       !! - Potential duplicate medications found  Please discuss with provider  CONTINUE these medications which have NOT CHANGED    Details   dicyclomine (BENTYL) 20 mg tablet Take 1 tablet (20 mg total) by mouth 3 (three) times a day as needed (for abdominal cramping) for up to 7 days, Starting Sun 6/28/2020, Until Sun 7/5/2020, Normal      !! omeprazole (PriLOSEC) 40 MG capsule Take 1 capsule (40 mg total) by mouth daily, Starting Wed 1/8/2020, Normal      acetaminophen (TYLENOL) 500 mg tablet Take 500 mg by mouth every 6 (six) hours as needed for mild pain, Historical Med      butalbital-acetaminophen-caffeine (FIORICET,ESGIC) -40 mg per tablet Take 1 tablet by mouth daily as needed for migraine For on set of headache, Starting Thu 3/8/2018, Print      !! meloxicam (MOBIC) 15 mg tablet Take 1 tablet (15 mg total) by mouth daily, Starting Thu 12/5/2019, Normal      !! meloxicam (MOBIC) 15 mg tablet Take 1 tablet (15 mg total) by mouth daily, Starting Wed 2/5/2020, Normal      multivitamin (THERAGRAN) TABS Take 1 tablet by mouth daily, Historical Med      topiramate (TOPAMAX) 50 MG tablet Take 1 tablet (50 mg total) by mouth daily, Starting Thu 3/8/2018, Normal       !! - Potential duplicate medications found   Please discuss with provider  No discharge procedures on file      PDMP Review     None          ED Provider  Electronically Signed by           Tari Hidalgo PA-C  06/30/20 [id number]

## 2020-06-30 DIAGNOSIS — Z71.89 COMPLEX CARE COORDINATION: Primary | ICD-10-CM

## 2020-07-08 ENCOUNTER — PATIENT OUTREACH (OUTPATIENT)
Dept: INTERNAL MEDICINE CLINIC | Facility: CLINIC | Age: 45
End: 2020-07-08

## 2020-07-08 NOTE — PROGRESS NOTES
Outpatient Care Management Note:  RE: Called number listed and spoke with pt's wife, Brittanie Caban  She states that he often does not answer during work hours  Pt is independent and she did not feel needed any outreach at this time  She reports that he has been having an acute episode of gastritis, has seen GI in the recent past, but the pain became too much and he went to the ER twice in a short period of time  She states that his issue is being handled with medications and hoping this flare up resolves  She will make an appt with GI for him if it continues

## 2020-07-09 ENCOUNTER — HOSPITAL ENCOUNTER (OUTPATIENT)
Dept: RADIOLOGY | Facility: HOSPITAL | Age: 45
Discharge: HOME | End: 2020-07-09
Attending: SURGERY

## 2020-07-10 ENCOUNTER — APPOINTMENT (OUTPATIENT)
Dept: OCCUPATIONAL THERAPY | Facility: CLINIC | Age: 45
End: 2020-07-10
Payer: COMMERCIAL

## 2020-07-17 ENCOUNTER — EVALUATION (OUTPATIENT)
Dept: OCCUPATIONAL THERAPY | Facility: CLINIC | Age: 45
End: 2020-07-17
Payer: COMMERCIAL

## 2020-07-17 DIAGNOSIS — M77.8 ELBOW TENDINITIS: ICD-10-CM

## 2020-07-17 DIAGNOSIS — M77.12 LATERAL EPICONDYLITIS OF LEFT ELBOW: ICD-10-CM

## 2020-07-17 PROCEDURE — 97140 MANUAL THERAPY 1/> REGIONS: CPT | Performed by: OCCUPATIONAL THERAPIST

## 2020-07-17 PROCEDURE — 97165 OT EVAL LOW COMPLEX 30 MIN: CPT | Performed by: OCCUPATIONAL THERAPIST

## 2020-07-17 PROCEDURE — L3906 WHO W/O JOINTS CF: HCPCS | Performed by: OCCUPATIONAL THERAPIST

## 2020-07-17 NOTE — PROGRESS NOTES
OT Evaluation     Today's date: 2020  Patient name: Khoa Artis  : 1975  MRN: [id number]  Referring provider: Lizet Lanier MD  Dx:   Encounter Diagnosis     ICD-10-CM    1  Elbow tendinitis M77 8 Ambulatory referral to PT/OT hand therapy   2  Lateral epicondylitis of left elbow M77 12 Ambulatory referral to PT/OT hand therapy                  Assessment  Assessment details: Vick Park presents with R elbow pain that started several months ago   He has local pain and tenderness in the R lateral epicondyle and triceps   He has median nerve symptoms from the pronator   He has nocturnal parasthesias   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   He lives wife and children  Impairments: activity intolerance, impaired physical strength, lacks appropriate home exercise program and pain with function  Functional limitations: Pt has pain with gripping , lifting , tool use  Symptom irritability: moderateUnderstanding of Dx/Px/POC: excellent  Goals  STG -1 wks  1- I with HEP  2- Custom orthosis  3- improve worst pain 6/10    LTG- 6 wks   1- no pain with  , lift , tool use  2- improve  to 75#  3- no parasthesias     Plan  Plan details: Plan- Graston , eccentric strengthening , MWM , neural MOB  Patient would benefit from: OT eval, custom splinting and skilled occupational therapy  Planned modality interventions: ultrasound, thermotherapy: hydrocollator packs and cryotherapy  Planned therapy interventions: joint mobilization, manual therapy, activity modification, strengthening, home exercise program, graded exercise, graded activity and functional ROM exercises  Frequency: 2x week  Duration in weeks: 6  Plan of Care beginning date: 2020  Treatment plan discussed with: patient        Subjective Evaluation    History of Present Illness  Date of onset: 2020  Mechanism of injury: Vick Park reports onset of elbow pain and R hand numbness that started 3 mos ago    He works as a    Quality of life: excellent    Pain  Current pain ratin  At best pain rating: 3  Location: R lat ep   Quality: burning and sharp  Relieving factors: medications  Aggravating factors: lifting  Progression: improved    Social Support  Steps to enter house: yes  Stairs in house: yes   Lives in: multiple-level home  Lives with: parents and spouse    Employment status: working  Hand dominance: right    Treatments  No previous or current treatments  Patient Goals  Patient goals for therapy: increased strength, decreased pain and independence with ADLs/IADLs  Patient goal: No pain R arm        Objective     Palpation     Additional Palpation Details  Tender R lat ep and triceps    Neurological Testing     Additional Neurological Details  C/o parasthesias med dist     Active Range of Motion     Right Elbow   Normal active range of motion  Elbow hyperextension  Flexion: WFL  Extension: WFL and with pain  Forearm supination: WFL and with pain  Forearm pronation: WFL    Right Wrist   Normal active range of motion    Additional Active Range of Motion Details  Digit ROM WNL     Strength/Myotome Testing     Right Elbow   Normal strength  Flexion: 5 and WFL  Extension: 5 and WFL  Forearm supination: 5 and WFL  Forearm pronation: 5 and WFL    Left Wrist/Hand      (2nd hand position)     Trial 1: 124    Right Wrist/Hand   Normal wrist strength     (2nd hand position)     Trial 1: 55    Additional Strength Details   2 R/L with elbow at 0 R/L= 45/126 - pain with R     Tests     Right Elbow   Positive Cozen's and Mill's  Negative active medial epicondylitis  Right Wrist/Hand   Negative Tinel's sign (medial nerve)       Additional Tests Details  + pronator compression     General Comments:      Shoulder Comments   Shoulder ROM and strength WNL      Flowsheet Rows      Most Recent Value   PT/OT G-Codes   Current Score  43   Projected Score  68             Precautions: universal       Manuals  cyrus 5m            MWM radial head -  and wr exr 4m              Bermudez MOB Med and radial  3m            Custom wrist orhtosis 12            Neuro Re-Ed                                                                                           HEP  5x day             Ther Ex             Nirschl stretches             Ecc wrist                                                                                           Ther Activity                                       Gait Training                                       Modalities             MH pre  10m            CP post 6m

## 2020-07-21 ENCOUNTER — HOSPITAL ENCOUNTER (OUTPATIENT)
Dept: RADIOLOGY | Facility: HOSPITAL | Age: 45
Discharge: HOME | End: 2020-07-21
Attending: SURGERY
Payer: COMMERCIAL

## 2020-07-21 ENCOUNTER — TRANSCRIBE ORDERS (OUTPATIENT)
Dept: RADIOLOGY | Facility: HOSPITAL | Age: 45
End: 2020-07-21

## 2020-07-21 DIAGNOSIS — G56.23 CUBITAL TUNNEL SYNDROME OF BOTH UPPER EXTREMITIES: ICD-10-CM

## 2020-07-21 PROCEDURE — 76882 US LMTD JT/FCL EVL NVASC XTR: CPT

## 2020-07-22 ENCOUNTER — APPOINTMENT (OUTPATIENT)
Dept: OCCUPATIONAL THERAPY | Facility: CLINIC | Age: 45
End: 2020-07-22
Payer: COMMERCIAL

## 2020-07-22 ENCOUNTER — PREP FOR PROCEDURE (OUTPATIENT)
Dept: GASTROENTEROLOGY | Facility: CLINIC | Age: 45
End: 2020-07-22

## 2020-07-22 ENCOUNTER — OFFICE VISIT (OUTPATIENT)
Dept: GASTROENTEROLOGY | Facility: CLINIC | Age: 45
End: 2020-07-22
Payer: COMMERCIAL

## 2020-07-22 VITALS
WEIGHT: 230 LBS | BODY MASS INDEX: 36.1 KG/M2 | DIASTOLIC BLOOD PRESSURE: 80 MMHG | HEIGHT: 67 IN | SYSTOLIC BLOOD PRESSURE: 136 MMHG | HEART RATE: 90 BPM | TEMPERATURE: 99.3 F

## 2020-07-22 DIAGNOSIS — K29.70 GASTRITIS, PRESENCE OF BLEEDING UNSPECIFIED, UNSPECIFIED CHRONICITY, UNSPECIFIED GASTRITIS TYPE: Primary | ICD-10-CM

## 2020-07-22 DIAGNOSIS — K92.1 BLACK STOOL: ICD-10-CM

## 2020-07-22 DIAGNOSIS — Z12.11 COLON CANCER SCREENING: ICD-10-CM

## 2020-07-22 DIAGNOSIS — Z20.822 ENCOUNTER FOR LABORATORY TESTING FOR COVID-19 VIRUS: ICD-10-CM

## 2020-07-22 DIAGNOSIS — R10.13 EPIGASTRIC PAIN: ICD-10-CM

## 2020-07-22 DIAGNOSIS — K29.70 GASTRITIS: ICD-10-CM

## 2020-07-22 DIAGNOSIS — R10.13 EPIGASTRIC PAIN: Primary | ICD-10-CM

## 2020-07-22 PROCEDURE — 1036F TOBACCO NON-USER: CPT | Performed by: INTERNAL MEDICINE

## 2020-07-22 PROCEDURE — 3008F BODY MASS INDEX DOCD: CPT | Performed by: INTERNAL MEDICINE

## 2020-07-22 PROCEDURE — 99214 OFFICE O/P EST MOD 30 MIN: CPT | Performed by: INTERNAL MEDICINE

## 2020-07-22 RX ORDER — SUCRALFATE 1 G/1
1 TABLET ORAL 2 TIMES DAILY
Qty: 60 TABLET | Refills: 3 | Status: SHIPPED | OUTPATIENT
Start: 2020-07-22 | End: 2022-04-01 | Stop reason: ALTCHOICE

## 2020-07-22 RX ORDER — OMEPRAZOLE 20 MG/1
40 CAPSULE, DELAYED RELEASE ORAL DAILY
Qty: 60 CAPSULE | Refills: 2 | Status: SHIPPED | OUTPATIENT
Start: 2020-07-22 | End: 2020-08-21 | Stop reason: ALTCHOICE

## 2020-07-22 RX ORDER — SUCRALFATE 1 G/1
1 TABLET ORAL 4 TIMES DAILY
Qty: 40 TABLET | Refills: 0 | Status: SHIPPED | OUTPATIENT
Start: 2020-07-22 | End: 2020-07-22 | Stop reason: SDUPTHER

## 2020-07-22 NOTE — PROGRESS NOTES
[facility]  Progress Note - Evan Mills 53 y o  male MRN: [id number]    Unit/Bed#:  Encounter: [id number]    Assessment/Plan:    1  Epigastric abdominal pain/black stools-possibly secondary to erosive gastritis, EGD in January was notable for mild to moderate gastritis  Biopsies did not show any evidence of H pylori  Fortunately, hemoglobin was stable x2-BUN was normal, therefore doubt bleeding peptic ulcer disease  Patient has had resolution of the black stools and improvement in symptoms therefore will hold off on repeat EGD at this time   -will recommend continuing omeprazole 20 milligrams b i d  And fill the prescription for Carafate 1 gram b i d  As well   -will obtain right upper quadrant ultrasound to assess for gallstones a given symptoms were unprovoked to assess for biliary colic  Patient was explained about the lifestyle and dietary modifications  Advised to avoid fatty foods, chocolates, caffeine, alcohol and any other triggering foods  Avoid eating for at least 3 hours before going to bed  2  Colon cancer screening-based on American Cancer society guidelines, would recommend screening colonoscopy at this time  Patient was explained about  the risks and benefits of the procedure  Risks including but not limited to bleeding, infection, perforation were explained in detail  Also explained about less than 100% sensitivity with the exam and other alternatives  -would recommend preprocedure COVID testing  Subjective: This is a 12-year-old male with history of gastritis who presents for follow-up  Patient was recently in the emergency room twice for symptoms of postprandial epigastric pain and black stools  Patient reports that he has been taking omeprazole 20 milligrams b i d  Since his endoscopy in January  He denies any NSAID use  Denies any dietary indiscretions  Denies alcohol use  He was started on Carafate in addition to omeprazole 20 milligrams b i d  With symptomatic improvement  He reports that stools are now back to normal   Denies any iron use, denies Pepto-Bismol use  Hemoglobin in the emergency room was 15 6 and then 15 8  BUN was normal   LFTs were normal, lactate was normal   Lipase was normal     Patient underwent CT scan with contrast which was unremarkable, no intra-abdominal pathology, there was evidence of uncomplicated colonic diverticulosis and tiny fat containing umbilical hernia  Objective:     Vitals: Blood pressure 136/80, pulse 90, temperature 99 3 °F (37 4 °C), height 5' 7" (1 702 m), weight 104 kg (230 lb)  ,Body mass index is 36 02 kg/m²  [unfilled]    Physical Exam:    GEN: wn/wd, NAD  HEENT: MMM, no cervical or supraclavicular LAD, anciteric  CV: RRR, no m/r/g  CHEST: CTA b/l, no w/r/r  ABD: +BS, soft, NT/ND, no hepatosplenomegaly  EXT: no c/c/e  SKIN: no rashes  NEURO: aaox3      Invasive Devices     None                         Lab, Imaging and other studies:     No visits with results within 1 Day(s) from this visit     Latest known visit with results is:   Admission on 06/29/2020, Discharged on 06/29/2020   Component Date Value    WBC 06/29/2020 8 75     RBC 06/29/2020 5 37     Hemoglobin 06/29/2020 15 8     Hematocrit 06/29/2020 47 4     MCV 06/29/2020 88     MCH 06/29/2020 29 4     MCHC 06/29/2020 33 3     RDW 06/29/2020 12 2     MPV 06/29/2020 9 7     Platelets 58/21/9956 232     nRBC 06/29/2020 0     Neutrophils Relative 06/29/2020 70     Immat GRANS % 06/29/2020 0     Lymphocytes Relative 06/29/2020 19     Monocytes Relative 06/29/2020 9     Eosinophils Relative 06/29/2020 2     Basophils Relative 06/29/2020 0     Neutrophils Absolute 06/29/2020 6 10     Immature Grans Absolute 06/29/2020 0 03     Lymphocytes Absolute 06/29/2020 1 67     Monocytes Absolute 06/29/2020 0 74     Eosinophils Absolute 06/29/2020 0 18     Basophils Absolute 06/29/2020 0 03     Sodium 06/29/2020 140     Potassium 06/29/2020 4 0     Chloride 06/29/2020 106     CO2 06/29/2020 28     ANION GAP 06/29/2020 6     BUN 06/29/2020 10     Creatinine 06/29/2020 0 92     Glucose 06/29/2020 107     Calcium 06/29/2020 9 4     eGFR 06/29/2020 99     Total Bilirubin 06/29/2020 0 30     Bilirubin, Direct 06/29/2020 0 09     Alkaline Phosphatase 06/29/2020 78     AST 06/29/2020 14     ALT 06/29/2020 32     Total Protein 06/29/2020 7 0     Albumin 06/29/2020 3 6     Lipase 06/29/2020 131     LACTIC ACID 06/29/2020 1 0     Troponin I 06/29/2020 <0 02     Ventricular Rate 06/29/2020 53     Atrial Rate 06/29/2020 53     IL Interval 06/29/2020 150     QRSD Interval 06/29/2020 90     QT Interval 06/29/2020 416     QTC Interval 06/29/2020 390     P Axis 06/29/2020 52     QRS Axis 06/29/2020 -29     T Wave Axis 06/29/2020 13     Ventricular Rate 06/29/2020 60     Atrial Rate 06/29/2020 60     IL Interval 06/29/2020 134     QRSD Interval 06/29/2020 92     QT Interval 06/29/2020 426     QTC Interval 06/29/2020 426     P Axis 06/29/2020 63     QRS [place] 06/29/2020 -29     T Wave Axis 06/29/2020 19          I have personally reviewed pertinent films in PACS    No current facility-administered medications for this visit

## 2020-07-22 NOTE — LETTER
July 22, 2020     Farzana Saucedo PA-C  [address] [place] [address]    Patient: Daniel Goins   YOB: 1975   Date of Visit: 7/22/2020       Dear Dr Nannette Guevara:    Thank you for referring Tamra Jerez to me for evaluation  Below are my notes for this consultation  If you have questions, please do not hesitate to call me  I look forward to following your patient along with you  Sincerely,        Flaco Walsh MD        CC: No Recipients  Flaco Walsh MD  7/22/2020  1:32 PM  Sign at close encounter  [facility]  Progress Note - Daniel Goins 53 y o  male MRN: [id number]    Unit/Bed#:  Encounter: [id number]    Assessment/Plan:    1  Epigastric abdominal pain/black stools-possibly secondary to erosive gastritis, EGD in January was notable for mild to moderate gastritis  Biopsies did not show any evidence of H pylori  Fortunately, hemoglobin was stable x2-BUN was normal, therefore doubt bleeding peptic ulcer disease  Patient has had resolution of the black stools and improvement in symptoms therefore will hold off on repeat EGD at this time   -will recommend continuing omeprazole 20 milligrams b i d  And fill the prescription for Carafate 1 gram b i d  As well   -will obtain right upper quadrant ultrasound to assess for gallstones a given symptoms were unprovoked to assess for biliary colic  Patient was explained about the lifestyle and dietary modifications  Advised to avoid fatty foods, chocolates, caffeine, alcohol and any other triggering foods  Avoid eating for at least 3 hours before going to bed  2  Colon cancer screening-based on American Cancer society guidelines, would recommend screening colonoscopy at this time  Patient was explained about  the risks and benefits of the procedure  Risks including but not limited to bleeding, infection, perforation were explained in detail   Also explained about less than 100% sensitivity with the exam and other alternatives  -would recommend preprocedure COVID testing  Subjective: This is a 22-year-old male with history of gastritis who presents for follow-up  Patient was recently in the emergency room twice for symptoms of postprandial epigastric pain and black stools  Patient reports that he has been taking omeprazole 20 milligrams b i d  Since his endoscopy in January  He denies any NSAID use  Denies any dietary indiscretions  Denies alcohol use  He was started on Carafate in addition to omeprazole 20 milligrams b i d  With symptomatic improvement  He reports that stools are now back to normal   Denies any iron use, denies Pepto-Bismol use  Hemoglobin in the emergency room was 15 6 and then 15 8  BUN was normal   LFTs were normal, lactate was normal   Lipase was normal     Patient underwent CT scan with contrast which was unremarkable, no intra-abdominal pathology, there was evidence of uncomplicated colonic diverticulosis and tiny fat containing umbilical hernia  Objective:     Vitals: Blood pressure 136/80, pulse 90, temperature 99 3 °F (37 4 °C), height 5' 7" (1 702 m), weight 104 kg (230 lb)  ,Body mass index is 36 02 kg/m²  [unfilled]    Physical Exam:    GEN: wn/wd, NAD  HEENT: MMM, no cervical or supraclavicular LAD, anciteric  CV: RRR, no m/r/g  CHEST: CTA b/l, no w/r/r  ABD: +BS, soft, NT/ND, no hepatosplenomegaly  EXT: no c/c/e  SKIN: no rashes  NEURO: aaox3      Invasive Devices     None                         Lab, Imaging and other studies:     No visits with results within 1 Day(s) from this visit     Latest known visit with results is:   Admission on 06/29/2020, Discharged on 06/29/2020   Component Date Value    WBC 06/29/2020 8 75     RBC 06/29/2020 5 37     Hemoglobin 06/29/2020 15 8     Hematocrit 06/29/2020 47 4     MCV 06/29/2020 88     MCH 06/29/2020 29 4     MCHC 06/29/2020 33 3     RDW 06/29/2020 12 2     MPV 06/29/2020 9 7     Platelets 65/37/5024 232     nRBC 06/29/2020 0     Neutrophils Relative 06/29/2020 70     Immat GRANS % 06/29/2020 0     Lymphocytes Relative 06/29/2020 19     Monocytes Relative 06/29/2020 9     Eosinophils Relative 06/29/2020 2     Basophils Relative 06/29/2020 0     Neutrophils Absolute 06/29/2020 6 10     Immature Grans Absolute 06/29/2020 0 03     Lymphocytes Absolute 06/29/2020 1 67     Monocytes Absolute 06/29/2020 0 74     Eosinophils Absolute 06/29/2020 0 18     Basophils Absolute 06/29/2020 0 03     Sodium 06/29/2020 140     Potassium 06/29/2020 4 0     Chloride 06/29/2020 106     CO2 06/29/2020 28     ANION GAP 06/29/2020 6     BUN 06/29/2020 10     Creatinine 06/29/2020 0 92     Glucose 06/29/2020 107     Calcium 06/29/2020 9 4     eGFR 06/29/2020 99     Total Bilirubin 06/29/2020 0 30     Bilirubin, Direct 06/29/2020 0 09     Alkaline Phosphatase 06/29/2020 78     AST 06/29/2020 14     ALT 06/29/2020 32     Total Protein 06/29/2020 7 0     Albumin 06/29/2020 3 6     Lipase 06/29/2020 131     LACTIC ACID 06/29/2020 1 0     Troponin I 06/29/2020 <0 02     Ventricular Rate 06/29/2020 53     Atrial Rate 06/29/2020 53     SD Interval 06/29/2020 150     QRSD Interval 06/29/2020 90     QT Interval 06/29/2020 416     QTC Interval 06/29/2020 390     P Axis 06/29/2020 52     QRS Axis 06/29/2020 -29     T Wave Axis 06/29/2020 13     Ventricular Rate 06/29/2020 60     Atrial Rate 06/29/2020 60     SD Interval 06/29/2020 134     QRSD Interval 06/29/2020 92     QT Interval 06/29/2020 426     QTC Interval 06/29/2020 426     P Axis 06/29/2020 63     QRS [place] 06/29/2020 -29     T Wave Axis 06/29/2020 19          I have personally reviewed pertinent films in PACS    No current facility-administered medications for this visit

## 2020-07-27 ENCOUNTER — OFFICE VISIT (OUTPATIENT)
Dept: OCCUPATIONAL THERAPY | Facility: CLINIC | Age: 45
End: 2020-07-27
Payer: COMMERCIAL

## 2020-07-27 DIAGNOSIS — M77.8 ELBOW TENDINITIS: Primary | ICD-10-CM

## 2020-07-27 DIAGNOSIS — M77.12 LATERAL EPICONDYLITIS OF LEFT ELBOW: ICD-10-CM

## 2020-07-27 PROCEDURE — 97140 MANUAL THERAPY 1/> REGIONS: CPT | Performed by: OCCUPATIONAL THERAPIST

## 2020-07-27 PROCEDURE — 97110 THERAPEUTIC EXERCISES: CPT | Performed by: OCCUPATIONAL THERAPIST

## 2020-07-27 NOTE — PROGRESS NOTES
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 any heavy lifting  "   " I was playing with my nephews yesterday, throwing the frisbee "  "after you did that technique I feel like something released and I can move my hand freely "       Objective: See treatment diary below  SPR 7/10 pre OT  SPR 2/10 post OT    Assessment: Tolerated treatment well  Patient would benefit from continued OT  Patient notes bilateral paresthesia in both hands digits 3,4,5 at night  Denies n/t at this time  Plan: Continue per plan of care  Patient self d/c'd due to work       Precautions: universal       Manuals            graston 5m stm at [facility] tendon 5'           MWM radial head -  and wr exr 4m   1# weight  4 min           Bermudez MOB Med and radial  3m leuko tape lateral radial head glide           Custom wrist orhtosis 12 No problems           Neuro Re-Ed                                                                                           HEP  5x day             Ther Ex             Nirschl stretches             Ecc wrist  10xs/ added to HEP                                                                                         Ther Activity                                                                              Modalities             MH pre  10m 5m           CP post 6m

## 2020-07-31 ENCOUNTER — APPOINTMENT (OUTPATIENT)
Dept: OCCUPATIONAL THERAPY | Facility: CLINIC | Age: 45
End: 2020-07-31
Payer: COMMERCIAL

## 2020-07-31 ENCOUNTER — TELEPHONE (OUTPATIENT)
Dept: INTERNAL MEDICINE CLINIC | Facility: CLINIC | Age: 45
End: 2020-07-31

## 2020-07-31 NOTE — TELEPHONE ENCOUNTER
Patient and his wife had questions regarding CT findings seen in the CT  Patient has scheduled follow up to discuss workup he has been getting with several specialists (ortho, GI)  Patients eye is much improved  CT from 6/28 showing subsegmental atelectasis  At this time not having any pulmonary sx and likely due to patients abd pain and discomfort at the time the imaging was taken  We will discuss these test results at his follow up appointment  Discussed importance of incentive spirometry and deep breathing exercises which can help with atelectasis  Will discuss further at our scheduled follow up

## 2020-08-14 ENCOUNTER — TELEPHONE (OUTPATIENT)
Dept: GASTROENTEROLOGY | Facility: CLINIC | Age: 45
End: 2020-08-14

## 2020-08-14 NOTE — TELEPHONE ENCOUNTER
Called pt to confirm procedure 8/20, stated will not be able to make it, rescheduled to 9/25 [facility] [place] with Dr Lluvia Escalona

## 2020-08-21 ENCOUNTER — OFFICE VISIT (OUTPATIENT)
Dept: INTERNAL MEDICINE CLINIC | Facility: CLINIC | Age: 45
End: 2020-08-21
Payer: COMMERCIAL

## 2020-08-21 VITALS
TEMPERATURE: 98.3 F | HEART RATE: 68 BPM | OXYGEN SATURATION: 98 % | DIASTOLIC BLOOD PRESSURE: 64 MMHG | BODY MASS INDEX: 34.56 KG/M2 | HEIGHT: 68 IN | SYSTOLIC BLOOD PRESSURE: 102 MMHG | WEIGHT: 228 LBS

## 2020-08-21 DIAGNOSIS — K21.9 GASTROESOPHAGEAL REFLUX DISEASE WITHOUT ESOPHAGITIS: ICD-10-CM

## 2020-08-21 DIAGNOSIS — G43.709 CHRONIC MIGRAINE WITHOUT AURA WITHOUT STATUS MIGRAINOSUS, NOT INTRACTABLE: ICD-10-CM

## 2020-08-21 DIAGNOSIS — R00.2 PALPITATION: ICD-10-CM

## 2020-08-21 DIAGNOSIS — K42.9 UMBILICAL HERNIA WITHOUT OBSTRUCTION AND WITHOUT GANGRENE: ICD-10-CM

## 2020-08-21 DIAGNOSIS — E78.1 HYPERTRIGLYCERIDEMIA: ICD-10-CM

## 2020-08-21 DIAGNOSIS — L84 CALLUS OF FOOT: ICD-10-CM

## 2020-08-21 DIAGNOSIS — J98.11 ATELECTASIS: Primary | ICD-10-CM

## 2020-08-21 DIAGNOSIS — G47.33 MILD OBSTRUCTIVE SLEEP APNEA: ICD-10-CM

## 2020-08-21 PROBLEM — R42 DIZZINESS: Status: RESOLVED | Noted: 2019-07-18 | Resolved: 2020-08-21

## 2020-08-21 PROBLEM — R79.1 ELEVATED INR: Status: RESOLVED | Noted: 2017-01-11 | Resolved: 2020-08-21

## 2020-08-21 PROCEDURE — 3008F BODY MASS INDEX DOCD: CPT | Performed by: PHYSICIAN ASSISTANT

## 2020-08-21 PROCEDURE — 1036F TOBACCO NON-USER: CPT | Performed by: PHYSICIAN ASSISTANT

## 2020-08-21 PROCEDURE — 99214 OFFICE O/P EST MOD 30 MIN: CPT | Performed by: PHYSICIAN ASSISTANT

## 2020-08-21 PROCEDURE — 93000 ELECTROCARDIOGRAM COMPLETE: CPT | Performed by: PHYSICIAN ASSISTANT

## 2020-08-21 NOTE — ASSESSMENT & PLAN NOTE
Atelectasis present on initial CT when presented in the emergency room with abdominal pain however resolved on subsequent CT abdomen and pelvis  Patient without smoking history no pulmonary symptoms  Will hold on further chest imaging at this time  CT chest last performed 2018  Discussed importance of reporting immediately if any development of pulmonary symptoms  Discussed case and Tx plan with Dr Nazario Sullivan

## 2020-08-21 NOTE — ASSESSMENT & PLAN NOTE
Keep a headache log  Discussed importance identifying any migraine trigger foods  Tylenol to be used as needed for pain as patient has some gastritis and nonsteroidal anti-inflammatory medications like Motrin Aleve ibuprofen all can irritate your stomach  Patient no longer taking meloxicam   Will discontinue this medication

## 2020-08-21 NOTE — PATIENT INSTRUCTIONS
Palpitation  EKG today showing sinus bradycardia with left axis deviation  Patient without any exertional symptoms  Recommend BMP TSH and magnesium level to further evaluate palpitations  Will check Holter monitor  Discussed importance of keeping a symptom log  Discussed at length my concern with energy drinks in the importance of avoiding any caffeine or stimulants which can induce chest pain and palpitations  Patient verbalized understanding and is willing  He no longer has coffee at his home which I commended him on  Avoid any coffee, soda, tea, energy drinks  Mild obstructive sleep apnea  Patient has been using CPAP intermittently  Discussed importance of using CPAP daily as directed  GERD (gastroesophageal reflux disease)  Patient had EGD earlier this year and is following with Gastroenterology  Symptoms improved since being on Prilosec 40 mg daily  Discussed importance of continue GI follow-up as patient has colonoscopy scheduled  Avoid spicy acidic foods and caffeine which can further irritate stomach  Eat small meals throughtout the day  No food within 2-3 hours of going to bed  Migraine headache  Keep a headache log  Discussed importance identifying any migraine trigger foods  Tylenol to be used as needed for pain as patient has some gastritis and nonsteroidal anti-inflammatory medications like Motrin Aleve ibuprofen all can irritate your stomach  Patient no longer taking meloxicam   Will discontinue this medication  Callus of foot  Patient has a callus on his left forefoot  Previously his ankle has been x-rayed  Avoid tight-fitting shoes which rub and put pressure on the area  Wear supportive shoes with cushion socks (wool)  Topical moisturizer to be used as needed

## 2020-08-21 NOTE — ASSESSMENT & PLAN NOTE
Patient has a callus on his left forefoot  Previously his ankle has been x-rayed  Avoid tight-fitting shoes which rub and put pressure on the area  Wear supportive shoes with cushion socks (wool)  Topical moisturizer to be used as needed

## 2020-08-21 NOTE — ASSESSMENT & PLAN NOTE
Patient had EGD earlier this year and is following with Gastroenterology  Symptoms improved since being on Prilosec 40 mg daily  Discussed importance of continue GI follow-up as patient has colonoscopy scheduled  Avoid spicy acidic foods and caffeine which can further irritate stomach  Eat small meals throughtout the day  No food within 2-3 hours of going to bed

## 2020-08-21 NOTE — ASSESSMENT & PLAN NOTE
EKG today showing sinus bradycardia with left axis deviation  Patient without any exertional symptoms  Recommend BMP TSH and magnesium level to further evaluate palpitations  Will check Holter monitor  Discussed importance of keeping a symptom log  Discussed at length my concern with energy drinks in the importance of avoiding any caffeine or stimulants which can induce chest pain and palpitations  Patient verbalized understanding and is willing  He no longer has coffee at his home which I commended him on  Avoid any coffee, soda, tea, energy drinks

## 2020-08-21 NOTE — ASSESSMENT & PLAN NOTE
Currently managed lifestyle modifications alone  Will check lipid panel    Encouraged weight loss healthy diet lifestyle

## 2020-08-21 NOTE — PROGRESS NOTES
[facility] [place] [facility]  Standard Office Visit  Patient ID: Pat Mullen    : 1975  Age/Gender: 53 y o  male     DATE: 2020      Assessment/Plan:    Palpitation  EKG today showing sinus bradycardia with left axis deviation  Patient without any exertional symptoms  Recommend BMP TSH and magnesium level to further evaluate palpitations  Will check Holter monitor  Discussed importance of keeping a symptom log  Discussed at length my concern with energy drinks in the importance of avoiding any caffeine or stimulants which can induce chest pain and palpitations  Patient verbalized understanding and is willing  He no longer has coffee at his home which I commended him on  Avoid any coffee, soda, tea, energy drinks  Mild obstructive sleep apnea  Patient has been using CPAP intermittently  Discussed importance of using CPAP daily as directed  GERD (gastroesophageal reflux disease)  Patient had EGD earlier this year and is following with Gastroenterology  Symptoms improved since being on Prilosec 40 mg daily  Discussed importance of continue GI follow-up as patient has colonoscopy scheduled  Avoid spicy acidic foods and caffeine which can further irritate stomach  Eat small meals throughtout the day  No food within 2-3 hours of going to bed  Migraine headache  Keep a headache log  Discussed importance identifying any migraine trigger foods  Tylenol to be used as needed for pain as patient has some gastritis and nonsteroidal anti-inflammatory medications like Motrin Aleve ibuprofen all can irritate your stomach  Patient no longer taking meloxicam   Will discontinue this medication  Callus of foot  Patient has a callus on his left forefoot  Previously his ankle has been x-rayed  Avoid tight-fitting shoes which rub and put pressure on the area  Wear supportive shoes with cushion socks (wool)    Topical moisturizer to be used as needed  Hypertriglyceridemia  Currently managed lifestyle modifications alone  Will check lipid panel  Encouraged weight loss healthy diet lifestyle    Atelectasis  Atelectasis present on initial CT when presented in the emergency room with abdominal pain however resolved on subsequent CT abdomen and pelvis  Patient without smoking history no pulmonary symptoms  Will hold on further chest imaging at this time  CT chest last performed 2018  Discussed importance of reporting immediately if any development of pulmonary symptoms  Discussed case and Tx plan with Dr Erin López  Diagnoses and all orders for this visit:    Atelectasis    Chronic migraine without aura without status migrainosus, not intractable    Gastroesophageal reflux disease without esophagitis    Palpitation  Comments:  Discussed red flag sx and R precautions which need immediate eval and tx should they develop  Orders:  -     TSH, 3rd generation with Free T4 reflex; Future  -     Basic metabolic panel; Future  -     Magnesium; Future  -     Holter monitor - 24 hour; Future    Callus of foot  -     Ambulatory referral to Podiatry; Future    Umbilical hernia without obstruction and without gangrene    Mild obstructive sleep apnea    Hypertriglyceridemia  -     Lipid Panel with Direct LDL reflex; Future    BMI 34 0-34 9,adult    Other orders  -     Cancel: XR chest pa & lateral; Future          BMI Counseling: Body mass index is 34 67 kg/m²  The BMI is above normal  Nutrition recommendations include decreasing portion sizes, encouraging healthy choices of fruits and vegetables, decreasing fast food intake, limiting drinks that contain sugar and reducing intake of cholesterol  Exercise recommendations include exercising 3-5 times per week  No pharmacotherapy was ordered  Patient referred to PCP due to patient being overweight  Depression Screening and Follow-up Plan: Patient's depression screening was positive with a PHQ-2 score of 0  Clincally patient does not have depression  No treatment is required  Subjective:   Chief Complaint   Patient presents with    Abdominal Pain     Follow up for abdominal pain, LUQ  No testing done since last OV  Needs BMI follow up  Left ankle area pain  Evan Mills is a 53 y o  male who presents to the office on 8/21/2020 for     For eval   He notes he went to the ER for abdominal pain  HE was evaluated for abdominal pain and was started on stomach medicine which has helped  He notes his abdominal pain has greatly improved  He notes he has colonoscopy scheduled  No longer with blood in stool but he did see blood in his stool prior to his ER visit  He has seen GI since and they scheduled colonoscopy  Has not followed with podiatry regarding his L foot (was referred prior)  Has RUQ ultrasound prior ordered by GI doctor, has not had it done yet  He is following with ortho regarding cubital tunnel  He is doing PT exercises at home but not going to formal PT any longer  Notes his arm ROM much improved  Has ortho follow up scheduled for next week  L ankle callus, used topical callus remover  Skin over the area is now sensitive     NO ankle pain or foot pain as far as his joint  More sensitive skin over the area  No new boots  Has been working long days at work  14 hour days but had been laid off due to pandemic for about 2 months  He is interested in seeing podiatry  Patient does continue to consume large amounts of caffeine  He drinks coffee throughout the day and will drink several energy drinks  Patient reported to his wife several weeks ago that he had an episode of increased heart rate following consumption of multiple energy drinks  On some days he will drink more than 3 red bulls or monster energy drinks  He notes that he drinks a energy drinks because he has a lot of work to do and long days at work    Sometimes he has to get a lot of work done and has limited help from his coworkers to get the job done  Patient no longer is sleeping with a CPAP regularly  He sleeps with a CPAP intermittently  Some feelings of on restored sleep  He has not been using to Topamax for some time  Denies anxiety or depression symptoms  Positive family history of a father with cardiac conditions  He is uncertain about the details of his father's cardiac history  He denies cough wheeze or shortness of breath  He is a nonsmoker  No previous history of tobacco abuse  NO umbilical pain  NO abdominal pain with lifting or straining  Abdominal Pain   The onset quality is undetermined  The problem occurs intermittently  The problem has been resolved  The pain is located in the epigastric region  The pain is at a severity of 0/10  The patient is experiencing no pain  The abdominal pain radiates to the epigastric region  Pertinent negatives include no anorexia, diarrhea, dysuria, fever, flatus, headaches, hematochezia, melena, nausea, vomiting or weight loss  The treatment provided significant relief  Prior diagnostic workup includes GI consult and CT scan  His past medical history is significant for GERD  Palpitations   This is a recurrent problem  The current episode started more than 1 month ago  The problem has been resolved  Associated symptoms include abdominal pain  Pertinent negatives include no anorexia, chest pain, coughing, fever, headaches, nausea or vomiting  Exacerbated by: Coffee caffeine energy drinks  He has tried nothing for the symptoms  Improvement on treatment: Resolved on its own spontaneously  Heartburn   He complains of abdominal pain  He reports no chest pain, no coughing, no early satiety, no globus sensation, no heartburn, no nausea or no wheezing  The current episode started more than 1 month ago  The problem has been resolved  The symptoms are aggravated by certain foods and caffeine  Pertinent negatives include no anemia, melena or weight loss   Risk factors include caffeine use, NSAIDs and obesity  He has tried a PPI for the symptoms  The treatment provided significant relief  Past procedures include an EGD  The following portions of the patient's history were reviewed and updated as appropriate: allergies, current medications, past family history, past medical history, past social history, past surgical history and problem list     Review of Systems   Constitutional: Negative for fever and weight loss  Respiratory: Negative for cough, shortness of breath and wheezing  Cardiovascular: Positive for palpitations (No current palpitations  See HPI for additional details)  Negative for chest pain and leg swelling  Gastrointestinal: Positive for abdominal pain  Negative for anorexia, diarrhea, flatus, heartburn, hematochezia, melena, nausea and vomiting  Genitourinary: Negative for dysuria  Musculoskeletal:        Callus left forefoot   Neurological: Negative for dizziness, syncope and headaches  Psychiatric/Behavioral: Negative for agitation and behavioral problems           Patient Active Problem List   Diagnosis    Chronic pain of left knee    Depression with anxiety    GERD (gastroesophageal reflux disease)    Heel pain, chronic, right    Joint pain    Lateral epicondylitis of left elbow    Migraine headache    Mild obstructive sleep apnea    Neck muscle spasm    Trigger ring finger of right hand    Trigger middle finger of right hand    Hypertriglyceridemia    Mass of left foot    Right elbow pain    Cubital tunnel syndrome of both upper extremities    Olecranon bursitis of right elbow    Gastritis    Red eye    Carpal tunnel syndrome, bilateral    Epigastric pain    Black stool    Colon cancer screening    Palpitation    Callus of foot    Umbilical hernia without obstruction and without gangrene    Atelectasis       Past Medical History:   Diagnosis Date    Elbow tendonitis     right possible bursitis    Gastritis  GERD (gastroesophageal reflux disease)     Migraine        Past Surgical History:   Procedure Laterality Date    EGD  2005    EYE FOREIGN BODY REMOVAL Left     [place] INCISE FINGER TENDON SHEATH Right 12/26/2018    Procedure: LONG AND RING TRIGGER FINGER RELEASES;  Surgeon: Joel Esparza MD;  Location: [facility];  Service: Orthopedics         Current Outpatient Medications:     acetaminophen (TYLENOL) 500 mg tablet, Take 500 mg by mouth every 6 (six) hours as needed for mild pain, Disp: , Rfl:     butalbital-acetaminophen-caffeine (FIORICET,ESGIC) -40 mg per tablet, Take 1 tablet by mouth daily as needed for migraine For on set of headache, Disp: 30 tablet, Rfl: 0    multivitamin (THERAGRAN) TABS, Take 1 tablet by mouth daily, Disp: , Rfl:     omeprazole (PriLOSEC) 40 MG capsule, Take 1 capsule (40 mg total) by mouth daily, Disp: 30 capsule, Rfl: 3    sucralfate (CARAFATE) 1 g tablet, Take 1 tablet (1 g total) by mouth 2 (two) times a day Chew/crush or dissolve tablets in water prior to swallowing, Disp: 60 tablet, Rfl: 3    dicyclomine (BENTYL) 20 mg tablet, Take 1 tablet (20 mg total) by mouth 3 (three) times a day as needed (for abdominal cramping) for up to 7 days, Disp: 21 tablet, Rfl: 0    Na Sulfate-K Sulfate-Mg Sulf 17 5-3 13-1 6 GM/177ML SOLN, Take 1 applicator by mouth once for 1 dose, Disp: 1 Bottle, Rfl: 0    No Known Allergies    Social History     Socioeconomic History    Marital status: /Civil Union     Spouse name: None    Number of children: None    Years of education: None    Highest education level: None   Occupational History    None   Social Needs    Financial resource strain: None    Food insecurity     Worry: None     Inability: None    Transportation needs     Medical: None     Non-medical: None   Tobacco Use    Smoking status: Never Smoker    Smokeless tobacco: Never Used   Substance and Sexual Activity    Alcohol use: Yes     Frequency: Monthly or less Comment: special occasions beer    Drug use: No    Sexual activity: Yes   Lifestyle    Physical activity     Days per week: None     Minutes per session: None    Stress: None   Relationships    Social connections     Talks on phone: None     Gets together: None     Attends Religion service: None     Active member of club or organization: None     Attends meetings of clubs or organizations: None     Relationship status: None    Intimate partner violence     Fear of current or ex partner: None     Emotionally abused: None     Physically abused: None     Forced sexual activity: None   Other Topics Concern    None   Social History Narrative    Caffeine use       Family History   Problem Relation Age of Onset    Diabetes Mother     Hypertension Mother     Breast cancer Mother     Diabetes Father     Gout Father     Hypertension Father     Cancer Maternal Grandmother     Rosacea Family        PHQ-9 Depression Screening    PHQ-9:    Frequency of the following problems over the past two weeks:       Little interest or pleasure in doing things:  0 - not at all  Feeling down, depressed, or hopeless:  0 - not at all  PHQ-2 Score:  0         Health Maintenance   Topic Date Due    HIV Screening  12/05/1988    Annual Physical  12/05/1991    Influenza Vaccine  07/01/2020    OT PLAN OF CARE  08/16/2020    BMI: Followup Plan  08/29/2020    BMI: Adult  08/21/2021    DTaP,Tdap,and Td Vaccines (5 - Td) 03/27/2030    Pneumococcal Vaccine: Pediatrics (0 to 5 Years) and At-Risk Patients (6 to 59 Years)  Aged Out    HIB Vaccine  Aged Out    Hepatitis B Vaccine  Aged Out    IPV Vaccine  Aged Out    Hepatitis A Vaccine  Aged Out    Meningococcal ACWY Vaccine  Aged Out    HPV Vaccine  Aged [employer] History   Administered Date(s) Administered    Influenza Quadrivalent Preservative Free 3 years and older IM 10/03/2016, 09/05/2017    TD (adult) Preservative Free 04/29/2015    Tdap 02/11/2014, 04/29/2015, 12/19/2016, 03/27/2020        Objective:  Vitals:    08/21/20 0728   BP: 102/64   BP Location: Left arm   Patient Position: Sitting   Cuff Size: Adult   Pulse: 68   Temp: 98 3 °F (36 8 °C)   TempSrc: Temporal   SpO2: 98%   Weight: 103 kg (228 lb)   Height: 5' 8" (1 727 m)     Wt Readings from Last 3 Encounters:   08/21/20 103 kg (228 lb)   07/22/20 104 kg (230 lb)   06/29/20 105 kg (231 lb 7 7 oz)     Body mass index is 34 67 kg/m²  No exam data present       Physical Exam  Vitals signs and nursing note reviewed  Constitutional:       General: He is not in acute distress  Appearance: He is well-developed  He is not diaphoretic  Comments: Alert pleasant cooperative  Seated in room in no acute distress   HENT:      Head: Normocephalic and atraumatic  Mouth/Throat:      Pharynx: No oropharyngeal exudate  Eyes:      General: No scleral icterus  Right eye: No discharge  Left eye: No discharge  Pupils: Pupils are equal, round, and reactive to light  Neck:      Musculoskeletal: Neck supple  Cardiovascular:      Rate and Rhythm: Normal rate and regular rhythm  Heart sounds: Normal heart sounds  No murmur  Pulmonary:      Effort: Pulmonary effort is normal  No respiratory distress  Breath sounds: Normal breath sounds  No wheezing or rales  Comments: Clear to auscultation throughout  No crackles no rhonchi no wheeze  No respiratory distress  Abdominal:      General: Abdomen is flat  Bowel sounds are normal  There is no distension  Palpations: Abdomen is soft  Tenderness: There is no abdominal tenderness ( soft nontender nondistended positive bowel sounds  )  There is no right CVA tenderness, left CVA tenderness, guarding or rebound  Negative signs include Che's sign and McBurney's sign  Hernia: No hernia is present  There is no hernia in the umbilical area (No palpable abdominal hernia )     Musculoskeletal:      Left foot: Normal range of motion  Deformity present  No foot drop  Feet:    Feet:      Left foot:      Skin integrity: Callus and dry skin present  No ulcer, erythema or warmth  Lymphadenopathy:      Cervical: No cervical adenopathy  Skin:     General: Skin is warm and dry  Neurological:      General: No focal deficit present  Mental Status: He is alert  Psychiatric:         Behavior: Behavior normal          Thought Content: Thought content normal              Future Appointments   Date Time Provider [facility]   9/18/2020  7:30 AM Vanessa Jara PA-C [address] [place] [address]   9/25/2020  8:00 AM [facility] GI ROOM 02 [facility] [place] Alyce Nava PA-C   [address]    Patient Care Team:  Vanessa Jara PA-C as PCP - General (Internal Medicine)  RONEY Carney MD    This note was completed in part utilizing M-Modal Fluency Direct Software  Grammatical errors, random word insertions, spelling mistakes, and incomplete sentences can be an occasional consequence of this system secondary to software limitations, ambient noise, and hardware issues  If you have any questions or concerns about the content, text, or information contained within the body of this dictation, please contact the provider for clarification

## 2020-09-19 ENCOUNTER — APPOINTMENT (OUTPATIENT)
Dept: URGENT CARE | Facility: MEDICAL CENTER | Age: 45
End: 2020-09-19
Payer: COMMERCIAL

## 2020-09-19 DIAGNOSIS — Z20.822 ENCOUNTER FOR LABORATORY TESTING FOR COVID-19 VIRUS: ICD-10-CM

## 2020-09-19 PROCEDURE — U0003 INFECTIOUS AGENT DETECTION BY NUCLEIC ACID (DNA OR RNA); SEVERE ACUTE RESPIRATORY SYNDROME CORONAVIRUS 2 (SARS-COV-2) (CORONAVIRUS DISEASE [COVID-19]), AMPLIFIED PROBE TECHNIQUE, MAKING USE OF HIGH THROUGHPUT TECHNOLOGIES AS DESCRIBED BY CMS-2020-01-R: HCPCS

## 2020-09-20 ENCOUNTER — LAB (OUTPATIENT)
Dept: LAB | Facility: CLINIC | Age: 45
End: 2020-09-20
Payer: COMMERCIAL

## 2020-09-20 DIAGNOSIS — R00.2 PALPITATION: ICD-10-CM

## 2020-09-20 DIAGNOSIS — E78.1 HYPERTRIGLYCERIDEMIA: ICD-10-CM

## 2020-09-20 LAB
ANION GAP SERPL CALCULATED.3IONS-SCNC: 6 MMOL/L (ref 4–13)
BUN SERPL-MCNC: 27 MG/DL (ref 5–25)
CALCIUM SERPL-MCNC: 8.7 MG/DL (ref 8.3–10.1)
CHLORIDE SERPL-SCNC: 105 MMOL/L (ref 100–108)
CHOLEST SERPL-MCNC: 164 MG/DL (ref 50–200)
CO2 SERPL-SCNC: 27 MMOL/L (ref 21–32)
CREAT SERPL-MCNC: 0.88 MG/DL (ref 0.6–1.3)
GFR SERPL CREATININE-BSD FRML MDRD: 103 ML/MIN/1.73SQ M
GLUCOSE P FAST SERPL-MCNC: 95 MG/DL (ref 65–99)
HDLC SERPL-MCNC: 44 MG/DL
LDLC SERPL CALC-MCNC: 102 MG/DL (ref 0–100)
MAGNESIUM SERPL-MCNC: 2.2 MG/DL (ref 1.6–2.6)
POTASSIUM SERPL-SCNC: 4.2 MMOL/L (ref 3.5–5.3)
SARS-COV-2 RNA SPEC QL NAA+PROBE: NOT DETECTED
SODIUM SERPL-SCNC: 138 MMOL/L (ref 136–145)
TRIGL SERPL-MCNC: 88 MG/DL
TSH SERPL DL<=0.05 MIU/L-ACNC: 1.93 UIU/ML (ref 0.36–3.74)

## 2020-09-20 PROCEDURE — 80048 BASIC METABOLIC PNL TOTAL CA: CPT

## 2020-09-20 PROCEDURE — 36415 COLL VENOUS BLD VENIPUNCTURE: CPT

## 2020-09-20 PROCEDURE — 84443 ASSAY THYROID STIM HORMONE: CPT

## 2020-09-20 PROCEDURE — 83735 ASSAY OF MAGNESIUM: CPT

## 2020-09-20 PROCEDURE — 80061 LIPID PANEL: CPT

## 2020-09-21 ENCOUNTER — HOSPITAL ENCOUNTER (OUTPATIENT)
Dept: NON INVASIVE DIAGNOSTICS | Facility: HOSPITAL | Age: 45
Discharge: HOME | End: 2020-09-21
Payer: COMMERCIAL

## 2020-09-21 DIAGNOSIS — R00.2 PALPITATION: ICD-10-CM

## 2020-09-21 PROCEDURE — 93225 XTRNL ECG REC<48 HRS REC: CPT

## 2020-09-21 PROCEDURE — 93226 XTRNL ECG REC<48 HR SCAN A/R: CPT

## 2020-09-23 NOTE — PRE-PROCEDURE INSTRUCTIONS
Pre-Surgery Instructions:   Medication Instructions    acetaminophen (TYLENOL) 500 mg tablet Patient was instructed by Physician and understands   butalbital-acetaminophen-caffeine (FIORICET,ESGIC) -40 mg per tablet Patient was instructed by Physician and understands   dicyclomine (BENTYL) 20 mg tablet Patient was instructed by Physician and understands   multivitamin [facility]) TABS Patient was instructed by Physician and understands   omeprazole (PriLOSEC) 40 MG capsule Patient was instructed by Physician and understands   sucralfate (CARAFATE) 1 g tablet Patient was instructed by Physician and understands

## 2020-09-25 ENCOUNTER — TELEPHONE (OUTPATIENT)
Dept: INTERNAL MEDICINE CLINIC | Facility: CLINIC | Age: 45
End: 2020-09-25

## 2020-09-25 ENCOUNTER — HOSPITAL ENCOUNTER (OUTPATIENT)
Dept: GASTROENTEROLOGY | Facility: AMBULARY SURGERY CENTER | Age: 45
Setting detail: OUTPATIENT SURGERY
Discharge: HOME | End: 2020-09-25
Attending: INTERNAL MEDICINE | Admitting: INTERNAL MEDICINE
Payer: COMMERCIAL

## 2020-09-25 ENCOUNTER — ANESTHESIA EVENT (OUTPATIENT)
Dept: GASTROENTEROLOGY | Facility: AMBULARY SURGERY CENTER | Age: 45
End: 2020-09-25

## 2020-09-25 ENCOUNTER — ANESTHESIA (OUTPATIENT)
Dept: GASTROENTEROLOGY | Facility: AMBULARY SURGERY CENTER | Age: 45
End: 2020-09-25

## 2020-09-25 VITALS
TEMPERATURE: 96.9 F | BODY MASS INDEX: 33.45 KG/M2 | OXYGEN SATURATION: 97 % | DIASTOLIC BLOOD PRESSURE: 65 MMHG | RESPIRATION RATE: 18 BRPM | SYSTOLIC BLOOD PRESSURE: 108 MMHG | WEIGHT: 220 LBS | HEART RATE: 50 BPM

## 2020-09-25 VITALS — HEART RATE: 57 BPM

## 2020-09-25 DIAGNOSIS — Z12.11 COLON CANCER SCREENING: ICD-10-CM

## 2020-09-25 DIAGNOSIS — K29.70 GASTRITIS, PRESENCE OF BLEEDING UNSPECIFIED, UNSPECIFIED CHRONICITY, UNSPECIFIED GASTRITIS TYPE: ICD-10-CM

## 2020-09-25 DIAGNOSIS — K92.1 BLACK STOOL: ICD-10-CM

## 2020-09-25 DIAGNOSIS — R10.13 EPIGASTRIC PAIN: ICD-10-CM

## 2020-09-25 PROBLEM — K57.90 DIVERTICULOSIS: Status: ACTIVE | Noted: 2020-09-25

## 2020-09-25 PROBLEM — K63.5 COLON POLYP: Status: ACTIVE | Noted: 2020-09-25

## 2020-09-25 PROBLEM — K64.8 INTERNAL HEMORRHOID: Status: ACTIVE | Noted: 2020-09-25

## 2020-09-25 PROCEDURE — 88305 TISSUE EXAM BY PATHOLOGIST: CPT | Performed by: PATHOLOGY

## 2020-09-25 PROCEDURE — 45380 COLONOSCOPY AND BIOPSY: CPT | Performed by: INTERNAL MEDICINE

## 2020-09-25 PROCEDURE — 45385 COLONOSCOPY W/LESION REMOVAL: CPT | Performed by: INTERNAL MEDICINE

## 2020-09-25 RX ORDER — LIDOCAINE HYDROCHLORIDE 10 MG/ML
0.5 INJECTION, SOLUTION EPIDURAL; INFILTRATION; INTRACAUDAL; PERINEURAL ONCE AS NEEDED
Status: DISCONTINUED | OUTPATIENT
Start: 2020-09-25 | End: 2020-09-29 | Stop reason: HOSPADM

## 2020-09-25 RX ORDER — LIDOCAINE HYDROCHLORIDE 10 MG/ML
INJECTION, SOLUTION EPIDURAL; INFILTRATION; INTRACAUDAL; PERINEURAL AS NEEDED
Status: DISCONTINUED | OUTPATIENT
Start: 2020-09-25 | End: 2020-09-25

## 2020-09-25 RX ORDER — SODIUM CHLORIDE 9 MG/ML
125 INJECTION, SOLUTION INTRAVENOUS CONTINUOUS
Status: DISCONTINUED | OUTPATIENT
Start: 2020-09-25 | End: 2020-09-29 | Stop reason: HOSPADM

## 2020-09-25 RX ORDER — PROPOFOL 10 MG/ML
INJECTION, EMULSION INTRAVENOUS AS NEEDED
Status: DISCONTINUED | OUTPATIENT
Start: 2020-09-25 | End: 2020-09-25

## 2020-09-25 RX ADMIN — PROPOFOL 20 MG: 10 INJECTION, EMULSION INTRAVENOUS at 14:14

## 2020-09-25 RX ADMIN — PROPOFOL 50 MG: 10 INJECTION, EMULSION INTRAVENOUS at 14:08

## 2020-09-25 RX ADMIN — PROPOFOL 50 MG: 10 INJECTION, EMULSION INTRAVENOUS at 14:11

## 2020-09-25 RX ADMIN — SODIUM CHLORIDE 125 ML/HR: 0.9 INJECTION, SOLUTION INTRAVENOUS at 13:03

## 2020-09-25 RX ADMIN — PROPOFOL 150 MG: 10 INJECTION, EMULSION INTRAVENOUS at 14:05

## 2020-09-25 RX ADMIN — LIDOCAINE HYDROCHLORIDE 50 MG: 10 INJECTION, SOLUTION EPIDURAL; INFILTRATION; INTRACAUDAL; PERINEURAL at 14:05

## 2020-09-25 NOTE — ANESTHESIA PREPROCEDURE EVALUATION
Procedure:  COLONOSCOPY    Relevant Problems   CARDIO   (+) Hypertriglyceridemia      GI/HEPATIC   (+) GERD (gastroesophageal reflux disease)      MUSCULOSKELETAL   (+) Lateral epicondylitis of left elbow   (+) Neck muscle spasm      NEURO/PSYCH   (+) Depression with anxiety      PULMONARY   (+) Mild obstructive sleep apnea        Physical Exam    Airway      TM Distance: >3 FB  Neck ROM: full     Dental   No notable dental hx     Cardiovascular  Cardiovascular exam normal    Pulmonary  Pulmonary exam normal     Other Findings        Anesthesia Plan  ASA Score- 2     Anesthesia Type- IV sedation with anesthesia with ASA Monitors  Additional Monitors:   Airway Plan:           Plan Factors-    Chart reviewed  Patient summary reviewed  Induction- intravenous  Postoperative Plan-     Informed Consent- Anesthetic plan and risks discussed with patient  I personally reviewed this patient with the CRNA  Discussed and agreed on the Anesthesia Plan with the CRNA  Ethel Diaz

## 2020-09-25 NOTE — TELEPHONE ENCOUNTER
Reviewed lab test results with patient's wife  Patient had colonoscopy today  Reviewed preliminary colonoscopy reading however recommended patient keep GI follow-up to follow-up colonoscopy results and pathology  Holter was completed but results have not been read

## 2020-09-25 NOTE — H&P
History and Physical - [facility]  David Marin 53 y o  male MRN: [id number]    HPI: David Marin is a 53y o  year old male who presents for colon cancer screening  Review of Systems    Historical Information   Past Medical History:   Diagnosis Date    Elbow tendonitis     right possible bursitis    Gastritis     GERD (gastroesophageal reflux disease)     Migraine      Past Surgical History:   Procedure Laterality Date    EGD  2005    EYE FOREIGN BODY REMOVAL Left     [place] INCISE FINGER TENDON SHEATH Right 12/26/2018    Procedure: LONG AND RING TRIGGER FINGER RELEASES;  Surgeon: Aubrey Dinh MD;  Location: [facility];  Service: Orthopedics     Social History   Social History     Substance and Sexual Activity   Alcohol Use Yes    Frequency: Monthly or less    Comment: special occasions beer     Social History     Substance and Sexual Activity   Drug Use No     Social History     Tobacco Use   Smoking Status Never Smoker   Smokeless Tobacco Never Used     Family History   Problem Relation Age of Onset    Diabetes Mother     Hypertension Mother     Breast cancer Mother     Diabetes Father     Gout Father     Hypertension Father     Cancer Maternal Grandmother     Rosacea Family        Meds/Allergies     (Not in a hospital admission)      No Known Allergies    Objective     /57   Pulse 56   Temp (!) 96 9 °F (36 1 °C) (Tympanic)   Resp 18   Wt 99 8 kg (220 lb)   SpO2 98%   BMI 33 45 kg/m²       PHYSICAL EXAM    Gen: NAD  CV: RRR  CHEST: Clear  ABD: soft, NT/ND  EXT: no edema  Neuro: AAO      ASSESSMENT/PLAN:  This is a 53y o  year old male here for colon cancer screening  PLAN:   Procedure:  Colonoscopy

## 2020-09-25 NOTE — RESULT ENCOUNTER NOTE
Please contact patient  TSH and mag are normal   BMP shows slightly increased BUN likely due to him fasting for the labs  Encouraged he try to remain hydrated  Increase fluid intake and avoid/reduce caffeine  Cholesterol shows improvement from his lipid panel from 1 year ago  Continue with diet and lifestyle modification (exercise, low fat, low cholsterol diet) to help keep lipid panel stable  We will discuss further at our scheduled follow up   Thanks Charmaine Sanders

## 2020-09-27 PROCEDURE — 93227 XTRNL ECG REC<48 HR R&I: CPT

## 2020-10-06 ENCOUNTER — TELEPHONE (OUTPATIENT)
Dept: GASTROENTEROLOGY | Facility: CLINIC | Age: 45
End: 2020-10-06

## 2020-10-10 PROBLEM — I49.1 PAC (PREMATURE ATRIAL CONTRACTION): Status: ACTIVE | Noted: 2020-10-10

## 2020-10-10 NOTE — RESULT ENCOUNTER NOTE
Holter showed few PACs but was otherwise unremarkable  Patient is advised to continue Tx plan discussed last visit (avoidance of caffeine and stimulants)  Please contact patient and schedule follow up in 2-3 weeks to address holter results    Thanks Alexander Diamond

## 2020-10-10 NOTE — RESULT ENCOUNTER NOTE
Patient already contacted regarding these results from GI  Repeat colonscopy recommended in 3 yrs per GI note

## 2020-10-23 ENCOUNTER — APPOINTMENT (OUTPATIENT)
Dept: RADIOLOGY | Age: 45
End: 2020-10-23
Payer: COMMERCIAL

## 2020-10-23 ENCOUNTER — LAB (OUTPATIENT)
Dept: LAB | Age: 45
End: 2020-10-23
Payer: COMMERCIAL

## 2020-10-23 ENCOUNTER — OFFICE VISIT (OUTPATIENT)
Dept: INTERNAL MEDICINE CLINIC | Facility: CLINIC | Age: 45
End: 2020-10-23
Payer: COMMERCIAL

## 2020-10-23 VITALS
SYSTOLIC BLOOD PRESSURE: 110 MMHG | HEIGHT: 68 IN | BODY MASS INDEX: 33.19 KG/M2 | TEMPERATURE: 98.8 F | HEART RATE: 66 BPM | DIASTOLIC BLOOD PRESSURE: 74 MMHG | WEIGHT: 219 LBS | OXYGEN SATURATION: 97 %

## 2020-10-23 DIAGNOSIS — S86.012S STRAIN OF LEFT ACHILLES TENDON, SEQUELA: Primary | ICD-10-CM

## 2020-10-23 DIAGNOSIS — R79.9 ELEVATED BUN: ICD-10-CM

## 2020-10-23 DIAGNOSIS — M79.645 PAIN OF LEFT THUMB: ICD-10-CM

## 2020-10-23 DIAGNOSIS — K92.1 MELENA: ICD-10-CM

## 2020-10-23 DIAGNOSIS — G43.709 CHRONIC MIGRAINE WITHOUT AURA WITHOUT STATUS MIGRAINOSUS, NOT INTRACTABLE: ICD-10-CM

## 2020-10-23 DIAGNOSIS — R00.2 PALPITATION: ICD-10-CM

## 2020-10-23 DIAGNOSIS — I49.1 PAC (PREMATURE ATRIAL CONTRACTION): ICD-10-CM

## 2020-10-23 DIAGNOSIS — R10.13 EPIGASTRIC PAIN: ICD-10-CM

## 2020-10-23 DIAGNOSIS — M77.32 CALCANEAL SPUR, LEFT: ICD-10-CM

## 2020-10-23 DIAGNOSIS — S86.012S STRAIN OF LEFT ACHILLES TENDON, SEQUELA: ICD-10-CM

## 2020-10-23 DIAGNOSIS — G47.33 MILD OBSTRUCTIVE SLEEP APNEA: ICD-10-CM

## 2020-10-23 DIAGNOSIS — Z23 NEEDS FLU SHOT: ICD-10-CM

## 2020-10-23 DIAGNOSIS — K21.9 GASTROESOPHAGEAL REFLUX DISEASE WITHOUT ESOPHAGITIS: ICD-10-CM

## 2020-10-23 DIAGNOSIS — E78.1 HYPERTRIGLYCERIDEMIA: ICD-10-CM

## 2020-10-23 PROBLEM — S86.012A STRAIN OF LEFT ACHILLES TENDON: Status: ACTIVE | Noted: 2020-10-23

## 2020-10-23 PROBLEM — S61.210A LACERATION OF RIGHT INDEX FINGER WITHOUT FOREIGN BODY WITHOUT DAMAGE TO NAIL: Status: ACTIVE | Noted: 2020-10-23

## 2020-10-23 LAB
ANION GAP SERPL CALCULATED.3IONS-SCNC: 5 MMOL/L (ref 4–13)
BUN SERPL-MCNC: 23 MG/DL (ref 5–25)
CALCIUM SERPL-MCNC: 9.4 MG/DL (ref 8.3–10.1)
CHLORIDE SERPL-SCNC: 108 MMOL/L (ref 100–108)
CO2 SERPL-SCNC: 28 MMOL/L (ref 21–32)
CREAT SERPL-MCNC: 0.97 MG/DL (ref 0.6–1.3)
GFR SERPL CREATININE-BSD FRML MDRD: 93 ML/MIN/1.73SQ M
GLUCOSE SERPL-MCNC: 98 MG/DL (ref 65–140)
POTASSIUM SERPL-SCNC: 4.1 MMOL/L (ref 3.5–5.3)
SODIUM SERPL-SCNC: 141 MMOL/L (ref 136–145)

## 2020-10-23 PROCEDURE — 80048 BASIC METABOLIC PNL TOTAL CA: CPT

## 2020-10-23 PROCEDURE — 73140 X-RAY EXAM OF FINGER(S): CPT

## 2020-10-23 PROCEDURE — 90471 IMMUNIZATION ADMIN: CPT

## 2020-10-23 PROCEDURE — 73610 X-RAY EXAM OF ANKLE: CPT

## 2020-10-23 PROCEDURE — 90682 RIV4 VACC RECOMBINANT DNA IM: CPT

## 2020-10-23 PROCEDURE — 36415 COLL VENOUS BLD VENIPUNCTURE: CPT

## 2020-10-23 PROCEDURE — 99214 OFFICE O/P EST MOD 30 MIN: CPT | Performed by: PHYSICIAN ASSISTANT

## 2020-10-30 ENCOUNTER — TELEPHONE (OUTPATIENT)
Dept: INTERNAL MEDICINE CLINIC | Facility: CLINIC | Age: 45
End: 2020-10-30

## 2020-12-02 DIAGNOSIS — Z20.822 ENCOUNTER FOR SCREENING LABORATORY TESTING FOR COVID-19 VIRUS: Primary | ICD-10-CM

## 2021-01-21 ENCOUNTER — TELEMEDICINE (OUTPATIENT)
Dept: INTERNAL MEDICINE CLINIC | Facility: CLINIC | Age: 46
End: 2021-01-21
Payer: COMMERCIAL

## 2021-01-21 VITALS — BODY MASS INDEX: 35.16 KG/M2 | HEIGHT: 67 IN | TEMPERATURE: 98.1 F | WEIGHT: 224 LBS

## 2021-01-21 DIAGNOSIS — B34.9 VIRAL INFECTION, UNSPECIFIED: ICD-10-CM

## 2021-01-21 DIAGNOSIS — Z20.822 EXPOSURE TO COVID-19 VIRUS: ICD-10-CM

## 2021-01-21 PROCEDURE — 99213 OFFICE O/P EST LOW 20 MIN: CPT | Performed by: NURSE PRACTITIONER

## 2021-01-21 NOTE — ASSESSMENT & PLAN NOTE
Advised patient that he should isolate at home, until test results are obtained  Stay home except to get medical care  Separate yourself from other people and animals in your home  Call ahead before visiting your doctor  Wear a facemask  Cover your coughs and sneezes  Clean your hands often  Avoid sharing personal household items  Clean all high-touch surfaces everyday  Monitor your symptoms  Seek prompt medical attention if your illness is worsening (e g , difficulty breathing)  Before seeking care, call your healthcare provider and tell them that you have, or are being evaluated for, COVID-19  Put on a facemask before you enter the facility  These steps will help the healthcare providers office to keep other people in the office or waiting room from getting infected or exposed  Ask your healthcare provider to call the local or state health department  Persons who are placed under active monitoring or facilitated self-monitoring should follow instructions provided by their local health department or occupational health professionals, as appropriate  If you have a medical emergency and need to call 911, notify the dispatch personnel that you have, or are being evaluated for COVID-19  If possible, put on a facemask before emergency medical services arrive

## 2021-01-21 NOTE — PROGRESS NOTES
COVID-19 Virtual Visit     Assessment/Plan:    Problem List Items Addressed This Visit     None         Disposition:     I have spent 15 minutes directly with the patient  Encounter provider DOMINIQUE Lamb    Provider located at [address] [address]    Recent Visits  No visits were found meeting these conditions  Showing recent visits within past 7 days and meeting all other requirements     Today's Visits  Date Type Provider Dept   01/21/21 Katty Francois 148 M  DOMINIQUE [facility]   Showing today's visits and meeting all other requirements     Future Appointments  No visits were found meeting these conditions  Showing future appointments within next 150 days and meeting all other requirements      This virtual check-in was done via [employer] and patient was informed that this is not a secure, HIPAA-compliant platform  He agrees to proceed  Patient agrees to participate in a virtual check in via telephone or video visit instead of presenting to the office to address urgent/immediate medical needs  Patient is aware this is a billable service  After connecting through [place], the patient was identified by name and date of birth  Amanda Martinez was informed that this was a telemedicine visit and that the exam was being conducted confidentially over secure lines  My office door was closed  No one else was in the room  Amanda Martinez acknowledged consent and understanding of privacy and security of the telemedicine visit  I informed the patient that I have reviewed his record in Epic and presented the opportunity for him to ask any questions regarding the visit today  The patient agreed to participate  Subjective:   Amanda Martinez is a 50 y o  male who is concerned about COVID-19  Patient's symptoms include sore throat and cough   Patient denies fever, chills, fatigue, malaise, congestion, rhinorrhea, anosmia, loss of taste, shortness of breath, chest tightness, abdominal pain, nausea, vomiting, diarrhea, myalgias and headaches         Date of symptom onset: 1/20/2021  Date of exposure: 1/15/2021    Exposure:   Contact with a person who is under investigation (PUI) for or who is positive for COVID-19 within the last 14 days?: Yes    Hospitalized recently for fever and/or lower respiratory symptoms?: No      Currently a healthcare worker that is involved in direct patient care?: No      Works in a special setting where the risk of COVID-19 transmission may be high? (this may include long-term care, correctional and shelter facilities; homeless shelters; assisted-living facilities and group homes ): No      Resident in a special setting where the risk of COVID-19 transmission may be high? (this may include long-term care, correctional and shelter facilities; homeless shelters; assisted-living facilities and group homes ): No      Exposed to his daughter who tested positive     Lab Results   Component Value Date    [address] Not Detected 09/19/2020     Past Medical History:   Diagnosis Date    Elbow tendonitis     right possible bursitis    Gastritis     GERD (gastroesophageal reflux disease)     Migraine      Past Surgical History:   Procedure Laterality Date    EGD  2005    EYE FOREIGN BODY REMOVAL Left     [place] INCISE FINGER TENDON SHEATH Right 12/26/2018    Procedure: LONG AND RING TRIGGER FINGER RELEASES;  Surgeon: Florian Parker MD;  Location: [facility];  Service: Orthopedics     Current Outpatient Medications   Medication Sig Dispense Refill    acetaminophen (TYLENOL) 500 mg tablet Take 500 mg by mouth every 6 (six) hours as needed for mild pain      butalbital-acetaminophen-caffeine (FIORICET,ESGIC) -40 mg per tablet Take 1 tablet by mouth daily as needed for migraine For on set of headache 30 tablet 0    multivitamin [facility]) TABS Take 1 tablet by mouth daily      omeprazole (PriLOSEC) 40 MG capsule Take 1 capsule (40 mg total) by mouth daily 30 capsule 3    sucralfate (CARAFATE) 1 g tablet Take 1 tablet (1 g total) by mouth 2 (two) times a day Chew/crush or dissolve tablets in water prior to swallowing (Patient taking differently: Take 1 g by mouth 2 (two) times a day as needed Chew/crush or dissolve tablets in water prior to swallowing) 60 tablet 3    dicyclomine (BENTYL) 20 mg tablet Take 1 tablet (20 mg total) by mouth 3 (three) times a day as needed (for abdominal cramping) for up to 7 days (Patient not taking: Reported on 1/21/2021) 21 tablet 0     No current facility-administered medications for this visit  No Known Allergies    Review of Systems   Constitutional: Negative for chills, fatigue and fever  HENT: Positive for sore throat  Negative for congestion and rhinorrhea  Respiratory: Positive for cough  Negative for chest tightness and shortness of breath  Gastrointestinal: Negative for abdominal pain, diarrhea, nausea and vomiting  Musculoskeletal: Negative for myalgias  Neurological: Negative for headaches  Objective:    Vitals:    01/21/21 0657   Temp: 98 1 °F (36 7 °C)   TempSrc: Temporal   Weight: 102 kg (224 lb)   Height: 5' 7" (1 702 m)       Physical Exam  Vitals signs reviewed  Constitutional:       Appearance: Normal appearance  Pulmonary:      Effort: No respiratory distress  Neurological:      General: No focal deficit present  Mental Status: He is alert and oriented to person, place, and time  Psychiatric:         Mood and Affect: Mood normal          Behavior: Behavior normal        VIRTUAL VISIT [address] acknowledges that he has consented to an online visit or consultation   He understands that the online visit is based solely on information provided by him, and that, in the absence of a face-to-face physical evaluation by the physician, the diagnosis he receives is both limited and provisional in terms of accuracy and completeness  This is not intended to replace a full medical face-to-face evaluation by the physician  Gertrude Crawley understands and accepts these terms

## 2021-01-22 ENCOUNTER — APPOINTMENT (OUTPATIENT)
Dept: LAB | Facility: HOSPITAL | Age: 46
End: 2021-01-22
Payer: COMMERCIAL

## 2021-01-22 DIAGNOSIS — Z20.822 EXPOSURE TO COVID-19 VIRUS: ICD-10-CM

## 2021-01-22 DIAGNOSIS — B34.9 VIRAL INFECTION, UNSPECIFIED: ICD-10-CM

## 2021-01-22 LAB — SARS-COV-2 RNA RESP QL NAA+PROBE: NEGATIVE

## 2021-01-22 PROCEDURE — 87635 SARS-COV-2 COVID-19 AMP PRB: CPT

## 2021-01-29 ENCOUNTER — LAB (OUTPATIENT)
Dept: LAB | Age: 46
End: 2021-01-29
Payer: COMMERCIAL

## 2021-01-29 ENCOUNTER — TRANSCRIBE ORDERS (OUTPATIENT)
Dept: ADMINISTRATIVE | Age: 46
End: 2021-01-29

## 2021-01-29 DIAGNOSIS — K92.1 BLOOD IN STOOL: ICD-10-CM

## 2021-01-29 DIAGNOSIS — R10.13 ABDOMINAL PAIN, EPIGASTRIC: Primary | ICD-10-CM

## 2021-01-29 DIAGNOSIS — R10.13 ABDOMINAL PAIN, EPIGASTRIC: ICD-10-CM

## 2021-01-29 LAB
ALBUMIN SERPL BCP-MCNC: 4.1 G/DL (ref 3.5–5)
ALP SERPL-CCNC: 85 U/L (ref 46–116)
ALT SERPL W P-5'-P-CCNC: 48 U/L (ref 12–78)
ANION GAP SERPL CALCULATED.3IONS-SCNC: 5 MMOL/L (ref 4–13)
AST SERPL W P-5'-P-CCNC: 19 U/L (ref 5–45)
BILIRUB SERPL-MCNC: 0.62 MG/DL (ref 0.2–1)
BUN SERPL-MCNC: 17 MG/DL (ref 5–25)
CALCIUM SERPL-MCNC: 9.5 MG/DL (ref 8.3–10.1)
CHLORIDE SERPL-SCNC: 104 MMOL/L (ref 100–108)
CO2 SERPL-SCNC: 30 MMOL/L (ref 21–32)
CREAT SERPL-MCNC: 0.82 MG/DL (ref 0.6–1.3)
ERYTHROCYTE [DISTWIDTH] IN BLOOD BY AUTOMATED COUNT: 12.2 % (ref 11.6–15.1)
GFR SERPL CREATININE-BSD FRML MDRD: 105 ML/MIN/1.73SQ M
GLUCOSE P FAST SERPL-MCNC: 92 MG/DL (ref 65–99)
HCT VFR BLD AUTO: 52 % (ref 36.5–49.3)
HGB BLD-MCNC: 16.9 G/DL (ref 12–17)
MCH RBC QN AUTO: 29 PG (ref 26.8–34.3)
MCHC RBC AUTO-ENTMCNC: 32.5 G/DL (ref 31.4–37.4)
MCV RBC AUTO: 89 FL (ref 82–98)
PLATELET # BLD AUTO: 243 THOUSANDS/UL (ref 149–390)
PMV BLD AUTO: 9.9 FL (ref 8.9–12.7)
POTASSIUM SERPL-SCNC: 4.1 MMOL/L (ref 3.5–5.3)
PROT SERPL-MCNC: 7.5 G/DL (ref 6.4–8.2)
RBC # BLD AUTO: 5.82 MILLION/UL (ref 3.88–5.62)
SODIUM SERPL-SCNC: 139 MMOL/L (ref 136–145)
WBC # BLD AUTO: 6.87 THOUSAND/UL (ref 4.31–10.16)

## 2021-01-29 PROCEDURE — 36415 COLL VENOUS BLD VENIPUNCTURE: CPT

## 2021-01-29 PROCEDURE — 85027 COMPLETE CBC AUTOMATED: CPT

## 2021-01-29 PROCEDURE — 80053 COMPREHEN METABOLIC PANEL: CPT

## 2021-01-30 ENCOUNTER — HOSPITAL ENCOUNTER (EMERGENCY)
Facility: HOSPITAL | Age: 46
Discharge: HOME | End: 2021-01-30
Attending: EMERGENCY MEDICINE | Admitting: EMERGENCY MEDICINE
Payer: COMMERCIAL

## 2021-01-30 ENCOUNTER — APPOINTMENT (EMERGENCY)
Dept: CT IMAGING | Facility: HOSPITAL | Age: 46
End: 2021-01-30
Payer: COMMERCIAL

## 2021-01-30 VITALS
RESPIRATION RATE: 19 BRPM | HEART RATE: 63 BPM | TEMPERATURE: 98.2 F | OXYGEN SATURATION: 94 % | SYSTOLIC BLOOD PRESSURE: 121 MMHG | DIASTOLIC BLOOD PRESSURE: 61 MMHG | WEIGHT: 220 LBS | HEIGHT: 67 IN | BODY MASS INDEX: 34.53 KG/M2

## 2021-01-30 DIAGNOSIS — S29.011A CHEST WALL MUSCLE STRAIN, INITIAL ENCOUNTER: Primary | ICD-10-CM

## 2021-01-30 LAB
ALBUMIN SERPL BCP-MCNC: 3.7 G/DL (ref 3.5–5)
ALP SERPL-CCNC: 85 U/L (ref 46–116)
ALT SERPL W P-5'-P-CCNC: 48 U/L (ref 12–78)
ANION GAP SERPL CALCULATED.3IONS-SCNC: 8 MMOL/L (ref 4–13)
AST SERPL W P-5'-P-CCNC: 19 U/L (ref 5–45)
BASOPHILS # BLD AUTO: 0.03 THOUSANDS/ΜL (ref 0–0.1)
BASOPHILS NFR BLD AUTO: 0 % (ref 0–1)
BILIRUB DIRECT SERPL-MCNC: 0.1 MG/DL (ref 0–0.2)
BILIRUB SERPL-MCNC: 0.4 MG/DL (ref 0.2–1)
BILIRUB UR QL STRIP: NEGATIVE
BUN SERPL-MCNC: 20 MG/DL (ref 5–25)
CALCIUM SERPL-MCNC: 9.2 MG/DL (ref 8.3–10.1)
CHLORIDE SERPL-SCNC: 104 MMOL/L (ref 100–108)
CLARITY UR: CLEAR
CO2 SERPL-SCNC: 31 MMOL/L (ref 21–32)
COLOR UR: YELLOW
CREAT SERPL-MCNC: 0.98 MG/DL (ref 0.6–1.3)
EOSINOPHIL # BLD AUTO: 0.16 THOUSAND/ΜL (ref 0–0.61)
EOSINOPHIL NFR BLD AUTO: 2 % (ref 0–6)
ERYTHROCYTE [DISTWIDTH] IN BLOOD BY AUTOMATED COUNT: 12.1 % (ref 11.6–15.1)
GFR SERPL CREATININE-BSD FRML MDRD: 91 ML/MIN/1.73SQ M
GLUCOSE SERPL-MCNC: 120 MG/DL (ref 65–140)
GLUCOSE UR STRIP-MCNC: NEGATIVE MG/DL
HCT VFR BLD AUTO: 46.2 % (ref 36.5–49.3)
HGB BLD-MCNC: 15.4 G/DL (ref 12–17)
HGB UR QL STRIP.AUTO: NEGATIVE
IMM GRANULOCYTES # BLD AUTO: 0.02 THOUSAND/UL (ref 0–0.2)
IMM GRANULOCYTES NFR BLD AUTO: 0 % (ref 0–2)
KETONES UR STRIP-MCNC: NEGATIVE MG/DL
LEUKOCYTE ESTERASE UR QL STRIP: NEGATIVE
LIPASE SERPL-CCNC: 171 U/L (ref 73–393)
LYMPHOCYTES # BLD AUTO: 2.18 THOUSANDS/ΜL (ref 0.6–4.47)
LYMPHOCYTES NFR BLD AUTO: 31 % (ref 14–44)
MCH RBC QN AUTO: 29.2 PG (ref 26.8–34.3)
MCHC RBC AUTO-ENTMCNC: 33.3 G/DL (ref 31.4–37.4)
MCV RBC AUTO: 88 FL (ref 82–98)
MONOCYTES # BLD AUTO: 0.5 THOUSAND/ΜL (ref 0.17–1.22)
MONOCYTES NFR BLD AUTO: 7 % (ref 4–12)
NEUTROPHILS # BLD AUTO: 4.05 THOUSANDS/ΜL (ref 1.85–7.62)
NEUTS SEG NFR BLD AUTO: 60 % (ref 43–75)
NITRITE UR QL STRIP: NEGATIVE
NRBC BLD AUTO-RTO: 0 /100 WBCS
PH UR STRIP.AUTO: 5.5 [PH]
PLATELET # BLD AUTO: 225 THOUSANDS/UL (ref 149–390)
PMV BLD AUTO: 9.7 FL (ref 8.9–12.7)
POTASSIUM SERPL-SCNC: 4.1 MMOL/L (ref 3.5–5.3)
PROT SERPL-MCNC: 6.6 G/DL (ref 6.4–8.2)
PROT UR STRIP-MCNC: NEGATIVE MG/DL
RBC # BLD AUTO: 5.27 MILLION/UL (ref 3.88–5.62)
SODIUM SERPL-SCNC: 143 MMOL/L (ref 136–145)
SP GR UR STRIP.AUTO: <=1.005 (ref 1–1.03)
TROPONIN I SERPL-MCNC: <0.02 NG/ML
UROBILINOGEN UR QL STRIP.AUTO: 0.2 E.U./DL
WBC # BLD AUTO: 6.94 THOUSAND/UL (ref 4.31–10.16)

## 2021-01-30 PROCEDURE — 96361 HYDRATE IV INFUSION ADD-ON: CPT

## 2021-01-30 PROCEDURE — 96374 THER/PROPH/DIAG INJ IV PUSH: CPT

## 2021-01-30 PROCEDURE — 80048 BASIC METABOLIC PNL TOTAL CA: CPT | Performed by: EMERGENCY MEDICINE

## 2021-01-30 PROCEDURE — 80076 HEPATIC FUNCTION PANEL: CPT | Performed by: EMERGENCY MEDICINE

## 2021-01-30 PROCEDURE — 36415 COLL VENOUS BLD VENIPUNCTURE: CPT | Performed by: EMERGENCY MEDICINE

## 2021-01-30 PROCEDURE — 99285 EMERGENCY DEPT VISIT HI MDM: CPT | Performed by: EMERGENCY MEDICINE

## 2021-01-30 PROCEDURE — 71260 CT THORAX DX C+: CPT

## 2021-01-30 PROCEDURE — 93005 ELECTROCARDIOGRAM TRACING: CPT

## 2021-01-30 PROCEDURE — 81003 URINALYSIS AUTO W/O SCOPE: CPT | Performed by: EMERGENCY MEDICINE

## 2021-01-30 PROCEDURE — 96375 TX/PRO/DX INJ NEW DRUG ADDON: CPT

## 2021-01-30 PROCEDURE — 83690 ASSAY OF LIPASE: CPT | Performed by: EMERGENCY MEDICINE

## 2021-01-30 PROCEDURE — 84484 ASSAY OF TROPONIN QUANT: CPT | Performed by: EMERGENCY MEDICINE

## 2021-01-30 PROCEDURE — 99285 EMERGENCY DEPT VISIT HI MDM: CPT

## 2021-01-30 PROCEDURE — 74177 CT ABD & PELVIS W/CONTRAST: CPT

## 2021-01-30 PROCEDURE — 85025 COMPLETE CBC W/AUTO DIFF WBC: CPT | Performed by: EMERGENCY MEDICINE

## 2021-01-30 RX ORDER — KETOROLAC TROMETHAMINE 30 MG/ML
15 INJECTION, SOLUTION INTRAMUSCULAR; INTRAVENOUS ONCE
Status: COMPLETED | OUTPATIENT
Start: 2021-01-30 | End: 2021-01-30

## 2021-01-30 RX ORDER — LIDOCAINE 50 MG/G
1 PATCH TOPICAL ONCE
Status: DISCONTINUED | OUTPATIENT
Start: 2021-01-30 | End: 2021-01-30 | Stop reason: HOSPADM

## 2021-01-30 RX ORDER — NAPROXEN 500 MG/1
500 TABLET ORAL EVERY 12 HOURS PRN
Qty: 20 TABLET | Refills: 0 | Status: SHIPPED | OUTPATIENT
Start: 2021-01-30 | End: 2021-02-26 | Stop reason: ALTCHOICE

## 2021-01-30 RX ORDER — MORPHINE SULFATE 4 MG/ML
4 INJECTION, SOLUTION INTRAMUSCULAR; INTRAVENOUS ONCE
Status: COMPLETED | OUTPATIENT
Start: 2021-01-30 | End: 2021-01-30

## 2021-01-30 RX ADMIN — KETOROLAC TROMETHAMINE 15 MG: 30 INJECTION, SOLUTION INTRAMUSCULAR at 18:29

## 2021-01-30 RX ADMIN — IOHEXOL 100 ML: 350 INJECTION, SOLUTION INTRAVENOUS at 17:34

## 2021-01-30 RX ADMIN — LIDOCAINE 5% 1 PATCH: 700 PATCH TOPICAL at 16:29

## 2021-01-30 RX ADMIN — MORPHINE SULFATE 4 MG: 4 INJECTION INTRAVENOUS at 16:27

## 2021-01-30 RX ADMIN — SODIUM CHLORIDE 500 ML: 0.9 INJECTION, SOLUTION INTRAVENOUS at 16:27

## 2021-01-30 NOTE — ED PROVIDER NOTES
History  Chief Complaint   Patient presents with    Abdominal Pain     Patient presents to ED with co RUQ abdominal pain that started 9 days ago after picking up something heavy  Denies N/V  Patient is a 57-year-old male with past medical history of GERD and gastritis, migraines, presents to the emergency department complaining of right upper quadrant/right lower chest pain that started about 1 week ago shortly after lifting heavy object  Patient reports that over 1 week ago he was lifting something heavy and felt and heard a crack in his right lower rib cage/upper quadrant  Ever since then he has been having progressively worsening pain since, sharp and pleuritic in nature  He denies feeling short of breath but states the pain is worsened by deep breathing  Pain also worsened by movement  He tried taking Tylenol last night with minimal relief  He denies any other associated symptoms such as fever, chills, diaphoresis, headache, dizziness or near syncope, neck or back pain, cough, hemoptysis URI symptoms, palpitations, dyspnea, abdominal distension, nausea, vomiting, diarrhea, constipation, blood per rectum or melena, dysuria, change in urinary frequency, hematuria, flank pain, skin rash or color change, extremity swelling or pain, extremity weakness or paresthesia other focal neurologic deficits  History provided by:  Patient   used: No    Abdominal Pain  Associated symptoms: chest pain    Associated symptoms: no chills, no constipation, no cough, no diarrhea, no dysuria, no fever, no hematuria, no nausea, no shortness of breath, no sore throat and no vomiting        Prior to Admission Medications   Prescriptions Last Dose Informant Patient Reported?  Taking?   acetaminophen (TYLENOL) 500 mg tablet  Self Yes No   Sig: Take 500 mg by mouth every 6 (six) hours as needed for mild pain   butalbital-acetaminophen-caffeine (FIORICET,ESGIC) -40 mg per tablet  Self No No   Sig: Take 1 tablet by mouth daily as needed for migraine For on set of headache   dicyclomine (BENTYL) 20 mg tablet   No No   Sig: Take 1 tablet (20 mg total) by mouth 3 (three) times a day as needed (for abdominal cramping) for up to 7 days   Patient not taking: Reported on 1/21/2021   multivitamin (THERAGRAN) TABS  Self Yes No   Sig: Take 1 tablet by mouth daily   omeprazole (PriLOSEC) 40 MG capsule  Self No No   Sig: Take 1 capsule (40 mg total) by mouth daily   sucralfate (CARAFATE) 1 g tablet  Self No No   Sig: Take 1 tablet (1 g total) by mouth 2 (two) times a day Chew/crush or dissolve tablets in water prior to swallowing   Patient taking differently: Take 1 g by mouth 2 (two) times a day as needed Chew/crush or dissolve tablets in water prior to swallowing      Facility-Administered Medications: None       Past Medical History:   Diagnosis Date    Elbow tendonitis     right possible bursitis    Gastritis     GERD (gastroesophageal reflux disease)     Migraine        Past Surgical History:   Procedure Laterality Date    EGD  2005    EYE FOREIGN BODY REMOVAL Left     [place] INCISE FINGER TENDON SHEATH Right 12/26/2018    Procedure: LONG AND RING TRIGGER FINGER RELEASES;  Surgeon: Cheryl Mirza MD;  Location: [facility] OR;  Service: Orthopedics       Family History   Problem Relation Age of Onset    Diabetes Mother     Hypertension Mother     Breast cancer Mother     Diabetes Father     Gout Father     Hypertension Father     Cancer Maternal Grandmother     Rosacea Family      I have reviewed and agree with the history as documented      E-Cigarette/Vaping    E-Cigarette Use Never User      E-Cigarette/Vaping Substances    Nicotine No     THC No     CBD No     Flavoring No     Other No     Unknown No      Social History     Tobacco Use    Smoking status: Never Smoker    Smokeless tobacco: Never Used   Substance Use Topics    Alcohol use: Yes     Frequency: Monthly or less     Comment: special occasions beer    Drug use: No       Review of Systems   Constitutional: Negative for chills, diaphoresis and fever  HENT: Negative for congestion, ear pain, rhinorrhea and sore throat  Respiratory: Negative for cough, chest tightness, shortness of breath and wheezing  Cardiovascular: Positive for chest pain  Negative for palpitations and leg swelling  Gastrointestinal: Positive for abdominal pain  Negative for abdominal distention, blood in stool, constipation, diarrhea, nausea and vomiting  Genitourinary: Negative for dysuria, flank pain, frequency and hematuria  Musculoskeletal: Negative for back pain, neck pain and neck stiffness  Skin: Negative for color change, pallor, rash and wound  Allergic/Immunologic: Negative for immunocompromised state  Neurological: Negative for dizziness, syncope, weakness, light-headedness, numbness and headaches  Hematological: Negative for adenopathy  Does not bruise/bleed easily  Psychiatric/Behavioral: Negative for confusion and decreased concentration  All other systems reviewed and are negative  Physical Exam  Physical Exam  Vitals signs and nursing note reviewed  Constitutional:       General: He is not in acute distress  Appearance: Normal appearance  He is well-developed  He is not ill-appearing, toxic-appearing or diaphoretic  HENT:      Head: Normocephalic and atraumatic  Right Ear: External ear normal       Left Ear: External ear normal       Mouth/Throat:      Comments: Orpharyngeal exam deferred at this time due to risk of exposure to COVID-19 during current pandemic  Patient has no oropharyngeal complaints  Eyes:      Extraocular Movements: Extraocular movements intact  Conjunctiva/sclera: Conjunctivae normal    Neck:      Musculoskeletal: Normal range of motion and neck supple  No neck rigidity  Vascular: No JVD  Cardiovascular:      Rate and Rhythm: Normal rate and regular rhythm  Pulses: Normal pulses  Heart sounds: Normal heart sounds  No murmur  No friction rub  No gallop  Pulmonary:      Effort: Pulmonary effort is normal  No respiratory distress  Breath sounds: Normal breath sounds  No wheezing, rhonchi or rales  Comments: +Right lower anterior/lateral rib tenderness  No crepitus  Chest:      Chest wall: Tenderness present  Abdominal:      General: There is no distension  Palpations: Abdomen is soft  Tenderness: There is no abdominal tenderness  There is no right CVA tenderness, left CVA tenderness, guarding or rebound  Musculoskeletal: Normal range of motion  General: No swelling or tenderness  Skin:     General: Skin is warm and dry  Coloration: Skin is not pale  Findings: No erythema or rash  Neurological:      General: No focal deficit present  Mental Status: He is alert and oriented to person, place, and time  Sensory: No sensory deficit  Motor: No weakness     Psychiatric:         Mood and Affect: Mood normal          Behavior: Behavior normal          Vital Signs  ED Triage Vitals   Temperature Pulse Respirations Blood Pressure SpO2   01/30/21 1518 01/30/21 1518 01/30/21 1518 01/30/21 1518 01/30/21 1518   98 2 °F (36 8 °C) 69 16 133/69 98 %      Temp Source Heart Rate Source Patient Position - Orthostatic VS BP Location FiO2 (%)   01/30/21 1518 01/30/21 1518 01/30/21 1518 01/30/21 1518 --   Oral Monitor Sitting Left arm       Pain Score       01/30/21 1627       9         Vitals:    01/30/21 1518 01/30/21 1700 01/30/21 1800   BP: 133/69 133/64 121/61   BP Location: Left arm Left arm Left arm   Pulse: 69 80 63   Resp: 16 18 19   Temp: 98 2 °F (36 8 °C)     TempSrc: Oral     SpO2: 98% 98% 94%   Weight: 99 8 kg (220 lb)     Height: 5' 7" (1 702 m)         Visual Acuity      ED Medications  Medications   lidocaine (LIDODERM) 5 % patch 1 patch (1 patch Topical Medication Applied 1/30/21 1629)   morphine (PF) 4 mg/mL injection 4 mg (4 mg Intravenous Given 1/30/21 1627)   sodium chloride 0 9 % bolus 500 mL (0 mL Intravenous Stopped 1/30/21 1829)   iohexol (OMNIPAQUE) 350 MG/ML injection (SINGLE-DOSE) 100 mL (100 mL Intravenous Given 1/30/21 1734)   ketorolac (TORADOL) injection 15 mg (15 mg Intravenous Given 1/30/21 1829)       Diagnostic Studies  Results Reviewed     Procedure Component Value Units Date/Time    UA (URINE) with reflex to Scope [[id number]] Collected: 01/30/21 1831    Lab Status: Final result Specimen: Urine, Clean Catch Updated: 01/30/21 1858     Color, UA Yellow     Clarity, UA Clear     Specific Gravity, UA <=1 005     pH, UA 5 5     Leukocytes, UA Negative     Nitrite, UA Negative     Protein, UA Negative mg/dl      Glucose, UA Negative mg/dl      Ketones, UA Negative mg/dl      Urobilinogen, UA 0 2 E U /dl      Bilirubin, UA Negative     Blood, UA Negative    Troponin I [[id number]]  (Normal) Collected: 01/30/21 1623    Lab Status: Final result Specimen: Blood from Arm, Right Updated: 01/30/21 1645     Troponin I <0 02 ng/mL     Hepatic function panel [[id number]]  (Normal) Collected: 01/30/21 1623    Lab Status: Final result Specimen: Blood from Arm, Right Updated: 01/30/21 1644     Total Bilirubin 0 40 mg/dL      Bilirubin, Direct 0 10 mg/dL      Alkaline Phosphatase 85 U/L      AST 19 U/L      ALT 48 U/L      Total Protein 6 6 g/dL      Albumin 3 7 g/dL     Lipase [[id number]]  (Normal) Collected: 01/30/21 1623    Lab Status: Final result Specimen: Blood from Arm, Right Updated: 01/30/21 1644     Lipase 171 u/L     Basic metabolic panel [[id number]] Collected: 01/30/21 1623    Lab Status: Final result Specimen: Blood from Arm, Right Updated: 01/30/21 1644     Sodium 143 mmol/L      Potassium 4 1 mmol/L      Chloride 104 mmol/L      CO2 31 mmol/L      ANION GAP 8 mmol/L      BUN 20 mg/dL      Creatinine 0 98 mg/dL      Glucose 120 mg/dL      Calcium 9 2 mg/dL      eGFR 91 ml/min/1 73sq m     Narrative:      National Kidney Disease Foundation guidelines for Chronic Kidney Disease (CKD):     Stage 1 with normal or high GFR (GFR > 90 mL/min/1 73 square meters)    Stage 2 Mild CKD (GFR = 60-89 mL/min/1 73 square meters)    Stage 3A Moderate CKD (GFR = 45-59 mL/min/1 73 square meters)    Stage 3B Moderate CKD (GFR = 30-44 mL/min/1 73 square meters)    Stage 4 Severe CKD (GFR = 15-29 mL/min/1 73 square meters)    Stage 5 End Stage CKD (GFR <15 mL/min/1 73 square meters)  Note: GFR calculation is accurate only with a steady state creatinine    CBC and differential [[id number]] Collected: 01/30/21 1623    Lab Status: Final result Specimen: Blood from Arm, Right Updated: 01/30/21 1629     WBC 6 94 Thousand/uL      RBC 5 27 Million/uL      Hemoglobin 15 4 g/dL      Hematocrit 46 2 %      MCV 88 fL      MCH 29 2 pg      MCHC 33 3 g/dL      RDW 12 1 %      MPV 9 7 fL      Platelets 958 Thousands/uL      nRBC 0 /100 WBCs      Neutrophils Relative 60 %      Immat GRANS % 0 %      Lymphocytes Relative 31 %      Monocytes Relative 7 %      Eosinophils Relative 2 %      Basophils Relative 0 %      Neutrophils Absolute 4 05 Thousands/µL      Immature Grans Absolute 0 02 Thousand/uL      Lymphocytes Absolute 2 18 Thousands/µL      Monocytes Absolute 0 50 Thousand/µL      Eosinophils Absolute 0 16 Thousand/µL      Basophils Absolute 0 03 Thousands/µL                  CT chest abdomen pelvis w contrast   Final Result by Gurinder Huddleston MD (01/30 1802)      No evidence for acute traumatic injury in the chest, abdomen, or pelvis  Uncomplicated colonic diverticulosis  Workstation performed: [id number]                    Procedures  ECG 12 Lead Documentation Only    Date/Time: 1/30/2021 4:46 PM  Performed by: Daniel Dorman DO  Authorized by: Daniel Dorman DO     ECG reviewed by me, the ED Provider: yes    Patient location:  ED  Previous ECG:     Previous ECG:  Compared to current    Comparison ECG info:  6-; LAFB now present   Inferior T waves inversion no longer present  Axis has shifted right    Similarity:  Changes noted  Rate:     ECG rate:  68    ECG rate assessment: normal    Rhythm:     Rhythm: sinus rhythm    Ectopy:     Ectopy: none    QRS:     QRS axis:  Right    QRS intervals:  Normal  Conduction:     Conduction: abnormal      Abnormal conduction: LAFB    ST segments:     ST segments:  Normal  T waves:     T waves: normal               ED Course  ED Course as of Jan 30 1917   Sat Jan 30, 2021   [address] Updated patient about essentially normal workup including CT scan as well as incidental finding of diverticulosis  I did discuss signs and symptoms of diverticulitis however currently he does not have any but I suggested if he does develop this he should be evaluated in the ED as soon as possible  In regards to his pain, likely this is more of a muscle strain  I recommended rest, ice or heat as needed, over-the-counter Lidoderm patches and will prescribe Naprosyn  Will also send home with incentive spirometer  Discussed ED return parameters                  HEART Risk Score      Most Recent Value   Heart Score Risk Calculator   History  0 Filed at: 01/30/2021 1658   ECG  0 Filed at: 01/30/2021 1658   Age  1 Filed at: 01/30/2021 1658   Risk Factors  0 Filed at: 01/30/2021 1658   Troponin  0 Filed at: 01/30/2021 1658   HEART Score  1 Filed at: 01/30/2021 1658              PERC Rule for PE      Most Recent Value   PERC Rule for PE   Age >=50  0 Filed at: 01/30/2021 1658   HR >=100  0 Filed at: 01/30/2021 1658   O2 Sat on room air < 95%  0 Filed at: 01/30/2021 1658   History of PE or DVT  0 Filed at: 01/30/2021 1658   Recent trauma or surgery  0 Filed at: 01/30/2021 1658   Hemoptysis  0 Filed at: 01/30/2021 1658   Exogenous estrogen  0 Filed at: 01/30/2021 1658   Unilateral leg swelling  0 Filed at: 01/30/2021 1658   PERC Rule for PE Results  0 Filed at: 01/30/2021 1658              SBIRT 20yo+      Most Recent Value   SBIRT (23 yo +)   In order to provide better care to our patients, we are screening all of our patients for alcohol and drug use  Would it be okay to ask you these screening questions? Yes Filed at: 01/30/2021 1605   Initial Alcohol Screen: US AUDIT-C    1  How often do you have a drink containing alcohol?  0 Filed at: 01/30/2021 1605   2  How many drinks containing alcohol do you have on a typical day you are drinking? 0 Filed at: 01/30/2021 1605   3a  Male UNDER 65: How often do you have five or more drinks on one occasion? 0 Filed at: 01/30/2021 1605   3b  FEMALE Any Age, or MALE 65+: How often do you have 4 or more drinks on one occassion? 0 Filed at: 01/30/2021 1605   Audit-C Score  0 Filed at: 01/30/2021 1605   MANUEL: How many times in the past year have you    Used an illegal drug or used a prescription medication for non-medical reasons? Never Filed at: 01/30/2021 1605          Wells' Criteria for PE      Most Recent Value   Wells' Criteria for PE   Clinical signs and symptoms of DVT  0 Filed at: 01/30/2021 1657   PE is primary diagnosis or equally likely  0 Filed at: 01/30/2021 1657   HR >100  0 Filed at: 01/30/2021 1657   Immobilization at least 3 days or Surgery in the previous 4 weeks  0 Filed at: 01/30/2021 1657   Previous, objectively diagnosed PE or DVT  0 Filed at: 01/30/2021 1657   Hemoptysis  0 Filed at: 01/30/2021 1657   Malignancy with treatment within 6 months or palliative  0 Filed at: 01/30/2021 1657   Wells' Criteria Total  0 Filed at: 01/30/2021 1657                MDM  Number of Diagnoses or Management Options  Diagnosis management comments: 78-year-old male presents to the ED complaining of 1 week of right lower chest/upper quadrant abdominal pain, pleuritic in nature  Pain started after he lifted a heavy object in heard and felt a crack in his rib area  Differential includes muscle strain or spasm, rib fracture, biliary disease, liver injury, renal colic  Less likely PE    Will check UA, cardiac abdominal labs and obtain CT chest, abdomen and pelvis to rule out injury  Will give small dose of morphine for pain and Lidoderm patch  Will add Toradol once internal bleeding ruled out  Amount and/or Complexity of Data Reviewed  Clinical lab tests: ordered and reviewed  Tests in the radiology section of CPT®: ordered and reviewed  Tests in the medicine section of CPT®: ordered and reviewed  Independent visualization of images, tracings, or specimens: yes        Disposition  Final diagnoses:   Chest wall muscle strain, initial encounter     Time reflects when diagnosis was documented in both MDM as applicable and the Disposition within this note     Time User Action Codes Description Comment    1/30/2021  7:06 PM Brendan Espino [[id number]] Chest wall muscle strain, initial encounter       ED Disposition     ED Disposition Condition Date/Time Comment    Discharge Stable Sat Jan 30, 2021  7:05 PM Silvia Redd discharge to home/self care              Follow-up Information     Follow up With Specialties Details Why Contact Info Additional Information    Kasandra Ponce PA-C Internal Medicine, Physician Assistant Schedule an appointment as soon as possible for a visit   [address] Emergency Department Emergency Medicine Go to  If symptoms worsen [address] Emergency Department, [facility], [address] [place] [address], [address] [place] [address], [address] [place] [address] Schedule an appointment as soon as possible for a visit  As needed [address]  [facility] [address]  [phone]             Patient's Medications   Discharge Prescriptions    NAPROXEN (NAPROSYN) 500 MG TABLET    Take 1 tablet (500 mg total) by mouth every 12 (twelve) hours as needed for mild pain or moderate pain       Start Date: 1/30/2021 End Date: --       Order Dose: 500 mg       Quantity: 20 tablet    Refills: 0     No discharge procedures on file      PDMP Review     None          ED Provider  Electronically Signed by           Daniel Dorman DO  01/30/21 1987

## 2021-01-31 NOTE — DISCHARGE INSTRUCTIONS
Dolor en la pared torácica   LO QUE NECESITA SABER:   El dolor de la pared torácica puede ser causado por problemas con los músculos, cartílago o huesos de la pared torácica  El dolor de la pared torácica también puede ser causado por dolor que se propaga a ortiz pecho y que proviene de otra parte de ortiz cuerpo  El dolor puede ser persistente, severo, leve o ramonita  Puede ser intermitente (va y viene) o puede ser persistente  El dolor también puede ser peor cuando usted se mueve de ciertas formas, respira profundo o tose  INSTRUCCIONES SOBRE EL CARLOS HOSPITALARIA:   Llame al 911 si:  · Tiene alguno de los siguientes signos de un ataque cardíaco:      ? Estrujamiento, presión o tensión en ortiz pecho    ? Usted también podría presentar alguno de los siguientes:     § Malestar o dolor en ortiz espalda, melissa, mandíbula, abdomen, o brazo    § Falta de [employer] o vómitos    § Desvanecimiento o sudor frío repentino      Regrese a la fan de emergencias si:  · Usted tiene dolor intenso  Comuníquese con ortiz médico si:  · Usted desarrolla un sarpullido  · Usted tiene otros síntomas nuevos  · Ortiz dolor no mejora, aún con tratamiento  · Usted tiene preguntas o inquietudes acerca de ortiz condición o cuidado  Medicamentos: Es posible que usted necesite alguno de los siguientes:  · [place], karen el ibuprofeno, [facility] a disminuir la inflamación, el dolor y la [place]  Pushpa medicamento está disponible con o sin fely receta médica  Los PIETRO pueden causar sangrado estomacal o problemas renales en ciertas personas  Si usted tomas un medicamento anticoagulante, siempre pregúntele a ortiz médico si los PIETRO son seguros para usted  Siempre germania la etiqueta de pushpa medicamento y [place] instrucciones  · Acetaminofén [employer]  Está disponible sin receta médica  Pregunte la cantidad y la frecuencia con que debe tomarlos  [id number]   El acetaminofén puede causar daño en el hígado cuando no se tomas de forma correcta  · Fely crema se puede aplicar en ortiz pecho para aliviar el dolor  · [place] berenice medicamentos karen se le haya indicado  Consulte con ortiz médico si usted clark que ortiz medicamento no le está ayudando o si presenta efectos secundarios  Infórmele si es alérgico a algún medicamento  Mantenga fely lista actualizada de los [place], las vitaminas y los productos herbales que tomas  Incluya los siguientes datos de los medicamentos: cantidad, frecuencia y motivo de administración  Traiga con usted la lista o los envases de las píldoras a berenice citas de seguimiento  Lleve la lista de los medicamentos con usted en ajay de fely emergencia  Acuda a berenice consultas de control con ortiz médico según le indicaron  Anote berenice preguntas para que se acuerde de hacerlas jose berenice visitas  Cuidados personales:  · El descanso tanto karen sea necesario  Evite las actividades que le empeoren el dolor de la pared torácica  · La aplicación de calor a ortiz pecho de 20 a 30 minutos cada 2 horas por los [employer] indiquen  El calor ayuda a disminuir el dolor y los espasmos musculares  · Aplique hielo a ortiz pecho de 15 a 20 minutos cada hora según indicaciones  Use fely compresa de hielo o ponga hielo triturado en fely bolsa de plástico  Nany Sarah con fely toalla  El hielo ayuda a evitar daño al tejido y a disminuir la inflamación y el dolor  © Copyright [address] Information is for End User's use only and may not be sold, redistributed or otherwise used for commercial purposes  All illustrations and images included in CareNotes® are the copyrighted property of [employer]  or [address] [place] [address] es sólo para uso en educación  Ortiz intención no es darle un consejo médico sobre enfermedades o tratamientos  Colsulte con ortiz Nisha Seats farmacéutico antes de seguir cualquier régimen médico para saber si es seguro y efectivo para usted

## 2021-02-03 LAB
ATRIAL RATE: 68 BPM
P AXIS: 19 DEGREES
PR INTERVAL: 150 MS
QRS AXIS: 120 DEGREES
QRSD INTERVAL: 100 MS
QT INTERVAL: 400 MS
QTC INTERVAL: 425 MS
T WAVE AXIS: 77 DEGREES
VENTRICULAR RATE: 68 BPM

## 2021-02-03 PROCEDURE — 93010 ELECTROCARDIOGRAM REPORT: CPT | Performed by: INTERNAL MEDICINE

## 2021-02-26 ENCOUNTER — OFFICE VISIT (OUTPATIENT)
Dept: INTERNAL MEDICINE CLINIC | Facility: CLINIC | Age: 46
End: 2021-02-26
Payer: COMMERCIAL

## 2021-02-26 VITALS
HEART RATE: 70 BPM | TEMPERATURE: 97.8 F | RESPIRATION RATE: 20 BRPM | HEIGHT: 67 IN | OXYGEN SATURATION: 97 % | SYSTOLIC BLOOD PRESSURE: 118 MMHG | WEIGHT: 227.6 LBS | DIASTOLIC BLOOD PRESSURE: 70 MMHG | BODY MASS INDEX: 35.72 KG/M2

## 2021-02-26 DIAGNOSIS — S29.011S PECTORALIS MUSCLE STRAIN, SEQUELA: ICD-10-CM

## 2021-02-26 DIAGNOSIS — S46.911S STRAIN OF RIGHT SHOULDER, SEQUELA: Primary | ICD-10-CM

## 2021-02-26 DIAGNOSIS — E78.1 HYPERTRIGLYCERIDEMIA: ICD-10-CM

## 2021-02-26 DIAGNOSIS — G47.33 MILD OBSTRUCTIVE SLEEP APNEA: ICD-10-CM

## 2021-02-26 DIAGNOSIS — Z00.00 ANNUAL PHYSICAL EXAM: ICD-10-CM

## 2021-02-26 DIAGNOSIS — F41.8 DEPRESSION WITH ANXIETY: ICD-10-CM

## 2021-02-26 DIAGNOSIS — K21.9 GASTROESOPHAGEAL REFLUX DISEASE WITHOUT ESOPHAGITIS: ICD-10-CM

## 2021-02-26 PROBLEM — H57.89 RED EYE: Status: RESOLVED | Noted: 2020-05-15 | Resolved: 2021-02-26

## 2021-02-26 PROBLEM — K92.1 BLACK STOOL: Status: RESOLVED | Noted: 2020-07-22 | Resolved: 2021-02-26

## 2021-02-26 PROBLEM — S46.911A STRAIN OF RIGHT SHOULDER: Status: ACTIVE | Noted: 2021-02-26

## 2021-02-26 PROBLEM — R79.9 ELEVATED BUN: Status: RESOLVED | Noted: 2020-10-23 | Resolved: 2021-02-26

## 2021-02-26 PROBLEM — B34.9 VIRAL INFECTION, UNSPECIFIED: Status: RESOLVED | Noted: 2021-01-21 | Resolved: 2021-02-26

## 2021-02-26 PROCEDURE — 99396 PREV VISIT EST AGE 40-64: CPT | Performed by: PHYSICIAN ASSISTANT

## 2021-02-26 RX ORDER — OMEPRAZOLE 40 MG/1
40 CAPSULE, DELAYED RELEASE ORAL DAILY
Qty: 30 CAPSULE | Refills: 3 | Status: SHIPPED | OUTPATIENT
Start: 2021-02-26 | End: 2021-02-26

## 2021-02-26 RX ORDER — OMEPRAZOLE 40 MG/1
40 CAPSULE, DELAYED RELEASE ORAL DAILY
Qty: 30 CAPSULE | Refills: 3 | Status: SHIPPED | OUTPATIENT
Start: 2021-02-26 | End: 2021-04-23

## 2021-02-26 NOTE — ASSESSMENT & PLAN NOTE
Doing very well at this time  Encouraged him to continue with Prilosec  EGD performed January 2020  Encouraged him to call Gastroenterology and see when they would like him to schedule his EGD follow-up  Continue to avoid caffeine NSAIDs and heartburn provoking foods  Tylenol safe to use as needed for pain for joints  Recommended yearly flu shot  Schedule eye doctor follow up  Schedule dental follow up  Start PT for R chest pectoralis/shouler strain  Tylenol PRN pain  Avoid lifting, straining, carrying

## 2021-02-26 NOTE — ASSESSMENT & PLAN NOTE
Encouraged he schedule follow up for sleep study  He notes he still has to go back to get his machine adjusted

## 2021-02-26 NOTE — PROGRESS NOTES
[facility]  Well Adult Male Physical Visit  Patient ID: Hair Garcia    : 1975  Age/Gender: 50 y o  male     DATE: 2021      Assessment/Plan:    Mild obstructive sleep apnea  Encouraged he schedule follow up for sleep study  He notes he still has to go back to get his machine adjusted  GERD (gastroesophageal reflux disease)  Doing very well at this time  Encouraged him to continue with Prilosec  EGD performed 2020  Encouraged him to call Gastroenterology and see when they would like him to schedule his EGD follow-up  Continue to avoid caffeine NSAIDs and heartburn provoking foods  Tylenol safe to use as needed for pain for joints  Recommended yearly flu shot  Schedule eye doctor follow up  Schedule dental follow up  Start PT for R chest pectoralis/shouler strain  Tylenol PRN pain  Avoid lifting, straining, carrying  Strain of right shoulder  Suspect right shoulder/pectoralis strain as being cause of patient's symptoms  Will refer physical therapy  Tylenol to use as needed pain  Avoid overuse  Avoid heavy lifting and straining with right arm    Annual physical exam  Healthy adult male  Reviewed labs and imaging from ER  Refer to PT as above to muscle strain with supportive care  Recommended regular eye doctor follow up  Recommended he schedule sleep study follow up  Contact GI and schedule follow up as it appears he may be due for follow up upper endoscopy  Commended on diet and lifestyle changes  Commended on reducing caffeine  Recommend flu shot, patient refuses  Labs to be done prior to follow up  No med changes  Depression with anxiety  PHQ9 reviewed, stable at this time without meds, will continue to follow    Hypertriglyceridemia  Encouraged weight loss and discussed low cholesterol diet  Will check lipids with next lab set         Diagnoses and all orders for this visit:    Strain of right shoulder, sequela  -     Ambulatory referral to Physical Therapy; Future    Annual physical exam  -     Comprehensive metabolic panel; Future    Pectoralis muscle strain, sequela  -     Ambulatory referral to Physical Therapy; Future    Mild obstructive sleep apnea    Hypertriglyceridemia  -     Comprehensive metabolic panel; Future  -     Lipid Panel with Direct LDL reflex; Future    Depression with anxiety    Gastroesophageal reflux disease without esophagitis  -     CBC and differential; Future  -     omeprazole (PriLOSEC) 40 MG capsule; Take 1 capsule (40 mg total) by mouth daily    Other orders  -     Discontinue: omeprazole (PriLOSEC) 40 MG capsule; Take 1 capsule (40 mg total) by mouth daily          Subjective:     Sherin Lopez is a 50 y o  male who presents to the office on 2/26/2021 for a health maintenance physical     Yearly PE  Follows with GI and had endoscopy/colonoscopy in 2020  No longer with GI sx   Drastically cut back on caffeine and cleaned up his diet  Feeling much better  Started exercising and notes that as work he went to catch something heavy and strained her R chest wall  He was in so much pain a few days a few days after it happened he had to go to the ER  Since the ER his pain is improving but there are still some arm/shoulder movements that can reproduce the symptom  He has not been taking anything for it  He does not follow with any other specialists  Reviewed depression screening  Prior he had been treated with medications for anxiety/depression  He notes his sx are much improved and he does not feel that he needs medications a this time  He notes he is overdue for sleep study follow up  He had the CPAP machine at home but is due for follow up testing which he plans to do  Still gets unrestful sleep  Migraines have been controlled  Heartburn  He reports no abdominal pain, no chest pain, no coughing, no heartburn, no nausea, no sore throat or no wheezing   The problem has been resolved  The symptoms are aggravated by certain foods  Associated symptoms include fatigue  Pertinent negatives include no anemia or melena  Risk factors include caffeine use and obesity  He has tried a PPI for the symptoms  The treatment provided significant relief  Past procedures include an EGD  The following portions of the patient's history were reviewed and updated as appropriate: allergies, current medications, past family history, past medical history, past social history, past surgical history and problem list     Pt reports overall health:  ok  Last Physical: >1 year ago    Healthy Diet:  "better" has stopped eating at restaurants, improved diet  Routine Exercise:  2x per week exercising  Notes he pulled a muscle exercising and went to the ER  Sx improving  Told he strained something  Weight Concerns:  Denies, notes he is trying to lose weight    Problems with vision:  Denies  Last Eye Exam:  About 1 year ago  Notes he is due for his follow up  Last eye doctor apts were when he has foreign body in his eye  Has several f/u visits with eye doctor around that time to remove the foreign body, eye/vision returned to normal      Problems with Hearing:  Denies    Routine Dental Exams:  <1 year ago  Notes he does routine dental care at his own at home  Smoking History:  denies  ETOH Use:  Very rarely  Illegal Drug Use:  denies  Caffeine Use:  Doing "a lot better" notes he now drinks coffee 1x per week    NO more energy drinks    Reproductive Health:    Sexually Active:  Yes  Contraceptive:  Condoms  Last PSA:  2014  Testicular self exam:  Never    Depression Screening:  PHQ-9 Depression Screening    PHQ-9:   Frequency of the following problems over the past two weeks:      Little interest or pleasure in doing things: 0 - not at all  Feeling down, depressed, or hopeless: 1 - several days  Trouble falling or staying asleep, or sleeping too much: 1 - several days  Feeling tired or having little energy: 1 - several days  Poor appetite or overeatin - not at all  Feeling bad about yourself - or that you are a failure or have let yourself or your family down: 0 - not at all  Trouble concentrating on things, such as reading the newspaper or watching television: 2 - more than half the days  Moving or speaking so slowly that other people could have noticed  Or the opposite - being so fidgety or restless that you have been moving around a lot more than usual: 0 - not at all  Thoughts that you would be better off dead, or of hurting yourself in some way: 0 - not at all  PHQ-2 Score: 1  PHQ-9 Score: 5         Last Colonoscopy:  BMI Counseling: Body mass index is 35 65 kg/m²  The BMI is above normal  Nutrition recommendations include decreasing portion sizes, encouraging healthy choices of fruits and vegetables, decreasing fast food intake, limiting drinks that contain sugar, increasing intake of lean protein and reducing intake of cholesterol  Exercise recommendations include exercising 3-5 times per week  No pharmacotherapy was ordered  Patient referred to PCP due to patient being overweight  Depression Screening and Follow-up Plan: Patient's depression screening was positive with a PHQ-2 score of 1  Their PHQ-9 score was 5  Patient advised to follow-up with PCP for further management  History of abnormal Colonoscopy:  Colonoscopy with polyp 2020, EGD 2020  Family History of Colon CA:  Denies    Last Dexa:  N/a     Last Labs:  2021    Review of Systems   Constitutional: Positive for fatigue  Negative for chills, fever and unexpected weight change  HENT: Negative for sore throat  Eyes: Negative for pain and visual disturbance  Respiratory: Negative for cough, shortness of breath and wheezing  Cardiovascular: Negative for chest pain and palpitations     Gastrointestinal: Negative for abdominal pain, blood in stool, constipation, diarrhea, heartburn, melena, nausea and vomiting  Genitourinary: Negative for dysuria, frequency, hematuria, scrotal swelling, testicular pain and urgency  Skin: Negative for color change and rash  Neurological: Negative for dizziness, seizures and syncope  Psychiatric/Behavioral:        Notes he is feeling much better with regards to his mental health  NO longer on medications  All other systems reviewed and are negative          Patient Active Problem List   Diagnosis    Chronic pain of left knee    Depression with anxiety    GERD (gastroesophageal reflux disease)    Heel pain, chronic, right    Joint pain    Lateral epicondylitis of left elbow    Migraine headache    Mild obstructive sleep apnea    Neck muscle spasm    Trigger ring finger of right hand    Trigger middle finger of right hand    Hypertriglyceridemia    Mass of left foot    Right elbow pain    Cubital tunnel syndrome of both upper extremities    Olecranon bursitis of right elbow    Gastritis    Carpal tunnel syndrome, bilateral    Colon cancer screening    Callus of foot    Umbilical hernia without obstruction and without gangrene    Atelectasis    Colon polyp    Diverticulosis    Internal hemorrhoid    PAC (premature atrial contraction)    Laceration of right index finger without foreign body without damage to nail    Strain of left Achilles tendon    Calcaneal spur, left    Pain of left thumb    Exposure to COVID-19 virus    Annual physical exam    Strain of right shoulder       Past Medical History:   Diagnosis Date    Elbow tendonitis     right possible bursitis    Gastritis     GERD (gastroesophageal reflux disease)     Migraine        Past Surgical History:   Procedure Laterality Date    EGD  2005    EYE FOREIGN BODY REMOVAL Left     NC INCISE FINGER TENDON SHEATH Right 12/26/2018    Procedure: LONG AND RING TRIGGER FINGER RELEASES;  Surgeon: Meaghan Perea MD;  Location: [facility];  Service: Orthopedics         Current Outpatient Medications:     acetaminophen (TYLENOL) 500 mg tablet, Take 500 mg by mouth every 6 (six) hours as needed for mild pain, Disp: , Rfl:     butalbital-acetaminophen-caffeine (FIORICET,ESGIC) -40 mg per tablet, Take 1 tablet by mouth daily as needed for migraine For on set of headache, Disp: 30 tablet, Rfl: 0    multivitamin (THERAGRAN) TABS, Take 1 tablet by mouth daily, Disp: , Rfl:     omeprazole (PriLOSEC) 40 MG capsule, Take 1 capsule (40 mg total) by mouth daily, Disp: 30 capsule, Rfl: 3    dicyclomine (BENTYL) 20 mg tablet, Take 1 tablet (20 mg total) by mouth 3 (three) times a day as needed (for abdominal cramping) for up to 7 days (Patient not taking: Reported on 1/21/2021), Disp: 21 tablet, Rfl: 0    sucralfate (CARAFATE) 1 g tablet, Take 1 tablet (1 g total) by mouth 2 (two) times a day Chew/crush or dissolve tablets in water prior to swallowing (Patient taking differently: Take 1 g by mouth 2 (two) times a day as needed Chew/crush or dissolve tablets in water prior to swallowing), Disp: 60 tablet, Rfl: 3    No Known Allergies    Social History     Socioeconomic History    Marital status: /Civil Union     Spouse name: None    Number of children: None    Years of education: None    Highest education level: None   Occupational History    None   Social Needs    Financial resource strain: None    Food insecurity     Worry: None     Inability: None    Transportation needs     Medical: None     Non-medical: None   Tobacco Use    Smoking status: Never Smoker    Smokeless tobacco: Never Used   Substance and Sexual Activity    Alcohol use: Yes     Frequency: Monthly or less     Comment: special occasions beer    Drug use: No    Sexual activity: Yes   Lifestyle    Physical activity     Days per week: None     Minutes per session: None    Stress: None   Relationships    Social connections     Talks on phone: None     Gets together: None     Attends Hindu service: None     Active member of club or organization: None     Attends meetings of clubs or organizations: None     Relationship status: None    Intimate partner violence     Fear of current or ex partner: None     Emotionally abused: None     Physically abused: None     Forced sexual activity: None   Other Topics Concern    None   Social History Narrative    Caffeine use       Family History   Problem Relation Age of Onset    Diabetes Mother     Hypertension Mother     Breast cancer Mother     Diabetes Father     Gout Father     Hypertension Father     Cancer Maternal Grandmother     Rosacea Family        Immunization History   Administered Date(s) Administered    INFLUENZA 10/03/2016, 09/05/2017    Influenza Quadrivalent Preservative Free 3 years and older IM 10/03/2016, 09/05/2017    Influenza, recombinant, quadrivalent,injectable, preservative free 10/23/2020    TD (adult) Preservative Free 04/29/2015    Td (adult), adsorbed 04/29/2015    Tdap 02/11/2014, 04/29/2015, 12/19/2016, 03/27/2020, 10/22/2020        Health Maintenance   Topic Date Due    HIV Screening  12/05/1988    Annual Physical  12/05/1991    BMI: Followup Plan  10/23/2021    BMI: Adult  02/26/2022    Colonoscopy Surveillance  09/25/2023    DTaP,Tdap,and Td Vaccines (6 - Td) 10/22/2030    Influenza Vaccine  Completed    Pneumococcal Vaccine: Pediatrics (0 to 5 Years) and At-Risk Patients (6 to 59 Years)  Aged Out    HIB Vaccine  Aged Out    Hepatitis B Vaccine  Aged Out    IPV Vaccine  Aged Out    Hepatitis A Vaccine  Aged Out    Meningococcal ACWY Vaccine  Aged Out    HPV Vaccine  Aged Out       Objective:  Vitals:    02/26/21 0719   BP: 118/70   BP Location: Left arm   Patient Position: Sitting   Cuff Size: Large   Pulse: 70   Resp: 20   Temp: 97 8 °F (36 6 °C)   TempSrc: Tympanic   SpO2: 97%   Weight: 103 kg (227 lb 9 6 oz)   Height: 5' 7" (1 702 m)     Wt Readings from Last 3 Encounters:   02/26/21 103 kg (227 lb 9 6 oz)   01/30/21 99 8 kg (220 lb)   01/21/21 102 kg (224 lb)     Body mass index is 35 65 kg/m²  No exam data present       Physical Exam  Vitals signs and nursing note reviewed  Constitutional:       General: He is not in acute distress  Appearance: He is well-developed  He is not diaphoretic  Comments: Alert pleasant cooperative  Seated in room in no acute distress   HENT:      Head: Normocephalic and atraumatic  Right Ear: Tympanic membrane normal       Left Ear: Tympanic membrane normal       Mouth/Throat:      Mouth: Mucous membranes are moist       Pharynx: No oropharyngeal exudate or posterior oropharyngeal erythema  Eyes:      General: No scleral icterus  Right eye: No discharge  Left eye: No discharge  Pupils: Pupils are equal, round, and reactive to light  Cardiovascular:      Rate and Rhythm: Normal rate and regular rhythm  Heart sounds: Normal heart sounds  No murmur  Pulmonary:      Effort: Pulmonary effort is normal  No respiratory distress  Breath sounds: Normal breath sounds  No wheezing or rales  Abdominal:      General: Bowel sounds are normal  There is no distension  Palpations: Abdomen is soft  Tenderness: There is no abdominal tenderness  There is no guarding  Comments: Soft, NT/ND   Musculoskeletal:      Right shoulder: He exhibits decreased range of motion and tenderness  He exhibits no bony tenderness, no swelling, no effusion and no crepitus  Arms:    Skin:     General: Skin is warm and dry  Neurological:      Mental Status: He is alert and oriented to person, place, and time  Psychiatric:         Behavior: Behavior normal          Thought Content:  Thought content normal              Future Appointments   Date Time Provider Otto Ruiz   6/18/2021  7:30 AM Bety Ontiveros PA-C [address] [place] [address]    Patient Care Team:  Sandip Tinsley as PCP - General (Internal Medicine)  Cristina Munch, PA-C Dorothy Schirmer, MD

## 2021-02-27 NOTE — ASSESSMENT & PLAN NOTE
Healthy adult male  Reviewed labs and imaging from ER  Refer to PT as above to muscle strain with supportive care  Recommended regular eye doctor follow up  Recommended he schedule sleep study follow up  Contact GI and schedule follow up as it appears he may be due for follow up upper endoscopy  Commended on diet and lifestyle changes  Commended on reducing caffeine  Recommend flu shot, patient refuses  Labs to be done prior to follow up  No med changes

## 2021-02-27 NOTE — ASSESSMENT & PLAN NOTE
Encouraged weight loss and discussed low cholesterol diet  Will check lipids with next lab set  oral

## 2021-02-27 NOTE — ASSESSMENT & PLAN NOTE
Suspect right shoulder/pectoralis strain as being cause of patient's symptoms  Will refer physical therapy  Tylenol to use as needed pain  Avoid overuse    Avoid heavy lifting and straining with right arm

## 2021-03-03 ENCOUNTER — EVALUATION (OUTPATIENT)
Dept: PHYSICAL THERAPY | Facility: CLINIC | Age: 46
End: 2021-03-03
Payer: COMMERCIAL

## 2021-03-03 DIAGNOSIS — R20.2 NUMBNESS AND TINGLING IN BOTH HANDS: ICD-10-CM

## 2021-03-03 DIAGNOSIS — R20.0 NUMBNESS AND TINGLING IN BOTH HANDS: ICD-10-CM

## 2021-03-03 DIAGNOSIS — M77.02 MEDIAL EPICONDYLITIS OF BOTH ELBOWS: ICD-10-CM

## 2021-03-03 DIAGNOSIS — S29.011S PECTORALIS MUSCLE STRAIN, SEQUELA: ICD-10-CM

## 2021-03-03 DIAGNOSIS — S46.911S STRAIN OF RIGHT SHOULDER, SEQUELA: Primary | ICD-10-CM

## 2021-03-03 DIAGNOSIS — M77.01 MEDIAL EPICONDYLITIS OF BOTH ELBOWS: ICD-10-CM

## 2021-03-03 PROCEDURE — 97110 THERAPEUTIC EXERCISES: CPT | Performed by: PHYSICAL THERAPIST

## 2021-03-03 PROCEDURE — 97530 THERAPEUTIC ACTIVITIES: CPT | Performed by: PHYSICAL THERAPIST

## 2021-03-03 PROCEDURE — 97163 PT EVAL HIGH COMPLEX 45 MIN: CPT | Performed by: PHYSICAL THERAPIST

## 2021-03-03 NOTE — PROGRESS NOTES
PT Evaluation     Today's date: 3/3/2021  Patient name: Yulia Sanders  : 1975  MRN: [id number]  Referring provider: Charlee Jaimes PA-C  Dx:   Encounter Diagnosis     ICD-10-CM    1  Strain of right shoulder, sequela  [id number] Ambulatory referral to Physical Therapy     PT plan of care cert/re-cert   2  Pectoralis muscle strain, sequela  S29 011S Ambulatory referral to Physical Therapy     PT plan of care cert/re-cert   3  Numbness and tingling in both hands  R20 0 PT plan of care cert/re-cert    A78 1    4  Medial epicondylitis of both elbows  M77 01     M77 02        Start Time: 1698  Stop Time: 1050  Total time in clinic (min): 62 minutes    Assessment  Assessment details: Yulia Sanders is a 50 y o  male who presents with signs and symptoms consistent with right rotator cuff tendonitis, bilateral medial epicondylitis, and intercostal strain  He demonstrates decreased shoulder ROM, poor posture, decreased flexibility, and decreased activity tolerance  Patient would benefit from skilled physical therapy in order to address said deficits and improve functional mobility     Impairments: abnormal or restricted ROM, abnormal movement, activity intolerance, lacks appropriate home exercise program, pain with function, poor posture  and poor body mechanics  Understanding of Dx/Px/POC: good   Prognosis: good    Goals  STG:  Independent with HEP  Increase shoulder ROM > 8 degrees   Patient will be luz elena to hold level for > 3 minutes without numbness     LTG:  Decrease pain 2 grades   Increase shoulder ROM to full to be able to hold drywall overhead   Eliminate numbness into bilateral UEs    Plan  Patient would benefit from: skilled physical therapy  Planned modality interventions: cryotherapy and thermotherapy: hydrocollator packs  Planned therapy interventions: abdominal trunk stabilization, manual therapy, joint mobilization, massage, neuromuscular re-education, stretching, strengthening, balance, home exercise program, flexibility, functional ROM exercises, therapeutic activities, therapeutic exercise and patient education  Frequency: 2x week  Duration in weeks: 6  Treatment plan discussed with: patient        Subjective Evaluation    History of Present Illness  Mechanism of injury: Suspect right shoulder/pectoralis strain as being cause of patient's symptoms  Will refer physical therapy  Tylenol to use as needed pain  Avoid overuse  Avoid heavy lifting and straining with right arm    Patient was picking up stuff in garage and loading in his truck and notes about 2 weeks later he went to ER he had pain to breathe  He has pain when he coughs in his chest  He also complains of right shoulder pain into posterior shoulder  Patient reports when he has arms in elbow bent position he gets numbness into 4th and 5th digits into bilateral UEs  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 he has numbness into 4th and 5th digits of bilateral hands R>L   He notices sometimes he will happen when he is sleeping as well  He is right hand dominant   Pain  Current pain ratin  At worst pain ratin          Objective     Tenderness     Left Elbow   Tenderness in the medial epicondyle  Right Elbow   Tenderness in the medial epicondyle  Left Wrist/Hand   Tenderness in the medial epicondyle  Right Wrist/Hand   Tenderness in the medial epicondyle       Additional Tenderness Details  Flexor mass  Sensitivity over ulnar nerve + tinels     Cervical/Thoracic Screen   Cervical range of motion within normal limits  Cervical range of motion within normal limits with the following exceptions: Prolonged postural holds did not affect N/T in bilateral hands     Active Range of Motion   Left Shoulder   Flexion: WFL  Abduction: [facility]  External rotation 90°: 83 degrees   Internal rotation 90°: 63 degrees     Right Shoulder   Flexion: 167 degrees with pain  Abduction: [facility]  External rotation 90°: 74 degrees  Internal rotation 90°: 67 degrees     Left Elbow   Normal active range of motion    Right Elbow   Normal active range of motion    Strength/Myotome Testing     Left Shoulder   Normal muscle strength    Right Shoulder   Normal muscle strength    Additional Strength Details  Shoulder strain with resisted IR/ER    Tests     Left Elbow   Positive Tinel's sign (cubital tunnel)  Right Elbow   Positive Tinel's sign (cubital tunnel)         Flowsheet Rows      Most Recent Value   PT/OT G-Codes   Current Score  55   Projected Score  74             Precautions: None      Manuals 3/3            Graston to medial epicondyles/wrist flexors bl NV            laser to medial epicondyles NV            Shoulder stretching Right side  NV            Joint mobes right  NV            Neuro Re-Ed             Tb rows NV            TB extensions NV            TB IR NV            TB ER NV            TB horizontal abd  NV                                     Ther Ex             UBE NV            Door pec stretch  20" x4            Shoulder flexion slides at door 10x10"            Wrist flexor srettch  10x10"            Wrist extensor stretch 10x10              DB wrist flexion NV            DB wrist extensoin NV            DB supination/pronation NV            DB shoulder flexion  NV           DB shoulder scaption  NV                                                  Ther Activity             Seated t/s extension NV            Open book stretch  NV                                                   Modalities

## 2021-03-10 DIAGNOSIS — Z23 ENCOUNTER FOR IMMUNIZATION: ICD-10-CM

## 2021-03-11 ENCOUNTER — OFFICE VISIT (OUTPATIENT)
Dept: PHYSICAL THERAPY | Facility: CLINIC | Age: 46
End: 2021-03-11
Payer: COMMERCIAL

## 2021-03-11 DIAGNOSIS — S29.011S PECTORALIS MUSCLE STRAIN, SEQUELA: ICD-10-CM

## 2021-03-11 DIAGNOSIS — R20.2 NUMBNESS AND TINGLING IN BOTH HANDS: ICD-10-CM

## 2021-03-11 DIAGNOSIS — M77.02 MEDIAL EPICONDYLITIS OF BOTH ELBOWS: ICD-10-CM

## 2021-03-11 DIAGNOSIS — S46.911S STRAIN OF RIGHT SHOULDER, SEQUELA: Primary | ICD-10-CM

## 2021-03-11 DIAGNOSIS — M77.01 MEDIAL EPICONDYLITIS OF BOTH ELBOWS: ICD-10-CM

## 2021-03-11 DIAGNOSIS — R20.0 NUMBNESS AND TINGLING IN BOTH HANDS: ICD-10-CM

## 2021-03-11 PROCEDURE — 97530 THERAPEUTIC ACTIVITIES: CPT | Performed by: PHYSICAL THERAPIST

## 2021-03-11 PROCEDURE — 97112 NEUROMUSCULAR REEDUCATION: CPT | Performed by: PHYSICAL THERAPIST

## 2021-03-11 PROCEDURE — 97140 MANUAL THERAPY 1/> REGIONS: CPT | Performed by: PHYSICAL THERAPIST

## 2021-03-11 NOTE — PROGRESS NOTES
Daily Note     Today's date: 3/11/2021  Patient name: Gelacio Lopez  : 1975  MRN: [id number]  Referring provider: Gaby Maurice PA-C  Dx:   Encounter Diagnosis     ICD-10-CM    1  Strain of right shoulder, sequela  S46 911S    2  Pectoralis muscle strain, sequela  S29 011S    3  Numbness and tingling in both hands  R20 0     R20 2    4  Medial epicondylitis of both elbows  M77 01     M77 02                   Subjective: Patient reports he has been feeling better in his shoulder though his right hand has been getting increasingly numb in his last three fingers  Objective: See treatment diary below    Assessment: Tolerated treatment well  Patient Had no pain with treatment today  He reports intermittent increase in n/t into right hand with Graston, manuals and nerve glides though reports overall tingling had decreased post session  Plan: Progress treatment as tolerated         Precautions: None      Manuals 3/3 3/11           Graston to medial epicondyles/wrist flexors bl NV 8' with STM to right  bl          laser to medial epicondyles NV NV           Shoulder stretching Right side  NV 8' with pec release           Joint mobes right  NV 2'           Neuro Re-Ed             Tb rows NV            TB extensions NV            TB IR NV            TB ER NV            TB horizontal abd  NV                                     Ther Ex             UBE NV            Door pec stretch  20" x4            Shoulder flexion slides at door 10x10"            Wrist flexor srettch  10x10" 10" x5           Wrist extensor stretch 10x10   10" x5           DB wrist flexion NV            DB wrist extensoin NV            DB supination/pronation NV            DB shoulder flexion  NV           DB shoulder scaption  NV                                                  Ther Activity             Seated t/s extension NV Supine over bolster 10x5"           Open book stretch  NV 5x5" bl           Median nerve glide at wall   5x5" bl                                     Modalities

## 2021-03-15 ENCOUNTER — APPOINTMENT (OUTPATIENT)
Dept: PHYSICAL THERAPY | Facility: CLINIC | Age: 46
End: 2021-03-15
Payer: COMMERCIAL

## 2021-03-16 ENCOUNTER — OFFICE VISIT (OUTPATIENT)
Dept: PHYSICAL THERAPY | Facility: CLINIC | Age: 46
End: 2021-03-16
Payer: COMMERCIAL

## 2021-03-16 DIAGNOSIS — R20.2 NUMBNESS AND TINGLING IN BOTH HANDS: ICD-10-CM

## 2021-03-16 DIAGNOSIS — S29.011S PECTORALIS MUSCLE STRAIN, SEQUELA: ICD-10-CM

## 2021-03-16 DIAGNOSIS — M77.01 MEDIAL EPICONDYLITIS OF BOTH ELBOWS: ICD-10-CM

## 2021-03-16 DIAGNOSIS — S46.911S STRAIN OF RIGHT SHOULDER, SEQUELA: Primary | ICD-10-CM

## 2021-03-16 DIAGNOSIS — M77.02 MEDIAL EPICONDYLITIS OF BOTH ELBOWS: ICD-10-CM

## 2021-03-16 DIAGNOSIS — R20.0 NUMBNESS AND TINGLING IN BOTH HANDS: ICD-10-CM

## 2021-03-16 PROCEDURE — 97140 MANUAL THERAPY 1/> REGIONS: CPT

## 2021-03-16 PROCEDURE — 97110 THERAPEUTIC EXERCISES: CPT

## 2021-03-16 PROCEDURE — 97112 NEUROMUSCULAR REEDUCATION: CPT

## 2021-03-16 NOTE — PROGRESS NOTES
Daily Note     Today's date: 3/16/2021  Patient name: Alexsandra Rausch  : 1975  MRN: [id number]  Referring provider: Elfego Mcgraw PA-C  Dx:   Encounter Diagnosis     ICD-10-CM    1  Strain of right shoulder, sequela  S46 911S    2  Pectoralis muscle strain, sequela  S29 011S    3  Numbness and tingling in both hands  R20 0     R20 2    4  Medial epicondylitis of both elbows  M77 01     M77 02                   Subjective: Pt reports no new complaints since last visit  Objective: See treatment diary below      Assessment: Tolerated treatment well  Patient would benefit from continued PT  Mild TTP of R pectoralis  Challenged by addition of resisted shdr flexion and scaption due to mild discomfort, britni w/ flexion  TTP of BL medial epicondyles persist, R > L  Mild paresthesias noted w/ IASTM       Plan: Progress treatment as tolerated  Precautions: None      Manuals 3/3 3/11 316          Graston to medial epicondyles/wrist flexors bl NV 8' with STM to right  12' bl          laser to medial epicondyles NV NV NV?           Shoulder stretching Right side  NV 8' with pec release 8' w/ pec release          Joint mobes right  NV 2' NV          Neuro Re-Ed             Tb rows NV  NV          TB extensions NV  NV          TB IR NV            TB ER NV            TB horizontal abd  NV                                     Ther Ex             UBE NV  NV          Door pec stretch  20" x4            Shoulder flexion slides at door 10x10"            Wrist flexor srettch  10x10" 10" x5 10"x5 ea          Wrist extensor stretch 10x10   10" x5 10"x5 ea          DB wrist flexion NV  NV          DB wrist extensoin NV  NV          DB supination/pronation NV            DB shoulder flexion  NV 2# x10          DB shoulder scaption  NV 2# x10                                                 Ther Activity             Seated t/s extension NV Supine over bolster 10x5" supine over bolster 10x5"          Open book stretch  NV 5x5" bl 5x5" BL          Median nerve glide at wall   5x5" bl 5x5" BL                                    Modalities

## 2021-03-18 ENCOUNTER — OFFICE VISIT (OUTPATIENT)
Dept: PHYSICAL THERAPY | Facility: CLINIC | Age: 46
End: 2021-03-18
Payer: COMMERCIAL

## 2021-03-18 DIAGNOSIS — M77.01 MEDIAL EPICONDYLITIS OF BOTH ELBOWS: ICD-10-CM

## 2021-03-18 DIAGNOSIS — R20.0 NUMBNESS AND TINGLING IN BOTH HANDS: ICD-10-CM

## 2021-03-18 DIAGNOSIS — M77.02 MEDIAL EPICONDYLITIS OF BOTH ELBOWS: ICD-10-CM

## 2021-03-18 DIAGNOSIS — S46.911S STRAIN OF RIGHT SHOULDER, SEQUELA: Primary | ICD-10-CM

## 2021-03-18 DIAGNOSIS — S29.011S PECTORALIS MUSCLE STRAIN, SEQUELA: ICD-10-CM

## 2021-03-18 DIAGNOSIS — R20.2 NUMBNESS AND TINGLING IN BOTH HANDS: ICD-10-CM

## 2021-03-18 PROCEDURE — 97112 NEUROMUSCULAR REEDUCATION: CPT | Performed by: PHYSICAL THERAPIST

## 2021-03-18 PROCEDURE — 97110 THERAPEUTIC EXERCISES: CPT | Performed by: PHYSICAL THERAPIST

## 2021-03-18 PROCEDURE — 97140 MANUAL THERAPY 1/> REGIONS: CPT | Performed by: PHYSICAL THERAPIST

## 2021-03-18 NOTE — PROGRESS NOTES
Daily Note     Today's date: 3/18/2021  Patient name: Josh Beck  : 1975  MRN: [id number]  Referring provider: Manuel Javier PA-C  Dx:   Encounter Diagnosis     ICD-10-CM    1  Strain of right shoulder, sequela  S46 911S    2  Pectoralis muscle strain, sequela  S29 011S    3  Numbness and tingling in both hands  R20 0     R20 2    4  Medial epicondylitis of both elbows  M77 01     M77 02                   Subjective: Patient staes his arm was a bit sore after last visit from [address] [facility] though he does not mind and likes it  He has been having less tingling in his whole hand, only though 4th and 5th fingers lately  He has no chest pain, and mild shoulder pain when trying to lift away from outside body  Objective: See treatment diary below      Assessment: Tolerated treatment well  Patient would benefit from continued PT  Patient tolerates exercises well today  He had mild soreness with TB ER/IR though completes with good form, only cues needed to keep elbow pinned to body  He has no pain with shoulder stretching, persistent tightness into pec and mild soreness into wrist flexors on R >L side after graston treatment  Plan: Progress treatment as tolerated  Precautions: None      Manuals 3/3 3/11 3/16 3/18         Graston to medial epicondyles/wrist flexors bl NV 8' with STM to right  12' bl 13' bl         laser to medial epicondyles NV NV NV?           Shoulder stretching Right side  NV 8' with pec release 8' w/ pec release 8'  pec release          Joint mobes right  NV 2' NV 2'         Neuro Re-Ed             Tb rows NV  NV blk x15 HEp        TB extensions NV  NV blk x15  HEP        TB IR NV   blk x15 HEP        TB ER NV   blk x15 HEP        TB horizontal abd  NV                                     Ther Ex             UBE NV  NV 2'/2'         Door pec stretch  20" x4            Shoulder flexion slides at door 10x10"            Wrist flexor srettch  10x10" 10" x5 10"x5 ea 10" x5 ea Wrist extensor stretch 10x10   10" x5 10"x5 ea          DB wrist flexion NV  NV NV         DB wrist extensoin NV  NV NV         DB supination/pronation NV            DB shoulder flexion  NV 2# x10          DB shoulder scaption  NV 2# x10                                                 Ther Activity             Seated t/s extension NV Supine over bolster 10x5" supine over bolster 10x5"          Open book stretch  NV 5x5" bl 5x5" BL          Median nerve glide at wall   5x5" bl 5x5" BL                                    Modalities

## 2021-03-22 ENCOUNTER — IMMUNIZATIONS (OUTPATIENT)
Dept: FAMILY MEDICINE CLINIC | Facility: HOSPITAL | Age: 46
End: 2021-03-22

## 2021-03-22 ENCOUNTER — APPOINTMENT (OUTPATIENT)
Dept: PHYSICAL THERAPY | Facility: CLINIC | Age: 46
End: 2021-03-22
Payer: COMMERCIAL

## 2021-03-22 DIAGNOSIS — Z23 ENCOUNTER FOR IMMUNIZATION: Primary | ICD-10-CM

## 2021-03-22 PROCEDURE — 91300 SARS-COV-2 / COVID-19 MRNA VACCINE (PFIZER-BIONTECH) 30 MCG: CPT

## 2021-03-22 PROCEDURE — 0001A SARS-COV-2 / COVID-19 MRNA VACCINE (PFIZER-BIONTECH) 30 MCG: CPT

## 2021-03-25 ENCOUNTER — APPOINTMENT (OUTPATIENT)
Dept: PHYSICAL THERAPY | Facility: CLINIC | Age: 46
End: 2021-03-25
Payer: COMMERCIAL

## 2021-04-12 ENCOUNTER — IMMUNIZATIONS (OUTPATIENT)
Dept: FAMILY MEDICINE CLINIC | Facility: HOSPITAL | Age: 46
End: 2021-04-12

## 2021-04-12 DIAGNOSIS — Z23 ENCOUNTER FOR IMMUNIZATION: Primary | ICD-10-CM

## 2021-04-12 PROCEDURE — 91300 SARS-COV-2 / COVID-19 MRNA VACCINE (PFIZER-BIONTECH) 30 MCG: CPT

## 2021-04-12 PROCEDURE — 0002A SARS-COV-2 / COVID-19 MRNA VACCINE (PFIZER-BIONTECH) 30 MCG: CPT

## 2021-04-12 NOTE — PROGRESS NOTES
Patient Discharge       Patient has been working out of town and looking to transfer locations due to traveling  He will be DC from our caseload at this time and may resume if necessary      Elva White, PT  04/12/21

## 2021-04-20 DIAGNOSIS — K21.9 GASTROESOPHAGEAL REFLUX DISEASE WITHOUT ESOPHAGITIS: ICD-10-CM

## 2021-04-23 RX ORDER — OMEPRAZOLE 40 MG/1
CAPSULE, DELAYED RELEASE ORAL
Qty: 90 CAPSULE | Refills: 1 | Status: SHIPPED | OUTPATIENT
Start: 2021-04-23 | End: 2021-05-28

## 2021-05-26 DIAGNOSIS — K21.9 GASTROESOPHAGEAL REFLUX DISEASE WITHOUT ESOPHAGITIS: ICD-10-CM

## 2021-05-28 RX ORDER — OMEPRAZOLE 40 MG/1
CAPSULE, DELAYED RELEASE ORAL
Qty: 90 CAPSULE | Refills: 1 | Status: SHIPPED | OUTPATIENT
Start: 2021-05-28 | End: 2021-06-17

## 2021-06-14 DIAGNOSIS — K21.9 GASTROESOPHAGEAL REFLUX DISEASE WITHOUT ESOPHAGITIS: ICD-10-CM

## 2021-06-17 RX ORDER — OMEPRAZOLE 40 MG/1
CAPSULE, DELAYED RELEASE ORAL
Qty: 90 CAPSULE | Refills: 1 | Status: SHIPPED | OUTPATIENT
Start: 2021-06-17 | End: 2021-07-13 | Stop reason: SDUPTHER

## 2021-07-13 DIAGNOSIS — K21.9 GASTROESOPHAGEAL REFLUX DISEASE WITHOUT ESOPHAGITIS: ICD-10-CM

## 2021-07-13 RX ORDER — OMEPRAZOLE 40 MG/1
40 CAPSULE, DELAYED RELEASE ORAL DAILY
Qty: 90 CAPSULE | Refills: 1 | Status: SHIPPED | OUTPATIENT
Start: 2021-07-13 | End: 2021-09-10 | Stop reason: SDUPTHER

## 2021-08-24 ENCOUNTER — OFFICE VISIT (OUTPATIENT)
Dept: INTERNAL MEDICINE CLINIC | Age: 46
End: 2021-08-24
Payer: COMMERCIAL

## 2021-08-24 VITALS
DIASTOLIC BLOOD PRESSURE: 80 MMHG | HEART RATE: 95 BPM | SYSTOLIC BLOOD PRESSURE: 110 MMHG | WEIGHT: 233.6 LBS | BODY MASS INDEX: 36.66 KG/M2 | OXYGEN SATURATION: 98 % | HEIGHT: 67 IN | TEMPERATURE: 97.2 F

## 2021-08-24 DIAGNOSIS — L81.9 ATYPICAL PIGMENTED SKIN LESION: Primary | ICD-10-CM

## 2021-08-24 PROCEDURE — 99213 OFFICE O/P EST LOW 20 MIN: CPT | Performed by: FAMILY MEDICINE

## 2021-08-24 RX ORDER — CEPHALEXIN 500 MG/1
500 CAPSULE ORAL
Qty: 21 CAPSULE | Refills: 0 | Status: SHIPPED | OUTPATIENT
Start: 2021-08-24 | End: 2021-08-31

## 2021-08-24 NOTE — PROGRESS NOTES
Assessment/Plan:    1  Atypical pigmented skin lesion  -     cephalexin (KEFLEX) 500 mg capsule; Take 1 capsule (500 mg total) by mouth 3 (three) times daily after meals for 7 days      Recommend sunscreen when outside  If antibiotics not helpful would recommend biopsy of the area to rule out skin cancer since he has significant sun exposure  Patient agreeable to this  Call if any issues  There are no Patient Instructions on file for this visit  No follow-ups on file  Subjective:      Patient ID: Vic Daley is a 50 y o  male  Chief Complaint   Patient presents with    Nasal Pain     spot on nose is painfull, has had for 6 months pain started 1 month ago        HPI    Patient with 2 month history of lesion on the nose  Over the past few weeks it is started to feel painful with stinging and burning  No exudate no trauma no issues however patient is 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 but when he is outside does not use sunscreen and does not wear a mask  Is not changing in any way except that it is painful for now  He denies any other rashes or lesions anywhere else on the body or the face    He states it is uncomfortable even when he is putting on his clothes and his sugar rubs against his nose    The following portions of the patient's history were reviewed and updated as appropriate: allergies, current medications, past family history, past medical history, past social history, past surgical history and problem list     Review of Systems      Constitutional:  Denies fever or chills   Eyes:  Denies double or blurry vision  HENT:  Denies nasal congestion or sore throat   Respiratory:  Denies cough or shortness of breath or wheezing  Cardiovascular:  Denies palpitations or chest pain  GI:  Denies abdominal pain, nausea, or vomiting, no loose stools, no reflux  Integument:  Denies rash , no open areas  Neurologic:  Denies headache or focal weakness, no dizziness  : no dysuria        Current Outpatient Medications   Medication Sig Dispense Refill    acetaminophen (TYLENOL) 500 mg tablet Take 500 mg by mouth every 6 (six) hours as needed for mild pain      butalbital-acetaminophen-caffeine (FIORICET,ESGIC) -40 mg per tablet Take 1 tablet by mouth daily as needed for migraine For on set of headache 30 tablet 0    multivitamin (THERAGRAN) TABS Take 1 tablet by mouth daily      omeprazole (PriLOSEC) 40 MG capsule Take 1 capsule (40 mg total) by mouth daily 90 capsule 1    cephalexin (KEFLEX) 500 mg capsule Take 1 capsule (500 mg total) by mouth 3 (three) times daily after meals for 7 days 21 capsule 0    dicyclomine (BENTYL) 20 mg tablet Take 1 tablet (20 mg total) by mouth 3 (three) times a day as needed (for abdominal cramping) for up to 7 days (Patient not taking: Reported on 1/21/2021) 21 tablet 0    sucralfate (CARAFATE) 1 g tablet Take 1 tablet (1 g total) by mouth 2 (two) times a day Chew/crush or dissolve tablets in water prior to swallowing (Patient taking differently: Take 1 g by mouth 2 (two) times a day as needed Chew/crush or dissolve tablets in water prior to swallowing) 60 tablet 3     No current facility-administered medications for this visit         Objective:    /80 (BP Location: Left arm, Patient Position: Sitting, Cuff Size: Adult)   Pulse 95   Temp (!) 97 2 °F (36 2 °C) (Temporal)   Ht 5' 7" (1 702 m)   Wt 106 kg (233 lb 9 6 oz)   SpO2 98%   BMI 36 59 kg/m²        Physical Exam         Constitutional:  Well developed, well nourished, no acute distress, non-toxic appearance   Eyes:  PERRL, conjunctiva normal , non icteric sclera  HENT:  Atraumatic, oropharynx moist  Neck-  supple   Respiratory:  CTA b/l, normal breath sounds, no rales, no wheezing   Cardiovascular:  RRR, no murmurs, no LE edema b/l  GI:  Soft, nondistended, normal bowel sounds x 4, nontender, no organomegaly, no mass, no rebound, no guarding   Neurologic:  no focal deficits noted   Psychiatric:  Speech and behavior appropriate , AAO x 3  Skin, hyper pigmented lesion approximately 4-5 mm on the bridge of the nose  Hypersensitive to palpation  No exudate or discharge however flight lead erythematous    Not raised, macular      Yamileth Peeling, DO

## 2021-09-07 ENCOUNTER — APPOINTMENT (OUTPATIENT)
Dept: LAB | Age: 46
End: 2021-09-07
Payer: COMMERCIAL

## 2021-09-07 DIAGNOSIS — Z00.00 ANNUAL PHYSICAL EXAM: ICD-10-CM

## 2021-09-07 DIAGNOSIS — K21.9 GASTROESOPHAGEAL REFLUX DISEASE WITHOUT ESOPHAGITIS: ICD-10-CM

## 2021-09-07 DIAGNOSIS — E78.1 HYPERTRIGLYCERIDEMIA: ICD-10-CM

## 2021-09-07 LAB
ALBUMIN SERPL BCP-MCNC: 3.7 G/DL (ref 3.5–5)
ALP SERPL-CCNC: 80 U/L (ref 46–116)
ALT SERPL W P-5'-P-CCNC: 40 U/L (ref 12–78)
ANION GAP SERPL CALCULATED.3IONS-SCNC: 4 MMOL/L (ref 4–13)
AST SERPL W P-5'-P-CCNC: 19 U/L (ref 5–45)
BASOPHILS # BLD AUTO: 0.03 THOUSANDS/ΜL (ref 0–0.1)
BASOPHILS NFR BLD AUTO: 1 % (ref 0–1)
BILIRUB SERPL-MCNC: 0.44 MG/DL (ref 0.2–1)
BUN SERPL-MCNC: 19 MG/DL (ref 5–25)
CALCIUM SERPL-MCNC: 9.2 MG/DL (ref 8.3–10.1)
CHLORIDE SERPL-SCNC: 108 MMOL/L (ref 100–108)
CHOLEST SERPL-MCNC: 228 MG/DL (ref 50–200)
CO2 SERPL-SCNC: 27 MMOL/L (ref 21–32)
CREAT SERPL-MCNC: 0.95 MG/DL (ref 0.6–1.3)
EOSINOPHIL # BLD AUTO: 0.14 THOUSAND/ΜL (ref 0–0.61)
EOSINOPHIL NFR BLD AUTO: 3 % (ref 0–6)
ERYTHROCYTE [DISTWIDTH] IN BLOOD BY AUTOMATED COUNT: 12.2 % (ref 11.6–15.1)
GFR SERPL CREATININE-BSD FRML MDRD: 95 ML/MIN/1.73SQ M
GLUCOSE P FAST SERPL-MCNC: 110 MG/DL (ref 65–99)
HCT VFR BLD AUTO: 48.4 % (ref 36.5–49.3)
HDLC SERPL-MCNC: 42 MG/DL
HGB BLD-MCNC: 16.4 G/DL (ref 12–17)
IMM GRANULOCYTES # BLD AUTO: 0.02 THOUSAND/UL (ref 0–0.2)
IMM GRANULOCYTES NFR BLD AUTO: 0 % (ref 0–2)
LDLC SERPL CALC-MCNC: 130 MG/DL (ref 0–100)
LYMPHOCYTES # BLD AUTO: 1.53 THOUSANDS/ΜL (ref 0.6–4.47)
LYMPHOCYTES NFR BLD AUTO: 28 % (ref 14–44)
MCH RBC QN AUTO: 29.9 PG (ref 26.8–34.3)
MCHC RBC AUTO-ENTMCNC: 33.9 G/DL (ref 31.4–37.4)
MCV RBC AUTO: 88 FL (ref 82–98)
MONOCYTES # BLD AUTO: 0.45 THOUSAND/ΜL (ref 0.17–1.22)
MONOCYTES NFR BLD AUTO: 8 % (ref 4–12)
NEUTROPHILS # BLD AUTO: 3.22 THOUSANDS/ΜL (ref 1.85–7.62)
NEUTS SEG NFR BLD AUTO: 60 % (ref 43–75)
NRBC BLD AUTO-RTO: 0 /100 WBCS
PLATELET # BLD AUTO: 268 THOUSANDS/UL (ref 149–390)
PMV BLD AUTO: 9.9 FL (ref 8.9–12.7)
POTASSIUM SERPL-SCNC: 4.4 MMOL/L (ref 3.5–5.3)
PROT SERPL-MCNC: 7.3 G/DL (ref 6.4–8.2)
RBC # BLD AUTO: 5.48 MILLION/UL (ref 3.88–5.62)
SODIUM SERPL-SCNC: 139 MMOL/L (ref 136–145)
TRIGL SERPL-MCNC: 278 MG/DL
WBC # BLD AUTO: 5.39 THOUSAND/UL (ref 4.31–10.16)

## 2021-09-07 PROCEDURE — 36415 COLL VENOUS BLD VENIPUNCTURE: CPT

## 2021-09-07 PROCEDURE — 80053 COMPREHEN METABOLIC PANEL: CPT

## 2021-09-07 PROCEDURE — 80061 LIPID PANEL: CPT

## 2021-09-07 PROCEDURE — 85025 COMPLETE CBC W/AUTO DIFF WBC: CPT

## 2021-09-10 ENCOUNTER — OFFICE VISIT (OUTPATIENT)
Dept: INTERNAL MEDICINE CLINIC | Age: 46
End: 2021-09-10
Payer: COMMERCIAL

## 2021-09-10 VITALS
WEIGHT: 235 LBS | SYSTOLIC BLOOD PRESSURE: 110 MMHG | TEMPERATURE: 97.9 F | HEIGHT: 67 IN | OXYGEN SATURATION: 96 % | BODY MASS INDEX: 36.88 KG/M2 | HEART RATE: 66 BPM | DIASTOLIC BLOOD PRESSURE: 62 MMHG

## 2021-09-10 DIAGNOSIS — R73.01 IMPAIRED FASTING GLUCOSE: ICD-10-CM

## 2021-09-10 DIAGNOSIS — G47.33 MILD OBSTRUCTIVE SLEEP APNEA: Primary | ICD-10-CM

## 2021-09-10 DIAGNOSIS — E78.1 HYPERTRIGLYCERIDEMIA: ICD-10-CM

## 2021-09-10 DIAGNOSIS — L81.9 ATYPICAL PIGMENTED SKIN LESION: ICD-10-CM

## 2021-09-10 DIAGNOSIS — K21.9 GASTROESOPHAGEAL REFLUX DISEASE WITHOUT ESOPHAGITIS: ICD-10-CM

## 2021-09-10 LAB — SL AMB POCT HEMOGLOBIN AIC: 5.5 (ref ?–6.5)

## 2021-09-10 PROCEDURE — 99214 OFFICE O/P EST MOD 30 MIN: CPT | Performed by: PHYSICIAN ASSISTANT

## 2021-09-10 PROCEDURE — 83036 HEMOGLOBIN GLYCOSYLATED A1C: CPT | Performed by: PHYSICIAN ASSISTANT

## 2021-09-10 RX ORDER — CHLORAL HYDRATE 500 MG
1000 CAPSULE ORAL DAILY
Qty: 30 CAPSULE | Refills: 1
Start: 2021-09-10 | End: 2022-04-01 | Stop reason: SDUPTHER

## 2021-09-10 RX ORDER — OMEPRAZOLE 40 MG/1
40 CAPSULE, DELAYED RELEASE ORAL DAILY
Qty: 90 CAPSULE | Refills: 1 | Status: SHIPPED | OUTPATIENT
Start: 2021-09-10 | End: 2021-11-08 | Stop reason: SDUPTHER

## 2021-09-10 NOTE — ASSESSMENT & PLAN NOTE
Discussed importance of continued caffeine reduction  Discontinue energy drinks  Continue with Prilosec    Encouraged him to call and schedule GI follow-up

## 2021-09-10 NOTE — ASSESSMENT & PLAN NOTE
No signs of acute infection  Advised him to schedule follow up procedure visit with Dr Jordin Mauro for biopsy due to persistent symptoms and h/o sun exposure  Advised he reduce sun exposure

## 2021-09-10 NOTE — PATIENT INSTRUCTIONS
GERD (gastroesophageal reflux disease)  Discussed importance of continued caffeine reduction  Discontinue energy drinks  Continue with Prilosec  Encouraged him to call and schedule GI follow-up    Impaired fasting glucose  Hemoglobin A1c performed today in the office (5 5)  Encouraged low carb diet  Avoid liquid calories  Avoid excessive starchy vegetables and sweets  Encouraged weight loss    Mild obstructive sleep apnea  Patient with a history of mild obstructive sleep apnea  Patient has a CPAP at home which he has not used in some time due to improper titration  Will obtain repeat titration study and encourage follow-up with sleep specialist    Atypical pigmented skin lesion  No signs of acute infection  Advised him to schedule follow up procedure visit with Dr Carin Addison for biopsy due to persistent symptoms and h/o sun exposure  Advised he reduce sun exposure  Hypertriglyceridemia  Lipids and A1c noted  Trigs elevated  Start fish oil, discussed low carb diet, low fat, low cholesterol diet and daily activity

## 2021-09-10 NOTE — PROGRESS NOTES
[facility] [place]  Standard Office Visit  Patient ID: Shraddha Townsend    : 1975  Age/Gender: 50 y o  male     DATE: 9/10/2021      Assessment/Plan:    GERD (gastroesophageal reflux disease)  Discussed importance of continued caffeine reduction  Discontinue energy drinks  Continue with Prilosec  Encouraged him to call and schedule GI follow-up    Impaired fasting glucose  Hemoglobin A1c performed today in the office (5 5)  Encouraged low carb diet  Avoid liquid calories  Avoid excessive starchy vegetables and sweets  Encouraged weight loss    Mild obstructive sleep apnea  Patient with a history of mild obstructive sleep apnea  Patient has a CPAP at home which he has not used in some time due to improper titration  Will obtain repeat titration study and encourage follow-up with sleep specialist    Atypical pigmented skin lesion  No signs of acute infection  Advised him to schedule follow up procedure visit with Dr Debbie Cochran for biopsy due to persistent symptoms and h/o sun exposure  Advised he reduce sun exposure  Hypertriglyceridemia  Lipids and A1c noted  Trigs elevated  Start fish oil, discussed low carb diet, low fat, low cholesterol diet and daily activity  Diagnoses and all orders for this visit:    Mild obstructive sleep apnea  -     Sleep Auto-SV Study; Future    Gastroesophageal reflux disease without esophagitis  Comments:  Refilled prilosec  Schedule GI follow up  Keep with GERD diet and lifestyle modifications  Reduce caffeine/soda/energy drinks with goal to cut out completley  Orders:  -     omeprazole (PriLOSEC) 40 MG capsule; Take 1 capsule (40 mg total) by mouth daily    Atypical pigmented skin lesion  Comments:  Unchanged s/p keflex  No signs of acute infection  Schedule with Dr Debbie Cochran for biopsy  If unable to get in will refer to dermatology        Impaired fasting glucose  Comments:  Discussed increased fasting glucose on labs, A1c in office 5 5 (discussed diabetic range 6 5)  Encouraged low carb diet, avoid liquid calories  Orders:  -     Comprehensive metabolic panel; Future  -     Lipid Panel with Direct LDL reflex; Future  -     POCT hemoglobin A1c    Hypertriglyceridemia  Comments:  Start low dose fish oil 1000mg daily, encouraged low carb diet, avoid liquid calories  Orders:  -     Comprehensive metabolic panel; Future  -     Lipid Panel with Direct LDL reflex; Future  -     Omega-3 Fatty Acids (fish oil) 1,000 mg; Take 1 capsule (1,000 mg total) by mouth daily          BMI Counseling: Body mass index is 36 81 kg/m²  The BMI is above normal  Nutrition recommendations include encouraging healthy choices of fruits and vegetables, limiting drinks that contain sugar, moderation in carbohydrate intake, increasing intake of lean protein and reducing intake of cholesterol  Exercise recommendations include exercising 3-5 times per week  No pharmacotherapy was ordered  Patient referred to PCP due to patient being overweight  Subjective:   Chief Complaint   Patient presents with    Follow-up     pt needs A1c done for caring starts with you review labs 9/7/21         Lawerence Koyanagi is a 50 y o  male who presents to the office on 9/10/2021 for     50year old male for eval   Notes that he has an area on his nose that on occasion thompson  He saw Dr Sylvester Ponce in the past and was given oral antibiotics with little change  No previous history of skin cancer  No previous history skin lesions removed  Had physical earlier this year for CDL lisence  Increased life stressors  Patient feels he is coping ok with at this time  Mother currently ill with Lung CA, has previous hx of breast CA  Father in law with CA as well  GI sx much better controlled  Has not needed bentyl  Uses Carafate PRN s directed by his GI last visit  Using Prilosec as directed with good control of GERD sx    Notes he is doing better with diarrhea, fatigue, fever, rhinorrhea, shortness of breath, sore throat or vomiting  Treatments tried: abx per Dr Mary Johnson  The treatment provided no relief  Hyperlipidemia  This is a chronic problem  Recent lipid tests were reviewed and are variable  Exacerbating diseases include obesity  He has no history of diabetes or hypothyroidism  Pertinent negatives include no chest pain or shortness of breath  Current antihyperlipidemic treatment includes diet change  There are no compliance problems  The following portions of the patient's history were reviewed and updated as appropriate: allergies, current medications, past family history, past medical history, past social history, past surgical history and problem list     Review of Systems   Constitutional: Negative for fatigue, fever and weight loss  HENT: Negative for hoarse voice, rhinorrhea and sore throat  Respiratory: Negative for cough, choking and shortness of breath  Cardiovascular: Negative for chest pain  Gastrointestinal: Positive for heartburn (much better on prilosec and diet modifications  )  Negative for abdominal distention, abdominal pain, blood in stool, diarrhea, dysphagia, melena, nausea and vomiting  Notes he is due for GI follow up  Genitourinary: Negative for dysuria and frequency  Musculoskeletal: Positive for arthralgias  Negative for gait problem and muscle weakness  Chronic joint pain unchanged   Skin: Positive for rash  Skin lesion nose unchanged with keflex  Has not had prior bx  No personal skin CA hx   + hx of sun exposure  Neurological: Negative for dizziness and light-headedness           Patient Active Problem List   Diagnosis    Chronic pain of left knee    Depression with anxiety    GERD (gastroesophageal reflux disease)    Heel pain, chronic, right    Joint pain    Lateral epicondylitis of left elbow    Migraine headache    Mild obstructive sleep apnea    Neck muscle spasm    Trigger ring finger of right hand    Trigger middle finger of right hand    Hypertriglyceridemia    Mass of left foot    Right elbow pain    Cubital tunnel syndrome of both upper extremities    Olecranon bursitis of right elbow    Gastritis    Carpal tunnel syndrome, bilateral    Colon cancer screening    Callus of foot    Umbilical hernia without obstruction and without gangrene    Atelectasis    Colon polyp    Diverticulosis    Internal hemorrhoid    PAC (premature atrial contraction)    Laceration of right index finger without foreign body without damage to nail    Strain of left Achilles tendon    Calcaneal spur, left    Pain of left thumb    Exposure to COVID-19 virus    Annual physical exam    Strain of right shoulder    Atypical pigmented skin lesion    Impaired fasting glucose       Past Medical History:   Diagnosis Date    Elbow tendonitis     right possible bursitis    Gastritis     GERD (gastroesophageal reflux disease)     Migraine        Past Surgical History:   Procedure Laterality Date    EGD  2005    EYE FOREIGN BODY REMOVAL Left     MN INCISE FINGER TENDON SHEATH Right 12/26/2018    Procedure: LONG AND RING TRIGGER FINGER RELEASES;  Surgeon: Haris Yadav MD;  Location: [facility] OR;  Service: Orthopedics         Current Outpatient Medications:     acetaminophen (TYLENOL) 500 mg tablet, Take 500 mg by mouth every 6 (six) hours as needed for mild pain, Disp: , Rfl:     butalbital-acetaminophen-caffeine (FIORICET,ESGIC) -40 mg per tablet, Take 1 tablet by mouth daily as needed for migraine For on set of headache, Disp: 30 tablet, Rfl: 0    multivitamin (THERAGRAN) TABS, Take 1 tablet by mouth daily, Disp: , Rfl:     omeprazole (PriLOSEC) 40 MG capsule, Take 1 capsule (40 mg total) by mouth daily, Disp: 90 capsule, Rfl: 1    dicyclomine (BENTYL) 20 mg tablet, Take 1 tablet (20 mg total) by mouth 3 (three) times a day as needed (for abdominal cramping) for up to 7 days (Patient not taking: Reported on 1/21/2021), Disp: 21 tablet, Rfl: 0    Omega-3 Fatty Acids (fish oil) 1,000 mg, Take 1 capsule (1,000 mg total) by mouth daily, Disp: 30 capsule, Rfl: 1    sucralfate (CARAFATE) 1 g tablet, Take 1 tablet (1 g total) by mouth 2 (two) times a day Chew/crush or dissolve tablets in water prior to swallowing (Patient taking differently: Take 1 g by mouth 2 (two) times a day as needed Chew/crush or dissolve tablets in water prior to swallowing), Disp: 60 tablet, Rfl: 3    No Known Allergies    Social History     Socioeconomic History    Marital status: /Civil Union     Spouse name: None    Number of children: None    Years of education: None    Highest education level: None   Occupational History    None   Tobacco Use    Smoking status: Never Smoker    Smokeless tobacco: Never Used   Vaping Use    Vaping Use: Never used   Substance and Sexual Activity    Alcohol use: Yes     Alcohol/week: 0 0 standard drinks     Comment: Occasional    Drug use: Never    Sexual activity: Yes     Partners: Female     Birth control/protection: Female Sterilization   Other Topics Concern    None   Social History Narrative    Caffeine use     Social Determinants of Health     Financial Resource Strain:     Difficulty of Paying Living Expenses:    Food Insecurity:     Worried About Running Out of Food in the Last Year:     [address] in the Last Year:    Transportation Needs:     Lack of Transportation (Medical):      Lack of Transportation (Non-Medical):    Physical Activity:     Days of Exercise per Week:     Minutes of Exercise per Session:    Stress:     Feeling of Stress :    Social Connections:     Frequency of Communication with Friends and Family:     Frequency of Social Gatherings with Friends and Family:     Attends Voodoo Services:     Active Member of Clubs or Organizations:     Attends Club or Organization Meetings:     Marital Status:    Intimate Partner Violence:     Fear of Current or Ex-Partner:     Emotionally Abused:     Physically Abused:     Sexually Abused:        Family History   Problem Relation Age of Onset    Diabetes Mother     Hypertension Mother    Ava Curtis Breast cancer Mother     Cancer Mother         Breast    Lung cancer Mother     Diabetes Father     Gout Father     Hypertension Father     Cancer Maternal Grandmother     Rosacea Family        PHQ-9 Depression Screening    PHQ-9:   Frequency of the following problems over the past two weeks:             Health Maintenance   Topic Date Due    Hepatitis C Screening  Never done    HIV Screening  Never done    PT PLAN OF CARE  04/02/2021    Influenza Vaccine (1) 09/01/2021    Annual Physical  02/26/2022    BMI: Followup Plan  09/10/2022    BMI: Adult  09/10/2022    Colorectal Cancer Screening  09/25/2023    DTaP,Tdap,and Td Vaccines (6 - Td or Tdap) 10/22/2030    COVID-19 Vaccine  Completed    Pneumococcal Vaccine: Pediatrics (0 to 5 Years) and At-Risk Patients (6 to 59 Years)  Aged Out    HIB Vaccine  Aged Out    Hepatitis B Vaccine  Aged Out    IPV Vaccine  Aged Out    Hepatitis A Vaccine  Aged Out    Meningococcal ACWY Vaccine  Aged Out    HPV Vaccine  Aged [employer] History   Administered Date(s) Administered    INFLUENZA 10/03/2016, 09/05/2017    Influenza Quadrivalent Preservative Free 3 years and older IM 10/03/2016, 09/05/2017    Influenza, recombinant, quadrivalent,injectable, preservative free 10/23/2020    SARS-CoV-2 / COVID-19 mRNA IM (Pfizer-BioNTech) 03/22/2021, 04/12/2021    TD (adult) Preservative Free 04/29/2015    Td (adult), adsorbed 04/29/2015    Tdap 02/11/2014, 04/29/2015, 12/19/2016, 03/27/2020, 10/22/2020        Objective:  Vitals:    09/10/21 0653   BP: 110/62   BP Location: Left arm   Patient Position: Sitting   Cuff Size: Large   Pulse: 66   Temp: 97 9 °F (36 6 °C)   TempSrc: Temporal   SpO2: 96%   Weight: 107 kg (235 lb) Height: 5' 7" (1 702 m)     Wt Readings from Last 3 Encounters:   09/10/21 107 kg (235 lb)   08/24/21 106 kg (233 lb 9 6 oz)   02/26/21 103 kg (227 lb 9 6 oz)     Body mass index is 36 81 kg/m²  No exam data present       Physical Exam  Vitals and nursing note reviewed  Constitutional:       General: He is not in acute distress  Appearance: Normal appearance  He is well-developed  He is obese  He is not ill-appearing, toxic-appearing or diaphoretic  Comments: Alert pleasant cooperative  Seated in room in no acute distress   HENT:      Head: Normocephalic and atraumatic  Right Ear: Tympanic membrane normal       Left Ear: Tympanic membrane normal       Mouth/Throat:      Mouth: Mucous membranes are moist       Pharynx: No oropharyngeal exudate or posterior oropharyngeal erythema  Eyes:      General: No scleral icterus  Right eye: No discharge  Left eye: No discharge  Pupils: Pupils are equal, round, and reactive to light  Cardiovascular:      Rate and Rhythm: Normal rate and regular rhythm  Heart sounds: Normal heart sounds  No murmur heard  Pulmonary:      Effort: Pulmonary effort is normal  No respiratory distress  Breath sounds: Normal breath sounds  No wheezing or rales  Comments: Clear to auscultation  No crackles no rhonchi no wheeze  No respiratory distress  Abdominal:      General: Bowel sounds are normal  There is no distension  Palpations: Abdomen is soft  Tenderness: There is no abdominal tenderness  There is no guarding  Comments: Positive bowel sounds soft nontender nondistended   Musculoskeletal:      Cervical back: Neck supple  Skin:     General: Skin is warm and dry  Findings: Lesion present  Neurological:      General: No focal deficit present  Mental Status: He is alert and oriented to person, place, and time  Psychiatric:         Behavior: Behavior normal          Thought Content:  Thought content normal              Future Appointments   Date Time Provider Otto Coloni   10/21/2021  3:00 PM Karla Ni DO [address]    Patient Care Team:  Clifton pabon PA-C as PCP - General (Internal Medicine)  RONEY Parra MD    This note was completed in part utilizing M-Modal Fluency Direct Software  Grammatical errors, random word insertions, spelling mistakes, and incomplete sentences can be an occasional consequence of this system secondary to software limitations, ambient noise, and hardware issues  If you have any questions or concerns about the content, text, or information contained within the body of this dictation, please contact the provider for clarification

## 2021-09-10 NOTE — ASSESSMENT & PLAN NOTE
Lipids and A1c noted  Trigs elevated  Start fish oil, discussed low carb diet, low fat, low cholesterol diet and daily activity

## 2021-09-10 NOTE — ASSESSMENT & PLAN NOTE
Patient with a history of mild obstructive sleep apnea  Patient has a CPAP at home which he has not used in some time due to improper titration    Will obtain repeat titration study and encourage follow-up with sleep specialist

## 2021-09-10 NOTE — ASSESSMENT & PLAN NOTE
Hemoglobin A1c performed today in the office (5 5)  Encouraged low carb diet  Avoid liquid calories  Avoid excessive starchy vegetables and sweets    Encouraged weight loss

## 2021-09-22 ENCOUNTER — TELEPHONE (OUTPATIENT)
Dept: SLEEP CENTER | Facility: CLINIC | Age: 46
End: 2021-09-22

## 2021-09-22 NOTE — TELEPHONE ENCOUNTER
Incorrect sleep study has been ordered, patient cannot have an ASV sleep study unless CPAP and BIPAP have failed  I saw your notes said you want a retitration study, Can you please place a new order for a CPAP sleep study? Thank you!

## 2021-09-24 DIAGNOSIS — G47.33 MILD OBSTRUCTIVE SLEEP APNEA: Primary | ICD-10-CM

## 2021-10-06 NOTE — TELEPHONE ENCOUNTER
I do not see a new order put in, I only see the CPAP study  Could you please try again placing the diagnostic sleep study? ? [facility] code)  Thank you!

## 2021-10-14 NOTE — TELEPHONE ENCOUNTER
Hi! I still don't see that a new order was placed    Would you be able to place the order for the SLE3 code? Thank you!

## 2021-10-21 ENCOUNTER — PROCEDURE VISIT (OUTPATIENT)
Dept: INTERNAL MEDICINE CLINIC | Facility: CLINIC | Age: 46
End: 2021-10-21
Payer: COMMERCIAL

## 2021-10-21 ENCOUNTER — TELEPHONE (OUTPATIENT)
Dept: SLEEP CENTER | Facility: CLINIC | Age: 46
End: 2021-10-21

## 2021-10-21 VITALS
BODY MASS INDEX: 34.71 KG/M2 | DIASTOLIC BLOOD PRESSURE: 78 MMHG | SYSTOLIC BLOOD PRESSURE: 114 MMHG | WEIGHT: 229 LBS | TEMPERATURE: 98.5 F | OXYGEN SATURATION: 98 % | HEIGHT: 68 IN | HEART RATE: 68 BPM

## 2021-10-21 DIAGNOSIS — L57.0 ACTINIC KERATOSES: ICD-10-CM

## 2021-10-21 DIAGNOSIS — L81.9 ATYPICAL PIGMENTED SKIN LESION: Primary | ICD-10-CM

## 2021-10-21 PROCEDURE — 88341 IMHCHEM/IMCYTCHM EA ADD ANTB: CPT | Performed by: PATHOLOGY

## 2021-10-21 PROCEDURE — 88342 IMHCHEM/IMCYTCHM 1ST ANTB: CPT | Performed by: PATHOLOGY

## 2021-10-21 PROCEDURE — 88305 TISSUE EXAM BY PATHOLOGIST: CPT | Performed by: PATHOLOGY

## 2021-10-21 PROCEDURE — 11310 SHAVE SKIN LESION 0.5 CM/<: CPT | Performed by: FAMILY MEDICINE

## 2021-10-25 VITALS
SYSTOLIC BLOOD PRESSURE: 139 MMHG | RESPIRATION RATE: 16 BRPM | DIASTOLIC BLOOD PRESSURE: 86 MMHG | HEART RATE: 72 BPM | OXYGEN SATURATION: 97 % | TEMPERATURE: 98.2 F

## 2021-10-25 PROCEDURE — 99282 EMERGENCY DEPT VISIT SF MDM: CPT

## 2021-10-26 ENCOUNTER — HOSPITAL ENCOUNTER (EMERGENCY)
Facility: HOSPITAL | Age: 46
Discharge: HOME | End: 2021-10-26
Attending: EMERGENCY MEDICINE
Payer: COMMERCIAL

## 2021-10-26 DIAGNOSIS — S51.812A FOREARM LACERATION, LEFT, INITIAL ENCOUNTER: Primary | ICD-10-CM

## 2021-10-26 PROCEDURE — 12004 RPR S/N/AX/GEN/TRK7.6-12.5CM: CPT | Performed by: PHYSICIAN ASSISTANT

## 2021-10-26 PROCEDURE — 99282 EMERGENCY DEPT VISIT SF MDM: CPT | Performed by: PHYSICIAN ASSISTANT

## 2021-10-26 RX ORDER — BACITRACIN, NEOMYCIN, POLYMYXIN B 400; 3.5; 5 [USP'U]/G; MG/G; [USP'U]/G
1 OINTMENT TOPICAL ONCE
Status: COMPLETED | OUTPATIENT
Start: 2021-10-26 | End: 2021-10-26

## 2021-10-26 RX ORDER — LIDOCAINE HYDROCHLORIDE AND EPINEPHRINE 10; 10 MG/ML; UG/ML
10 INJECTION, SOLUTION INFILTRATION; PERINEURAL ONCE
Status: COMPLETED | OUTPATIENT
Start: 2021-10-26 | End: 2021-10-26

## 2021-10-26 RX ADMIN — LIDOCAINE HYDROCHLORIDE,EPINEPHRINE BITARTRATE 10 ML: 10; .01 INJECTION, SOLUTION INFILTRATION; PERINEURAL at 03:32

## 2021-10-26 RX ADMIN — BACITRACIN ZINC, NEOMYCIN, POLYMYXIN B 1 LARGE APPLICATION: 400; 3.5; 5 OINTMENT TOPICAL at 03:55

## 2021-10-28 DIAGNOSIS — L57.0 ACTINIC KERATOSIS: ICD-10-CM

## 2021-10-28 DIAGNOSIS — L81.9 ATYPICAL PIGMENTED SKIN LESION: Primary | ICD-10-CM

## 2021-11-01 ENCOUNTER — CONSULT (OUTPATIENT)
Dept: DERMATOLOGY | Facility: CLINIC | Age: 46
End: 2021-11-01
Payer: COMMERCIAL

## 2021-11-01 VITALS — BODY MASS INDEX: 36.1 KG/M2 | HEIGHT: 67 IN | TEMPERATURE: 97.8 F | WEIGHT: 230 LBS

## 2021-11-01 DIAGNOSIS — L57.0 ACTINIC KERATOSIS: Primary | ICD-10-CM

## 2021-11-01 DIAGNOSIS — L57.8 ACTINIC SKIN DAMAGE: ICD-10-CM

## 2021-11-01 DIAGNOSIS — L81.9 ATYPICAL PIGMENTED SKIN LESION: ICD-10-CM

## 2021-11-01 DIAGNOSIS — L98.9 FOOT LESION: ICD-10-CM

## 2021-11-01 DIAGNOSIS — D18.01 CHERRY ANGIOMA: ICD-10-CM

## 2021-11-01 PROCEDURE — 99244 OFF/OP CNSLTJ NEW/EST MOD 40: CPT | Performed by: DERMATOLOGY

## 2021-11-01 RX ORDER — IMIQUIMOD 12.5 MG/.25G
CREAM TOPICAL
Qty: 24 EACH | Refills: 0 | Status: SHIPPED | OUTPATIENT
Start: 2021-11-01 | End: 2022-03-04 | Stop reason: ALTCHOICE

## 2021-11-03 PROBLEM — L57.0 ACTINIC KERATOSES: Status: ACTIVE | Noted: 2021-11-03

## 2021-11-08 ENCOUNTER — OFFICE VISIT (OUTPATIENT)
Dept: INTERNAL MEDICINE CLINIC | Facility: CLINIC | Age: 46
End: 2021-11-08
Payer: COMMERCIAL

## 2021-11-08 VITALS
HEART RATE: 72 BPM | BODY MASS INDEX: 35.04 KG/M2 | HEIGHT: 68 IN | SYSTOLIC BLOOD PRESSURE: 106 MMHG | OXYGEN SATURATION: 96 % | DIASTOLIC BLOOD PRESSURE: 70 MMHG | TEMPERATURE: 97.1 F | WEIGHT: 231.2 LBS

## 2021-11-08 DIAGNOSIS — K21.9 GASTROESOPHAGEAL REFLUX DISEASE WITHOUT ESOPHAGITIS: ICD-10-CM

## 2021-11-08 DIAGNOSIS — S51.812D LACERATION OF LEFT FOREARM, SUBSEQUENT ENCOUNTER: Primary | ICD-10-CM

## 2021-11-08 PROBLEM — S51.812A LACERATION OF LEFT FOREARM: Status: ACTIVE | Noted: 2021-11-08

## 2021-11-08 PROCEDURE — 99213 OFFICE O/P EST LOW 20 MIN: CPT | Performed by: NURSE PRACTITIONER

## 2021-11-08 RX ORDER — OMEPRAZOLE 40 MG/1
40 CAPSULE, DELAYED RELEASE ORAL DAILY
Qty: 90 CAPSULE | Refills: 1 | Status: SHIPPED | OUTPATIENT
Start: 2021-11-08 | End: 2022-03-04 | Stop reason: SDUPTHER

## 2022-01-24 ENCOUNTER — HOSPITAL ENCOUNTER (OUTPATIENT)
Dept: RADIOLOGY | Age: 47
Discharge: HOME | End: 2022-01-24
Payer: COMMERCIAL

## 2022-01-24 DIAGNOSIS — R22.42 LOCALIZED SWELLING, MASS AND LUMP, LEFT LOWER LIMB: ICD-10-CM

## 2022-01-24 PROCEDURE — 76882 US LMTD JT/FCL EVL NVASC XTR: CPT

## 2022-01-26 ENCOUNTER — HOSPITAL ENCOUNTER (OUTPATIENT)
Dept: SLEEP CENTER | Facility: CLINIC | Age: 47
Discharge: HOME | End: 2022-01-26
Payer: COMMERCIAL

## 2022-01-26 DIAGNOSIS — G47.33 MILD OBSTRUCTIVE SLEEP APNEA: ICD-10-CM

## 2022-01-26 PROCEDURE — 95810 POLYSOM 6/> YRS 4/> PARAM: CPT

## 2022-01-26 PROCEDURE — 95810 POLYSOM 6/> YRS 4/> PARAM: CPT | Performed by: INTERNAL MEDICINE

## 2022-01-27 NOTE — PROGRESS NOTES
Sleep Study Documentation    Pre-Sleep Study       Sleep testing procedure explained to patient:YES    Patient napped prior to study:NO    [address] [place] worker after 12PM   Caffeine use:NO    Alcohol:Dayshift workers after 5PM: Alcohol use:NO    Typical day for patient:YES       Study Documentation    Sleep Study Indications: snoring and excessive daytime sleepiness    Sleep Study: Diagnostic   Snore: Moderate  Supplemental O2: no    Minimum SaO2 83  Baseline SaO2 94          EKG abnormalities: no     EEG abnormalities: no    Sleep Study Recorded < 2 hours: N/A    Sleep Study Recorded > 2 hours but incomplete study: N/A    Sleep Study Recorded 6 hours but no sleep obtained: NO    Patient classification: employed       Post-Sleep Study    Medication used at bedtime or during sleep study:NO    Patient reports time it took to fall asleep:less than 20 minutes    Patient reports waking up during study:3 or more times  Patient reports returning to sleep without difficulty  Patient reports sleeping 4 to 6 hours without dreaming  Patient reports sleep during study:typical    Patient rated sleepiness: Somewhat sleepy or tired    PAP treatment:no

## 2022-01-28 NOTE — RESULT ENCOUNTER NOTE
Please contact patient  Sleep study showing mild sleep apnea  Recommend CPAP titration study  Could you please contact patient and see if he has this scheduled  Otherwise we will place the order  Thanks  Irish Cuellar

## 2022-02-03 ENCOUNTER — TELEPHONE (OUTPATIENT)
Dept: SLEEP CENTER | Facility: CLINIC | Age: 47
End: 2022-02-03

## 2022-02-03 NOTE — TELEPHONE ENCOUNTER
Called patient and advised sleep study resulted and shows mild ROMAINE and hypoxemia  PCP had reached out to patient and scheduled follow up 3/4/22 to review results and treatment options  Offered to schedule consult with sleep specialist but patient declined  States he will wait to discuss with PCP

## 2022-02-10 ENCOUNTER — HOSPITAL ENCOUNTER (OUTPATIENT)
Dept: RADIOLOGY | Facility: IMAGING CENTER | Age: 47
Discharge: HOME | End: 2022-02-10
Attending: PODIATRIST
Payer: COMMERCIAL

## 2022-02-10 DIAGNOSIS — R22.42 LOCALIZED SWELLING, MASS AND LUMP, LOWER LIMB, LEFT: ICD-10-CM

## 2022-02-10 PROCEDURE — 73723 MRI JOINT LWR EXTR W/O&W/DYE: CPT

## 2022-02-10 PROCEDURE — A9585 GADOBUTROL INJECTION: HCPCS | Performed by: PODIATRIST

## 2022-02-10 RX ADMIN — GADOBUTROL 10 ML: 604.72 INJECTION INTRAVENOUS at 09:00

## 2022-02-14 DIAGNOSIS — R22.42 MASS OF LEFT FOOT: Primary | ICD-10-CM

## 2022-02-14 NOTE — NURSING NOTE
Call placed to patient to discuss upcoming appointment at [facility] radiology department and complete consultation with patient  Patient is having left ankle mass biopsy utilizing fluoroscopy guidance  Reviewed patient's allergies, no current anticoagulant medication present, also discussed the pre and post procedure expectations  Reminded patient of location and time expected for procedure, Patient expressed understanding by verbalizing and repeating instructions

## 2022-02-23 ENCOUNTER — OFFICE VISIT (OUTPATIENT)
Dept: DERMATOLOGY | Facility: CLINIC | Age: 47
End: 2022-02-23
Payer: COMMERCIAL

## 2022-02-23 VITALS — BODY MASS INDEX: 36.1 KG/M2 | TEMPERATURE: 97.8 F | HEIGHT: 67 IN | WEIGHT: 230 LBS

## 2022-02-23 DIAGNOSIS — L57.8 ACTINIC SKIN DAMAGE: Primary | ICD-10-CM

## 2022-02-23 PROCEDURE — 99213 OFFICE O/P EST LOW 20 MIN: CPT | Performed by: DERMATOLOGY

## 2022-02-23 NOTE — PROGRESS NOTES
[facility] Follow Up Note    Patient Name: Claudia Wall  Encounter Date: 02/23/22    Today's Chief Concerns:  Giuliana Delgado Concern #1:  SPOTS ON FACE   Concern #2:  FU TO TENDON LESION ON ANKLE      Current Medications:    Current Outpatient Medications:     acetaminophen (TYLENOL) 500 mg tablet, Take 500 mg by mouth every 6 (six) hours as needed for mild pain, Disp: , Rfl:     butalbital-acetaminophen-caffeine (FIORICET,ESGIC) -40 mg per tablet, Take 1 tablet by mouth daily as needed for migraine For on set of headache, Disp: 30 tablet, Rfl: 0    multivitamin (THERAGRAN) TABS, Take 1 tablet by mouth daily, Disp: , Rfl:     Omega-3 Fatty Acids (fish oil) 1,000 mg, Take 1 capsule (1,000 mg total) by mouth daily, Disp: 30 capsule, Rfl: 1    omeprazole (PriLOSEC) 40 MG capsule, Take 1 capsule (40 mg total) by mouth daily, Disp: 90 capsule, Rfl: 1    dicyclomine (BENTYL) 20 mg tablet, Take 1 tablet (20 mg total) by mouth 3 (three) times a day as needed (for abdominal cramping) for up to 7 days (Patient not taking: Reported on 11/8/2021 ), Disp: 21 tablet, Rfl: 0    imiquimod (ALDARA) 5 % cream, Apply to area on nose 3 nights a week for 4 weeks; stop for 4 weeks;  then resume for 4 weeks (Patient not taking: Reported on 2/23/2022 ), Disp: 24 each, Rfl: 0    sucralfate (CARAFATE) 1 g tablet, Take 1 tablet (1 g total) by mouth 2 (two) times a day Chew/crush or dissolve tablets in water prior to swallowing (Patient not taking: Reported on 10/21/2021), Disp: 60 tablet, Rfl: 3    CONSTITUTIONAL:   Vitals:    02/23/22 0929   Temp: 97 8 °F (36 6 °C)   TempSrc: Temporal   Weight: 104 kg (230 lb)   Height: 5' 7" (1 702 m)     Specific Alerts:    Have you been seen by a [facility] Dermatologist in the last 3 years? YES    Are you pregnant or planning to become pregnant? No    Are you currently or planning to be nursing or breast feeding?  No    No Known Allergies    May we call your Preferred Phone number to discuss your specific medical information? YES    May we leave a detailed message that includes your specific medical information? No    Have you traveled outside of the [facility] in the past 3 months? No    Do you currently have a pacemaker or defibrillator? No    Do you have any artificial heart valves, joints, plates, screws, rods, stents, pins, etc? No   - If Yes, were any placed within the last 2 years? Do you require any medications prior to a surgical procedure? No    Are you taking any medications that cause you to bleed more easily ("blood thinners") No    Have you ever experienced a rapid heartbeat with epinephrine? No    Have you ever been treated with "gold" (gold sodium thiomalate) therapy? No    [address] D[facility]at[facility] can help with wrinkles, "laugh lines," facial volume loss, "double chin," "love handles," age spots, and more  Are you interested in learning today about some of the skin enhancement procedures that we offer? (If Yes, please provide more detail) No    Review of Systems:  Have you recently had or currently have any of the following?     · Fever or chills: No  · Night Sweats: YES  · Headaches: YES  · Weight Gain: YES  · Weight Loss: No  · Blurry Vision: No  · Nausea: No  · Vomiting: No  · Diarrhea: No  · Blood in Stool: No  · Abdominal Pain: No  · Itchy Skin: No  · Painful Joints: No  · Swollen Joints: No  · Muscle Pain: No  · Irregular Mole: No  · Sun Burn: No  · Dry Skin: No  · Skin Color Changes: No  · Scar or Keloid: No  · Cold Sores/Fever Blisters: No  · Bacterial Infections/MRSA: No  · Anxiety: No  · Depression: No  · Suicidal or Homicidal Thoughts: No      PSYCH: Normal mood and affect  EYES: Normal conjunctiva  ENT: Normal lips  CARDIOVASCULAR: No upper-limb edema  RESPIRATORY: Normal respirations    FOCUSED ORGAN SYSTEM SKIN EXAM (SKIN)   Ears, Face Normal except as noted below in Assessment   Right Hand/Fingers Normal except as noted below in Assessment   Left Hand/Fingers Normal except as noted below in Assessment     TENDON LESION  Physical Exam:  · Anatomic Location Affected:  Left ankle  · Morphological Description: hard movable nodule     Additional History of Present Condition:  PATIENT HAD MRI IMAGING DONE ON  02/10/22; SEE REPORT   Assessment and Plan:  Based on a thorough discussion of this condition and the management approach to it (including a comprehensive discussion of the known risks, side effects and potential benefits of treatment), the patient (family) agrees to implement the following specific plan:    · Patient has biopsy set up on 02/28/22 for needle guide    SEBORRHEIC KERATOSIS; NON-INFLAMED    Physical Exam:   Anatomic Location Affected:  face   Morphological Description:  No such lesions of note   Pertinent Positives:   Pertinent Negatives: Additional History of Present Condition:  Denies itch, burn, pain, bleeding or ulceration  Present constantly; nothing seems to make it worse or better  No prior treatment  Assessment and Plan:  Based on a thorough discussion of this condition and the management approach to it (including a comprehensive discussion of the known risks, side effects and potential benefits of treatment), the patient (family) agrees to implement the following specific plan:   Reassured benign   AK resolved  Head, neck, upper limbs, ears WNL   RTO ASAP with any new or changing skin lesions   Sun precautions  Seborrheic Keratosis  A seborrheic keratosis is a harmless warty spot that appears during adult life as a common sign of skin aging  Seborrheic keratoses can arise on any area of skin, covered or uncovered, with the usual exception of the palms and soles  They do not arise from mucous membranes  Seborrheic keratoses can have highly variable appearance  Seborrheic keratoses are extremely common  It has been estimated that over 90% of adults over the age of 61 years have one or more of them   They occur in males and females of all races, typically beginning to erupt in the 35s or 45s  They are uncommon under the age of 21 years  The precise cause of seborrhoeic keratoses is not known  Seborrhoeic keratoses are considered degenerative in nature  As time goes by, seborrheic keratoses tend to become more numerous  Some people inherit a tendency to develop a very large number of them; some people may have hundreds of them  The name "seborrheic keratosis" is misleading, because these lesions are not limited to a seborrhoeic distribution (scalp, mid-face, chest, upper back), nor are they formed from sebaceous glands, nor are they associated with sebum -- which is greasy  Seborrheic keratosis may also be called "SK," "Seb K," "basal cell papilloma," "senile wart," or "barnacle "      Researchers have noted:   Eruptive seborrhoeic keratoses can follow sunburn or dermatitis   Skin friction may be the reason they appear in body folds   Viral cause (e g , human papillomavirus) seems unlikely   Stable and clonal mutations or activation of FRFR3, PIK3CA, DUSTIN, AKT1 and EGFR genes are found in seborrhoeic keratoses   Seborrhoeic keratosis can arise from solar lentigo   FRFR3 mutations also arise in solar lentigines  These mutations are associated with increased age and location on the head and neck, suggesting a role of ultraviolet radiation in these lesions   Seborrheic keratoses do not harbour tumour suppressor gene mutations   Epidermal growth factor receptor inhibitors, which are used to treat some cancers, often result in an increase in verrucal (warty) keratoses  There is no easy way to remove multiple lesions on a single occasion  Unless a specific lesion is "inflamed" and is causing pain or stinging/burning or is bleeding, most insurance companies do not authorize treatment      Scribe Attestation    I,:  Brian Ayala am acting as a scribe while in the presence of the attending physician :       I,: Pilar Pedro MD personally performed the services described in this documentation    as scribed in my presence :

## 2022-02-23 NOTE — PATIENT INSTRUCTIONS
TENDON LESION     Assessment and Plan:  Based on a thorough discussion of this condition and the management approach to it (including a comprehensive discussion of the known risks, side effects and potential benefits of treatment), the patient (family) agrees to implement the following specific plan:    · Patient has biopsy set up on 02/28/22 for needle guide    SEBORRHEIC KERATOSIS; NON-INFLAMED    Assessment and Plan:  Based on a thorough discussion of this condition and the management approach to it (including a comprehensive discussion of the known risks, side effects and potential benefits of treatment), the patient (family) agrees to implement the following specific plan:   Reassured benign    Seborrheic Keratosis  A seborrheic keratosis is a harmless warty spot that appears during adult life as a common sign of skin aging  Seborrheic keratoses can arise on any area of skin, covered or uncovered, with the usual exception of the palms and soles  They do not arise from mucous membranes  Seborrheic keratoses can have highly variable appearance  Seborrheic keratoses are extremely common  It has been estimated that over 90% of adults over the age of 61 years have one or more of them  They occur in males and females of all races, typically beginning to erupt in the 35s or 45s  They are uncommon under the age of 21 years  The precise cause of seborrhoeic keratoses is not known  Seborrhoeic keratoses are considered degenerative in nature  As time goes by, seborrheic keratoses tend to become more numerous  Some people inherit a tendency to develop a very large number of them; some people may have hundreds of them  The name "seborrheic keratosis" is misleading, because these lesions are not limited to a seborrhoeic distribution (scalp, mid-face, chest, upper back), nor are they formed from sebaceous glands, nor are they associated with sebum -- which is greasy    Seborrheic keratosis may also be called "SK," "Seb K," "basal cell papilloma," "senile wart," or "barnacle "      Researchers have noted:   Eruptive seborrhoeic keratoses can follow sunburn or dermatitis   Skin friction may be the reason they appear in body folds   Viral cause (e g , human papillomavirus) seems unlikely   Stable and clonal mutations or activation of FRFR3, PIK3CA, DUSTIN, AKT1 and EGFR genes are found in seborrhoeic keratoses   Seborrhoeic keratosis can arise from solar lentigo   FRFR3 mutations also arise in solar lentigines  These mutations are associated with increased age and location on the head and neck, suggesting a role of ultraviolet radiation in these lesions   Seborrheic keratoses do not harbour tumour suppressor gene mutations   Epidermal growth factor receptor inhibitors, which are used to treat some cancers, often result in an increase in verrucal (warty) keratoses  There is no easy way to remove multiple lesions on a single occasion  Unless a specific lesion is "inflamed" and is causing pain or stinging/burning or is bleeding, most insurance companies do not authorize treatment

## 2022-02-28 ENCOUNTER — HOSPITAL ENCOUNTER (OUTPATIENT)
Dept: RADIOLOGY | Facility: HOSPITAL | Age: 47
Discharge: HOME | End: 2022-02-28
Attending: PODIATRIST

## 2022-02-28 ENCOUNTER — TELEPHONE (OUTPATIENT)
Dept: OTHER | Facility: HOSPITAL | Age: 47
End: 2022-02-28

## 2022-03-01 DIAGNOSIS — R22.42 MASS OF LEFT FOOT: Primary | ICD-10-CM

## 2022-03-04 ENCOUNTER — TELEPHONE (OUTPATIENT)
Dept: SLEEP CENTER | Facility: CLINIC | Age: 47
End: 2022-03-04

## 2022-03-04 ENCOUNTER — OFFICE VISIT (OUTPATIENT)
Dept: INTERNAL MEDICINE CLINIC | Facility: OTHER | Age: 47
End: 2022-03-04
Payer: COMMERCIAL

## 2022-03-04 ENCOUNTER — HOSPITAL ENCOUNTER (OUTPATIENT)
Dept: SLEEP CENTER | Facility: CLINIC | Age: 47
Discharge: HOME | End: 2022-03-04
Payer: COMMERCIAL

## 2022-03-04 VITALS
TEMPERATURE: 97.8 F | SYSTOLIC BLOOD PRESSURE: 110 MMHG | DIASTOLIC BLOOD PRESSURE: 72 MMHG | OXYGEN SATURATION: 97 % | BODY MASS INDEX: 35.79 KG/M2 | HEART RATE: 60 BPM | WEIGHT: 228 LBS | HEIGHT: 67 IN

## 2022-03-04 DIAGNOSIS — G47.33 MILD OBSTRUCTIVE SLEEP APNEA: ICD-10-CM

## 2022-03-04 DIAGNOSIS — R07.9 CHEST PAIN, UNSPECIFIED TYPE: ICD-10-CM

## 2022-03-04 DIAGNOSIS — E78.1 HYPERTRIGLYCERIDEMIA: ICD-10-CM

## 2022-03-04 DIAGNOSIS — K21.9 GASTROESOPHAGEAL REFLUX DISEASE WITHOUT ESOPHAGITIS: ICD-10-CM

## 2022-03-04 DIAGNOSIS — G47.33 MILD OBSTRUCTIVE SLEEP APNEA: Primary | ICD-10-CM

## 2022-03-04 DIAGNOSIS — R53.83 OTHER FATIGUE: ICD-10-CM

## 2022-03-04 PROCEDURE — 95811 POLYSOM 6/>YRS CPAP 4/> PARM: CPT

## 2022-03-04 PROCEDURE — 95811 POLYSOM 6/>YRS CPAP 4/> PARM: CPT | Performed by: INTERNAL MEDICINE

## 2022-03-04 PROCEDURE — 93000 ELECTROCARDIOGRAM COMPLETE: CPT | Performed by: PHYSICIAN ASSISTANT

## 2022-03-04 PROCEDURE — 99214 OFFICE O/P EST MOD 30 MIN: CPT | Performed by: PHYSICIAN ASSISTANT

## 2022-03-04 RX ORDER — OMEPRAZOLE 40 MG/1
40 CAPSULE, DELAYED RELEASE ORAL DAILY
Qty: 90 CAPSULE | Refills: 1 | Status: SHIPPED | OUTPATIENT
Start: 2022-03-04

## 2022-03-04 NOTE — ASSESSMENT & PLAN NOTE
EKG today in office showing flipped T waves in III and flattened in AvF similar to prior EKG in 2020 but note present on 1/2021 EKG  Recommend stress test to further evaluate  Will sent for labs as well  Discussed red flag sx and ER precautions which need immediate eval and tx should they develop

## 2022-03-04 NOTE — TELEPHONE ENCOUNTER
----- Message from Frankyln Diaz MD sent at 3/4/2022 10:00 AM EST -----  Approved  ----- Message -----  From: Kristy Greene  Sent: 3/2/2022   1:35 PM EST  To: Sleep Medicine [facility] Provider    This sleep study needs approval      If approved please sign and return to clerical pool  If denied please include reasons why  Also provide alternative testing if warranted  Please sign and return to clerical pool

## 2022-03-04 NOTE — ASSESSMENT & PLAN NOTE
Will check lipid panel  Encouraged low-fat low-cholesterol diet    Commended on weight loss and exercise

## 2022-03-04 NOTE — ASSESSMENT & PLAN NOTE
Continue on Prilosec  Continue with Carafate p r n     Discussed importance contain doing with diet lifestyle modifications to help control symptoms along with medications  Schedule GI follow-up

## 2022-03-04 NOTE — H&P (VIEW-ONLY)
[facility]  Standard Office Visit  Patient ID: Gregor Pardo    : 1975  Age/Gender: 48 y o  male     DATE: 3/4/2022      Assessment/Plan:    Mild obstructive sleep apnea  Reviewed sleep study  Recommend CPAP titration study  Patient verbalized understanding and is willing  Patient called while he is in office today for CPAP study and will be going tonight  Discussed other ways to help manage obstructive sleep apnea including  Weight loss, sleeping on side can be beneficial   Patient notes he is exercising and trying to lose weight which can help  Will follow results    Chest pain  EKG today in office showing flipped T waves in III and flattened in AvF similar to prior EKG in  but note present on 2021 EKG  Recommend stress test to further evaluate  Will sent for labs as well  Discussed red flag sx and ER precautions which need immediate eval and tx should they develop  GERD (gastroesophageal reflux disease)  Continue on Prilosec  Continue with Carafate p r n     Discussed importance contain doing with diet lifestyle modifications to help control symptoms along with medications  Schedule GI follow-up  Hypertriglyceridemia  Will check lipid panel  Encouraged low-fat low-cholesterol diet  Commended on weight loss and exercise    Other fatigue  Sent for additional labs to further evaluate as discussed above  Four-week follow-up with labs  Diagnoses and all orders for this visit:    Mild obstructive sleep apnea    Gastroesophageal reflux disease without esophagitis  Comments:  Refilled prilosec  Schedule GI follow up  Keep with GERD diet and lifestyle modifications  Reduce caffeine/soda/energy drinks with goal to cut out completley  Orders:  -     omeprazole (PriLOSEC) 40 MG capsule; Take 1 capsule (40 mg total) by mouth daily    Chest pain, unspecified type  -     Lipid Panel with Direct LDL reflex;  Future  - Comprehensive metabolic panel; Future  -     CBC and differential; Future  -     Stress test only, exercise; Future  -     POCT ECG    Hypertriglyceridemia  -     Lipid Panel with Direct LDL reflex; Future  -     Comprehensive metabolic panel; Future  -     CBC and differential; Future    Other fatigue  -     Lipid Panel with Direct LDL reflex; Future  -     Comprehensive metabolic panel; Future  -     CBC and differential; Future  -     TSH, 3rd generation with Free T4 reflex; Future          BMI Counseling: Body mass index is 35 71 kg/m²  The BMI is above normal  Nutrition recommendations include decreasing portion sizes, encouraging healthy choices of fruits and vegetables, moderation in carbohydrate intake and increasing intake of lean protein  No pharmacotherapy was ordered  Patient referred to PCP  Rationale for BMI follow-up plan is due to patient being overweight or obese  Subjective:   Chief Complaint   Patient presents with    Follow-up     4 month f/u         annual physical          Angelica Chan is a 48 y o  male who presents to the office on 3/4/2022 for     66-year-old male with a history of GERD, impaired fasting glucose, mild obstructive sleep apnea, migraine headaches presents to the office for chronic condition follow-up  He reports that he has had some increased stress as of late and he has been feeling fatigued  He notes he does sleep at night and can fall asleep in 5 minutes  Gets 6-7 hours per night  Does feel rested  Notes some days when he wakes he feels like he struggles to get out of bed  Denies anxiety  Denies depression  NO SI no HI  Decreased ambition  Notes he is physically active throughout the day without decreased exercise tolerance  Physically active day working 10-12 hours per day without difficulty  Denies any physical limitations to his daily activity  Family is supportive  Notes he had initial sleep study (was diagnosed with mild sleep apnea)    He was called today while in office to set follow-up sleep study  They had an opening tonight and he is going tonight for follow-up sleep study  He followed up with derm for skin lesion  He notes topical cream helped  He has annual derm follow up  Following with Dr Shen Carty regarding left ankle swelling  He reports that he had an MRI performed  His left ankle swelling has improved and is no longer painful or tender  Decreasing in size  Notes he is scheduled with IR for biopsy of growth on his left foot  Has not needed migraine meds in some time  Stopped Coffee outright when he was getting heartburn and since that time notes he no longer gets HA since stopping caffeine  Has not needed Fioricet in some time  Incidentally noted he reports that he did have a single episode chest pressure and discomfort  Occurred during a stressful situation when his daughter told him she may be getting   Lasted several minutes and resolved spontaneously  Not associated with any palpitations  Not associated with any shortness of breath or diaphoresis  Did not seek care for this  No personal history of any previous cardiac problems  He has had a stress test in the past several years ago  He has had a Holter monitor in the past   No prior echo that patient recalls  Patient denies chest pain or palpitations with daily activity  Increased life stressors include: Mother being tx for lung CA and left sided lung resection  His mother was getting chemo but recently has elected to forego chemo  He notes he is dealing ok with his mom's medical situation  He notes he helps with her medical and talks with other family (3 sisters and 1 brother)  Recently lost his Father in law (multiple myeloma)  He notes he was not able to go to [place] for his father in [address] [facility] because he was caring for his Mom  Daughter is scheduled to get her ovary removed (she prior had a similar procedure many years ago on the other ovary)  Chest Pain   This is a recurrent problem  The current episode started 1 to 4 weeks ago  The onset quality is sudden  The problem has been resolved  The pain is present in the substernal region  The pain is moderate  The quality of the pain is described as sharp and pressure  The pain does not radiate  Associated symptoms include malaise/fatigue  Pertinent negatives include no abdominal pain, cough, diaphoresis, dizziness, exertional chest pressure, fever, headaches, irregular heartbeat, lower extremity edema, nausea, near-syncope, palpitations, shortness of breath, syncope, vomiting or weakness  Associated with: Stressful situation  The treatment provided significant relief  The following portions of the patient's history were reviewed and updated as appropriate: allergies, current medications, past family history, past medical history, past social history, past surgical history and problem list     Review of Systems   Constitutional: Positive for fatigue and malaise/fatigue  Negative for chills, diaphoresis and fever  HENT: Negative for rhinorrhea and sore throat  Eyes: Negative for visual disturbance  Respiratory: Negative for cough, shortness of breath and wheezing  Cardiovascular: Negative for chest pain, palpitations, leg swelling, syncope and near-syncope  Gastrointestinal: Negative for abdominal pain, blood in stool, constipation, diarrhea, nausea and vomiting  No dark tarry stool   Genitourinary: Negative for dysuria and frequency  Skin: Negative for rash (Face lesion resolved per patient  )  Neurological: Negative for dizziness, syncope, weakness, light-headedness and headaches           Patient Active Problem List   Diagnosis    Chest pain    Chronic pain of left knee    Depression with anxiety    GERD (gastroesophageal reflux disease)    Heel pain, chronic, right    Joint pain    Lateral epicondylitis of left elbow    Migraine headache    Mild obstructive sleep apnea    Neck muscle spasm    Trigger ring finger of right hand    Trigger middle finger of right hand    Hypertriglyceridemia    Mass of left foot    Right elbow pain    Cubital tunnel syndrome of both upper extremities    Olecranon bursitis of right elbow    Gastritis    Carpal tunnel syndrome, bilateral    Colon cancer screening    Callus of foot    Umbilical hernia without obstruction and without gangrene    Atelectasis    Colon polyp    Diverticulosis    Internal hemorrhoid    PAC (premature atrial contraction)    Laceration of right index finger without foreign body without damage to nail    Strain of left Achilles tendon    Calcaneal spur, left    Pain of left thumb    Exposure to COVID-19 virus    Annual physical exam    Strain of right shoulder    Atypical pigmented skin lesion    Impaired fasting glucose    Actinic keratoses    Laceration of left forearm    Excessive daytime sleepiness    Other fatigue       Past Medical History:   Diagnosis Date    Elbow tendonitis     right possible bursitis    Gastritis     GERD (gastroesophageal reflux disease)     Migraine        Past Surgical History:   Procedure Laterality Date    COLONOSCOPY      EGD  2005    EGD  01/2020    EYE FOREIGN BODY REMOVAL Left     AZ INCISE FINGER TENDON SHEATH Right 12/26/2018    Procedure: LONG AND RING TRIGGER FINGER RELEASES;  Surgeon: Isamar Saldana MD;  Location: [facility];  Service: Orthopedics    SKIN BIOPSY           Current Outpatient Medications:     butalbital-acetaminophen-caffeine (FIORICET,ESGIC) -40 mg per tablet, Take 1 tablet by mouth daily as needed for migraine For on set of headache, Disp: 30 tablet, Rfl: 0    multivitamin (THERAGRAN) TABS, Take 1 tablet by mouth daily, Disp: , Rfl:     Omega-3 Fatty Acids (fish oil) 1,000 mg, Take 1 capsule (1,000 mg total) by mouth daily, Disp: 30 capsule, Rfl: 1    omeprazole (PriLOSEC) 40 MG capsule, Take 1 capsule (40 mg total) by mouth daily, Disp: 90 capsule, Rfl: 1    sucralfate (CARAFATE) 1 g tablet, Take 1 tablet (1 g total) by mouth 2 (two) times a day Chew/crush or dissolve tablets in water prior to swallowing (Patient not taking: Reported on 10/21/2021), Disp: 60 tablet, Rfl: 3    No Known Allergies    Social History     Socioeconomic History    Marital status: /Civil Union     Spouse name: None    Number of children: None    Years of education: None    Highest education level: None   Occupational History    None   Tobacco Use    Smoking status: Never Smoker    Smokeless tobacco: Never Used   Vaping Use    Vaping Use: Never used   Substance and Sexual Activity    Alcohol use: Not Currently     Alcohol/week: 0 0 standard drinks     Comment: Occasional    Drug use: Never    Sexual activity: Yes     Partners: Female     Birth control/protection: Female Sterilization   Other Topics Concern    None   Social History Narrative    Caffeine use     Social Determinants of Health     Financial Resource Strain: Not on file   Food Insecurity: Not on file   Transportation Needs: Not on file   Physical Activity: Not on file   Stress: Not on file   Social Connections: Not on file   Intimate Partner Violence: Not on file   Housing Stability: Not on file       Family History   Problem Relation Age of Onset    Diabetes Mother     Hypertension Mother     Breast cancer Mother     Cancer Mother         Breast    Lung cancer Mother     Diabetes Father     Gout Father     Hypertension Father     Cancer Maternal Grandmother     Rosacea Family        PHQ-2/9 Depression Screening    Little interest or pleasure in doing things: 0 - not at all  Feeling down, depressed, or hopeless: 0 - not at all  Trouble falling or staying asleep, or sleeping too much: 1 - several days  Feeling tired or having little energy: 2 - more than half the days  Poor appetite or overeatin - not at all  Feeling bad about yourself - or that you are a failure or have let yourself or your family down: 0 - not at all  Trouble concentrating on things, such as reading the newspaper or watching television: 1 - several days  Moving or speaking so slowly that other people could have noticed   Or the opposite - being so fidgety or restless that you have been moving around a lot more than usual: 0 - not at all  Thoughts that you would be better off dead, or of hurting yourself in some way: 0 - not at all  PHQ-9 Score: 4   PHQ-9 Interpretation: No or Minimal depression          Health Maintenance   Topic Date Due    Hepatitis C Screening  Never done    HIV Screening  Never done    PT PLAN OF CARE  04/02/2021    COVID-19 Vaccine (3 - Booster for Pfizer series) 09/12/2021    Annual Physical  02/26/2022    Influenza Vaccine (1) 06/30/2022 (Originally 9/1/2021)    BMI: Followup Plan  09/10/2022    BMI: Adult  03/04/2023    Colorectal Cancer Screening  09/25/2023    DTaP,Tdap,and Td Vaccines (6 - Td or Tdap) 10/22/2030    Pneumococcal Vaccine: Pediatrics (0 to 5 Years) and At-Risk Patients (6 to 59 Years)  Aged Out    HIB Vaccine  Aged Out    Hepatitis B Vaccine  Aged Out    IPV Vaccine  Aged Out    Hepatitis A Vaccine  Aged Out    Meningococcal ACWY Vaccine  Aged Out    HPV Vaccine  Aged [employer] History   Administered Date(s) Administered    COVID-19 PFIZER VACCINE 0 3 ML IM 03/22/2021, 04/12/2021    INFLUENZA 10/03/2016, 09/05/2017    Influenza Quadrivalent Preservative Free 3 years and older IM 10/03/2016, 09/05/2017    Influenza, recombinant, quadrivalent,injectable, preservative free 10/23/2020    TD (adult) Preservative Free 04/29/2015    Td (adult), adsorbed 04/29/2015    Tdap 02/11/2014, 04/29/2015, 12/19/2016, 03/27/2020, 10/22/2020        Objective:  Vitals:    03/04/22 0824   BP: 110/72   BP Location: Left arm   Patient Position: Sitting   Cuff Size: Large   Pulse: 60   Temp: 97 8 °F (36 6 °C)   TempSrc: Temporal   SpO2: 97% Weight: 103 kg (228 lb)   Height: 5' 7" (1 702 m)     Wt Readings from Last 3 Encounters:   03/04/22 103 kg (228 lb)   02/23/22 104 kg (230 lb)   11/08/21 105 kg (231 lb 3 2 oz)     Body mass index is 35 71 kg/m²  No exam data present       Physical Exam  Vitals and nursing note reviewed  Constitutional:       General: He is not in acute distress  Appearance: Normal appearance  He is well-developed  He is not diaphoretic  Comments: Alert pleasant cooperative  Seated in room in no acute distress  HENT:      Head: Normocephalic and atraumatic  Right Ear: Tympanic membrane normal       Left Ear: Tympanic membrane normal       Mouth/Throat:      Mouth: Mucous membranes are moist       Pharynx: No oropharyngeal exudate or posterior oropharyngeal erythema  Eyes:      General: No scleral icterus  Right eye: No discharge  Left eye: No discharge  Pupils: Pupils are equal, round, and reactive to light  Cardiovascular:      Rate and Rhythm: Normal rate and regular rhythm  Heart sounds: Normal heart sounds  No murmur heard  Pulmonary:      Effort: Pulmonary effort is normal  No respiratory distress  Breath sounds: Normal breath sounds  No wheezing or rales  Comments: Symmetric air exchange throughout  No crackles no rhonchi no wheeze  No respiratory distress  Abdominal:      General: Bowel sounds are normal  There is no distension  Palpations: Abdomen is soft  Tenderness: There is no abdominal tenderness  There is no right CVA tenderness or guarding  Comments: Positive bowel sounds soft nontender  No epigastric tenderness to palpation   Musculoskeletal:      Cervical back: Neck supple  Right lower leg: No edema  Left lower leg: No edema  Legs:    Lymphadenopathy:      Cervical: No cervical adenopathy  Skin:     General: Skin is warm and dry  Findings: No rash     Neurological:      Mental Status: He is alert and oriented to person, place, and time  Psychiatric:         Behavior: Behavior normal          Thought Content: Thought content normal              Future Appointments   Date Time Provider Otto Sara   3/4/2022  8:00 PM [facility] SLEEP ROOM 02 [facility]   3/16/2022  8:30 AM BE IR 3 (DISCOVERY) BE IR [facility]   3/23/2022  9:00 AM Chayo Mallory MD ORTHO AND [facility]   4/1/2022  7:15 AM Oriana Garcia PA-C [facility]       Oriana Garcia[address]    Patient Care Team:  Oriana Garcia PA-C as PCP - General (Internal Medicine)  RONEY Gaspar MD    This note was completed in part utilizing M-Modal Fluency Direct Software  Grammatical errors, random word insertions, spelling mistakes, and incomplete sentences can be an occasional consequence of this system secondary to software limitations, ambient noise, and hardware issues  If you have any questions or concerns about the content, text, or information contained within the body of this dictation, please contact the provider for clarification

## 2022-03-04 NOTE — PROGRESS NOTES
[facility] [place]  Patient ID: Gaby Bella    : 1975  Age/Gender: 48 y o  male     DATE: 3/4/2022      Assessment/Plan:    Mild obstructive sleep apnea  Reviewed sleep study  Recommend CPAP titration study  Patient verbalized understanding and is willing  Patient called while he is in office today for CPAP study and will be going tonight  Discussed other ways to help manage obstructive sleep apnea including  Weight loss, sleeping on side can be beneficial   Patient notes he is exercising and trying to lose weight which can help  Will follow results    Chest pain  EKG today in office showing flipped T waves in III and flattened in AvF similar to prior EKG in  but note present on 2021 EKG  Recommend stress test to further evaluate  Will sent for labs as well  Discussed red flag sx and ER precautions which need immediate eval and tx should they develop  GERD (gastroesophageal reflux disease)  Continue on Prilosec  Continue with Carafate p r n     Discussed importance contain doing with diet lifestyle modifications to help control symptoms along with medications  Schedule GI follow-up  Hypertriglyceridemia  Will check lipid panel  Encouraged low-fat low-cholesterol diet  Commended on weight loss and exercise    Other fatigue  Sent for additional labs to further evaluate as discussed above  Four-week follow-up with labs  Diagnoses and all orders for this visit:    Mild obstructive sleep apnea    Gastroesophageal reflux disease without esophagitis  Comments:  Refilled prilosec  Schedule GI follow up  Keep with GERD diet and lifestyle modifications  Reduce caffeine/soda/energy drinks with goal to cut out completley  Orders:  -     omeprazole (PriLOSEC) 40 MG capsule; Take 1 capsule (40 mg total) by mouth daily    Chest pain, unspecified type  -     Lipid Panel with Direct LDL reflex;  Future  - Comprehensive metabolic panel; Future  -     CBC and differential; Future  -     Stress test only, exercise; Future  -     POCT ECG    Hypertriglyceridemia  -     Lipid Panel with Direct LDL reflex; Future  -     Comprehensive metabolic panel; Future  -     CBC and differential; Future    Other fatigue  -     Lipid Panel with Direct LDL reflex; Future  -     Comprehensive metabolic panel; Future  -     CBC and differential; Future  -     TSH, 3rd generation with Free T4 reflex; Future          BMI Counseling: Body mass index is 35 71 kg/m²  The BMI is above normal  Nutrition recommendations include decreasing portion sizes, encouraging healthy choices of fruits and vegetables, moderation in carbohydrate intake and increasing intake of lean protein  No pharmacotherapy was ordered  Patient referred to PCP  Rationale for BMI follow-up plan is due to patient being overweight or obese  Subjective:   Chief Complaint   Patient presents with    Follow-up     4 month f/u         annual physical          Mary White is a 48 y o  male who presents to the office on 3/4/2022 for     43-year-old male with a history of GERD, impaired fasting glucose, mild obstructive sleep apnea, migraine headaches presents to the office for chronic condition follow-up  He reports that he has had some increased stress as of late and he has been feeling fatigued  He notes he does sleep at night and can fall asleep in 5 minutes  Gets 6-7 hours per night  Does feel rested  Notes some days when he wakes he feels like he struggles to get out of bed  Denies anxiety  Denies depression  NO SI no HI  Decreased ambition  Notes he is physically active throughout the day without decreased exercise tolerance  Physically active day working 10-12 hours per day without difficulty  Denies any physical limitations to his daily activity  Family is supportive  Notes he had initial sleep study (was diagnosed with mild sleep apnea)    He was called today while in office to set follow-up sleep study  They had an opening tonight and he is going tonight for follow-up sleep study  He followed up with derm for skin lesion  He notes topical cream helped  He has annual derm follow up  Following with Dr Lamine Cunningham regarding left ankle swelling  He reports that he had an MRI performed  His left ankle swelling has improved and is no longer painful or tender  Decreasing in size  Notes he is scheduled with IR for biopsy of growth on his left foot  Has not needed migraine meds in some time  Stopped Coffee outright when he was getting heartburn and since that time notes he no longer gets HA since stopping caffeine  Has not needed Fioricet in some time  Incidentally noted he reports that he did have a single episode chest pressure and discomfort  Occurred during a stressful situation when his daughter told him she may be getting   Lasted several minutes and resolved spontaneously  Not associated with any palpitations  Not associated with any shortness of breath or diaphoresis  Did not seek care for this  No personal history of any previous cardiac problems  He has had a stress test in the past several years ago  He has had a Holter monitor in the past   No prior echo that patient recalls  Patient denies chest pain or palpitations with daily activity  Increased life stressors include: Mother being tx for lung CA and left sided lung resection  His mother was getting chemo but recently has elected to forego chemo  He notes he is dealing ok with his mom's medical situation  He notes he helps with her medical and talks with other family (3 sisters and 1 brother)  Recently lost his Father in law (multiple myeloma)  He notes he was not able to go to [place] for his father in [address] [facility] because he was caring for his Mom  Daughter is scheduled to get her ovary removed (she prior had a similar procedure many years ago on the other ovary)  Chest Pain   This is a recurrent problem  The current episode started 1 to 4 weeks ago  The onset quality is sudden  The problem has been resolved  The pain is present in the substernal region  The pain is moderate  The quality of the pain is described as sharp and pressure  The pain does not radiate  Associated symptoms include malaise/fatigue  Pertinent negatives include no abdominal pain, cough, diaphoresis, dizziness, exertional chest pressure, fever, headaches, irregular heartbeat, lower extremity edema, nausea, near-syncope, palpitations, shortness of breath, syncope, vomiting or weakness  Associated with: Stressful situation  The treatment provided significant relief  The following portions of the patient's history were reviewed and updated as appropriate: allergies, current medications, past family history, past medical history, past social history, past surgical history and problem list     Review of Systems   Constitutional: Positive for fatigue and malaise/fatigue  Negative for chills, diaphoresis and fever  HENT: Negative for rhinorrhea and sore throat  Eyes: Negative for visual disturbance  Respiratory: Negative for cough, shortness of breath and wheezing  Cardiovascular: Negative for chest pain, palpitations, leg swelling, syncope and near-syncope  Gastrointestinal: Negative for abdominal pain, blood in stool, constipation, diarrhea, nausea and vomiting  No dark tarry stool   Genitourinary: Negative for dysuria and frequency  Skin: Negative for rash (Face lesion resolved per patient  )  Neurological: Negative for dizziness, syncope, weakness, light-headedness and headaches           Patient Active Problem List   Diagnosis    Chest pain    Chronic pain of left knee    Depression with anxiety    GERD (gastroesophageal reflux disease)    Heel pain, chronic, right    Joint pain    Lateral epicondylitis of left elbow    Migraine headache    Mild obstructive sleep apnea    Neck muscle spasm    Trigger ring finger of right hand    Trigger middle finger of right hand    Hypertriglyceridemia    Mass of left foot    Right elbow pain    Cubital tunnel syndrome of both upper extremities    Olecranon bursitis of right elbow    Gastritis    Carpal tunnel syndrome, bilateral    Colon cancer screening    Callus of foot    Umbilical hernia without obstruction and without gangrene    Atelectasis    Colon polyp    Diverticulosis    Internal hemorrhoid    PAC (premature atrial contraction)    Laceration of right index finger without foreign body without damage to nail    Strain of left Achilles tendon    Calcaneal spur, left    Pain of left thumb    Exposure to COVID-19 virus    Annual physical exam    Strain of right shoulder    Atypical pigmented skin lesion    Impaired fasting glucose    Actinic keratoses    Laceration of left forearm    Excessive daytime sleepiness    Other fatigue       Past Medical History:   Diagnosis Date    Elbow tendonitis     right possible bursitis    Gastritis     GERD (gastroesophageal reflux disease)     Migraine        Past Surgical History:   Procedure Laterality Date    COLONOSCOPY      EGD  2005    EGD  01/2020    EYE FOREIGN BODY REMOVAL Left     [facility] INCISE FINGER TENDON SHEATH Right 12/26/2018    Procedure: LONG AND RING TRIGGER FINGER RELEASES;  Surgeon: Oneal Leonard MD;  Location: [facility];  Service: Orthopedics    SKIN BIOPSY           Current Outpatient Medications:     butalbital-acetaminophen-caffeine (FIORICET,ESGIC) -40 mg per tablet, Take 1 tablet by mouth daily as needed for migraine For on set of headache, Disp: 30 tablet, Rfl: 0    multivitamin (THERAGRAN) TABS, Take 1 tablet by mouth daily, Disp: , Rfl:     Omega-3 Fatty Acids (fish oil) 1,000 mg, Take 1 capsule (1,000 mg total) by mouth daily, Disp: 30 capsule, Rfl: 1    omeprazole (PriLOSEC) 40 MG capsule, Take 1 capsule (40 mg total) by mouth daily, Disp: 90 capsule, Rfl: 1    sucralfate (CARAFATE) 1 g tablet, Take 1 tablet (1 g total) by mouth 2 (two) times a day Chew/crush or dissolve tablets in water prior to swallowing (Patient not taking: Reported on 10/21/2021), Disp: 60 tablet, Rfl: 3    No Known Allergies    Social History     Socioeconomic History    Marital status: /Civil Union     Spouse name: None    Number of children: None    Years of education: None    Highest education level: None   Occupational History    None   Tobacco Use    Smoking status: Never Smoker    Smokeless tobacco: Never Used   Vaping Use    Vaping Use: Never used   Substance and Sexual Activity    Alcohol use: Not Currently     Alcohol/week: 0 0 standard drinks     Comment: Occasional    Drug use: Never    Sexual activity: Yes     Partners: Female     Birth control/protection: Female Sterilization   Other Topics Concern    None   Social History Narrative    Caffeine use     Social Determinants of Health     Financial Resource Strain: Not on file   Food Insecurity: Not on file   Transportation Needs: Not on file   Physical Activity: Not on file   Stress: Not on file   Social Connections: Not on file   Intimate Partner Violence: Not on file   Housing Stability: Not on file       Family History   Problem Relation Age of Onset    Diabetes Mother     Hypertension Mother     Breast cancer Mother     Cancer Mother         Breast    Lung cancer Mother     Diabetes Father     Gout Father     Hypertension Father     Cancer Maternal Grandmother     Rosacea Family        PHQ-2/9 Depression Screening    Little interest or pleasure in doing things: 0 - not at all  Feeling down, depressed, or hopeless: 0 - not at all  Trouble falling or staying asleep, or sleeping too much: 1 - several days  Feeling tired or having little energy: 2 - more than half the days  Poor appetite or overeatin - not at all  Feeling bad about yourself - or that you are a failure or have let yourself or your family down: 0 - not at all  Trouble concentrating on things, such as reading the newspaper or watching television: 1 - several days  Moving or speaking so slowly that other people could have noticed   Or the opposite - being so fidgety or restless that you have been moving around a lot more than usual: 0 - not at all  Thoughts that you would be better off dead, or of hurting yourself in some way: 0 - not at all  PHQ-9 Score: 4   PHQ-9 Interpretation: No or Minimal depression          Health Maintenance   Topic Date Due    Hepatitis C Screening  Never done    HIV Screening  Never done    PT PLAN OF CARE  04/02/2021    COVID-19 Vaccine (3 - Booster for Pfizer series) 09/12/2021    Annual Physical  02/26/2022    Influenza Vaccine (1) 06/30/2022 (Originally 9/1/2021)    BMI: Followup Plan  09/10/2022    BMI: Adult  03/04/2023    Colorectal Cancer Screening  09/25/2023    DTaP,Tdap,and Td Vaccines (6 - Td or Tdap) 10/22/2030    Pneumococcal Vaccine: Pediatrics (0 to 5 Years) and At-Risk Patients (6 to 59 Years)  Aged Out    HIB Vaccine  Aged Out    Hepatitis B Vaccine  Aged Out    IPV Vaccine  Aged Out    Hepatitis A Vaccine  Aged Out    Meningococcal ACWY Vaccine  Aged Out    HPV Vaccine  Aged [employer] History   Administered Date(s) Administered    COVID-19 PFIZER VACCINE 0 3 ML IM 03/22/2021, 04/12/2021    INFLUENZA 10/03/2016, 09/05/2017    Influenza Quadrivalent Preservative Free 3 years and older IM 10/03/2016, 09/05/2017    Influenza, recombinant, quadrivalent,injectable, preservative free 10/23/2020    TD (adult) Preservative Free 04/29/2015    Td (adult), adsorbed 04/29/2015    Tdap 02/11/2014, 04/29/2015, 12/19/2016, 03/27/2020, 10/22/2020        Objective:  Vitals:    03/04/22 0824   BP: 110/72   BP Location: Left arm   Patient Position: Sitting   Cuff Size: Large   Pulse: 60   Temp: 97 8 °F (36 6 °C)   TempSrc: Temporal   SpO2: 97% Weight: 103 kg (228 lb)   Height: 5' 7" (1 702 m)     Wt Readings from Last 3 Encounters:   03/04/22 103 kg (228 lb)   02/23/22 104 kg (230 lb)   11/08/21 105 kg (231 lb 3 2 oz)     Body mass index is 35 71 kg/m²  No exam data present       Physical Exam  Vitals and nursing note reviewed  Constitutional:       General: He is not in acute distress  Appearance: Normal appearance  He is well-developed  He is not diaphoretic  Comments: Alert pleasant cooperative  Seated in room in no acute distress  HENT:      Head: Normocephalic and atraumatic  Right Ear: Tympanic membrane normal       Left Ear: Tympanic membrane normal       Mouth/Throat:      Mouth: Mucous membranes are moist       Pharynx: No oropharyngeal exudate or posterior oropharyngeal erythema  Eyes:      General: No scleral icterus  Right eye: No discharge  Left eye: No discharge  Pupils: Pupils are equal, round, and reactive to light  Cardiovascular:      Rate and Rhythm: Normal rate and regular rhythm  Heart sounds: Normal heart sounds  No murmur heard  Pulmonary:      Effort: Pulmonary effort is normal  No respiratory distress  Breath sounds: Normal breath sounds  No wheezing or rales  Comments: Symmetric air exchange throughout  No crackles no rhonchi no wheeze  No respiratory distress  Abdominal:      General: Bowel sounds are normal  There is no distension  Palpations: Abdomen is soft  Tenderness: There is no abdominal tenderness  There is no right CVA tenderness or guarding  Comments: Positive bowel sounds soft nontender  No epigastric tenderness to palpation   Musculoskeletal:      Cervical back: Neck supple  Right lower leg: No edema  Left lower leg: No edema  Legs:    Lymphadenopathy:      Cervical: No cervical adenopathy  Skin:     General: Skin is warm and dry  Findings: No rash     Neurological:      Mental Status: He is alert and oriented to person, place, and time  Psychiatric:         Behavior: Behavior normal          Thought Content: Thought content normal              Future Appointments   Date Time Provider Otto Sara   3/4/2022  8:00 PM [facility] SLEEP ROOM 02 [facility]   3/16/2022  8:30 AM BE IR 3 (DISCOVERY) BE IR [facility]   3/23/2022  9:00 AM Taina Llanes MD ORTHO AND [facility]   4/1/2022  7:15 AM Patricia Walker PA-C [facility]       Patricia Walker[address]    Patient Care Team:  Patricia Walker PA-C as PCP - General (Internal Medicine)  RONEY Ospina MD    This note was completed in part utilizing M-Modal Fluency Direct Software  Grammatical errors, random word insertions, spelling mistakes, and incomplete sentences can be an occasional consequence of this system secondary to software limitations, ambient noise, and hardware issues  If you have any questions or concerns about the content, text, or information contained within the body of this dictation, please contact the provider for clarification

## 2022-03-04 NOTE — PATIENT INSTRUCTIONS
Mild obstructive sleep apnea  Reviewed sleep study  Recommend CPAP titration study  Patient verbalized understanding and is willing  Patient called while he is in office today for CPAP study and will be going tonight  Discussed other ways to help manage obstructive sleep apnea including  Weight loss, sleeping on side can be beneficial   Patient notes he is exercising and trying to lose weight which can help  Will follow results    Chest pain  EKG today in office showing flipped T waves in III and flattened in AvF similar to prior EKG in 2020 but note present on 1/2021 EKG  Recommend stress test to further evaluate  Will sent for labs as well  Discussed red flag sx and ER precautions which need immediate eval and tx should they develop  GERD (gastroesophageal reflux disease)  Continue on Prilosec  Continue with Carafate p r n     Discussed importance contain doing with diet lifestyle modifications to help control symptoms along with medications  Schedule GI follow-up  Hypertriglyceridemia  Will check lipid panel  Encouraged low-fat low-cholesterol diet  Commended on weight loss and exercise    Other fatigue  Sent for additional labs to further evaluate as discussed above  Four-week follow-up with labs

## 2022-03-04 NOTE — ASSESSMENT & PLAN NOTE
Reviewed sleep study  Recommend CPAP titration study  Patient verbalized understanding and is willing  Patient called while he is in office today for CPAP study and will be going tonight  Discussed other ways to help manage obstructive sleep apnea including  Weight loss, sleeping on side can be beneficial   Patient notes he is exercising and trying to lose weight which can help    Will follow results

## 2022-03-05 NOTE — PROGRESS NOTES
Sleep Study Documentation    Pre-Sleep Study       Sleep testing procedure explained to patient:YES    Patient napped prior to study:NO    Caffeine:Dayshift worker after 12PM   Caffeine use:YES- tea    Alcohol:Dayshift workers after 5PM: Alcohol use:NO    Typical day for patient:YES       Study Documentation    Sleep Study Indications: CPAP retitration and excessive daytime sleepiness    Sleep Study: Treatment   Optimal PAP pressure: 10 cmH2O  Snore:Eliminated  REM Obtained:yes  Supplemental O2:   Minimum SaO2: 89%  Baseline SaO2:  95%  PAP mask tried (list all) Medium ResMed AirTouch F20 FFM  PAP mask choice (final) Medium ResMed AirTouch F20 FFM  PAP mask type:full face  PAP pressure at which snoring was eliminated 10 cmH2O  Mode of Therapy:CPAP  ETCO2:No  CPAP changed to BiPAP:No    Mode of Therapy:CPAP    EKG abnormalities: no     EEG abnormalities: no    Sleep Study Recorded < 2 hours: N/A    Sleep Study Recorded > 2 hours but incomplete study: N/A    Sleep Study Recorded 6 hours but no sleep obtained: NO    Patient classification: employed       Post-Sleep Study    Medication used at bedtime or during sleep study:NO    Patient reports time it took to fall asleep:less than 20 minutes    Patient reports waking up during study:1 to 2 times  Patient reports returning to sleep in 10 to 30 minutes  Patient reports sleeping 4 to 6 hours without dreaming  Patient reports sleep during study:typical    Patient rated sleepiness: Somewhat sleepy or tired    PAP treatment:yes: Post PAP treatment patient reports feeling unchanged and would wear PAP mask at home

## 2022-03-08 ENCOUNTER — TELEPHONE (OUTPATIENT)
Dept: RADIOLOGY | Facility: HOSPITAL | Age: 47
End: 2022-03-08

## 2022-03-10 ENCOUNTER — TELEPHONE (OUTPATIENT)
Dept: SLEEP CENTER | Facility: CLINIC | Age: 47
End: 2022-03-10

## 2022-03-10 NOTE — TELEPHONE ENCOUNTER
Called patient and advised sleep study resulted  Per study order, patient to follow up with a sleep specialist   Offered to génesis consult but patient declined  States he will follow up with his PCP

## 2022-03-16 ENCOUNTER — HOSPITAL ENCOUNTER (OUTPATIENT)
Dept: RADIOLOGY | Facility: HOSPITAL | Age: 47
Discharge: HOME | End: 2022-03-16
Attending: RADIOLOGY
Payer: COMMERCIAL

## 2022-03-16 DIAGNOSIS — R22.42 MASS OF LEFT FOOT: ICD-10-CM

## 2022-03-16 PROCEDURE — 10005 FNA BX W/US GDN 1ST LES: CPT

## 2022-03-16 PROCEDURE — 88173 CYTOPATH EVAL FNA REPORT: CPT | Performed by: SPECIALIST

## 2022-03-16 PROCEDURE — 88172 CYTP DX EVAL FNA 1ST EA SITE: CPT | Performed by: SPECIALIST

## 2022-03-16 PROCEDURE — 10005 FNA BX W/US GDN 1ST LES: CPT | Performed by: RADIOLOGY

## 2022-03-16 RX ORDER — LIDOCAINE WITH 8.4% SOD BICARB 0.9%(10ML)
SYRINGE (ML) INJECTION CODE/TRAUMA/SEDATION MEDICATION
Status: COMPLETED | OUTPATIENT
Start: 2022-03-16 | End: 2022-03-16

## 2022-03-16 RX ADMIN — Medication 10 ML: at 08:40

## 2022-03-16 NOTE — INTERVAL H&P NOTE
Update: (This section must be completed if the H&P was completed greater than 24 hrs to procedure or admission)    H&P reviewed  After examining the patient, I find no changed to the H&P since it had been written  Patient re-evaluated   Accept as history and physical     Chey Etienne MD/March 16, 2022/9:15 AM

## 2022-03-16 NOTE — DISCHARGE INSTRUCTIONS
POST BIOPSY    Care after your procedure:    1  Limit your activities for 24 hours after your biopsy  2  No driving day of biopsy  3  Return to your normal diet  Small sips of flat soda will help with mild nausea  4  Remove band-aid or dressing 24 hours after procedure  Contact Interventional Radiology at [phone] Brenda PATIENTS: Contact Interventional Radiology at [phone]) Melissa Carranza PATIENTS: Contact Interventional Radiology at [phone]) if:    1  Difficulty breathing, nausea or vomiting  2  Chills or fever above 101 degrees F      3  Pain at biopsy site not relieved by medication  4  Develop any redness, swelling, heat, unusual drainage, heavy bruising or bleeding from biopsy site

## 2022-03-16 NOTE — BRIEF OP NOTE (RAD/CATH)
INTERVENTIONAL RADIOLOGY PROCEDURE NOTE    Date: 3/16/2022    Procedure: IR BIOPSY LOWER LIMB    Preoperative diagnosis:   1  Mass of left foot         Postoperative diagnosis: Same  Surgeon: Cluadia Arevalo MD     Assistant: None  No qualified resident was available  Blood loss: Minimal    Specimens: FNA x 3     Findings: Superficial right ankle soft tissue palpable mass  Lidocaine was used for local anesthesia  A 19 G tenmo needle was advanced into the edge of the superficial lesion and the patient noted pain shooting up his leg into his knee  At that point the decision to abort core biopsy and attempt FNA was made  FNA x 3 was performed and the patient noted burning in the area of FNA biopsy and pain  Unfortunately no cells were able to be collected from FNA  Given symptoms during biopsy the decision to no pursue more aggressive biopsies was made  At the end of the procedure the patient noted the burning in his foot was nearly gone  Discussed the case with ordering doctor and plan is possible open surgical evaluation and possible removal vs close follow up  Complications: None immediate      Anesthesia: local

## 2022-03-23 ENCOUNTER — OFFICE VISIT (OUTPATIENT)
Dept: OBGYN CLINIC | Facility: CLINIC | Age: 47
End: 2022-03-23
Payer: COMMERCIAL

## 2022-03-23 VITALS
WEIGHT: 230.6 LBS | HEIGHT: 67 IN | DIASTOLIC BLOOD PRESSURE: 81 MMHG | HEART RATE: 85 BPM | BODY MASS INDEX: 36.19 KG/M2 | SYSTOLIC BLOOD PRESSURE: 124 MMHG

## 2022-03-23 DIAGNOSIS — G56.03 CARPAL TUNNEL SYNDROME, BILATERAL: ICD-10-CM

## 2022-03-23 DIAGNOSIS — M65.312 TRIGGER THUMB OF LEFT HAND: Primary | ICD-10-CM

## 2022-03-23 PROCEDURE — 20550 NJX 1 TENDON SHEATH/LIGAMENT: CPT | Performed by: SURGERY

## 2022-03-23 PROCEDURE — 99214 OFFICE O/P EST MOD 30 MIN: CPT | Performed by: SURGERY

## 2022-03-23 RX ORDER — DEXAMETHASONE SODIUM PHOSPHATE 100 MG/10ML
40 INJECTION INTRAMUSCULAR; INTRAVENOUS
Status: COMPLETED | OUTPATIENT
Start: 2022-03-23 | End: 2022-03-23

## 2022-03-23 RX ORDER — LIDOCAINE HYDROCHLORIDE 10 MG/ML
1 INJECTION, SOLUTION INFILTRATION; PERINEURAL
Status: COMPLETED | OUTPATIENT
Start: 2022-03-23 | End: 2022-03-23

## 2022-03-23 RX ADMIN — LIDOCAINE HYDROCHLORIDE 1 ML: 10 INJECTION, SOLUTION INFILTRATION; PERINEURAL at 09:34

## 2022-03-23 RX ADMIN — DEXAMETHASONE SODIUM PHOSPHATE 40 MG: 100 INJECTION INTRAMUSCULAR; INTRAVENOUS at 09:34

## 2022-03-23 NOTE — PROGRESS NOTES
Kylie MONTANO  Attending, Orthopaedic Surgery  Hand, Wrist, and Elbow Surgery  [facility]      ORTHOPAEDIC HAND, WRIST, AND ELBOW OFFICE  VISIT       ASSESSMENT/PLAN:      47 yo male with start of left trigger thumb, also with BL CTS diagnosed previously on EMG  Discussed that this is likely the beginning of a TF, even though there is no locking  Discussed risks, benefits, and alternatives to steroid injection and patient was agreeable  Initial L thumb TF injection done today, will re-evaluate in 6 weeks  The patient verbalized understanding of exam findings and treatment plan  We engaged in the shared decision-making process and treatment options were discussed at length with the patient  Surgical and conservative management discussed today along with risks and benefits  Diagnoses and all orders for this visit:    Trigger thumb of left hand  -     Hand/upper extremity injection    Carpal tunnel syndrome, bilateral        Follow Up:  Return in about 6 weeks (around 5/4/2022)  To Do Next Visit:  Re-evaluation of current issue      General Discussions:  Trigger Finger: The anatomy and physiology of trigger finger was discussed with the patient today in the office  Edema and increased contact pressure within the flexor tendons at the A1 pulley can cause pain, crepitation, and triggering or locking of the digit resulting in limitation of function  Symptoms can occur at anytime but are typically worse in the morning or after a brief rest from repetitive activity  Treatment options include resting/nighttime MP blocking splints to decrease edema, oral anti-inflammatory medications, home or formal therapy exercises, up to 2 steroid injections within the tendon sheath, or surgical release  While majority of patients do respond to conservative treatment, up to 20% may require surgical release  ____________________________________________________________________________________________________________________________________________      CHIEF COMPLAINT:  Chief Complaint   Patient presents with    Left Thumb - Pain       SUBJECTIVE:  Guadalupe Barry is a 48y o  year old RHD male who presents for a new issue regarding the left thumb  He notes a mass at the base of the volar thumb which comes and goes and is painful  He denies clicking or locking currently but does have A1 pulley pain  He has hx of prior TF releases on the other hand  He was also previously seen in our office for carpal tunnel syndrome which is only mildly bothersome today         Pain/symptom timing:  Worse during the day when active  Pain/symptom context:  Worse with activites and work  Pain/symptom modifying factors:  Rest makes better, activities make worse  Pain/symptom associated signs/symptoms: none    Prior treatment   · NSAIDsYes   · Injections No   · Bracing/Orthotics No    Physical Therapy No     I have personally reviewed all the relevant PMH, PSH, SH, FH, Medications and allergies      PAST MEDICAL HISTORY:  Past Medical History:   Diagnosis Date    Elbow tendonitis     right possible bursitis    Gastritis     GERD (gastroesophageal reflux disease)     Migraine        PAST SURGICAL HISTORY:  Past Surgical History:   Procedure Laterality Date    COLONOSCOPY      EGD  2005    EGD  01/2020    EYE FOREIGN BODY REMOVAL Left     IR BIOPSY LOWER LIMB  3/16/2022    WA INCISE FINGER TENDON SHEATH Right 12/26/2018    Procedure: LONG AND RING TRIGGER FINGER RELEASES;  Surgeon: Francesco Baxter MD;  Location: [facility] OR;  Service: Orthopedics    SKIN BIOPSY         FAMILY HISTORY:  Family History   Problem Relation Age of Onset    Diabetes Mother     Hypertension Mother     Breast cancer Mother     Cancer Mother         Breast    Lung cancer Mother     Diabetes Father     Gout Father     Hypertension Father     Cancer Maternal Grandmother     Tanvir Family        SOCIAL HISTORY:  Social History     Tobacco Use    Smoking status: Never Smoker    Smokeless tobacco: Never Used   Vaping Use    Vaping Use: Never used   Substance Use Topics    Alcohol use: Not Currently     Alcohol/week: 0 0 standard drinks     Comment: Occasional    Drug use: Never       MEDICATIONS:    Current Outpatient Medications:     butalbital-acetaminophen-caffeine (FIORICET,ESGIC) -40 mg per tablet, Take 1 tablet by mouth daily as needed for migraine For on set of headache, Disp: 30 tablet, Rfl: 0    multivitamin (THERAGRAN) TABS, Take 1 tablet by mouth daily, Disp: , Rfl:     Omega-3 Fatty Acids (fish oil) 1,000 mg, Take 1 capsule (1,000 mg total) by mouth daily, Disp: 30 capsule, Rfl: 1    omeprazole (PriLOSEC) 40 MG capsule, Take 1 capsule (40 mg total) by mouth daily, Disp: 90 capsule, Rfl: 1    sucralfate (CARAFATE) 1 g tablet, Take 1 tablet (1 g total) by mouth 2 (two) times a day Chew/crush or dissolve tablets in water prior to swallowing (Patient not taking: Reported on 10/21/2021), Disp: 60 tablet, Rfl: 3    ALLERGIES:  No Known Allergies        REVIEW OF SYSTEMS:  Review of Systems   Constitutional: Negative for chills, fatigue, fever and unexpected weight change  HENT: Negative for hearing loss, nosebleeds and sore throat  Eyes: Negative for pain, redness and visual disturbance  Respiratory: Negative for cough, shortness of breath and wheezing  Cardiovascular: Negative for chest pain, palpitations and leg swelling  Gastrointestinal: Negative for abdominal pain, nausea and vomiting  Endocrine: Negative for polydipsia and polyuria  Genitourinary: Negative for difficulty urinating and hematuria  Musculoskeletal: Positive for arthralgias  Negative for joint swelling and myalgias  Skin: Negative for rash and wound  Neurological: Positive for numbness  Negative for dizziness and headaches     Psychiatric/Behavioral: Negative for decreased concentration, dysphoric mood and suicidal ideas  The patient is not nervous/anxious  VITALS:  Vitals:    03/23/22 0911   BP: 124/81   Pulse: 85       LABS:  HgA1c:   Lab Results   Component Value Date    HGBA1C 5 5 09/10/2021     BMP:   Lab Results   Component Value Date    GLUCOSE 120 12/02/2015    CALCIUM 9 2 09/07/2021     12/02/2015    K 4 4 09/07/2021    CO2 27 09/07/2021     09/07/2021    BUN 19 09/07/2021    CREATININE 0 95 09/07/2021       _____________________________________________________  PHYSICAL EXAMINATION:  General: well developed and well nourished, alert, oriented times 3 and appears comfortable  Psychiatric: Normal  HEENT: Normocephalic, Atraumatic Trachea Midline, No torticollis  Pulmonary: No audible wheezing or respiratory distress   Abdomen/GI: Non tender, non distended   Cardiovascular: No pitting edema, 2+ radial pulse   Skin: No masses, erythema, lacerations, fluctation, ulcerations  Neurovascular: Sensation Intact to the Median, Ulnar, Radial Nerve, Motor Intact to the Median, Ulnar, Radial Nerve and Pulses Intact  Musculoskeletal: Normal, except as noted in detailed exam and in HPI  MUSCULOSKELETAL EXAMINATION:  left first finger:  Negative palpable nodule over the A1 pulley  Positive tenderness to palpation over A1 pulley  Negative catching  Negative clicking         ___________________________________________________  STUDIES REVIEWED:  No new studies to review       PROCEDURES PERFORMED:  Hand/upper extremity injection: L thumb A1  Universal Protocol:  Consent: Verbal consent obtained  Risks and benefits: risks, benefits and alternatives were discussed  Consent given by: patient  Time out: Immediately prior to procedure a "time out" was called to verify the correct patient, procedure, equipment, support staff and site/side marked as required    Patient understanding: patient states understanding of the procedure being performed  Site marked: the operative site was marked  Required items: required blood products, implants, devices, and special equipment available  Patient identity confirmed: verbally with patient    Supporting Documentation  Indications: pain and therapeutic   Procedure Details  Condition:trigger finger Location: thumb - L thumb A1   Preparation: Patient was prepped and draped in the usual sterile fashion  Needle size: 25 G  Ultrasound guidance: no  Approach: volar  Medications administered: 1 mL lidocaine 1 %; 40 mg dexamethasone 100 mg/10 mL    Patient tolerance: patient tolerated the procedure well with no immediate complications  Dressing:  Sterile dressing applied              _____________________________________________________      Scribe Attestation    I,:  Topher Kamara PA-C am acting as a scribe while in the presence of the attending physician :       I,:  Kody Hood MD personally performed the services described in this documentation    as scribed in my presence :

## 2022-03-25 NOTE — RESULT ENCOUNTER NOTE
Spoke with patient  He reports that IR had difficulty obtaining a sample and he was told he may need a surgical bx  I recommended he follow with Dr Elijah Contreras to further discuss biopsy options  He verbalized understanding and will follow this up with Dr Elijah Contreras

## 2022-04-01 ENCOUNTER — LAB (OUTPATIENT)
Dept: LAB | Facility: IMAGING CENTER | Age: 47
End: 2022-04-01
Payer: COMMERCIAL

## 2022-04-01 ENCOUNTER — OFFICE VISIT (OUTPATIENT)
Dept: INTERNAL MEDICINE CLINIC | Facility: OTHER | Age: 47
End: 2022-04-01
Payer: COMMERCIAL

## 2022-04-01 ENCOUNTER — HOSPITAL ENCOUNTER (OUTPATIENT)
Dept: RADIOLOGY | Facility: IMAGING CENTER | Age: 47
Discharge: HOME | End: 2022-04-01
Payer: COMMERCIAL

## 2022-04-01 VITALS
OXYGEN SATURATION: 97 % | TEMPERATURE: 97.8 F | DIASTOLIC BLOOD PRESSURE: 72 MMHG | SYSTOLIC BLOOD PRESSURE: 120 MMHG | WEIGHT: 227 LBS | HEIGHT: 67 IN | HEART RATE: 67 BPM | BODY MASS INDEX: 35.63 KG/M2

## 2022-04-01 DIAGNOSIS — Z11.4 ENCOUNTER FOR SCREENING FOR HIV: ICD-10-CM

## 2022-04-01 DIAGNOSIS — R61 NIGHT SWEAT: ICD-10-CM

## 2022-04-01 DIAGNOSIS — E78.1 HYPERTRIGLYCERIDEMIA: ICD-10-CM

## 2022-04-01 DIAGNOSIS — R53.83 OTHER FATIGUE: ICD-10-CM

## 2022-04-01 DIAGNOSIS — Z71.85 VACCINE COUNSELING: Primary | ICD-10-CM

## 2022-04-01 DIAGNOSIS — Z11.59 NEED FOR HEPATITIS C SCREENING TEST: ICD-10-CM

## 2022-04-01 DIAGNOSIS — G47.19 EXCESSIVE DAYTIME SLEEPINESS: ICD-10-CM

## 2022-04-01 DIAGNOSIS — R07.9 CHEST PAIN, UNSPECIFIED TYPE: ICD-10-CM

## 2022-04-01 DIAGNOSIS — Z11.1 ENCOUNTER FOR PPD TEST: ICD-10-CM

## 2022-04-01 DIAGNOSIS — G47.33 OSA (OBSTRUCTIVE SLEEP APNEA): ICD-10-CM

## 2022-04-01 DIAGNOSIS — R73.01 IMPAIRED FASTING GLUCOSE: ICD-10-CM

## 2022-04-01 PROBLEM — Z20.822 EXPOSURE TO COVID-19 VIRUS: Status: RESOLVED | Noted: 2021-01-21 | Resolved: 2022-04-01

## 2022-04-01 LAB
ALBUMIN SERPL BCP-MCNC: 3.9 G/DL (ref 3.5–5)
ALP SERPL-CCNC: 79 U/L (ref 46–116)
ALT SERPL W P-5'-P-CCNC: 29 U/L (ref 12–78)
ANION GAP SERPL CALCULATED.3IONS-SCNC: 1 MMOL/L (ref 4–13)
AST SERPL W P-5'-P-CCNC: 17 U/L (ref 5–45)
BASOPHILS # BLD AUTO: 0.03 THOUSANDS/ΜL (ref 0–0.1)
BASOPHILS NFR BLD AUTO: 1 % (ref 0–1)
BILIRUB SERPL-MCNC: 0.5 MG/DL (ref 0.2–1)
BUN SERPL-MCNC: 21 MG/DL (ref 5–25)
CALCIUM SERPL-MCNC: 9.1 MG/DL (ref 8.3–10.1)
CHLORIDE SERPL-SCNC: 107 MMOL/L (ref 100–108)
CHOLEST SERPL-MCNC: 226 MG/DL
CO2 SERPL-SCNC: 29 MMOL/L (ref 21–32)
CREAT SERPL-MCNC: 1.11 MG/DL (ref 0.6–1.3)
CRP SERPL QL: <3 MG/L
EOSINOPHIL # BLD AUTO: 0.13 THOUSAND/ΜL (ref 0–0.61)
EOSINOPHIL NFR BLD AUTO: 2 % (ref 0–6)
ERYTHROCYTE [DISTWIDTH] IN BLOOD BY AUTOMATED COUNT: 12.6 % (ref 11.6–15.1)
GFR SERPL CREATININE-BSD FRML MDRD: 78 ML/MIN/1.73SQ M
GLUCOSE P FAST SERPL-MCNC: 106 MG/DL (ref 65–99)
HCT VFR BLD AUTO: 49.4 % (ref 36.5–49.3)
HCV AB SER QL: NORMAL
HDLC SERPL-MCNC: 42 MG/DL
HGB BLD-MCNC: 16 G/DL (ref 12–17)
IMM GRANULOCYTES # BLD AUTO: 0.03 THOUSAND/UL (ref 0–0.2)
IMM GRANULOCYTES NFR BLD AUTO: 1 % (ref 0–2)
LDLC SERPL CALC-MCNC: 122 MG/DL (ref 0–100)
LYMPHOCYTES # BLD AUTO: 1.89 THOUSANDS/ΜL (ref 0.6–4.47)
LYMPHOCYTES NFR BLD AUTO: 29 % (ref 14–44)
MCH RBC QN AUTO: 29.4 PG (ref 26.8–34.3)
MCHC RBC AUTO-ENTMCNC: 32.4 G/DL (ref 31.4–37.4)
MCV RBC AUTO: 91 FL (ref 82–98)
MONOCYTES # BLD AUTO: 0.52 THOUSAND/ΜL (ref 0.17–1.22)
MONOCYTES NFR BLD AUTO: 8 % (ref 4–12)
NEUTROPHILS # BLD AUTO: 3.96 THOUSANDS/ΜL (ref 1.85–7.62)
NEUTS SEG NFR BLD AUTO: 59 % (ref 43–75)
NRBC BLD AUTO-RTO: 0 /100 WBCS
PLATELET # BLD AUTO: 254 THOUSANDS/UL (ref 149–390)
PMV BLD AUTO: 9.9 FL (ref 8.9–12.7)
POTASSIUM SERPL-SCNC: 4.6 MMOL/L (ref 3.5–5.3)
PROT SERPL-MCNC: 7.2 G/DL (ref 6.4–8.2)
RBC # BLD AUTO: 5.44 MILLION/UL (ref 3.88–5.62)
SODIUM SERPL-SCNC: 137 MMOL/L (ref 136–145)
TRIGL SERPL-MCNC: 311 MG/DL
TSH SERPL DL<=0.05 MIU/L-ACNC: 2.69 UIU/ML (ref 0.36–3.74)
WBC # BLD AUTO: 6.56 THOUSAND/UL (ref 4.31–10.16)

## 2022-04-01 PROCEDURE — 99214 OFFICE O/P EST MOD 30 MIN: CPT | Performed by: PHYSICIAN ASSISTANT

## 2022-04-01 PROCEDURE — 87389 HIV-1 AG W/HIV-1&-2 AB AG IA: CPT

## 2022-04-01 PROCEDURE — 80061 LIPID PANEL: CPT

## 2022-04-01 PROCEDURE — 80053 COMPREHEN METABOLIC PANEL: CPT

## 2022-04-01 PROCEDURE — 71046 X-RAY EXAM CHEST 2 VIEWS: CPT

## 2022-04-01 PROCEDURE — 86580 TB INTRADERMAL TEST: CPT

## 2022-04-01 PROCEDURE — 84443 ASSAY THYROID STIM HORMONE: CPT

## 2022-04-01 PROCEDURE — 86803 HEPATITIS C AB TEST: CPT

## 2022-04-01 PROCEDURE — 36415 COLL VENOUS BLD VENIPUNCTURE: CPT

## 2022-04-01 PROCEDURE — 85025 COMPLETE CBC W/AUTO DIFF WBC: CPT

## 2022-04-01 PROCEDURE — 86140 C-REACTIVE PROTEIN: CPT

## 2022-04-01 NOTE — ASSESSMENT & PLAN NOTE
Some improvement since last visit however I recommend he complete workup that was ordered last visit  Go for labs ordered last visit along with today's labs  Due to episode of night sweats had chest x-ray and PPD  PPD today to be checked in 72 hours  CXR to be done  Will check screening test for HIV HEP c since these have not been done and are recommended for all adults  Reviewed depression questionnaire  dicussed possible medications which patient has used int he past but does not feel that he needs them at this time  Family supportive  Will continue to follow

## 2022-04-01 NOTE — RESULT ENCOUNTER NOTE
Spoke with patient  Reviewed labs  Reviewed increased trigs  Encouraged weight loss, low fat, low cholesterol diet, avoidance of alcohol  Increase fish oil to 1 tablet by mouth twice daily  Continue to follow up with CXR, PPD  CBC and CRP normal   CMP and CBC reviewed  Hep C negative  HIV still processing  Keep [facility] follow up as previously discussed

## 2022-04-01 NOTE — ASSESSMENT & PLAN NOTE
Has not returned since last visit  Patient does exercise and has not had any limitation to his exercise tolerance  Continue with stress test as recommended last visit  Patient advised to call and reschedule stress test as a has not yet been scheduled  Go for labs as ordered last visit

## 2022-04-01 NOTE — ASSESSMENT & PLAN NOTE
Currently following with Sleep Medicine  Being scheduled for CPAP  Encouraged weight loss and sleeping on side which can help    Keep our office informed of any change in treatment plan

## 2022-04-01 NOTE — PROGRESS NOTES
[facility] [place]  Standard Office Visit  Patient ID: Tamara Morales    : 1975  Age/Gender: 48 y o  male     DATE: 2022      Assessment/Plan:    Other fatigue  Some improvement since last visit however I recommend he complete workup that was ordered last visit  Go for labs ordered last visit along with today's labs  Due to episode of night sweats had chest x-ray and PPD  PPD today to be checked in 72 hours  CXR to be done  Will check screening test for HIV HEP c since these have not been done and are recommended for all adults  Reviewed depression questionnaire  dicussed possible medications which patient has used int he past but does not feel that he needs them at this time  Family supportive  Will continue to follow  ROMAINE (obstructive sleep apnea)  Currently following with Sleep Medicine  Being scheduled for CPAP  Encouraged weight loss and sleeping on side which can help  Keep our office informed of any change in treatment plan    Chest pain  Has not returned since last visit  Patient does exercise and has not had any limitation to his exercise tolerance  Continue with stress test as recommended last visit  Patient advised to call and reschedule stress test as a has not yet been scheduled  Go for labs as ordered last visit  Excessive daytime sleepiness  Likely secondary to obstructive sleep apnea  Follow sleep medicine as discussed above  Diagnoses and all orders for this visit:    Vaccine counseling  Comments:  recommend COVID booster    Other fatigue    Night sweat  Comments:  Afebrile  Occurred twice and has resolved  Will check PPD chest x-ray CRP along with CBC and labs ordered last visit  Monitor report back if symptoms re  Orders:  -     XR chest pa & lateral; Future  -     TB Skin Test  -     C-reactive protein;  Future    Chest pain, unspecified type    Excessive daytime sleepiness    ROMAINE (obstructive sleep apnea)    Encounter for screening for HIV  -     HIV 1/2 Antigen/Antibody (4th Generation) w Reflex [facility]; Future    Need for hepatitis C screening test  -     Hepatitis C antibody; Future    Encounter for PPD test          BMI Counseling: Body mass index is 35 55 kg/m²  The BMI is above normal  Nutrition recommendations include decreasing portion sizes, encouraging healthy choices of fruits and vegetables, moderation in carbohydrate intake, increasing intake of lean protein and reducing intake of cholesterol  No pharmacotherapy was ordered  Patient referred to PCP  Rationale for BMI follow-up plan is due to patient being overweight or obese  BMI 35 currently, discussed 5 lb weight loss goal, small increments, 1 lb per week optimal for weight loss  Patient has goal of 215  Depression Screening and Follow-up Plan: Their PHQ-9 score was 8  Patient advised to follow-up with PCP for further management  Subjective:   Chief Complaint   Patient presents with    GERD    Mild obstructive sleep apnea    Follow-up     4 week f/u , labs last drawn on 3/4          Annual physical     TB Test         Rubi Tello is a 48 y o  male who presents to the office on 4/1/2022 for       24-year-old male with history of hypertension, fatigue, obstructive sleep apnea presents to the office for follow-up  Since last visit he did not yet get his laboratory studies ordered  He is currently fasting today and plans to go later today for his laboratory studies  He has not scheduled his stress test but he did call and he is awaiting call back  Notes he does get "arilea depressed" at times  Notes he is working on some emotional things as of late  Mother's health is a little better and mother has been living with his sister for last 2 weeks  Notes he does feel a little tired as of late  Got 3 hours of sleep last night  Notes he could not sleep, occurs about 3x per month where he cannot fall asleep    Has used meds in the past for anxiety and depression in the past but notes he does not feel that he needs meds at this time  Weight down 3 lbs  He started exercising  Running and lifting weight  Runs 3 miles  No CP or palpitations with it  Notes at times after several miles he has to stop and catch his breath but he is able to do it  Gradually increasing his exercise  NO recurrence of previously noted chest discomfort  Has not gone for labs and he is currently fasting  Willing to go for labs today  Saw podiatry, L foot flares up at times but has not been bothering him as of late  Notes whenever he has an appointment his foot stops bothering him  He spoke with his podiatry possible repeat bx but at this time is holding off on repeat bx at this time  Taking prilosec as directed  Rarely gets any prilosec  Notes he still cant believe that he does not drink coffee anymore (>1 month since last cup)  + daytime fatigue, worse when he gets more sleep than when he gets less  S on average gets 7 hours per night  Does not use anything when he gets nights where he cannot sleep  I pretty good, notes he tries to keep himself busy  G denies  E sometimes tired, scheduled to get CPAP, does not yet have one, has been putting in increased hours as of late with work 10 hours per day, work has been stressful, has has some problems with [employer] and people who did work for him who has not paid him for the work  Increased financial stressors  C Notes he never was good with concentration even since he was a child, always needs music to keep him on track, 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    Always needed to do physical activity  A stable  P denies  S denies        Ankle Pain         The following portions of the patient's history were reviewed and updated as appropriate: allergies, current medications, past family history, past medical history, past social history, past surgical history and problem list     Review of Systems   Constitutional: Negative for chills, fever and unexpected weight change  Patient had 2 episodes of night sweats in the last 2 months  Woke sleep sweating  Checked he and the room temperature was normal   Uncertain what is causing this  No unintentional weight loss  No fevers or chills  No documented temperature at the time of this episode  No known history of TB  Patient has traveled back to his home country in the last 2 years  Patient reports he had a PPD test done several years ago which was normal   No prior reaction to PPD  HENT: Negative for rhinorrhea and sore throat  Eyes: Negative for visual disturbance  Respiratory: Negative for cough, shortness of breath and wheezing  Has follow-up with sleep medicine scheduled to get a CPAP  Currently not using a CPAP   Cardiovascular: Negative for chest pain, palpitations and leg swelling  No recurrent chest pain since last visit  Gastrointestinal: Negative for abdominal pain, diarrhea, nausea and vomiting  Genitourinary: Negative for dysuria  Musculoskeletal: Negative for arthralgias and back pain  Left foot pain improving  Follow with podiatry   Neurological: Negative for dizziness and light-headedness  Psychiatric/Behavioral:        As noted in HPI   All other systems reviewed and are negative          Patient Active Problem List   Diagnosis    Chest pain    Chronic pain of left knee    Depression with anxiety    GERD (gastroesophageal reflux disease)    Heel pain, chronic, right    Joint pain    Lateral epicondylitis of left elbow    Migraine headache    Mild obstructive sleep apnea    Neck muscle spasm    Trigger ring finger of right hand    Trigger middle finger of right hand    Hypertriglyceridemia    Mass of left foot    Right elbow pain    Cubital tunnel syndrome of both upper extremities    Olecranon bursitis of right elbow    Gastritis    Carpal tunnel syndrome, bilateral    Colon cancer screening    Callus of foot    Umbilical hernia without obstruction and without gangrene    Atelectasis    Colon polyp    Diverticulosis    Internal hemorrhoid    PAC (premature atrial contraction)    Laceration of right index finger without foreign body without damage to nail    Strain of left Achilles tendon    Calcaneal spur, left    Pain of left thumb    Annual physical exam    Strain of right shoulder    Atypical pigmented skin lesion    Impaired fasting glucose    Actinic keratoses    Laceration of left forearm    Excessive daytime sleepiness    Other fatigue    ROMAINE (obstructive sleep apnea)    Night sweat       Past Medical History:   Diagnosis Date    Elbow tendonitis     right possible bursitis    Gastritis     GERD (gastroesophageal reflux disease)     Migraine        Past Surgical History:   Procedure Laterality Date    COLONOSCOPY      EGD  2005    EGD  01/2020    EYE FOREIGN BODY REMOVAL Left     IR BIOPSY LOWER LIMB  3/16/2022    [place] INCISE FINGER TENDON SHEATH Right 12/26/2018    Procedure: LONG AND RING TRIGGER FINGER RELEASES;  Surgeon: Cheryl Mirza MD;  Location: [facility];  Service: Orthopedics    SKIN BIOPSY           Current Outpatient Medications:     butalbital-acetaminophen-caffeine (FIORICET,ESGIC) -40 mg per tablet, Take 1 tablet by mouth daily as needed for migraine For on set of headache, Disp: 30 tablet, Rfl: 0    multivitamin (THERAGRAN) TABS, Take 1 tablet by mouth daily, Disp: , Rfl:     Omega-3 Fatty Acids (fish oil) 1,000 mg, Take 1 capsule (1,000 mg total) by mouth daily, Disp: 30 capsule, Rfl: 1    omeprazole (PriLOSEC) 40 MG capsule, Take 1 capsule (40 mg total) by mouth daily, Disp: 90 capsule, Rfl: 1    No Known Allergies    Social History     Socioeconomic History    Marital status: /Civil Union     Spouse name: None    Number of children: None    Years of education: None    Highest education level: None Occupational History    None   Tobacco Use    Smoking status: Never Smoker    Smokeless tobacco: Never Used   Vaping Use    Vaping Use: Never used   Substance and Sexual Activity    Alcohol use: Not Currently     Alcohol/week: 0 0 standard drinks     Comment: Occasional    Drug use: Never    Sexual activity: Yes     Partners: Female     Birth control/protection: Female Sterilization   Other Topics Concern    None   Social History Narrative    Caffeine use     Social Determinants of Health     Financial Resource Strain: Not on file   Food Insecurity: Not on file   Transportation Needs: Not on file   Physical Activity: Not on file   Stress: Not on file   Social Connections: Not on file   Intimate Partner Violence: Not on file   Housing Stability: Not on file       Family History   Problem Relation Age of Onset    Diabetes Mother     Hypertension Mother     Breast cancer Mother     Cancer Mother         Breast    Lung cancer Mother     Diabetes Father     Gout Father     Hypertension Father     Cancer Maternal Grandmother     Rosacea Family        PHQ-2/9 Depression Screening    Little interest or pleasure in doing things: 2 - more than half the days  Feeling down, depressed, or hopeless: 0 - not at all  Trouble falling or staying asleep, or sleeping too much: 1 - several days  Feeling tired or having little energy: 2 - more than half the days  Poor appetite or overeatin - not at all  Feeling bad about yourself - or that you are a failure or have let yourself or your family down: 0 - not at all  Trouble concentrating on things, such as reading the newspaper or watching television: 3 - nearly every day  Moving or speaking so slowly that other people could have noticed   Or the opposite - being so fidgety or restless that you have been moving around a lot more than usual: 0 - not at all  Thoughts that you would be better off dead, or of hurting yourself in some way: 0 - not at all  PHQ-9 Score: 8 PHQ-9 Interpretation: Mild depression          Health Maintenance   Topic Date Due    Hepatitis C Screening  Never done    HIV Screening  Never done    PT PLAN OF CARE  04/02/2021    COVID-19 Vaccine (3 - Booster for Pfizer series) 09/12/2021    Annual Physical  02/26/2022    Influenza Vaccine (1) 06/30/2022 (Originally 9/1/2021)    BMI: Followup Plan  04/01/2023    BMI: Adult  04/01/2023    Colorectal Cancer Screening  09/25/2023    DTaP,Tdap,and Td Vaccines (6 - Td or Tdap) 10/22/2030    Pneumococcal Vaccine: Pediatrics (0 to 5 Years) and At-Risk Patients (6 to 59 Years)  Aged Out    HIB Vaccine  Aged Out    Hepatitis B Vaccine  Aged Out    IPV Vaccine  Aged Out    Hepatitis A Vaccine  Aged Out    Meningococcal ACWY Vaccine  Aged Out    HPV Vaccine  Aged [employer] History   Administered Date(s) Administered    COVID-19 PFIZER VACCINE 0 3 ML IM 03/22/2021, 04/12/2021    INFLUENZA 10/03/2016, 09/05/2017    Influenza Quadrivalent Preservative Free 3 years and older IM 10/03/2016, 09/05/2017    Influenza, recombinant, quadrivalent,injectable, preservative free 10/23/2020    TD (adult) Preservative Free 04/29/2015    Td (adult), adsorbed 04/29/2015    Tdap 02/11/2014, 04/29/2015, 12/19/2016, 03/27/2020, 10/22/2020    Tuberculin Skin Test-PPD Intradermal 04/01/2022        Objective:  Vitals:    04/01/22 0727   BP: 120/72   BP Location: Left arm   Patient Position: Sitting   Cuff Size: Large   Pulse: 67   Temp: 97 8 °F (36 6 °C)   TempSrc: Temporal   SpO2: 97%   Weight: 103 kg (227 lb)   Height: 5' 7" (1 702 m)     Wt Readings from Last 3 Encounters:   04/01/22 103 kg (227 lb)   03/23/22 105 kg (230 lb 9 6 oz)   03/04/22 103 kg (228 lb)     Body mass index is 35 55 kg/m²  No exam data present       Physical Exam  Vitals and nursing note reviewed  Constitutional:       General: He is not in acute distress  Appearance: Normal appearance  He is well-developed and normal weight  He is not diaphoretic  Comments: Alert pleasant cooperative  Seated in room in no acute distress   HENT:      Head: Normocephalic and atraumatic  Right Ear: Tympanic membrane normal       Left Ear: Tympanic membrane normal       Mouth/Throat:      Mouth: Mucous membranes are moist       Pharynx: No oropharyngeal exudate or posterior oropharyngeal erythema  Eyes:      General: No scleral icterus  Right eye: No discharge  Left eye: No discharge  Pupils: Pupils are equal, round, and reactive to light  Cardiovascular:      Rate and Rhythm: Normal rate and regular rhythm  Heart sounds: Normal heart sounds  Pulmonary:      Effort: Pulmonary effort is normal  No respiratory distress  Breath sounds: Normal breath sounds  No wheezing or rales  Comments: Symmetric air exchange  Clear to auscultation throughout  No crackles no rhonchi no wheeze  No respiratory distress  Abdominal:      General: Bowel sounds are normal  There is no distension  Palpations: Abdomen is soft  Tenderness: There is no abdominal tenderness  There is no guarding  Comments: Positive bowel sounds soft nontender   Genitourinary:     Comments: No inguinal lymphadenopathy  Musculoskeletal:      Cervical back: Neck supple  Lymphadenopathy:      Cervical: No cervical adenopathy  Skin:     General: Skin is warm and dry  Findings: No rash  Neurological:      General: No focal deficit present  Mental Status: He is alert  Psychiatric:         Mood and Affect: Mood normal          Behavior: Behavior normal          Thought Content:  Thought content normal              Future Appointments   Date Time Provider Otto Ruiz   5/5/2022  8:45 AM Momo Mcclain MD [facility]   5/13/2022  7:30 AM Danielle Su PA-C [facility]   5/23/2022 11:20 AM [facility]       Danielle Su PA-C  [facility] C[facility] [place]    Patient Care Team:  Kalin Mendez PA-C as PCP - General (Internal Medicine)  RONEY Roper MD    This note was completed in part utilizing M-Modal Fluency Direct Software  Grammatical errors, random word insertions, spelling mistakes, and incomplete sentences can be an occasional consequence of this system secondary to software limitations, ambient noise, and hardware issues  If you have any questions or concerns about the content, text, or information contained within the body of this dictation, please contact the provider for clarification

## 2022-04-01 NOTE — PATIENT INSTRUCTIONS
Other fatigue  Some improvement since last visit however I recommend he complete workup that was ordered last visit  Go for labs ordered last visit along with today's labs  Due to episode of night sweats had chest x-ray and PPD  PPD today to be checked in 72 hours  CXR to be done  Will check screening test for HIV HEP c since these have not been done and are recommended for all adults  ROMAINE (obstructive sleep apnea)  Currently following with Sleep Medicine  Being scheduled for CPAP  Encouraged weight loss and sleeping on side which can help  Keep our office informed of any change in treatment plan    Chest pain  Has not returned since last visit  Patient does exercise and has not had any limitation to his exercise tolerance  Continue with stress test as recommended last visit  Patient advised to call and reschedule stress test as a has not yet been scheduled  Go for labs as ordered last visit  Excessive daytime sleepiness  Likely secondary to obstructive sleep apnea  Follow sleep medicine as discussed above

## 2022-04-03 LAB — HIV 1+2 AB+HIV1 P24 AG SERPL QL IA: NORMAL

## 2022-04-04 ENCOUNTER — CLINICAL SUPPORT (OUTPATIENT)
Dept: INTERNAL MEDICINE CLINIC | Facility: OTHER | Age: 47
End: 2022-04-04

## 2022-04-04 DIAGNOSIS — Z11.1 ENCOUNTER FOR PPD SKIN TEST READING: Primary | ICD-10-CM

## 2022-04-04 LAB
INDURATION: 0 MM
TB SKIN TEST: NEGATIVE

## 2022-04-14 ENCOUNTER — TELEPHONE (OUTPATIENT)
Dept: NON INVASIVE DIAGNOSTICS | Facility: HOSPITAL | Age: 47
End: 2022-04-14

## 2022-04-15 ENCOUNTER — HOSPITAL ENCOUNTER (OUTPATIENT)
Dept: NON INVASIVE DIAGNOSTICS | Facility: HOSPITAL | Age: 47
Discharge: HOME | End: 2022-04-15
Payer: COMMERCIAL

## 2022-04-15 DIAGNOSIS — R07.9 CHEST PAIN, UNSPECIFIED TYPE: ICD-10-CM

## 2022-04-15 LAB
MAX HR PERCENT: 94 %
RATE PRESSURE PRODUCT: NORMAL
SL CV STRESS RECOVERY BP: NORMAL MMHG
SL CV STRESS RECOVERY HR: 94 BPM
SL CV STRESS RECOVERY O2 SAT: 98 %
SL CV STRESS STAGE REACHED: 4
STRESS ANGINA INDEX: 0
STRESS BASELINE BP: NORMAL MMHG
STRESS BASELINE HR: 68 BPM
STRESS O2 SAT REST: 97 %
STRESS PEAK HR: 162 BPM
STRESS PERCENT HR: 94 %
STRESS POST ESTIMATED WORKLOAD: 13.4 METS
STRESS POST EXERCISE DUR MIN: 10 MIN
STRESS POST EXERCISE DUR SEC: 0 SEC
STRESS POST O2 SAT PEAK: 97 %
STRESS POST PEAK BP: 146 MMHG
STRESS TARGET HR: 162 BPM

## 2022-04-15 PROCEDURE — 93016 CV STRESS TEST SUPVJ ONLY: CPT | Performed by: INTERNAL MEDICINE

## 2022-04-15 PROCEDURE — 93018 CV STRESS TEST I&R ONLY: CPT | Performed by: INTERNAL MEDICINE

## 2022-04-15 PROCEDURE — 93017 CV STRESS TEST TRACING ONLY: CPT

## 2022-04-18 LAB
CHEST PAIN STATEMENT: NORMAL
MAX DIASTOLIC BP: 80 MMHG
MAX HEART RATE: 162 BPM
MAX PREDICTED HEART RATE: 172 BPM
MAX. SYSTOLIC BP: 156 MMHG
PROTOCOL NAME: NORMAL
REASON FOR TERMINATION: NORMAL
TARGET HR FORMULA: NORMAL
TEST INDICATION: NORMAL
TIME IN EXERCISE PHASE: NORMAL

## 2022-05-23 ENCOUNTER — CONSULT (OUTPATIENT)
Dept: PULMONOLOGY | Facility: CLINIC | Age: 47
End: 2022-05-23
Payer: COMMERCIAL

## 2022-05-23 VITALS
WEIGHT: 229 LBS | OXYGEN SATURATION: 97 % | HEART RATE: 77 BPM | BODY MASS INDEX: 35.94 KG/M2 | HEIGHT: 67 IN | TEMPERATURE: 97.1 F

## 2022-05-23 DIAGNOSIS — G47.33 OSA (OBSTRUCTIVE SLEEP APNEA): Primary | ICD-10-CM

## 2022-05-23 DIAGNOSIS — E66.9 OBESITY (BMI 30-39.9): ICD-10-CM

## 2022-05-23 DIAGNOSIS — G47.19 EXCESSIVE DAYTIME SLEEPINESS: ICD-10-CM

## 2022-05-23 PROCEDURE — 99244 OFF/OP CNSLTJ NEW/EST MOD 40: CPT | Performed by: INTERNAL MEDICINE

## 2022-05-23 NOTE — PROGRESS NOTES
Assessment/Plan:     Diagnoses and all orders for this visit:    ROMAINE (obstructive sleep apnea)  -     Cpap New DME    Excessive daytime sleepiness    Obesity (BMI 30-39  9)          Plan for follow up:  Excessive daytime sleepiness likely secondary to his obstructive sleep apnea  Etiology pathogenesis of obstructive sleep apnea discussed in detail   Consequences of untreated sleep apnea discussed with excessive daytime sleepiness, increased risk for myocardial infarction stroke atrial fibrillation discussed   His recent diagnostic sleep study was discussed in detail, with good sleep efficiency and patient was seen in the REM stage as well as in the supine position during the study time, with mild obstructive sleep apnea with AHI of 5 7  Various treatment options discussed with mandible advancing appliance, uvula pharyngoplasty as well as Pap titration discussed   He would like to have the PAP machine  Will set him up for the auto CPAP   Given his excessive daytime sleepiness he would benefit from the PAP machine   Need for compliance with the CPAP machine discussed   Cautioned against driving when sleepy  Recommend weight loss   Follow-up in 3 months with the CPAP download compliance  Return in about 3 months (around 8/23/2022)  All questions are answered to the patient's satisfaction and understanding  He verbalizes understanding  He is encouraged to call with any further questions or concerns  Portions of the record may have been created with voice recognition software  Occasional wrong word or "sound a like" substitutions may have occurred due to the inherent limitations of voice recognition software  Read the chart carefully and recognize, using context, where substitutions have occurred  a    Electronically Signed by Quan San MD    ______________________________________________________________________    Chief Complaint:   Chief Complaint   Patient presents with    Sleep Apnea Patient ID: Jamee Richter is a 48 y o  y o  male has a past medical history of Elbow tendonitis, Gastritis, GERD (gastroesophageal reflux disease), and Migraine  5/23/2022  Patient presents today for initial visit  Patient is here for evaluation for obstructive sleep apnea  He states he works as a , usually works from 8:00 a m  to 5:00 p m  but he also travels sites, and he does have extended hours he states  He usually goes to bed at around 9:30 p m  and falls asleep right away and is out of bed at 5:00 a m  or 5:30 a m , has 2 nocturnal awakenings for nocturia  Occasionally but usually once he states  History of loud snoring and witnessed apneic spells as described by his family members  For which he went in for the evaluation for a diagnostic sleep study he states when he is out of the bed he still tired and fatigued usually does not take a nap he states he is on his feet all day long, even on the weekend he usually does not take a nap tries to do some chores around the house  No history of any early morning headaches no symptoms related to restless legs he states he has gained around 20-25 lb in the past 2 years with the pandemic he was not working for some time and was home he states,          Review of Systems   Constitutional: Positive for fatigue and unexpected weight change  HENT: Negative  Eyes: Negative  Respiratory: Negative  History of loud snoring witnessed apneic spells, no history of choking or gasping for air  Cardiovascular: Negative  Gastrointestinal: Negative  Endocrine: Negative  Genitourinary: Negative  Musculoskeletal: Negative  Allergic/Immunologic: Negative  Neurological: Negative  Hematological: Negative  Psychiatric/Behavioral: Positive for sleep disturbance  Social history: He reports that he has never smoked  He has never used smokeless tobacco  He reports previous alcohol use  He reports that he does not use drugs      Past surgical history:   Past Surgical History:   Procedure Laterality Date    COLONOSCOPY      EGD  2005    EGD  01/2020    EYE FOREIGN BODY REMOVAL Left     IR BIOPSY LOWER LIMB  3/16/2022    [place] INCISE FINGER TENDON SHEATH Right 12/26/2018    Procedure: LONG AND RING TRIGGER FINGER RELEASES;  Surgeon: Burgess Boogie MD;  Location: [facility];  Service: Orthopedics    SKIN BIOPSY       Family history:   Family History   Problem Relation Age of Onset    Diabetes Mother     Hypertension Mother     Breast cancer Mother     Cancer Mother         Breast    Lung cancer Mother     Diabetes Father     Gout Father     Hypertension Father     Cancer Maternal Grandmother     Rosacea Family        Immunization History   Administered Date(s) Administered    COVID-19 PFIZER VACCINE 0 3 ML IM 03/22/2021, 04/12/2021    INFLUENZA 10/03/2016, 09/05/2017    Influenza Quadrivalent Preservative Free 3 years and older IM 10/03/2016, 09/05/2017    Influenza, recombinant, quadrivalent,injectable, preservative free 10/23/2020    TD (adult) Preservative Free 04/29/2015    Td (adult), adsorbed 04/29/2015    Tdap 02/11/2014, 04/29/2015, 12/19/2016, 03/27/2020, 10/22/2020    Tuberculin Skin Test-PPD Intradermal 04/01/2022     Current Outpatient Medications   Medication Sig Dispense Refill    butalbital-acetaminophen-caffeine (FIORICET,ESGIC) -40 mg per tablet Take 1 tablet by mouth daily as needed for migraine For on set of headache 30 tablet 0    multivitamin (THERAGRAN) TABS Take 1 tablet by mouth daily      Omega-3 Fatty Acids (fish oil) 1,000 mg Take 1 capsule (1,000 mg total) by mouth 2 (two) times a day 60 capsule 1    omeprazole (PriLOSEC) 40 MG capsule Take 1 capsule (40 mg total) by mouth daily 90 capsule 1     No current facility-administered medications for this visit  Allergies: Patient has no known allergies      Objective:  Vitals:    05/23/22 1140 05/23/22 1141   Pulse: 77    Temp: (!) 97 1 °F (36 2 °C)    SpO2:  97%   Weight: 104 kg (229 lb)    Height: 5' 7" (1 702 m)    Oxygen Therapy  SpO2: 97 %  Oxygen Therapy: None (Room air)    Wt Readings from Last 3 Encounters:   05/23/22 104 kg (229 lb)   04/01/22 103 kg (227 lb)   03/23/22 105 kg (230 lb 9 6 oz)     Body mass index is 35 87 kg/m²  Physical Exam  Constitutional:       Appearance: He is well-developed  HENT:      Head: Normocephalic and atraumatic  Mouth/Throat:      Comments: Crowded oropharyngeal airways  Eyes:      Pupils: Pupils are equal, round, and reactive to light  Neck:      Comments: Short and wide neck  Cardiovascular:      Rate and Rhythm: Normal rate and regular rhythm  Heart sounds: Normal heart sounds  Pulmonary:      Effort: Pulmonary effort is normal       Breath sounds: Normal breath sounds  Musculoskeletal:         General: Normal range of motion  Cervical back: Normal range of motion and neck supple  Skin:     General: Skin is warm and dry  Neurological:      Mental Status: He is alert and oriented to person, place, and time  Psychiatric:         Behavior: Behavior normal            Diagnostics:  I have personally reviewed pertinent reports      ESS: Total score: 13

## 2022-05-29 ENCOUNTER — HOSPITAL ENCOUNTER (EMERGENCY)
Facility: HOSPITAL | Age: 47
Discharge: HOME | End: 2022-05-29
Attending: EMERGENCY MEDICINE | Admitting: EMERGENCY MEDICINE
Payer: COMMERCIAL

## 2022-05-29 ENCOUNTER — APPOINTMENT (EMERGENCY)
Dept: CT IMAGING | Facility: HOSPITAL | Age: 47
End: 2022-05-29
Payer: COMMERCIAL

## 2022-05-29 VITALS
DIASTOLIC BLOOD PRESSURE: 78 MMHG | TEMPERATURE: 98.1 F | OXYGEN SATURATION: 99 % | SYSTOLIC BLOOD PRESSURE: 125 MMHG | HEART RATE: 64 BPM | RESPIRATION RATE: 18 BRPM

## 2022-05-29 DIAGNOSIS — R10.9 RIGHT FLANK PAIN: Primary | ICD-10-CM

## 2022-05-29 DIAGNOSIS — R07.9 CHEST PAIN: ICD-10-CM

## 2022-05-29 LAB
ALBUMIN SERPL BCP-MCNC: 3.9 G/DL (ref 3.5–5)
ALP SERPL-CCNC: 71 U/L (ref 46–116)
ALT SERPL W P-5'-P-CCNC: 27 U/L (ref 12–78)
ANION GAP SERPL CALCULATED.3IONS-SCNC: 8 MMOL/L (ref 4–13)
AST SERPL W P-5'-P-CCNC: 17 U/L (ref 5–45)
BASOPHILS # BLD AUTO: 0.02 THOUSANDS/ΜL (ref 0–0.1)
BASOPHILS NFR BLD AUTO: 0 % (ref 0–1)
BILIRUB SERPL-MCNC: 0.61 MG/DL (ref 0.2–1)
BUN SERPL-MCNC: 22 MG/DL (ref 5–25)
CALCIUM SERPL-MCNC: 8.7 MG/DL (ref 8.3–10.1)
CHLORIDE SERPL-SCNC: 104 MMOL/L (ref 100–108)
CO2 SERPL-SCNC: 26 MMOL/L (ref 21–32)
CREAT SERPL-MCNC: 0.96 MG/DL (ref 0.6–1.3)
EOSINOPHIL # BLD AUTO: 0.17 THOUSAND/ΜL (ref 0–0.61)
EOSINOPHIL NFR BLD AUTO: 3 % (ref 0–6)
ERYTHROCYTE [DISTWIDTH] IN BLOOD BY AUTOMATED COUNT: 12.3 % (ref 11.6–15.1)
GFR SERPL CREATININE-BSD FRML MDRD: 93 ML/MIN/1.73SQ M
GLUCOSE SERPL-MCNC: 112 MG/DL (ref 65–140)
HCT VFR BLD AUTO: 47.1 % (ref 36.5–49.3)
HGB BLD-MCNC: 15.9 G/DL (ref 12–17)
IMM GRANULOCYTES # BLD AUTO: 0.01 THOUSAND/UL (ref 0–0.2)
IMM GRANULOCYTES NFR BLD AUTO: 0 % (ref 0–2)
LIPASE SERPL-CCNC: 141 U/L (ref 73–393)
LYMPHOCYTES # BLD AUTO: 1.6 THOUSANDS/ΜL (ref 0.6–4.47)
LYMPHOCYTES NFR BLD AUTO: 27 % (ref 14–44)
MCH RBC QN AUTO: 29.9 PG (ref 26.8–34.3)
MCHC RBC AUTO-ENTMCNC: 33.8 G/DL (ref 31.4–37.4)
MCV RBC AUTO: 89 FL (ref 82–98)
MONOCYTES # BLD AUTO: 0.47 THOUSAND/ΜL (ref 0.17–1.22)
MONOCYTES NFR BLD AUTO: 8 % (ref 4–12)
NEUTROPHILS # BLD AUTO: 3.61 THOUSANDS/ΜL (ref 1.85–7.62)
NEUTS SEG NFR BLD AUTO: 62 % (ref 43–75)
NRBC BLD AUTO-RTO: 0 /100 WBCS
PLATELET # BLD AUTO: 247 THOUSANDS/UL (ref 149–390)
PMV BLD AUTO: 9.1 FL (ref 8.9–12.7)
POTASSIUM SERPL-SCNC: 3.7 MMOL/L (ref 3.5–5.3)
PROT SERPL-MCNC: 6.9 G/DL (ref 6.4–8.2)
RBC # BLD AUTO: 5.31 MILLION/UL (ref 3.88–5.62)
SODIUM SERPL-SCNC: 138 MMOL/L (ref 136–145)
WBC # BLD AUTO: 5.88 THOUSAND/UL (ref 4.31–10.16)

## 2022-05-29 PROCEDURE — G1004 CDSM NDSC: HCPCS

## 2022-05-29 PROCEDURE — 74176 CT ABD & PELVIS W/O CONTRAST: CPT

## 2022-05-29 PROCEDURE — 83690 ASSAY OF LIPASE: CPT | Performed by: EMERGENCY MEDICINE

## 2022-05-29 PROCEDURE — 80053 COMPREHEN METABOLIC PANEL: CPT | Performed by: EMERGENCY MEDICINE

## 2022-05-29 PROCEDURE — 36415 COLL VENOUS BLD VENIPUNCTURE: CPT

## 2022-05-29 PROCEDURE — 99284 EMERGENCY DEPT VISIT MOD MDM: CPT | Performed by: EMERGENCY MEDICINE

## 2022-05-29 PROCEDURE — 99284 EMERGENCY DEPT VISIT MOD MDM: CPT

## 2022-05-29 PROCEDURE — 85025 COMPLETE CBC W/AUTO DIFF WBC: CPT | Performed by: EMERGENCY MEDICINE

## 2022-05-29 PROCEDURE — 71250 CT THORAX DX C-: CPT

## 2022-05-29 RX ORDER — IBUPROFEN 600 MG/1
600 TABLET ORAL EVERY 6 HOURS PRN
Qty: 30 TABLET | Refills: 0 | Status: SHIPPED | OUTPATIENT
Start: 2022-05-29 | End: 2022-06-08

## 2022-05-29 NOTE — ED PROVIDER NOTES
History  Chief Complaint   Patient presents with    Abdominal Pain     Pt with RUQ and right flank pain worsening since monday     47-year-old male coming with complaint of right flank lower rib right upper quadrant abdominal pain that has been going on for about a week that has developed more gradually  He states about a week ago he was playing soccer and collided with another person  He had some discomfort at the time but did not think much of it however over the past week he has developed some more pain worse with movement and breathing so now got more concerned about potential rib injuries so came in for evaluation  He has no fever, chills, nausea vomiting or diarrhea  He has no cough or shortness of breath  History provided by:  Patient  Flank Pain  Pain location:  R flank  Pain quality: aching    Pain radiates to:  Chest  Pain severity:  Moderate  Onset quality:  Gradual  Duration:  1 week  Timing:  Constant  Progression:  Worsening  Chronicity:  New  Context: trauma    Relieved by:  Nothing  Worsened by: Movement  Ineffective treatments:  None tried  Associated symptoms: no vomiting        Prior to Admission Medications   Prescriptions Last Dose Informant Patient Reported? Taking?    Omega-3 Fatty Acids (fish oil) 1,000 mg  Self No No   Sig: Take 1 capsule (1,000 mg total) by mouth 2 (two) times a day   butalbital-acetaminophen-caffeine (FIORICET,ESGIC) -40 mg per tablet  Self No No   Sig: Take 1 tablet by mouth daily as needed for migraine For on set of headache   multivitamin (THERAGRAN) TABS  Self Yes No   Sig: Take 1 tablet by mouth daily   omeprazole (PriLOSEC) 40 MG capsule  Self No No   Sig: Take 1 capsule (40 mg total) by mouth daily      Facility-Administered Medications: None       Past Medical History:   Diagnosis Date    Elbow tendonitis     right possible bursitis    Gastritis     GERD (gastroesophageal reflux disease)     Migraine        Past Surgical History:   Procedure Laterality Date    COLONOSCOPY      EGD  2005    EGD  01/2020    EYE FOREIGN BODY REMOVAL Left     IR BIOPSY LOWER LIMB  3/16/2022    AK INCISE FINGER TENDON SHEATH Right 12/26/2018    Procedure: LONG AND RING TRIGGER FINGER RELEASES;  Surgeon: Moisés Pardo MD;  [facility];  Service: Orthopedics    SKIN BIOPSY         Family History   Problem Relation Age of Onset    Diabetes Mother     Hypertension Mother    Bekah Albright Breast cancer Mother     Cancer Mother         Breast    Lung cancer Mother     Diabetes Father     Gout Father     Hypertension Father     Cancer Maternal Grandmother     Rosacea Family      I have reviewed and agree with the history as documented  E-Cigarette/Vaping    E-Cigarette Use Never User      E-Cigarette/Vaping Substances    Nicotine No     THC No     CBD No     Flavoring No     Other No     Unknown No      Social History     Tobacco Use    Smoking status: Never Smoker    Smokeless tobacco: Never Used   Vaping Use    Vaping Use: Never used   Substance Use Topics    Alcohol use: Not Currently     Alcohol/week: 0 0 standard drinks     Comment: Occasional    Drug use: Never       Review of Systems   Gastrointestinal: Negative for vomiting  Genitourinary: Positive for flank pain  All other systems reviewed and are negative  Physical Exam  Physical Exam  Vitals and nursing note reviewed  Constitutional:       General: He is not in acute distress  Appearance: He is well-developed  He is not diaphoretic  HENT:      Head: Normocephalic and atraumatic  Nose: Nose normal    Eyes:      Conjunctiva/sclera: Conjunctivae normal    Cardiovascular:      Rate and Rhythm: Normal rate and regular rhythm  Heart sounds: Normal heart sounds  Pulmonary:      Effort: Pulmonary effort is normal  No respiratory distress  Breath sounds: Normal breath sounds  Chest:      Chest wall: Tenderness (right lateral chest/ribs) present     Abdominal: General: There is no distension  Palpations: Abdomen is soft  Tenderness: There is no abdominal tenderness  Musculoskeletal:         General: No deformity  Normal range of motion  Cervical back: Normal range of motion and neck supple  Skin:     General: Skin is warm and dry  Neurological:      General: No focal deficit present  Mental Status: He is alert and oriented to person, place, and time  Mental status is at baseline     Psychiatric:         Mood and Affect: Mood normal          Vital Signs  ED Triage Vitals [05/29/22 0628]   Temperature Pulse Respirations Blood Pressure SpO2   98 1 °F (36 7 °C) 64 18 125/78 99 %      Temp Source Heart Rate Source Patient Position - Orthostatic VS BP Location FiO2 (%)   Oral Monitor Sitting Right arm --      Pain Score       7           Vitals:    05/29/22 0628   BP: 125/78   Pulse: 64   Patient Position - Orthostatic VS: Sitting         Visual Acuity      ED Medications  Medications - No data to display    Diagnostic Studies  Results Reviewed     Procedure Component Value Units Date/Time    Lipase [[id number]]  (Normal) Collected: 05/29/22 0642    Lab Status: Final result Specimen: Blood from Arm, Left Updated: 05/29/22 0706     Lipase 141 u/L     Comprehensive metabolic panel [[id number]] Collected: 05/29/22 0642    Lab Status: Final result Specimen: Blood from Arm, Left Updated: 05/29/22 0706     Sodium 138 mmol/L      Potassium 3 7 mmol/L      Chloride 104 mmol/L      CO2 26 mmol/L      ANION GAP 8 mmol/L      BUN 22 mg/dL      Creatinine 0 96 mg/dL      Glucose 112 mg/dL      Calcium 8 7 mg/dL      AST 17 U/L      ALT 27 U/L      Alkaline Phosphatase 71 U/L      Total Protein 6 9 g/dL      Albumin 3 9 g/dL      Total Bilirubin 0 61 mg/dL      eGFR 93 ml/min/1 73sq m     Narrative:      [place] guidelines for Chronic Kidney Disease (CKD):     Stage 1 with normal or high GFR (GFR > 90 mL/min/1 73 square meters)    Stage 2 Mild CKD (GFR = 60-89 mL/min/1 73 square meters)    Stage 3A Moderate CKD (GFR = 45-59 mL/min/1 73 square meters)    Stage 3B Moderate CKD (GFR = 30-44 mL/min/1 73 square meters)    Stage 4 Severe CKD (GFR = 15-29 mL/min/1 73 square meters)    Stage 5 End Stage CKD (GFR <15 mL/min/1 73 square meters)  Note: GFR calculation is accurate only with a steady state creatinine    CBC and differential [[id number]] Collected: 05/29/22 0642    Lab Status: Final result Specimen: Blood from Arm, Left Updated: 05/29/22 0649     WBC 5 88 Thousand/uL      RBC 5 31 Million/uL      Hemoglobin 15 9 g/dL      Hematocrit 47 1 %      MCV 89 fL      MCH 29 9 pg      MCHC 33 8 g/dL      RDW 12 3 %      MPV 9 1 fL      Platelets 246 Thousands/uL      nRBC 0 /100 WBCs      Neutrophils Relative 62 %      Immat GRANS % 0 %      Lymphocytes Relative 27 %      Monocytes Relative 8 %      Eosinophils Relative 3 %      Basophils Relative 0 %      Neutrophils Absolute 3 61 Thousands/µL      Immature Grans Absolute 0 01 Thousand/uL      Lymphocytes Absolute 1 60 Thousands/µL      Monocytes Absolute 0 47 Thousand/µL      Eosinophils Absolute 0 17 Thousand/µL      Basophils Absolute 0 02 Thousands/µL                  CT chest abdomen pelvis wo contrast   Final Result by Nas Hatch MD (05/29 0820)      No acute cardiopulmonary abnormality  No acute intra-abdominal abnormality  Colonic diverticulosis  Workstation performed: [id number]                    Procedures  Procedures         ED Course                               SBIRT 22yo+    Flowsheet Row Most Recent Value   SBIRT (23 yo +)    In order to provide better care to our patients, we are screening all of our patients for alcohol and drug use  Would it be okay to ask you these screening questions? Yes Filed at: 05/29/2022 7446   Initial Alcohol Screen: US AUDIT-C     1  How often do you have a drink containing alcohol? 1 Filed at: 05/29/2022 7272   2   How many drinks containing alcohol do you have on a typical day you are drinking? 0 Filed at: 05/29/2022 5443   3a  Male UNDER 65: How often do you have five or more drinks on one occasion? 0 Filed at: 05/29/2022 8979   Audit-C Score 1 Filed at: 05/29/2022 8636   MANUEL: How many times in the past year have you    Used an illegal drug or used a prescription medication for non-medical reasons? Never Filed at: 05/29/2022 0843                    MDM  Number of Diagnoses or Management Options  Chest pain: new and requires workup  Right flank pain: new and requires workup     Amount and/or Complexity of Data Reviewed  Clinical lab tests: ordered and reviewed  Tests in the radiology section of CPT®: ordered and reviewed    Risk of Complications, Morbidity, and/or Mortality  Presenting problems: high    Patient Progress  Patient progress: stable      Disposition  Final diagnoses:   Right flank pain   Chest pain     Time reflects when diagnosis was documented in both MDM as applicable and the Disposition within this note     Time User Action Codes Description Comment    5/29/2022  8:49 AM Gracia CAGE Add [R10 9] Right flank pain     5/29/2022  8:49 AM Katerine Barnes Add [R07 9] Chest pain       ED Disposition     ED Disposition   Discharge    Condition   Stable    Date/Time   Sun May 29, 2022  8:49 AM    [address] [place] [address] Street discharge to home/self care  Follow-up Information     Follow up With Specialties Details Why Contact Info Additional Information    [address] [facility] Emergency Department Emergency Medicine Go to   [address] [place] [address] Emergency Department, [address] [place] [address], [id number]          Patient's Medications   Discharge Prescriptions    No medications on file       No discharge procedures on file      PDMP Review     None          ED Provider  Electronically Signed by           Vishnu Love Melba Hess MD  05/29/22 7266

## 2022-06-08 LAB

## 2022-08-29 ENCOUNTER — APPOINTMENT (OUTPATIENT)
Dept: LAB | Age: 47
End: 2022-08-29

## 2022-08-29 DIAGNOSIS — Z00.8 HEALTH EXAMINATION IN POPULATION SURVEY: ICD-10-CM

## 2022-08-29 LAB
CHOLEST SERPL-MCNC: 213 MG/DL
HDLC SERPL-MCNC: 44 MG/DL
LDLC SERPL CALC-MCNC: 122 MG/DL (ref 0–100)
NONHDLC SERPL-MCNC: 169 MG/DL
TRIGL SERPL-MCNC: 237 MG/DL

## 2022-08-29 PROCEDURE — 36415 COLL VENOUS BLD VENIPUNCTURE: CPT

## 2022-08-29 PROCEDURE — 80061 LIPID PANEL: CPT

## 2022-08-29 PROCEDURE — 83036 HEMOGLOBIN GLYCOSYLATED A1C: CPT

## 2022-08-30 LAB
EST. AVERAGE GLUCOSE BLD GHB EST-MCNC: 114 MG/DL
HBA1C MFR BLD: 5.6 %

## 2022-09-16 LAB

## 2022-09-23 ENCOUNTER — OFFICE VISIT (OUTPATIENT)
Dept: INTERNAL MEDICINE CLINIC | Facility: OTHER | Age: 47
End: 2022-09-23
Payer: COMMERCIAL

## 2022-09-23 VITALS
HEART RATE: 60 BPM | DIASTOLIC BLOOD PRESSURE: 78 MMHG | OXYGEN SATURATION: 99 % | WEIGHT: 227 LBS | HEIGHT: 67 IN | TEMPERATURE: 98.2 F | BODY MASS INDEX: 35.63 KG/M2 | SYSTOLIC BLOOD PRESSURE: 124 MMHG

## 2022-09-23 DIAGNOSIS — M54.50 LOW BACK PAIN WITHOUT SCIATICA, UNSPECIFIED BACK PAIN LATERALITY, UNSPECIFIED CHRONICITY: Primary | ICD-10-CM

## 2022-09-23 PROCEDURE — 99214 OFFICE O/P EST MOD 30 MIN: CPT | Performed by: PHYSICIAN ASSISTANT

## 2022-09-23 RX ORDER — METHOCARBAMOL 500 MG/1
500 TABLET, FILM COATED ORAL
Qty: 20 TABLET | Refills: 0 | Status: SHIPPED | OUTPATIENT
Start: 2022-09-23

## 2022-09-23 RX ORDER — MELOXICAM 15 MG/1
15 TABLET ORAL DAILY
Qty: 30 TABLET | Refills: 5 | Status: SHIPPED | OUTPATIENT
Start: 2022-09-23

## 2022-09-23 NOTE — ASSESSMENT & PLAN NOTE
Appears to be musculoskeletal low back pain  Start  Mobic once daily with food  D/C ibuprofen  Discussed posture  Start physical therapy, core strengthening, avoid lifting/straining    Hold on imaging  4-6 week follow up    Consider imaging if sx persist or do not respond to PT
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
## 2022-09-23 NOTE — PATIENT INSTRUCTIONS
Low back pain without sciatica  Appears to be musculoskeletal low back pain  Start  Mobic once daily with food  D/C ibuprofen  Discussed posture  Start physical therapy, core strengthening, avoid lifting/straining    Hold on imaging  4-6 week follow up    Consider imaging if sx persist or do not respond to PT

## 2022-10-06 LAB

## 2022-10-12 PROBLEM — S51.812A LACERATION OF LEFT FOREARM: Status: RESOLVED | Noted: 2021-11-08 | Resolved: 2022-10-12

## 2022-10-13 LAB
DME PARACHUTE DELIVERY DATE ACTUAL: NORMAL
DME PARACHUTE DELIVERY DATE REQUESTED: NORMAL
DME PARACHUTE ITEM DESCRIPTION: NORMAL
DME PARACHUTE ORDER STATUS: NORMAL
DME PARACHUTE SUPPLIER NAME: NORMAL
DME PARACHUTE SUPPLIER PHONE: NORMAL

## 2022-10-25 ENCOUNTER — APPOINTMENT (OUTPATIENT)
Dept: URGENT CARE | Age: 47
End: 2022-10-25

## 2022-10-27 ENCOUNTER — TELEMEDICINE (OUTPATIENT)
Dept: INTERNAL MEDICINE CLINIC | Facility: OTHER | Age: 47
End: 2022-10-27

## 2022-10-27 VITALS — HEIGHT: 67 IN | BODY MASS INDEX: 35.63 KG/M2 | WEIGHT: 227 LBS

## 2022-10-27 DIAGNOSIS — S67.22XA CRUSHING INJURY OF LEFT HAND, INITIAL ENCOUNTER: Primary | ICD-10-CM

## 2022-10-27 NOTE — PROGRESS NOTES
Virtual Regular Visit    Verification of patient location:    Patient is located in the following state in which I hold an active license [place]      Assessment/Plan:    Problem List Items Addressed This Visit        Other    Crushing injury of left hand - Primary     Will order an x-ray for further evaluation  Relevant Orders    XR hand 3+ vw left               Reason for visit is   Chief Complaint   Patient presents with   • Virtual Brief Visit     Left hand pain    • Virtual Regular Visit        Encounter provider Tanmay Mitchell MD    Provider located at [address] [place] [address]      Recent Visits  No visits were found meeting these conditions  Showing recent visits within past 7 days and meeting all other requirements  Today's Visits  Date Type Provider Dept   10/27/22 Telemedicine Tanmay Mitchell MD [facility]   Showing today's visits and meeting all other requirements  Future Appointments  No visits were found meeting these conditions  Showing future appointments within next 150 days and meeting all other requirements       The patient was identified by name and date of birth  Hermelinda Griffin was informed that this is a telemedicine visit and that the visit is being conducted through the [employer]  He agrees to proceed     My office door was closed  No one else was in the room  He acknowledged consent and understanding of privacy and security of the video platform  The patient has agreed to participate and understands they can discontinue the visit at any time  Patient is aware this is a billable service  Adam is a   58-year-old male seen today with concern for left hand pain that occurred 2 weeks ago  While moving furniture a large cabinet fell onto his left hand   He has been experiencing worsening pain and hand weakness since yesterday  Past Medical History:   Diagnosis Date   • Elbow tendonitis     right possible bursitis   • Gastritis    • GERD (gastroesophageal reflux disease)    • Migraine        Past Surgical History:   Procedure Laterality Date   • COLONOSCOPY     • EGD  2005   • EGD  01/2020   • EYE FOREIGN BODY REMOVAL Left    • IR BIOPSY LOWER LIMB  3/16/2022   • [place] INCISE FINGER TENDON SHEATH Right 12/26/2018    Procedure: LONG AND RING TRIGGER FINGER RELEASES;  Surgeon: Josh Shook MD;  Location: [facility];  Service: Orthopedics   • SKIN BIOPSY         Current Outpatient Medications   Medication Sig Dispense Refill   • butalbital-acetaminophen-caffeine (FIORICET,ESGIC) -40 mg per tablet Take 1 tablet by mouth daily as needed for migraine For on set of headache 30 tablet 0   • meloxicam (Mobic) 15 mg tablet Take 1 tablet (15 mg total) by mouth daily 30 tablet 5   • methocarbamol (ROBAXIN) 500 mg tablet Take 1 tablet (500 mg total) by mouth daily at bedtime as needed for muscle spasms (As needed for spasm (caution may cause sedation)) 20 tablet 0   • multivitamin (THERAGRAN) TABS Take 1 tablet by mouth daily     • Omega-3 Fatty Acids (fish oil) 1,000 mg Take 1 capsule (1,000 mg total) by mouth 2 (two) times a day 60 capsule 1   • omeprazole (PriLOSEC) 40 MG capsule Take 1 capsule (40 mg total) by mouth daily 90 capsule 1     No current facility-administered medications for this visit  No Known Allergies    Review of Systems   Constitutional: Negative for activity change, appetite change, chills, diaphoresis, fatigue and fever  HENT: Negative for congestion, postnasal drip, rhinorrhea, sinus pressure, sinus pain, sneezing and sore throat  Eyes: Negative for visual disturbance  Respiratory: Negative for apnea, cough, choking, chest tightness, shortness of breath and wheezing  Cardiovascular: Negative for chest pain, palpitations and leg swelling     Gastrointestinal: Negative for abdominal distention, abdominal pain, anal bleeding, blood in stool, constipation, diarrhea, nausea and vomiting  Endocrine: Negative for cold intolerance and heat intolerance  Genitourinary: Negative for difficulty urinating, dysuria and hematuria  Musculoskeletal: Negative  Skin: Negative  Neurological: Negative for dizziness, weakness, light-headedness, numbness and headaches  Hematological: Negative for adenopathy  Psychiatric/Behavioral: Negative for agitation, sleep disturbance and suicidal ideas  All other systems reviewed and are negative  Video Exam    Vitals:    10/27/22 1614   Weight: 103 kg (227 lb)   Height: 5' 7" (1 702 m)       Physical Exam  Vitals and nursing note reviewed  Constitutional:       General: He is not in acute distress  Appearance: He is well-developed  He is not diaphoretic  HENT:      Head: Normocephalic and atraumatic  Right Ear: External ear normal       Left Ear: External ear normal    Eyes:      General:         Right eye: No discharge  Left eye: No discharge  Conjunctiva/sclera: Conjunctivae normal    Pulmonary:      Effort: Pulmonary effort is normal  No respiratory distress  Abdominal:      General: There is no distension  Tenderness: There is no abdominal tenderness  Musculoskeletal:         General: No deformity  Normal range of motion  Arms:       Cervical back: Normal range of motion  Skin:     Coloration: Skin is not pale  Findings: No erythema or rash  Neurological:      Mental Status: He is alert and oriented to person, place, and time  Cranial Nerves: No cranial nerve deficit  Coordination: Coordination normal    Psychiatric:         Behavior: Behavior normal          Thought Content:  Thought content normal          Judgment: Judgment normal           I spent 15 minutes directly with the patient during this visit

## 2022-10-28 ENCOUNTER — HOSPITAL ENCOUNTER (OUTPATIENT)
Dept: RADIOLOGY | Facility: HOSPITAL | Age: 47
End: 2022-10-28
Payer: COMMERCIAL

## 2022-10-28 DIAGNOSIS — S67.22XA CRUSHING INJURY OF LEFT HAND, INITIAL ENCOUNTER: ICD-10-CM

## 2022-10-28 PROCEDURE — 73130 X-RAY EXAM OF HAND: CPT

## 2022-11-02 ENCOUNTER — OFFICE VISIT (OUTPATIENT)
Dept: INTERNAL MEDICINE CLINIC | Facility: OTHER | Age: 47
End: 2022-11-02

## 2022-11-02 VITALS
WEIGHT: 231 LBS | SYSTOLIC BLOOD PRESSURE: 118 MMHG | RESPIRATION RATE: 20 BRPM | OXYGEN SATURATION: 98 % | TEMPERATURE: 98.6 F | HEART RATE: 58 BPM | HEIGHT: 67 IN | BODY MASS INDEX: 36.26 KG/M2 | DIASTOLIC BLOOD PRESSURE: 82 MMHG

## 2022-11-02 DIAGNOSIS — S62.355D CLOSED NONDISPLACED FRACTURE OF SHAFT OF FOURTH METACARPAL BONE OF LEFT HAND WITH ROUTINE HEALING: Primary | ICD-10-CM

## 2022-11-02 NOTE — PROGRESS NOTES
Assessment/Plan:    Closed nondisplaced fracture of shaft of fourth metacarpal bone of left hand with routine healing  Hand/finger splint applied today  Discussed limiting range-of-motion and use of his hand  Will follow-up in 6 weeks  Alternatives were presented to the patient which included referral to Orthopedic surgery however the nature of the fractures nonsurgical   He is agreeable to close treatment of metacarpal fracture without manipulation  Diagnoses and all orders for this visit:    Closed nondisplaced fracture of shaft of fourth metacarpal bone of left hand with routine healing  -     Cast application                Subjective:      Patient ID: Sindy Bell is a 48 y o  male  Chief Complaint   Patient presents with   • xray     Left hand on 10/28/22  Happened 2 weeks ago  Dropped [place] cabinet on hand     • hm     Bmi f/u plan, annual       25-year-old male seen today for follow-up of left 4th finger fracture  He had an x-ray which confirmed curvilinear nondisplaced fracture of the mid to distal 4th metacarpal   He has been experiencing worsening pain of the left hand  The following portions of the patient's history were reviewed and updated as appropriate: allergies, current medications, past family history, past medical history, past social history, past surgical history and problem list     Review of Systems   Constitutional: Negative for activity change, appetite change, chills, diaphoresis, fatigue and fever  HENT: Negative for congestion, postnasal drip, rhinorrhea, sinus pressure, sinus pain, sneezing and sore throat  Eyes: Negative for visual disturbance  Respiratory: Negative for apnea, cough, choking, chest tightness, shortness of breath and wheezing  Cardiovascular: Negative for chest pain, palpitations and leg swelling     Gastrointestinal: Negative for abdominal distention, abdominal pain, anal bleeding, blood in stool, constipation, diarrhea, nausea and vomiting  Endocrine: Negative for cold intolerance and heat intolerance  Genitourinary: Negative for difficulty urinating, dysuria and hematuria  Musculoskeletal: Negative  Skin: Negative  Neurological: Negative for dizziness, weakness, light-headedness, numbness and headaches  Hematological: Negative for adenopathy  Psychiatric/Behavioral: Negative for agitation, sleep disturbance and suicidal ideas  All other systems reviewed and are negative  Cast application    Date/Time: 11/2/2022 3:17 PM  Performed by: Flako De León MD  Authorized by: Flako De León MD   Universal Protocol:  Consent: Verbal consent obtained    Risks and benefits: risks, benefits and alternatives were discussed  Consent given by: patient  Patient understanding: patient states understanding of the procedure being performed  Patient identity confirmed: verbally with patient      Pre-procedure details:     Sensation:  Normal  Procedure details:     Laterality:  Left    Location:  Finger    Finger:  L ring finger    Strapping: yes  Cast type:  Finger (Finger splint)      Splint type:  Finger splint, static    Supplies:  Aluminum splint and 2 layer wrap        Past Medical History:   Diagnosis Date   • Elbow tendonitis     right possible bursitis   • Gastritis    • GERD (gastroesophageal reflux disease)    • Migraine          Current Outpatient Medications:   •  butalbital-acetaminophen-caffeine (FIORICET,ESGIC) -40 mg per tablet, Take 1 tablet by mouth daily as needed for migraine For on set of headache, Disp: 30 tablet, Rfl: 0  •  meloxicam (Mobic) 15 mg tablet, Take 1 tablet (15 mg total) by mouth daily, Disp: 30 tablet, Rfl: 5  •  methocarbamol (ROBAXIN) 500 mg tablet, Take 1 tablet (500 mg total) by mouth daily at bedtime as needed for muscle spasms (As needed for spasm (caution may cause sedation)), Disp: 20 tablet, Rfl: 0  •  multivitamin (THERAGRAN) TABS, Take 1 tablet by mouth daily, Disp: , Rfl:   •  Omega-3 Fatty Acids (fish oil) 1,000 mg, Take 1 capsule (1,000 mg total) by mouth 2 (two) times a day, Disp: 60 capsule, Rfl: 1  •  omeprazole (PriLOSEC) 40 MG capsule, Take 1 capsule (40 mg total) by mouth daily, Disp: 90 capsule, Rfl: 1    No Known Allergies    Social History   Past Surgical History:   Procedure Laterality Date   • COLONOSCOPY     • EGD  2005   • EGD  01/2020   • EYE FOREIGN BODY REMOVAL Left    • IR BIOPSY LOWER LIMB  3/16/2022   • [place] INCISE FINGER TENDON SHEATH Right 12/26/2018    Procedure: LONG AND RING TRIGGER FINGER RELEASES;  Surgeon: Elizabeth Thomas MD;  Location: [facility];  Service: Orthopedics   • SKIN BIOPSY       Family History   Problem Relation Age of Onset   • Diabetes Mother    • Hypertension Mother    • Breast cancer Mother    • Cancer Mother         Breast   • Lung cancer Mother    • Diabetes Father    • Gout Father    • Hypertension Father    • Cancer Maternal Grandmother    • Rosacea Family        Objective:  /82 (BP Location: Left arm, Patient Position: Sitting, Cuff Size: Adult)   Pulse 58   Temp 98 6 °F (37 °C) (Temporal)   Resp 20   Ht 5' 7" (1 702 m)   Wt 105 kg (231 lb)   SpO2 98%   BMI 36 18 kg/m²     No results found for this or any previous visit (from the past 1344 hour(s))  Physical Exam  Vitals and nursing note reviewed  Constitutional:       General: He is not in acute distress  Appearance: He is well-developed  He is not diaphoretic  HENT:      Head: Normocephalic and atraumatic  Eyes:      General: No scleral icterus  Right eye: No discharge  Left eye: No discharge  Conjunctiva/sclera: Conjunctivae normal       Pupils: Pupils are equal, round, and reactive to light  Neck:      Thyroid: No thyromegaly  Vascular: No JVD  Cardiovascular:      Rate and Rhythm: Normal rate and regular rhythm  Heart sounds: Normal heart sounds  No murmur heard  No friction rub  No gallop     Pulmonary:      Effort: Pulmonary effort is normal  No respiratory distress  Breath sounds: Normal breath sounds  No wheezing or rales  Chest:      Chest wall: No tenderness  Abdominal:      General: Bowel sounds are normal  There is no distension  Palpations: Abdomen is soft  There is no mass  Tenderness: There is no abdominal tenderness  There is no guarding or rebound  Musculoskeletal:         General: No deformity  Normal range of motion  Left hand: Tenderness (Along 4th metacarpal) present  Cervical back: Normal range of motion and neck supple  Lymphadenopathy:      Cervical: No cervical adenopathy  Skin:     General: Skin is warm and dry  Coloration: Skin is not pale  Findings: No erythema or rash  Neurological:      Mental Status: He is alert and oriented to person, place, and time  Cranial Nerves: No cranial nerve deficit  Coordination: Coordination normal       Deep Tendon Reflexes: Reflexes are normal and symmetric  Psychiatric:         Behavior: Behavior normal          Thought Content:  Thought content normal          Judgment: Judgment normal

## 2022-11-02 NOTE — ASSESSMENT & PLAN NOTE
Hand/finger splint applied today  Discussed limiting range-of-motion and use of his hand  Will follow-up in 6 weeks  Alternatives were presented to the patient which included referral to Orthopedic surgery however the nature of the fractures nonsurgical   He is agreeable to close treatment of metacarpal fracture without manipulation

## 2022-12-07 ENCOUNTER — TELEPHONE (OUTPATIENT)
Dept: OBGYN CLINIC | Facility: HOSPITAL | Age: 47
End: 2022-12-07

## 2022-12-07 NOTE — TELEPHONE ENCOUNTER
Caller: Patient    Doctor:  Any Hand Doctor    Reason for call: Nondisplaced curvilinear fracture mid to distal 4th metacarpal   Xrays in [facility]    Call back#: [phone]

## 2022-12-16 ENCOUNTER — HOSPITAL ENCOUNTER (OUTPATIENT)
Dept: RADIOLOGY | Facility: IMAGING CENTER | Age: 47
End: 2022-12-16

## 2022-12-16 ENCOUNTER — OFFICE VISIT (OUTPATIENT)
Dept: INTERNAL MEDICINE CLINIC | Facility: OTHER | Age: 47
End: 2022-12-16

## 2022-12-16 VITALS
TEMPERATURE: 98.1 F | HEART RATE: 71 BPM | HEIGHT: 67 IN | BODY MASS INDEX: 36.88 KG/M2 | OXYGEN SATURATION: 97 % | WEIGHT: 235 LBS | DIASTOLIC BLOOD PRESSURE: 84 MMHG | SYSTOLIC BLOOD PRESSURE: 118 MMHG

## 2022-12-16 DIAGNOSIS — K21.9 GASTROESOPHAGEAL REFLUX DISEASE WITHOUT ESOPHAGITIS: ICD-10-CM

## 2022-12-16 DIAGNOSIS — M54.41 RIGHT-SIDED LOW BACK PAIN WITH RIGHT-SIDED SCIATICA, UNSPECIFIED CHRONICITY: ICD-10-CM

## 2022-12-16 DIAGNOSIS — M54.50 LOW BACK PAIN WITHOUT SCIATICA, UNSPECIFIED BACK PAIN LATERALITY, UNSPECIFIED CHRONICITY: ICD-10-CM

## 2022-12-16 DIAGNOSIS — M54.41 RIGHT-SIDED LOW BACK PAIN WITH RIGHT-SIDED SCIATICA, UNSPECIFIED CHRONICITY: Primary | ICD-10-CM

## 2022-12-16 RX ORDER — METHYLPREDNISOLONE 4 MG/1
TABLET ORAL
Qty: 21 EACH | Refills: 0 | Status: SHIPPED | OUTPATIENT
Start: 2022-12-16

## 2022-12-16 RX ORDER — METHOCARBAMOL 500 MG/1
500 TABLET, FILM COATED ORAL
Qty: 20 TABLET | Refills: 0 | Status: SHIPPED | OUTPATIENT
Start: 2022-12-16

## 2022-12-16 RX ORDER — OMEPRAZOLE 40 MG/1
40 CAPSULE, DELAYED RELEASE ORAL DAILY
Qty: 90 CAPSULE | Refills: 1 | Status: SHIPPED | OUTPATIENT
Start: 2022-12-16

## 2022-12-16 NOTE — PATIENT INSTRUCTIONS
Right-sided low back pain with right-sided sciatica  Go for xray today  Start medrol dose pack  Start robaxin daily at night (warned of sedation)  Discussed importance of scheduling with PT, patient will call and schedule  Anticipate MRI needed to further evaluate  Advised patient to call and schedule  Discussed posture and core strengthening  Discussed weight loss and daily activity  Avoid lifting, straining  GERD (gastroesophageal reflux disease)  Stable at this time on Prilosec    Refilled today

## 2022-12-16 NOTE — PROGRESS NOTES
[facility] [place]  Standard Office Visit  Patient ID: Karin Walden    : 1975  Age/Gender: 50 y o  male     DATE: 2022      Assessment/Plan:    Right-sided low back pain with right-sided sciatica  Go for xray today  Start medrol dose pack  Start robaxin daily at night (warned of sedation)  Discussed importance of scheduling with PT, patient will call and schedule  Anticipate MRI needed to further evaluate  Advised patient to call and schedule  Discussed posture and core strengthening  Discussed weight loss and daily activity  Avoid lifting, straining  GERD (gastroesophageal reflux disease)  Stable at this time on Prilosec  Refilled today       Diagnoses and all orders for this visit:    Right-sided low back pain with right-sided sciatica, unspecified chronicity  -     Cancel: XR spine lumbar 2 or 3 views injury; Future  -     MRI lumbar spine wo contrast; Future  -     methylPREDNISolone 4 MG tablet therapy pack; Use as directed on package  -     Ambulatory Referral to Physical Therapy; Future    Gastroesophageal reflux disease without esophagitis  Comments:  Refilled prilosec  Schedule GI follow up  Keep with GERD diet and lifestyle modifications  Reduce caffeine/soda/energy drinks with goal to cut out completley  Orders:  -     omeprazole (PriLOSEC) 40 MG capsule; Take 1 capsule (40 mg total) by mouth daily    Low back pain without sciatica, unspecified back pain laterality, unspecified chronicity  -     methocarbamol (ROBAXIN) 500 mg tablet; Take 1 tablet (500 mg total) by mouth daily at bedtime as needed for muscle spasms (As needed for spasm (caution may cause sedation))                Subjective:   Chief Complaint   Patient presents with   • Back Pain     Patient complaining of lower back pain for awhile   States he is just using [address] is a 50 y o  male who presents to the office on 2022 for     49 yo male with h/o back pain presents to office for follow-up persistent back pain since last visit 9/2022  Did not start formal PT  Notes he was during home stretches and ROM exercises at home  Pain is located in middle of back to the right side in lumbar area  Movement makes it worse, worse after a long nights sleep  When he wakes he has had pain and stiffness in the morning  He notes he has been trying to limit lifting and bending at work  Does have physical demanding job  Patient has not had back imaging  Back pain, persistent, worse in the morning  At times shoots to front of his legs  Feels pressure in the front of his legs and hip  No change in bowel or function  No leg numbness or tingling  No fall or injury to back he can recall  Worse with prolonged sitting or standing  Notes movement helps the pain  Worse with extension  Improved with rotation and lateral flexion  MRI lumbar 2014 reading "Normal enhanced MRI of the lumbar spine "  No imaging of back since  No prior injections  Pt reports he has gained weight  Tries to keep good posture  Uses towel rolls behind back when driving long distances  Also following with our office due to L hand fracture  Patient notes pain much improved  Has follow up with Dr Abner Langford to discus his hand  Back Pain  This is a recurrent problem  The current episode started more than 1 month ago  The problem occurs daily  The problem has been gradually worsening since onset  The pain is present in the lumbar spine  The quality of the pain is described as stabbing (sharp)  The pain radiates to the right thigh  The pain is at a severity of 7/10  The pain is moderate  The pain is worse during the day  The symptoms are aggravated by sitting and standing  Associated symptoms include leg pain and numbness (right thigh comes and goes)   Pertinent negatives include no abdominal pain, bladder incontinence, bowel incontinence, chest pain, dysuria, fever, headaches, perianal numbness, tingling, weakness or weight loss  The following portions of the patient's history were reviewed and updated as appropriate: allergies, current medications, past family history, past medical history, past social history, past surgical history and problem list     Review of Systems   Constitutional: Negative for fever and weight loss  Respiratory: Negative for cough, shortness of breath and wheezing  Cardiovascular: Negative for chest pain and palpitations  Gastrointestinal: Negative for abdominal pain, bowel incontinence, diarrhea, nausea and vomiting  Genitourinary: Negative for bladder incontinence and dysuria  Musculoskeletal: Positive for back pain  Neurological: Positive for numbness (right thigh comes and goes)  Negative for tingling, weakness and headaches           Patient Active Problem List   Diagnosis   • Atypical chest pain   • Chronic pain of left knee   • Depression with anxiety   • GERD (gastroesophageal reflux disease)   • Heel pain, chronic, right   • Joint pain   • Lateral epicondylitis of left elbow   • Migraine headache   • Mild obstructive sleep apnea   • Neck muscle spasm   • Trigger ring finger of right hand   • Trigger middle finger of right hand   • Hypertriglyceridemia   • Mass of left foot   • Right elbow pain   • Cubital tunnel syndrome of both upper extremities   • Olecranon bursitis of right elbow   • Gastritis   • Carpal tunnel syndrome, bilateral   • Colon cancer screening   • Callus of foot   • Umbilical hernia without obstruction and without gangrene   • Atelectasis   • Colon polyp   • Diverticulosis   • Internal hemorrhoid   • PAC (premature atrial contraction)   • Laceration of right index finger without foreign body without damage to nail   • Strain of left Achilles tendon   • Calcaneal spur, left   • Pain of left thumb   • Annual physical exam   • Strain of right shoulder   • Atypical pigmented skin lesion   • Impaired fasting glucose   • Actinic keratoses   • Excessive daytime sleepiness   • Other fatigue   • ROMAINE (obstructive sleep apnea)   • Night sweat   • Right-sided low back pain with right-sided sciatica   • Crushing injury of left hand   • Closed nondisplaced fracture of shaft of fourth metacarpal bone of left hand with routine healing       Past Medical History:   Diagnosis Date   • Elbow tendonitis     right possible bursitis   • Gastritis    • GERD (gastroesophageal reflux disease)    • Migraine        Past Surgical History:   Procedure Laterality Date   • COLONOSCOPY     • EGD  2005   • EGD  01/2020   • EYE FOREIGN BODY REMOVAL Left    • IR BIOPSY LOWER LIMB  3/16/2022   • SD INCISE FINGER TENDON SHEATH Right 12/26/2018    Procedure: LONG AND RING TRIGGER FINGER RELEASES;  Surgeon: Danie Eller MD;  Location: [facility];  Service: Orthopedics   • SKIN BIOPSY           Current Outpatient Medications:   •  butalbital-acetaminophen-caffeine (FIORICET,ESGIC) -40 mg per tablet, Take 1 tablet by mouth daily as needed for migraine For on set of headache, Disp: 30 tablet, Rfl: 0  •  methocarbamol (ROBAXIN) 500 mg tablet, Take 1 tablet (500 mg total) by mouth daily at bedtime as needed for muscle spasms (As needed for spasm (caution may cause sedation)), Disp: 20 tablet, Rfl: 0  •  methylPREDNISolone 4 MG tablet therapy pack, Use as directed on package, Disp: 21 each, Rfl: 0  •  multivitamin (THERAGRAN) TABS, Take 1 tablet by mouth daily, Disp: , Rfl:   •  Omega-3 Fatty Acids (fish oil) 1,000 mg, Take 1 capsule (1,000 mg total) by mouth 2 (two) times a day, Disp: 60 capsule, Rfl: 1  •  omeprazole (PriLOSEC) 40 MG capsule, Take 1 capsule (40 mg total) by mouth daily, Disp: 90 capsule, Rfl: 1    No Known Allergies    Social History     Socioeconomic History   • Marital status: /Civil Union     Spouse name: None   • Number of children: None   • Years of education: None   • Highest education level: None   Occupational History   • None   Tobacco Use   • Smoking status: Never   • Smokeless tobacco: Never   Vaping Use   • Vaping Use: Never used   Substance and Sexual Activity   • Alcohol use: Not Currently     Alcohol/week: 0 0 standard drinks     Comment: Occasional   • Drug use: Never   • Sexual activity: Yes     Partners: Female     Birth control/protection: Female Sterilization   Other Topics Concern   • None   Social History Narrative    Caffeine use     Social Determinants of Health     Financial Resource Strain: Not on file   Food Insecurity: Not on file   Transportation Needs: Not on file   Physical Activity: Not on file   Stress: Not on file   Social Connections: Not on file   Intimate Partner Violence: Not on file   Housing Stability: Not on file       Family History   Problem Relation Age of Onset   • Diabetes Mother    • Hypertension Mother    • Breast cancer Mother    • Cancer Mother         Breast   • Lung cancer Mother    • Diabetes Father    • Gout Father    • Hypertension Father    • Cancer Maternal Grandmother    • Rosacea Family        PHQ-2/9 Depression Screening           Health Maintenance   Topic Date Due   • Hepatitis B Vaccine (1 of 3 - 3-dose series) Never done   • PT PLAN OF CARE  04/02/2021   • COVID-19 Vaccine (3 - Booster for Pfizer series) 09/12/2021   • Annual Physical  02/26/2022   • Influenza Vaccine (1) 09/01/2022   • BMI: Followup Plan  04/01/2023   • Colorectal Cancer Screening  09/25/2023   • BMI: Adult  12/16/2023   • DTaP,Tdap,and Td Vaccines (6 - Td or Tdap) 10/22/2030   • HIV Screening  Completed   • Hepatitis C Screening  Completed   • Pneumococcal Vaccine: Pediatrics (0 to 5 Years) and At-Risk Patients (6 to 59 Years)  Aged Out   • HIB Vaccine  Aged Out   • IPV Vaccine  Aged Out   • Hepatitis A Vaccine  Aged Out   • Meningococcal ACWY Vaccine  Aged Out   • HPV Vaccine  Aged [employer] History   Administered Date(s) Administered   • COVID-19 PFIZER VACCINE 0 3 ML IM 03/22/2021, 04/12/2021   • INFLUENZA 10/03/2016, 09/05/2017   • Influenza Quadrivalent Preservative Free 3 years and older IM 10/03/2016, 09/05/2017   • Influenza, recombinant, quadrivalent,injectable, preservative free 10/23/2020   • TD (adult) Preservative Free 04/29/2015   • Td (adult), adsorbed 04/29/2015   • Tdap 02/11/2014, 04/29/2015, 12/19/2016, 03/27/2020, 10/22/2020   • Tuberculin Skin Test-PPD Intradermal 04/01/2022, 04/01/2022        Objective:  Vitals:    12/16/22 1228   BP: 118/84   BP Location: Left arm   Patient Position: Sitting   Cuff Size: Adult   Pulse: 71   Temp: 98 1 °F (36 7 °C)   TempSrc: Temporal   SpO2: 97%   Weight: 107 kg (235 lb)   Height: 5' 7" (1 702 m)     Wt Readings from Last 3 Encounters:   12/16/22 107 kg (235 lb)   11/02/22 105 kg (231 lb)   10/27/22 103 kg (227 lb)     Body mass index is 36 81 kg/m²  No results found  Physical Exam  Vitals and nursing note reviewed  Constitutional:       General: He is not in acute distress  Appearance: He is well-developed  He is obese  He is not ill-appearing, toxic-appearing or diaphoretic  Comments: Alert, pleasant, cooperative, seated in room in NAD   HENT:      Head: Normocephalic and atraumatic  Mouth/Throat:      Mouth: Mucous membranes are moist       Pharynx: No oropharyngeal exudate or posterior oropharyngeal erythema  Eyes:      General: No scleral icterus  Right eye: No discharge  Left eye: No discharge  Pupils: Pupils are equal, round, and reactive to light  Cardiovascular:      Rate and Rhythm: Normal rate and regular rhythm  Heart sounds: Normal heart sounds  No murmur heard  Pulmonary:      Effort: Pulmonary effort is normal  No respiratory distress  Breath sounds: Normal breath sounds  No wheezing or rales  Abdominal:      General: Bowel sounds are normal  There is no distension  Palpations: Abdomen is soft  Tenderness: There is no abdominal tenderness   There is no right CVA tenderness, left CVA tenderness or guarding  Comments: Obese, soft, NT/ND  Musculoskeletal:      Thoracic back: Normal range of motion  Lumbar back: Spasms and tenderness present  No swelling, edema or bony tenderness  Decreased range of motion  Positive right straight leg raise test  Negative left straight leg raise test  No scoliosis  Back:    Skin:     General: Skin is warm and dry  Neurological:      General: No focal deficit present  Mental Status: He is alert  Mental status is at baseline  Psychiatric:         Mood and Affect: Mood normal          Behavior: Behavior normal          Thought Content: Thought content normal              Future Appointments   Date Time Provider Otto Ruiz   1/13/2023  1:30 PM Faith Arevalo PA-C [facility]   2/15/2023  4:20 PM Didi Dillon MD [facility]       Faith Arevalo[address]    Patient Care Team:  Faith Arevalo PA-C as PCP - General (Internal Medicine)  RONEY Yang MD    This note was completed in part utilizing [employer] Software  Grammatical errors, random word insertions, spelling mistakes, and incomplete sentences can be an occasional consequence of this system secondary to software limitations, ambient noise, and hardware issues  If you have any questions or concerns about the content, text, or information contained within the body of this dictation, please contact the provider for clarification

## 2022-12-16 NOTE — ASSESSMENT & PLAN NOTE
Go for xray today  Start medrol dose pack  Start robaxin daily at night (warned of sedation)  Discussed importance of scheduling with PT, patient will call and schedule  Anticipate MRI needed to further evaluate  Advised patient to call and schedule  Discussed posture and core strengthening  Discussed weight loss and daily activity  Avoid lifting, straining

## 2022-12-20 NOTE — RESULT ENCOUNTER NOTE
Called patient reviewed results  Patient reports pain improved on prednisone  He Still has some tightness in his back  Reviewed x-ray results  Patient scheduled tomorrow for mri  Advised him to call and schedule with physical therapy as well  We'll follow MRI results once resulted

## 2022-12-26 ENCOUNTER — HOSPITAL ENCOUNTER (EMERGENCY)
Facility: HOSPITAL | Age: 47
Discharge: HOME | End: 2022-12-26
Attending: EMERGENCY MEDICINE

## 2022-12-26 ENCOUNTER — APPOINTMENT (EMERGENCY)
Dept: RADIOLOGY | Facility: HOSPITAL | Age: 47
End: 2022-12-26

## 2022-12-26 ENCOUNTER — APPOINTMENT (EMERGENCY)
Dept: CT IMAGING | Facility: HOSPITAL | Age: 47
End: 2022-12-26

## 2022-12-26 VITALS
DIASTOLIC BLOOD PRESSURE: 75 MMHG | RESPIRATION RATE: 16 BRPM | OXYGEN SATURATION: 98 % | HEART RATE: 85 BPM | TEMPERATURE: 97.9 F | SYSTOLIC BLOOD PRESSURE: 157 MMHG

## 2022-12-26 DIAGNOSIS — K52.9 GASTROENTERITIS: Primary | ICD-10-CM

## 2022-12-26 LAB
ALBUMIN SERPL BCP-MCNC: 4.7 G/DL (ref 3.5–5)
ALP SERPL-CCNC: 80 U/L (ref 34–104)
ALT SERPL W P-5'-P-CCNC: 17 U/L (ref 7–52)
ANION GAP SERPL CALCULATED.3IONS-SCNC: 6 MMOL/L (ref 4–13)
AST SERPL W P-5'-P-CCNC: 14 U/L (ref 13–39)
BACTERIA UR QL AUTO: ABNORMAL /HPF
BASOPHILS # BLD AUTO: 0.06 THOUSANDS/ÂΜL (ref 0–0.1)
BASOPHILS NFR BLD AUTO: 0 % (ref 0–1)
BILIRUB SERPL-MCNC: 0.53 MG/DL (ref 0.2–1)
BILIRUB UR QL STRIP: NEGATIVE
BUN SERPL-MCNC: 24 MG/DL (ref 5–25)
CALCIUM SERPL-MCNC: 9.4 MG/DL (ref 8.4–10.2)
CARDIAC TROPONIN I PNL SERPL HS: <2 NG/L
CHLORIDE SERPL-SCNC: 105 MMOL/L (ref 96–108)
CLARITY UR: CLEAR
CO2 SERPL-SCNC: 24 MMOL/L (ref 21–32)
COLOR UR: YELLOW
CREAT SERPL-MCNC: 0.84 MG/DL (ref 0.6–1.3)
EOSINOPHIL # BLD AUTO: 0.06 THOUSAND/ÂΜL (ref 0–0.61)
EOSINOPHIL NFR BLD AUTO: 0 % (ref 0–6)
ERYTHROCYTE [DISTWIDTH] IN BLOOD BY AUTOMATED COUNT: 12.3 % (ref 11.6–15.1)
GFR SERPL CREATININE-BSD FRML MDRD: 102 ML/MIN/1.73SQ M
GLUCOSE SERPL-MCNC: 100 MG/DL (ref 65–140)
GLUCOSE UR STRIP-MCNC: NEGATIVE MG/DL
HCT VFR BLD AUTO: 50.1 % (ref 36.5–49.3)
HGB BLD-MCNC: 17 G/DL (ref 12–17)
HGB UR QL STRIP.AUTO: NEGATIVE
IMM GRANULOCYTES # BLD AUTO: 0.11 THOUSAND/UL (ref 0–0.2)
IMM GRANULOCYTES NFR BLD AUTO: 1 % (ref 0–2)
KETONES UR STRIP-MCNC: NEGATIVE MG/DL
LEUKOCYTE ESTERASE UR QL STRIP: NEGATIVE
LIPASE SERPL-CCNC: 23 U/L (ref 11–82)
LYMPHOCYTES # BLD AUTO: 0.76 THOUSANDS/ÂΜL (ref 0.6–4.47)
LYMPHOCYTES NFR BLD AUTO: 4 % (ref 14–44)
MCH RBC QN AUTO: 29.4 PG (ref 26.8–34.3)
MCHC RBC AUTO-ENTMCNC: 33.9 G/DL (ref 31.4–37.4)
MCV RBC AUTO: 87 FL (ref 82–98)
MONOCYTES # BLD AUTO: 0.96 THOUSAND/ÂΜL (ref 0.17–1.22)
MONOCYTES NFR BLD AUTO: 6 % (ref 4–12)
MUCOUS THREADS UR QL AUTO: ABNORMAL
NEUTROPHILS # BLD AUTO: 15.2 THOUSANDS/ÂΜL (ref 1.85–7.62)
NEUTS SEG NFR BLD AUTO: 89 % (ref 43–75)
NITRITE UR QL STRIP: NEGATIVE
NON-SQ EPI CELLS URNS QL MICRO: ABNORMAL /HPF
NRBC BLD AUTO-RTO: 0 /100 WBCS
PH UR STRIP.AUTO: 6 [PH]
PLATELET # BLD AUTO: 275 THOUSANDS/UL (ref 149–390)
PMV BLD AUTO: 9 FL (ref 8.9–12.7)
POTASSIUM SERPL-SCNC: 3.9 MMOL/L (ref 3.5–5.3)
PROT SERPL-MCNC: 7.6 G/DL (ref 6.4–8.4)
PROT UR STRIP-MCNC: ABNORMAL MG/DL
RBC # BLD AUTO: 5.78 MILLION/UL (ref 3.88–5.62)
RBC #/AREA URNS AUTO: ABNORMAL /HPF
SODIUM SERPL-SCNC: 135 MMOL/L (ref 135–147)
SP GR UR STRIP.AUTO: 1.03 (ref 1–1.03)
UROBILINOGEN UR STRIP-ACNC: <2 MG/DL
WBC # BLD AUTO: 17.15 THOUSAND/UL (ref 4.31–10.16)
WBC #/AREA URNS AUTO: ABNORMAL /HPF

## 2022-12-26 RX ORDER — DICYCLOMINE HCL 20 MG
20 TABLET ORAL 2 TIMES DAILY
Qty: 20 TABLET | Refills: 0 | Status: SHIPPED | OUTPATIENT
Start: 2022-12-26 | End: 2023-01-05

## 2022-12-26 RX ORDER — MAGNESIUM HYDROXIDE/ALUMINUM HYDROXICE/SIMETHICONE 120; 1200; 1200 MG/30ML; MG/30ML; MG/30ML
30 SUSPENSION ORAL ONCE
Status: COMPLETED | OUTPATIENT
Start: 2022-12-26 | End: 2022-12-26

## 2022-12-26 RX ORDER — SUCRALFATE 1 G/1
1 TABLET ORAL 4 TIMES DAILY
Qty: 20 TABLET | Refills: 0 | Status: SHIPPED | OUTPATIENT
Start: 2022-12-26 | End: 2022-12-30 | Stop reason: SDUPTHER

## 2022-12-26 RX ORDER — ONDANSETRON 4 MG/1
4 TABLET, FILM COATED ORAL EVERY 6 HOURS
Qty: 12 TABLET | Refills: 0 | Status: SHIPPED | OUTPATIENT
Start: 2022-12-26 | End: 2022-12-29

## 2022-12-26 RX ORDER — LIDOCAINE HYDROCHLORIDE 20 MG/ML
15 SOLUTION OROPHARYNGEAL ONCE
Status: COMPLETED | OUTPATIENT
Start: 2022-12-26 | End: 2022-12-26

## 2022-12-26 RX ORDER — ONDANSETRON 2 MG/ML
4 INJECTION INTRAMUSCULAR; INTRAVENOUS ONCE
Status: COMPLETED | OUTPATIENT
Start: 2022-12-26 | End: 2022-12-26

## 2022-12-26 RX ADMIN — ONDANSETRON 4 MG: 2 INJECTION INTRAMUSCULAR; INTRAVENOUS at 10:23

## 2022-12-26 RX ADMIN — LIDOCAINE HYDROCHLORIDE 15 ML: 20 SOLUTION ORAL; TOPICAL at 10:25

## 2022-12-26 RX ADMIN — ALUMINUM HYDROXIDE, MAGNESIUM HYDROXIDE, AND DIMETHICONE 30 ML: 200; 20; 200 SUSPENSION ORAL at 10:25

## 2022-12-26 RX ADMIN — IOHEXOL 100 ML: 350 INJECTION, SOLUTION INTRAVENOUS at 11:17

## 2022-12-26 RX ADMIN — MORPHINE SULFATE 2 MG: 2 INJECTION, SOLUTION INTRAMUSCULAR; INTRAVENOUS at 11:38

## 2022-12-26 NOTE — ED ATTENDING ATTESTATION
12/26/2022  IJamie DO, saw and evaluated the patient  I have discussed the patient with the resident/non-physician practitioner and agree with the resident's/non-physician practitioner's findings, Plan of Care, and MDM as documented in the resident's/non-physician practitioner's note, except where noted  All available labs and Radiology studies were reviewed  I was present for key portions of any procedure(s) performed by the resident/non-physician practitioner and I was immediately available to provide assistance  At this point I agree with the current assessment done in the Emergency Department  I have conducted an independent evaluation of this patient a history and physical is as follows:    52-year-old- male presents with epigastric and right upper quadrant abdominal pain  Has been occurring for 1 to 2 days, associated with vomiting and diarrhea, diarrhea worse yesterday vomiting worse today, severe cramping particularly in the upper abdomen  Decreased appetite  No falls no injury no trauma no previous history of this  Did just start methylprednisone for exacerbation of back pain    Review of Systems   Constitutional: Negative for activity change, chills, diaphoresis and fever  HENT: Negative for congestion, sinus pressure and sore throat  Eyes: Negative for pain and visual disturbance  Respiratory: Negative for cough, chest tightness, shortness of breath, wheezing and stridor  Cardiovascular: Negative for chest pain and palpitations  Gastrointestinal: Positive for abdominal distention, abdominal pain,, diarrhea, nausea and vomiting  Genitourinary: Negative for dysuria and frequency  Musculoskeletal: Negative for neck pain and neck stiffness  Skin: Negative for rash  Neurological: Negative for dizziness, speech difficulty, light-headedness, numbness and headaches  Physical Exam  Vitals reviewed  Constitutional:       General: He is in acute distress  Appearance: He is well-developed  He is not diaphoretic  HENT:      Head: Normocephalic and atraumatic  Right Ear: External ear normal       Left Ear: External ear normal       Nose: Nose normal    Eyes:      General:         Right eye: No discharge  Left eye: No discharge  Pupils: Pupils are equal, round, and reactive to light  Neck:      Trachea: No tracheal deviation  Cardiovascular:      Rate and Rhythm: Normal rate and regular rhythm  Heart sounds: Normal heart sounds  No murmur heard  Pulmonary:      Effort: Pulmonary effort is normal  No respiratory distress  Breath sounds: Normal breath sounds  No stridor  Abdominal:      General: There is no distension  Palpations: Abdomen is soft  Tenderness: There is abdominal tenderness in the right upper quadrant and epigastric area  There is no guarding or rebound  Musculoskeletal:         General: Normal range of motion  Cervical back: Normal range of motion and neck supple  Skin:     General: Skin is warm and dry  Coloration: Skin is not pale  Findings: No erythema  Neurological:      General: No focal deficit present  Mental Status: He is alert and oriented to person, place, and time               ED Course         Critical Care Time  Procedures      MDM  Number of Diagnoses or Management Options  Diagnosis management comments:       Initial ED assessment: 23-year-old male diarrhea, vomiting, tender abdomen on examination    Initial DDx includes but is not limited to:   Acute gastroenteritis, duodenitis, pancreatitis, colitis, diverticulitis, appendicitis    Initial ED plan:   Blood work, CAT scan, disposition pending ED work-up        Final ED summary/disposition:   After evaluation and workup in the emergency department,  work-up unremarkable, likely gastroenteritis, will treat with Carafate, Zofran, Bentyl, strict return parameters        Amount and/or Complexity of Data Reviewed  Clinical lab tests: ordered and reviewed  Tests in the radiology section of CPT®: ordered and reviewed  Decide to obtain previous medical records or to obtain history from someone other than the patient: yes  Obtain history from someone other than the patient: yes  Review and summarize past medical records: yes  Discuss the patient with other providers: yes  Independent visualization of images, tracings, or specimens: yes

## 2022-12-26 NOTE — ED PROVIDER NOTES
History  Chief Complaint   Patient presents with   • Abdominal Pain     Pt c/o diffuse abd pain since yesterday  States starting today he has diarrhea and vomiting  47-year-old male past medical history of hypertension and sciatica recently placed on methylprednisolone presents emergency department complaining of abdominal pain  Patient states that 12 hours ago after dinner he developed epigastric abdominal pain that was gradual in nature and is now 10 out of 10, pressure-like, with associated nausea and vomiting  Patient denies any blood in his vomit  Patient notes that today he also developed nonbloody diarrhea which prompted him to come to the emergency department for further evaluation  Patient states that he did drink 2 beers last night  Patient denies taking any medication to improve his symptoms  Patient has been unable to tolerate p o  secondary due to nausea  Patient notes that he has had mild shortness of breath which he attributes to the pressure in his stomach as well as mild back pain that began prior to arrival   Patient denies any known fevers, headache, ear pain, eye pain, visual changes, hearing changes, sore throat, chest pain, and change in urination, numbness, tingling, weakness, or any other concerns at this time        History provided by:  Patient  Abdominal Pain  Pain location:  Epigastric  Pain quality: aching and pressure    Pain radiates to:  Back  Pain severity:  Severe  Onset quality:  Gradual  Duration:  12 hours  Timing:  Constant  Progression:  Worsening  Chronicity:  New  Relieved by:  Nothing  Worsened by:  Nothing  Ineffective treatments:  None tried  Associated symptoms: anorexia, diarrhea, nausea, shortness of breath and vomiting    Associated symptoms: no chest pain, no chills, no constipation, no cough, no dysuria, no fever, no hematemesis, no hematochezia, no hematuria, no melena and no sore throat        Prior to Admission Medications   Prescriptions Last Dose Informant Patient Reported? Taking? Omega-3 Fatty Acids (fish oil) 1,000 mg   No No   Sig: Take 1 capsule (1,000 mg total) by mouth 2 (two) times a day   butalbital-acetaminophen-caffeine (FIORICET,ESGIC) -40 mg per tablet   No No   Sig: Take 1 tablet by mouth daily as needed for migraine For on set of headache   methocarbamol (ROBAXIN) 500 mg tablet   No No   Sig: Take 1 tablet (500 mg total) by mouth daily at bedtime as needed for muscle spasms (As needed for spasm (caution may cause sedation))   methylPREDNISolone 4 MG tablet therapy pack   No No   Sig: Use as directed on package   multivitamin (THERAGRAN) TABS   Yes No   Sig: Take 1 tablet by mouth daily   omeprazole (PriLOSEC) 40 MG capsule   No No   Sig: Take 1 capsule (40 mg total) by mouth daily      Facility-Administered Medications: None       Past Medical History:   Diagnosis Date   • Elbow tendonitis     right possible bursitis   • Gastritis    • GERD (gastroesophageal reflux disease)    • Migraine        Past Surgical History:   Procedure Laterality Date   • COLONOSCOPY     • EGD  2005   • EGD  01/2020   • EYE FOREIGN BODY REMOVAL Left    • IR BIOPSY LOWER LIMB  3/16/2022   • VT TENDON SHEATH INCISION Right 12/26/2018    Procedure: LONG AND RING TRIGGER FINGER RELEASES;  Surgeon: Dejuan York MD;  Location: [facility];  Service: Orthopedics   • SKIN BIOPSY         Family History   Problem Relation Age of Onset   • Diabetes Mother    • Hypertension Mother    • Breast cancer Mother    • Cancer Mother         Breast   • Lung cancer Mother    • Diabetes Father    • Gout Father    • Hypertension Father    • Cancer Maternal Grandmother    • Rosacea Family      I have reviewed and agree with the history as documented      E-Cigarette/Vaping   • E-Cigarette Use Never User      E-Cigarette/Vaping Substances   • Nicotine No    • THC No    • CBD No    • Flavoring No    • Other No    • Unknown No      Social History     Tobacco Use   • Smoking status: Never   • Smokeless tobacco: Never   Vaping Use   • Vaping Use: Never used   Substance Use Topics   • Alcohol use: Not Currently     Alcohol/week: 0 0 standard drinks     Comment: Occasional   • Drug use: Never        Review of Systems   Constitutional: Negative for chills and fever  HENT: Negative for ear pain and sore throat  Eyes: Negative for pain and visual disturbance  Respiratory: Positive for shortness of breath  Negative for cough  Cardiovascular: Negative for chest pain and palpitations  Gastrointestinal: Positive for abdominal pain, anorexia, diarrhea, nausea and vomiting  Negative for constipation, hematemesis, hematochezia and melena  Genitourinary: Negative for dysuria and hematuria  Musculoskeletal: Positive for back pain  Negative for arthralgias  Skin: Negative for color change and rash  Neurological: Negative for seizures and syncope  All other systems reviewed and are negative  Physical Exam  ED Triage Vitals   Temperature Pulse Respirations Blood Pressure SpO2   12/26/22 0959 12/26/22 1000 12/26/22 0959 12/26/22 0959 12/26/22 1000   97 9 °F (36 6 °C) (!) 112 16 157/75 98 %      Temp Source Heart Rate Source Patient Position - Orthostatic VS BP Location FiO2 (%)   12/26/22 0959 12/26/22 1204 -- -- --   Oral Monitor         Pain Score       12/26/22 1138       9             Orthostatic Vital Signs  Vitals:    12/26/22 0959 12/26/22 1000 12/26/22 1204   BP: 157/75     Pulse:  (!) 112 85       Physical Exam  Vitals and nursing note reviewed  Constitutional:       General: He is in acute distress (Mild, uncomfortable appearing)  Appearance: He is well-developed  HENT:      Head: Normocephalic and atraumatic        Right Ear: External ear normal       Left Ear: External ear normal       Nose: Nose normal       Mouth/Throat:      Mouth: Mucous membranes are moist    Eyes:      Conjunctiva/sclera: Conjunctivae normal    Cardiovascular:      Rate and Rhythm: Regular rhythm  Tachycardia present  Heart sounds: No murmur heard  Pulmonary:      Effort: Pulmonary effort is normal  No respiratory distress  Breath sounds: Normal breath sounds  Abdominal:      Palpations: Abdomen is soft  Tenderness: There is abdominal tenderness in the epigastric area  There is no right CVA tenderness, left CVA tenderness, guarding or rebound  Negative signs include Che's sign  Musculoskeletal:         General: No swelling  Normal range of motion  Cervical back: Normal range of motion  Skin:     General: Skin is warm and dry  Capillary Refill: Capillary refill takes less than 2 seconds  Neurological:      Mental Status: He is alert  Mental status is at baseline     Psychiatric:         Mood and Affect: Mood normal          ED Medications  Medications   ondansetron (ZOFRAN) injection 4 mg (4 mg Intravenous Given 12/26/22 1023)   Lidocaine Viscous HCl (XYLOCAINE) 2 % mucosal solution 15 mL (15 mL Swish & Swallow Given 12/26/22 1025)   aluminum-magnesium hydroxide-simethicone (MYLANTA) oral suspension 30 mL (30 mL Oral Given 12/26/22 1025)   morphine injection 2 mg (2 mg Intravenous Given 12/26/22 1138)   iohexol (OMNIPAQUE) 350 MG/ML injection (SINGLE-DOSE) 100 mL (100 mL Intravenous Given 12/26/22 1117)       Diagnostic Studies  Results Reviewed     Procedure Component Value Units Date/Time    Urine Microscopic [[id number]]  (Abnormal) Collected: 12/26/22 1144    Lab Status: Final result Specimen: Urine, Other Updated: 12/26/22 1158     RBC, UA 1-2 /hpf      WBC, UA 1-2 /hpf      Epithelial Cells None Seen /hpf      Bacteria, UA None Seen /hpf      MUCUS THREADS Moderate    UA w Reflex to Microscopic w Reflex to Culture [[id number]]  (Abnormal) Collected: 12/26/22 1144    Lab Status: Final result Specimen: Urine, Other Updated: 12/26/22 1156     Color, UA Yellow     Clarity, UA Clear     Specific Gravity, UA 1 029     pH, UA 6 0     Leukocytes, UA Negative     Nitrite, UA Negative     Protein, UA Trace mg/dl      Glucose, UA Negative mg/dl      Ketones, UA Negative mg/dl      Urobilinogen, UA <2 0 mg/dl      Bilirubin, UA Negative     Occult Blood, UA Negative    HS Troponin 0hr (reflex protocol) [[id number]]  (Normal) Collected: 12/26/22 1015    Lab Status: Final result Specimen: Blood from Arm, Left Updated: 12/26/22 1053     hs TnI 0hr <2 ng/L     CMP [[id number]] Collected: 12/26/22 1015    Lab Status: Final result Specimen: Blood from Arm, Left Updated: 12/26/22 1042     Sodium 135 mmol/L      Potassium 3 9 mmol/L      Chloride 105 mmol/L      CO2 24 mmol/L      ANION GAP 6 mmol/L      BUN 24 mg/dL      Creatinine 0 84 mg/dL      Glucose 100 mg/dL      Calcium 9 4 mg/dL      AST 14 U/L      ALT 17 U/L      Alkaline Phosphatase 80 U/L      Total Protein 7 6 g/dL      Albumin 4 7 g/dL      Total Bilirubin 0 53 mg/dL      eGFR 102 ml/min/1 73sq m     Narrative:      [place] guidelines for Chronic Kidney Disease (CKD):   •  Stage 1 with normal or high GFR (GFR > 90 mL/min/1 73 square meters)  •  Stage 2 Mild CKD (GFR = 60-89 mL/min/1 73 square meters)  •  Stage 3A Moderate CKD (GFR = 45-59 mL/min/1 73 square meters)  •  Stage 3B Moderate CKD (GFR = 30-44 mL/min/1 73 square meters)  •  Stage 4 Severe CKD (GFR = 15-29 mL/min/1 73 square meters)  •  Stage 5 End Stage CKD (GFR <15 mL/min/1 73 square meters)  Note: GFR calculation is accurate only with a steady state creatinine    Lipase [[id number]]  (Normal) Collected: 12/26/22 1015    Lab Status: Final result Specimen: Blood from Arm, Left Updated: 12/26/22 1042     Lipase 23 u/L     CBC and differential [[id number]]  (Abnormal) Collected: 12/26/22 1015    Lab Status: Final result Specimen: Blood from Arm, Left Updated: 12/26/22 1024     WBC 17 15 Thousand/uL      RBC 5 78 Million/uL      Hemoglobin 17 0 g/dL      Hematocrit 50 1 %      MCV 87 fL      MCH 29 4 pg      MCHC 33 9 g/dL      RDW 12 3 %      MPV 9 0 fL      Platelets 817 Thousands/uL      nRBC 0 /100 WBCs      Neutrophils Relative 89 %      Immat GRANS % 1 %      Lymphocytes Relative 4 %      Monocytes Relative 6 %      Eosinophils Relative 0 %      Basophils Relative 0 %      Neutrophils Absolute 15 20 Thousands/µL      Immature Grans Absolute 0 11 Thousand/uL      Lymphocytes Absolute 0 76 Thousands/µL      Monocytes Absolute 0 96 Thousand/µL      Eosinophils Absolute 0 06 Thousand/µL      Basophils Absolute 0 06 Thousands/µL                  CT abdomen pelvis with contrast   Final Result by Dayna Kingston MD (12/26 1140)      No acute intra-abdominal finding  Workstation performed: [id number]         X-ray chest 1 view portable   ED Interpretation by Zachary Colon MD (12/26 1050)   Acute cardiopulmonary disease  Procedures  ECG 12 Lead Documentation Only    Date/Time: 12/26/2022 10:33 AM  Performed by: Zachary Colon MD  Authorized by: Zachary Colon MD     Indications / Diagnosis:  Epigastric abdominal pain  ECG reviewed by me, the ED Provider: yes    Patient location:  ED  Previous ECG:     Previous ECG:  Compared to current    Similarity:  No change  Interpretation:     Interpretation: abnormal    Rate:     ECG rate:  92    ECG rate assessment: normal    Rhythm:     Rhythm: sinus rhythm    Ectopy:     Ectopy: none    QRS:     QRS axis:  Normal    QRS intervals:  Normal  Conduction:     Conduction: abnormal      Abnormal conduction: LAFB    ST segments:     ST segments:  Normal  T waves:     T waves: normal    Comments:      Normal sinus rhythm at 92 bpm, left anterior fascicular block, no PACs, no PVCs, no acute ischemic changes  ED Course  ED Course as of 12/26/22 1220   Mon Dec 26, 2022   1151 Patient reexamined, heart rate 85, states pain is improved status post morphine administration                                         MDM  Number of Diagnoses or Management Options  Gastroenteritis  Diagnosis management comments: 49-year-old male past medical history of hypertension and sciatica recently placed on methylprednisolone presents emergency department complaining of abdominal pain  Seen and examined noted to be in mild acute distress with tachycardia and epigastric tenderness  Due to patient's history and presentation CBC, CMP, lipase, troponin, urinalysis was obtained  Laboratory analysis reported no acute findings  EKG reported no acute ischemic findings  Chest x-ray reported no acute pulmonary disease  CT of the abdomen pelvis reported no intra-abdominal findings  Patient was given viscous lidocaine, Mylanta, Zofran, and morphine with improvement of symptoms  Patient was given prescription for Zofran, Bentyl, and Carafate  Patient was instructed to follow-up with their primary care physician  Patient appears well, is nontoxic appearing, expresses understanding and agrees with plan of care at this time  In light of this patient would benefit from outpatient management  Amount and/or Complexity of Data Reviewed  Clinical lab tests: ordered and reviewed  Tests in the radiology section of CPT®: ordered and reviewed  Tests in the medicine section of CPT®: ordered  Review and summarize past medical records: yes        Disposition  Final diagnoses:   Gastroenteritis     Time reflects when diagnosis was documented in both MDM as applicable and the Disposition within this note     Time User Action Codes Description Comment    12/26/2022 12:05 PM [facility] [K52 9] Gastroenteritis       ED Disposition     ED Disposition   Discharge    Condition   Stable    Date/Time   Mon Dec 26, 2022 12:04 PM    [address] discharge to home/self care                 Follow-up Information     Follow up With Specialties Details Why Contact Info    Jackson Velazquez PA-C Internal Medicine, Physician Assistant Call  If symptoms worsen [id number]            Patient's Medications   Discharge Prescriptions    DICYCLOMINE (BENTYL) 20 MG TABLET    Take 1 tablet (20 mg total) by mouth 2 (two) times a day for 10 days       Start Date: 12/26/2022End Date: 1/5/2023       Order Dose: 20 mg       Quantity: 20 tablet    Refills: 0    ONDANSETRON (ZOFRAN) 4 MG TABLET    Take 1 tablet (4 mg total) by mouth every 6 (six) hours for 3 days       Start Date: 12/26/2022End Date: 12/29/2022       Order Dose: 4 mg       Quantity: 12 tablet    Refills: 0    SUCRALFATE (CARAFATE) 1 G TABLET    Take 1 tablet (1 g total) by mouth 4 (four) times a day for 5 days       Start Date: 12/26/2022End Date: 12/31/2022       Order Dose: 1 g       Quantity: 20 tablet    Refills: 0     No discharge procedures on file  PDMP Review     None           ED Provider  Attending physically available and evaluated Leodan Garduno I managed the patient along with the ED Attending      Electronically Signed by         Corrina Galloway MD  12/26/22 7414

## 2022-12-27 ENCOUNTER — TELEPHONE (OUTPATIENT)
Dept: INTERNAL MEDICINE CLINIC | Age: 47
End: 2022-12-27

## 2022-12-27 NOTE — TELEPHONE ENCOUNTER
Spoke with patient  He is feeling better today than he was while he was in the ER  Reviewed ER results  He is taking Carafate as directed  He has not needed bentyl or Zofran the last day or so since nausea improved  He notes his stomach is much better but still has some soreness to touch  No blood in stool or tarry stool  No longer with nausea or vomiting  No constipation or diarrhea  He is able to tolerate PO but has decreased appetite  He reports he did not take Prilosec last night or day before and wants to know if he should take it  He reports his back did improve somewhat with medrol dose pack  He did not take the last day of the pack which I said is okay and that he should NOT take the remaining day  He is also scheduled tomorrow morning for his MRI      I reviewed ER notes, labs and imaging with him  Suspect gastroenteritis vs gastritis  He had been taking Aleve PRN for back pain as well  I advised him to stop any OTC NSAIDs (mortin, aleve, ibuprofen which can further irritate stomach)  Recommend that he use acetaminophen (Tylenol) as directed for any back/joint pains  Discussed importance of bland diet (avoid caffeine, soda, spicy/acidic foods, alcohol)  Restart Prilosec 40 mg daily now and continue daily  Patient has this as home and will start it now  Advised him to take this 1 hour prior to his AM carafate dose to help with absorption  Continue Carafate 4 times daily as was given in the ER  Requested he continue to monitor sx and let me know if his sx do not continue to improve as they have been since the ER  Requested he call to office on Friday to review MRI and his abdominal symptoms which he is agreeable to  Disused need to be seen if sx worsen or come back sooner  Patient has no further questions

## 2022-12-28 ENCOUNTER — HOSPITAL ENCOUNTER (OUTPATIENT)
Dept: RADIOLOGY | Facility: IMAGING CENTER | Age: 47
Discharge: HOME | End: 2022-12-28

## 2022-12-28 DIAGNOSIS — M54.41 RIGHT-SIDED LOW BACK PAIN WITH RIGHT-SIDED SCIATICA, UNSPECIFIED CHRONICITY: ICD-10-CM

## 2022-12-28 LAB
ATRIAL RATE: 92 BPM
P AXIS: 49 DEGREES
PR INTERVAL: 144 MS
QRS AXIS: -51 DEGREES
QRSD INTERVAL: 94 MS
QT INTERVAL: 342 MS
QTC INTERVAL: 422 MS
T WAVE AXIS: 17 DEGREES
VENTRICULAR RATE: 92 BPM

## 2022-12-30 DIAGNOSIS — M47.26 OSTEOARTHRITIS OF SPINE WITH RADICULOPATHY, LUMBAR REGION: Primary | ICD-10-CM

## 2022-12-30 DIAGNOSIS — K52.9 GASTROENTERITIS: ICD-10-CM

## 2022-12-30 RX ORDER — SUCRALFATE 1 G/1
1 TABLET ORAL 4 TIMES DAILY
Qty: 40 TABLET | Refills: 0 | Status: SHIPPED | OUTPATIENT
Start: 2022-12-30 | End: 2023-01-09

## 2022-12-30 NOTE — RESULT ENCOUNTER NOTE
Spoke with patient  GI sx much improved  He d/c NSAIDs and is taking Prilosec and Carafate as directed  No longer with any abdominal sx  Due to his h/o gastritis in the past will tx with Carafate full 2 weeks then continue on Prilosec alone  Continue with diet discussed last phone call  Reviewed MRI  Recommend pain management for further eval and treat  Patient agreeable  Referral to pain management placed today  Report back to our office if any difficulty scheduling

## 2023-01-01 NOTE — LETTER
[address] [place] [address]  [address]      January 28, 2022    MRN: [id number]     Phone: [phone]     Dear Mr Mary Jc recently had a(n) Ultrasound performed on 1/24/2022 at  [address] [place] [address] that was requested by Luz Maria Solorio DPM  The study was reviewed by a radiologist, which is a physician who specializes in medical imaging  The radiologist issued a report describing his or her findings  In that report there was a finding that the radiologist felt warranted further discussion with your health care provider and that discussion would be beneficial to you  The results were sent to Luz Maria Solorio DPM on 01/24/2022  8:47 AM  We recommend that you contact Luz Maria Solorio DPM at [phone] or set up an appointment to discuss the results of the imaging test  If you have already heard from Luz Maria Solorio DPM regarding the results of your study, you can disregard this letter  This letter is not meant to alarm you, but intended to encourage you to follow-up on your results with the provider that sent you for the imaging study  In addition, we have enclosed answers to frequently asked questions by other patients who have also received a letter to review results with their health care provider (see page two)  [facility] you for choosing [facility] for your medical imaging needs  FREQUENTLY ASKED QUESTIONS    1  Why am I receiving this letter? [facility] requires us to notify patients who have findings on imaging exams that may require more testing or follow-up with a health professional within the next 3 months          2  How serious is the finding on the imaging test?  This letter is sent to all patients who may need follow-up or more testing within the next 3 months  Receiving this letter does not necessarily mean you have a life-threatening imaging finding or disease  Recommendations in the radiologists imaging report are general in nature and it is up to your healthcare provider to say whether those recommendations make sense for your situation  You are strongly encouraged to talk to your health care provider about the results and ask whether additional steps need to be taken  3  Where can I get a copy of the final report for my recent radiology exam?  To get a full copy of the report you can access your records online at [email or web address] or please contact [address] [place] [address] Records Department at [phone] Monday through Friday between 8 am and 6 pm          4  What do I need to do now? Please contact your health care provider who requested the imaging study to discuss what further actions (if any) are needed  You may have already heard from (your ordering provider) in regard to this test in which case you can disregard this letter  NOTICE IN ACCORDANCE WITH THE [facility] PATIENT TEST RESULT INFORMATION ACT OF 2018    You are receiving this notice as a result of a determination by your diagnostic imaging service that further discussions of your test results are warranted and would be beneficial to you  The complete results of your test or tests have been or will be sent to the health care practitioner that ordered the test or tests  It is recommended that you contact your health care practitioner to discuss your results as soon as possible  failed MBS last month

## 2023-01-16 ENCOUNTER — EVALUATION (OUTPATIENT)
Dept: PHYSICAL THERAPY | Facility: CLINIC | Age: 48
End: 2023-01-16

## 2023-01-16 DIAGNOSIS — M54.41 RIGHT-SIDED LOW BACK PAIN WITH RIGHT-SIDED SCIATICA, UNSPECIFIED CHRONICITY: ICD-10-CM

## 2023-01-16 NOTE — PROGRESS NOTES
PT Evaluation     Today's date: 2023  Patient name: Nabor Singh  : 1975  MRN: [id number]  Referring provider: Roberto Moore PA-C  Dx:   Encounter Diagnosis     ICD-10-CM    1  Right-sided low back pain with right-sided sciatica, unspecified chronicity  M54 41 Ambulatory Referral to Physical Therapy          Start Time: 1800  Stop Time: 1844  Total time in clinic (min): 44 minutes    Assessment  Assessment details: Pt is a 50 y o  male presenting to PT services with c/o LBP with R sided radicular symptoms  Pt responded favorably to lumbar extension with decreased radicular symptoms  PT noted R innominate anterior tilt, able to be corrected with SIJ MET, resulting in decreased low back pain  Pt has impaired BLE strength  Pt has impaired left lumbar lateral flexion, improved post L/S grade V mobilization  PT added repeated lumbar extension, lower trunk rotations, and TA contraction to pt's HEP, pt verbalized and demonstrated understanding of proper form  Pt is a good candidate for skilled physical therapy in order to improve BLE strength, decrease low back and radicular pain, improve lumbar ROM, improve core activation, and increase functional ability  Impairments: abnormal or restricted ROM, activity intolerance, impaired balance, impaired physical strength, lacks appropriate home exercise program, pain with function and weight-bearing intolerance    Goals  STG (3 weeks):  1  Pt will improve core activation evidenced by palpable TA contraction with dynamic movement  2  Pt will report no radicular symptoms   3  Pt will report pain at worst as 6/10 or less     LTG (6 weeks):  1  Pt will be independent in HEP  2  Pt will improve BLE strength to be at least 4+/5 globally   3  Pt will have no increase in pain with lumbar mobility   4   Pt will be able to stand for >5 minutes without increase in pain    Plan  Patient would benefit from: skilled physical therapy  Planned modality interventions: cryotherapy, biofeedback, TENS, thermotherapy: hydrocollator packs and unattended electrical stimulation  Other planned modality interventions: IASTM, MFDc  Planned therapy interventions: manual therapy, joint mobilization, massage, neuromuscular re-education, patient education, strengthening, stretching, abdominal trunk stabilization, balance, home exercise program, flexibility and functional ROM exercises  Frequency: 2x week  Duration in weeks: 6  Plan of Care beginning date: 2023  Plan of Care expiration date: 3/3/2023  Treatment plan discussed with: patient        Subjective Evaluation    History of Present Illness  Mechanism of injury: Pt reports that his low back pain started a couple of months ago  He states that his low back pain seems worse in the morning  He reports that he started running again a couple of months ago and one night he woke up with pain in the front of his R thigh and then his R back started hurting  He states that he took Advil and didn't 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  He states that he got an appointment with ortho and they prescribed Meloxicam and Prednisone and that did not seem to help  He said once he moves it feels better  Pain  Current pain ratin  At best pain ratin  At worst pain rating: 10  Location: R low back, R thigh   Quality: sharp (cramp)  Relieving factors: heat and ice (stretching)  Exacerbated by: sleeping      Social Support  Steps to enter house: yes (5 steps, bilateral hand rails )  Stairs in house: yes (2 flights, no hand rails)   Lives in: multiple-level home  Lives with: spouse (1 dog)    Employment status: working (contractor )  Hand dominance: right      Diagnostic Tests  Abnormal MRI: mild degenerative changes   Treatments  Previous treatment: medication  Patient Goals  Patient goals for therapy: decreased pain, improved balance, increased motion and increased strength  Patient goal: "to get rid of the pain, at least so it's tolerable"        Objective     Active Range of Motion     Lumbar   Flexion:  WFL and with pain  Extension:  WFL  Left lateral flexion:  Restriction level: minimal  Right lateral flexion:  WFL  Mechanical Assessment    Cervical      Thoracic    Seated flexion: repeated movements  Pain location: peripheralized    Lumbar    Standing extension: repeated movements  Pain level: decreased    Strength/Myotome Testing     Left Hip   Planes of Motion   Flexion: 3- (pain)  Extension: 4-  Abduction: 5    Right Hip   Planes of Motion   Flexion: 4+  Extension: 4-  Abduction: 5    Left Knee   Flexion: 5  Extension: 5    Right Knee   Flexion: 5  Extension: 5    Left Ankle/Foot   Dorsiflexion: 5  Plantar flexion: 5    Right Ankle/Foot   Dorsiflexion: 5  Plantar flexion: 5    Muscle Activation   Patient unable to activate left transverse abdominals, left multifidus, right transverse abdominals and right multifidus       General Comments:      Lumbar Comments  R innominate anterior tilt       Precautions: GERD       Manuals 1/16            SIJ MET R anterior tilt - SC            L/S sidelying roll SC GV                                       Neuro Re-Ed             TA contraction 5"x20                                                                                          Ther Ex             Bike             Repeated lumbar ext 3x10            Hip abduction             Lower trunk rotation 5"x20                                                                Ther Activity                                       Gait Training                                       Modalities

## 2023-01-17 NOTE — PROGRESS NOTES
Portions of the record may have been created with voice recognition software  Occasional wrong word or "sound a like" substitutions may have occurred due to the inherent limitations of voice recognition software  Read the chart carefully and recognize, using context, where substitutions have occurred  Assessment  1  Chronic right-sided low back pain with right-sided sciatica    2  Lumbar radiculitis    3  Lumbar disc herniation        Plan  No orders of the defined types were placed in this encounter  No orders of the defined types were placed in this encounter  59-year-old gentleman presenting with 4-month history of right-sided low back and right anterior thigh pain  On physical exam no motor or sensory deficits noted, mild to moderate tenderness palpation right lumbar paraspinal region  Lumbar MRI imaging showing posterior annular fissures at L4-L5 and L5-S1 with mild foraminal narrowing  Normal alignment  Etiology of patient's pain presentation could be secondary lumbar radiculitis in setting of annular fissure  Mild foraminal narrowing could also be element of facet arthrosis and degenerative changes     -At this time patient has just recently started physical therapy  We will continue with PT for 4 to 6 weeks  Patient already noting some improvement of symptoms     -Patient will follow up in 4 weeks at which point we can consider further interventions  Patient would like to hold off on medications at this time  Advised him to continue with conservative management with topical creams and patches, heat ice and exercises  My impressions and treatment recommendations were discussed in detail with the patient who verbalized understanding and had no further questions  Discharge instructions were provided  I personally saw and examined the patient and I agree with the above discussed plan of care        History of Present Illness    Joyce Deras is a 50 y o  male referred to [facility] by Geraldo Denver       patient presents today with more than 4-month history of low back and right thigh pain that started without any injury or accident  Over the past month the pain has been intermittent but severe and worse in the morning but improves throughout the day  Describes as cramping, shooting, sharp, throbbing and notes occasional radiating pain in the right anterior thigh without any symptoms in the left leg or right lower leg or feet  Does admit to having subjective weakness of the right lower extremity initially but not recently  The pain is not changed by laying down, standing, bending and is decreased by walking  Is also decreased by exercise  He has just recently started physical therapy and notes moderate relief  Moderate relief to heat ice  Patient has GERD and is limiting NSAIDs and steroids  He is currently taking Tylenol which provide some relief  He does not want to be on sedative oral medication  In terms of imaging, lumbar MRI from 12- showing a posterior annular fissure at the L4-L5 level with mild foraminal narrowing there is also a posterior annular fissure at L5-S1 without significant foraminal stenosis or central narrowing  Patient denies any bowel bladder incontinence or saddle anesthesia  Denies any recent fevers chills or weight changes  Lab Results   Component Value Date    CREATININE 0 84 12/26/2022     Lab Results   Component Value Date    ALT 17 12/26/2022     (H) 12/02/2015         Imaging:      MRI LUMBAR SPINE WITHOUT CONTRAST   12-     INDICATION: M54 41: Lumbago with sciatica, right side      COMPARISON:  12/1/2014     TECHNIQUE:  Multiplanar, multisequence imaging of the lumbar spine was performed             IMAGE QUALITY:  Diagnostic     FINDINGS:     VERTEBRAL BODIES:  There are 5 lumbar type vertebral bodies  Normal alignment of the lumbar spine  No spondylolysis or spondylolisthesis  No scoliosis    No compression fracture  Normal marrow signal is identified within the visualized bony   structures  No discrete marrow lesion      SACRUM:  Normal signal within the sacrum  No evidence of insufficiency or stress fracture      DISTAL CORD AND CONUS:  Normal size and signal within the distal cord and conus  Conus medullaris terminates at L1      PARASPINAL SOFT TISSUES:  Paraspinal soft tissues are unremarkable      LOWER THORACIC DISC SPACES:  Normal disc height and signal   No disc herniation, canal stenosis or foraminal narrowing      LUMBAR DISC SPACES:     L1-L2:  Normal      L2-L3:  Normal      L3-L4:  There is disc space degeneration and narrowing  There is a mild bulge  There is facet arthrosis  There is no significant canal stenosis or foraminal narrowing      L4-L5:  There is a mild bulge  There is a posterior annular fissure  There is mild foraminal narrowing  There is minimal mass effect on the thecal sac      L5-S1:  There is a posterior annular fissure  There is no significant canal stenosis or foraminal narrowing      OTHER FINDINGS:  None      IMPRESSION:     Mild degenerative changes of the lumbar spine, as described above  No significant canal stenosis or foraminal narrowing identified    No MRI cervical spine results found in the past 2 years   MRI lumbar spine wo contrast    Result Date: 12/30/2022  Impression     Mild degenerative changes of the lumbar spine, as described above  No significant canal stenosis or foraminal narrowing identified  LUMBAR SPINE   12-     INDICATION:   M54 41: Lumbago with sciatica, right side      COMPARISON:  11/7/2014     VIEWS:  XR SPINE LUMBAR MINIMUM 4 VIEWS NON INJURY        FINDINGS:     There are 5 non rib bearing lumbar vertebral bodies       There is no evidence of acute fracture or destructive osseous lesion      L3-L4 through L4-L5 slight retrolisthesis    Anatomic alignment otherwise       L4-L5, L5-S1 bilateral posterior facet mild arthrosis  L4-L5, L5-S1 mild disc space narrowing  L3-L4, L4-L5 tiny osteophytes      The pedicles appear intact      Soft tissues are unremarkable      IMPRESSION:     No acute osseous abnormality        Workstation performed: [id number]          No MRI thoracic spine results found in the past 2 years   No X-ray cervical spine results found in the past 2 years   XR spine lumbar minimum 4 views non injury    Result Date: 12/20/2022  Impression     No acute osseous abnormality         Workstation performed: [id number]          No X-ray hip/pelvis results found in the past 2 years   No X-ray knee results found in the past 2 years         I have personally reviewed and/or updated the patient's past medical history, past surgical history, family history, social history, current medications, allergies, and vital signs today  Review of Systems   Musculoskeletal: Positive for back pain, gait problem and joint swelling          Right hip and right thigh       Patient Active Problem List   Diagnosis   • Atypical chest pain   • Chronic pain of left knee   • Depression with anxiety   • GERD (gastroesophageal reflux disease)   • Heel pain, chronic, right   • Joint pain   • Lateral epicondylitis of left elbow   • Migraine headache   • Mild obstructive sleep apnea   • Neck muscle spasm   • Trigger ring finger of right hand   • Trigger middle finger of right hand   • Hypertriglyceridemia   • Mass of left foot   • Right elbow pain   • Cubital tunnel syndrome of both upper extremities   • Olecranon bursitis of right elbow   • Gastritis   • Carpal tunnel syndrome, bilateral   • Colon cancer screening   • Callus of foot   • Umbilical hernia without obstruction and without gangrene   • Atelectasis   • Colon polyp   • Diverticulosis   • Internal hemorrhoid   • PAC (premature atrial contraction)   • Laceration of right index finger without foreign body without damage to nail   • Strain of left Achilles tendon   • Calcaneal spur, left   • Pain of left thumb   • Annual physical exam   • Strain of right shoulder   • Atypical pigmented skin lesion   • Impaired fasting glucose   • Actinic keratoses   • Excessive daytime sleepiness   • Other fatigue   • ROMAINE (obstructive sleep apnea)   • Night sweat   • Right-sided low back pain with right-sided sciatica   • Crushing injury of left hand   • Closed nondisplaced fracture of shaft of fourth metacarpal bone of left hand with routine healing   • Osteoarthritis of spine with radiculopathy, lumbar region       Past Medical History:   Diagnosis Date   • Elbow tendonitis     right possible bursitis   • Gastritis    • GERD (gastroesophageal reflux disease)    • Migraine        Past Surgical History:   Procedure Laterality Date   • COLONOSCOPY     • EGD  2005   • EGD  01/2020   • EYE FOREIGN BODY REMOVAL Left    • IR BIOPSY LOWER LIMB  3/16/2022   • KS TENDON SHEATH INCISION Right 12/26/2018    Procedure: LONG AND RING TRIGGER FINGER RELEASES;  Surgeon: Aisha Lovelace MD;  Location: [facility] OR;  Service: Orthopedics   • SKIN BIOPSY         Family History   Problem Relation Age of Onset   • Diabetes Mother    • Hypertension Mother    • Breast cancer Mother    • Cancer Mother         Breast   • Lung cancer Mother    • Diabetes Father    • Gout Father    • Hypertension Father    • Cancer Maternal Grandmother    • Rosacea Family        Social History     Occupational History   • Not on file   Tobacco Use   • Smoking status: Never   • Smokeless tobacco: Never   Vaping Use   • Vaping Use: Never used   Substance and Sexual Activity   • Alcohol use: Not Currently     Alcohol/week: 0 0 standard drinks     Comment: Occasional   • Drug use: Never   • Sexual activity: Yes     Partners: Female     Birth control/protection: Female Sterilization       Current Outpatient Medications on File Prior to Visit   Medication Sig   • multivitamin (THERAGRAN) TABS Take 1 tablet by mouth daily   • Omega-3 Fatty Acids (fish oil) 1,000 mg Take 1 capsule (1,000 mg total) by mouth 2 (two) times a day   • omeprazole (PriLOSEC) 40 MG capsule Take 1 capsule (40 mg total) by mouth daily   • butalbital-acetaminophen-caffeine (FIORICET,ESGIC) -40 mg per tablet Take 1 tablet by mouth daily as needed for migraine For on set of headache (Patient not taking: Reported on 1/18/2023)   • dicyclomine (BENTYL) 20 mg tablet Take 1 tablet (20 mg total) by mouth 2 (two) times a day for 10 days   • methocarbamol (ROBAXIN) 500 mg tablet Take 1 tablet (500 mg total) by mouth daily at bedtime as needed for muscle spasms (As needed for spasm (caution may cause sedation)) (Patient not taking: Reported on 1/18/2023)   • ondansetron (ZOFRAN) 4 mg tablet Take 1 tablet (4 mg total) by mouth every 6 (six) hours for 3 days   • sucralfate (CARAFATE) 1 g tablet Take 1 tablet (1 g total) by mouth 4 (four) times a day for 10 days     No current facility-administered medications on file prior to visit  No Known Allergies    Physical Exam    /88   Pulse 87   Ht 5' 7" (1 702 m) Comment: verbal  Wt 106 kg (233 lb)   BMI 36 49 kg/m²     Constitutional: normal, well developed, well nourished, alert, in no distress and non-toxic and no overt pain behavior  Eyes: anicteric  HEENT: grossly intact  Neck: supple, symmetric, trachea midline and no masses   Pulmonary:even and unlabored  Cardiovascular:No edema or pitting edema present  Skin:Normal without rashes or lesions and well hydrated  Psychiatric:Mood and affect appropriate  Neurologic:Cranial Nerves II-XII grossly intact  Musculoskeletal:normal    Palpation:  No tenderness over the left paravertebral musculature  Moderate tenderness over the right paravertebral musculature    No tenderness with palpation of the left SI joint  No tenderness with palpation of the right SI joint    No tenderness with palpation of the left greater trochanter   No tenderness with palpation of the right greater trochanter    Sensory:    Intact to light touch grossly in the left lower extremity  Intact to light touch grossly in the right lower extremity    Muscle Strength      Hip flexion Knee flexion Knee extension  L4 Foot plantarflexion  S1 Foot   Dorsiflexion  L5 EHL-  Dorsiflexion  L5  EHL- Plantar Flexion-S1 Heel walking- L5 Toe walking   S1   L 5/5 5/5 5/5 5/5 5/5 5/5 5/5 5/5 5/5   R 5/5 5/5 5/5 5/5 5/5 5/5 5/5 5/5 5/5     DTRs: 2+ patellar, 2+ Achilles and symmetric       Special Tests:     Facet loading Straight leg raise DIPESH FAIR   L  negative  negative  negative    R  negative  negative  negative

## 2023-01-18 ENCOUNTER — CONSULT (OUTPATIENT)
Dept: PAIN MEDICINE | Facility: CLINIC | Age: 48
End: 2023-01-18

## 2023-01-18 ENCOUNTER — OFFICE VISIT (OUTPATIENT)
Dept: PHYSICAL THERAPY | Facility: CLINIC | Age: 48
End: 2023-01-18

## 2023-01-18 VITALS
DIASTOLIC BLOOD PRESSURE: 88 MMHG | BODY MASS INDEX: 36.57 KG/M2 | SYSTOLIC BLOOD PRESSURE: 157 MMHG | WEIGHT: 233 LBS | HEIGHT: 67 IN | HEART RATE: 87 BPM

## 2023-01-18 DIAGNOSIS — G89.29 CHRONIC RIGHT-SIDED LOW BACK PAIN WITH RIGHT-SIDED SCIATICA: Primary | ICD-10-CM

## 2023-01-18 DIAGNOSIS — M54.41 CHRONIC RIGHT-SIDED LOW BACK PAIN WITH RIGHT-SIDED SCIATICA: Primary | ICD-10-CM

## 2023-01-18 DIAGNOSIS — M54.16 LUMBAR RADICULITIS: ICD-10-CM

## 2023-01-18 DIAGNOSIS — M51.26 LUMBAR DISC HERNIATION: ICD-10-CM

## 2023-01-18 DIAGNOSIS — M54.41 RIGHT-SIDED LOW BACK PAIN WITH RIGHT-SIDED SCIATICA, UNSPECIFIED CHRONICITY: Primary | ICD-10-CM

## 2023-01-18 NOTE — PROGRESS NOTES
Daily Note     Today's date: 2023  Patient name: Diana Alan  : 1975  MRN: [id number]  Referring provider: Lori Ornelas PA-C  Dx:   Encounter Diagnosis     ICD-10-CM    1  Right-sided low back pain with right-sided sciatica, unspecified chronicity  M54 41                      Subjective: Pt states his low back felt great the next morning after initial eval   He reports his low back feels tight this morning  Pt reveals he has low back pain when he is working overhead  Objective: See treatment diary below      Assessment: Improved low back tightness following PPU  Child's pose added today to help improve lat and hip mobility  Patient is challenged to get into full child's pose position  Pt reports improved low back discomfort post session  Plan: Continue per plan of care        Precautions: GERD       Manuals            SIJ MET R anterior tilt - SC            L/S sidelying roll SC GV                                       Neuro Re-Ed             TA contraction 5"x20            TA bridge  2x10           TA SKFO  2x10           Multifidus press  BTB 2x10                                                  Ther Ex             Bike  TM 5'           Repeated lumbar ext 3x10 PPU 2x10           Hip abduction  S/l 2x10           Lower trunk rotation 5"x20            Cat/camel  x20           Child's pose  10"x10           Standing hip abd/ext  GTB 2x10           Retro walk c nova  15# x10           Ther Activity                                       Gait Training                                       Modalities

## 2023-01-23 ENCOUNTER — OFFICE VISIT (OUTPATIENT)
Dept: PHYSICAL THERAPY | Facility: CLINIC | Age: 48
End: 2023-01-23

## 2023-01-23 DIAGNOSIS — M54.41 RIGHT-SIDED LOW BACK PAIN WITH RIGHT-SIDED SCIATICA, UNSPECIFIED CHRONICITY: Primary | ICD-10-CM

## 2023-01-23 NOTE — PROGRESS NOTES
Daily Note     Today's date: 2023  Patient name: Leonardo Tovar  : 1975  MRN: [id number]  Referring provider: Isha Maldonado PA-C  Dx:   Encounter Diagnosis     ICD-10-CM    1  Right-sided low back pain with right-sided sciatica, unspecified chronicity  M54 41           Start Time: 1100  Stop Time: 1142  Total time in clinic (min): 42 minutes    Subjective: Pt reports that his back has been feeling better lately  Objective: See treatment diary below      Assessment: Focused on core strengthening, hip mobility, and lumbar mobility, pt was sufficiently fatigued  Pt had decreased low back stiffness at the end of the session  PT will update HEP NV  Plan: Continue per plan of care        Precautions: GERD       Manuals            SIJ MET R anterior tilt - SC            L/S sidelying roll SC GV                                       Neuro Re-Ed             TA contraction 5"x20            TA bridge  2x10           TA SKFO  2x10           Multifidus press  BTB 2x10 Double GTB 2x10          Rows + TA   BTB 3x10          STS + TA   Gray mat BMB x15                       Ther Ex             Bike  TM 5' 6'          Repeated lumbar ext 3x10 PPU 2x10 PPU 2x20          Hip abduction  S/l 2x10           Lower trunk rotation 5"x20  5"x15 ea          Cat/camel  x20           Child's pose  10"x10 10'x10          Standing hip abd/ext  GTB 2x10           Retro walk c nova  15# x10 15# x15          Open book stretch   5"x10           Ther Activity                                       Gait Training                                       Modalities

## 2023-01-26 ENCOUNTER — OFFICE VISIT (OUTPATIENT)
Dept: PHYSICAL THERAPY | Facility: CLINIC | Age: 48
End: 2023-01-26

## 2023-01-26 DIAGNOSIS — M54.41 RIGHT-SIDED LOW BACK PAIN WITH RIGHT-SIDED SCIATICA, UNSPECIFIED CHRONICITY: Primary | ICD-10-CM

## 2023-01-26 NOTE — PROGRESS NOTES
Daily Note     Today's date: 2023  Patient name: Laura Holm  : 1975  MRN: [id number]  Referring provider: Esha Sarmiento PA-C  Dx:   Encounter Diagnosis     ICD-10-CM    1  Right-sided low back pain with right-sided sciatica, unspecified chronicity  M54 41           Start Time: 08  Stop Time: 08  Total time in clinic (min): 42 minutes    Subjective: Pt states that he was a little sore for about a day after last session, but just muscular fatigue  Objective: See treatment diary below      Assessment: Pt was fatigued post session and had decreased discomfort post session  Difficulty achieving full ROM with R sided SKFO as well as full hip extension with bridges  PT added rows + TA, LPD + TA, and paloff press to pt's HEP, pt verbalized and demonstrated understanding of proper form  Plan: Continue per plan of care        Precautions: GERD   Access Code: [email or web address]       Manuals          SIJ MET R anterior tilt - SC            L/S sidelying roll SC GV                                       Neuro Re-Ed             TA contraction 5"x20            TA bridge  2x10  GTB x20         TA SKFO  2x10  GTB 2x10         Multifidus press  BTB 2x10 Double GTB 2x10 Double BTB 2x10 ea         Rows + TA   BTB 3x10 BTB x30         STS + TA   Gray mat BMB x15          LPD + TA    BTB x30         Ther Ex             Bike  TM 5' 6' 6'         Repeated lumbar ext 3x10 PPU 2x10 PPU 2x20 x20         Hip abduction  S/l 2x10           Lower trunk rotation 5"x20  5"x15 ea          Cat/camel  x20           Child's pose  10"x10 10'x10          Standing hip abd/ext  GTB 2x10  GTB 2x10         Retro walk c nova  15# x10 15# x15          Open book stretch   5"x10  5"x10          Ther Activity                                       Gait Training                                       Modalities

## 2023-01-30 ENCOUNTER — OFFICE VISIT (OUTPATIENT)
Dept: PHYSICAL THERAPY | Facility: CLINIC | Age: 48
End: 2023-01-30

## 2023-01-30 DIAGNOSIS — M54.41 RIGHT-SIDED LOW BACK PAIN WITH RIGHT-SIDED SCIATICA, UNSPECIFIED CHRONICITY: Primary | ICD-10-CM

## 2023-01-30 NOTE — PROGRESS NOTES
Daily Note     Today's date: 2023  Patient name: Leodan Garduno  : 1975  MRN: [id number]  Referring provider: Dorys Neal PA-C  Dx:   Encounter Diagnosis     ICD-10-CM    1  Right-sided low back pain with right-sided sciatica, unspecified chronicity  M54 41                      Subjective: Pt states he was sore for approximately 1 day following last session but denies low back pain  He denies R radicular pain recently and states he was able to go skiing without pain yesterday  Objective: See treatment diary below      Assessment: Still limited in thoracic rotation ROM  Lumbar extension ROM significantly improved  Lumbar flexion present with functional lift  Provided pt education on hip hinging and neutral spine  Pt able to maintain neutral spine during hip hinging exercise but does lack some hip flexion mobility  Plan: Continue per plan of care        Precautions: GERD   Access Code: [email or web address]       Manuals         SIJ MET R anterior tilt - SC            L/S sidelying roll SC GV                                       Neuro Re-Ed             TA contraction 5"x20            TA bridge  2x10  GTB x20         TA SKFO  2x10  GTB 2x10         Multifidus press  BTB 2x10 Double GTB 2x10 Double BTB 2x10 ea BTB x30        Rows + TA   BTB 3x10 BTB x30         STS + TA   Gray mat BMB x15  PB+OH lift BMB 2x10        LPD + TA    BTB x30         plank     24" 30"x3        Bird dog     2x10        Ther Ex             Bike  TM 5' 6' 6' 6'        Repeated lumbar ext 3x10 PPU 2x10 PPU 2x20 x20 x20        Hip abduction  S/l 2x10           Lower trunk rotation 5"x20  5"x15 ea          Cat/camel  x20           Child's pose  10"x10 10'x10          Standing hip abd/ext  GTB 2x10  GTB 2x10         Retro walk c nova  15# x10 15# x15          Open book stretch   5"x10  5"x10  10"x10        Ther Activity             Box lift     Box x10        Hip hinging     2x10        Dirty baby carry BMB 5 laps        Pt education     AF        Gait Training                                       Modalities

## 2023-02-02 ENCOUNTER — OFFICE VISIT (OUTPATIENT)
Dept: PHYSICAL THERAPY | Facility: CLINIC | Age: 48
End: 2023-02-02

## 2023-02-02 DIAGNOSIS — M54.41 RIGHT-SIDED LOW BACK PAIN WITH RIGHT-SIDED SCIATICA, UNSPECIFIED CHRONICITY: Primary | ICD-10-CM

## 2023-02-02 NOTE — PROGRESS NOTES
Daily Note     Today's date: 2023  Patient name: Maribell Spencer  : 1975  MRN: [id number]  Referring provider: Delilah Whittaker PA-C  Dx:   Encounter Diagnosis     ICD-10-CM    1  Right-sided low back pain with right-sided sciatica, unspecified chronicity  M54 41                      Subjective: Pt reports continued improvements in low back pain  He states he returned to the gym yesterday and his muscles are sore but he did not experience LBP  Objective: See treatment diary below      Assessment: Pt demonstrating good form throughout exercises  Will be able to progress to floor plank NV  Patient required VC to utilize power lift during box lift but was able to perform hip hinging without c/o pain or discomfort  Will continue to progress as able  Plan: Continue per plan of care        Precautions: GERD   Access Code: [email or web address]       Manuals        SIJ MET R anterior tilt - SC            L/S sidelying roll SC GV                                       Neuro Re-Ed             TA contraction 5"x20            TA bridge  2x10  GTB x20         TA SKFO  2x10  GTB 2x10         Multifidus press  BTB 2x10 Double GTB 2x10 Double BTB 2x10 ea BTB x30 Blk on foam 2x10       Rows + TA   BTB 3x10 BTB x30         STS + TA   Gray mat BMB x15  PB+OH lift BMB 2x10 OH lift BMB 2x10       LPD + TA    BTB x30         plank     24" 30"x3 24" 30"x3       Bird dog     2x10 2x10                    Ther Ex             Bike  TM 5' 6' 6' 6' 6'       Repeated lumbar ext 3x10 PPU 2x10 PPU 2x20 x20 x20 PPU 2x10       Hip abduction  S/l 2x10           Lower trunk rotation 5"x20  5"x15 ea          Cat/camel  x20           Child's pose  10"x10 10'x10          Standing hip abd/ext  GTB 2x10  GTB 2x10         Retro walk c nova  15# x10 15# x15          Open book stretch   5"x10  5"x10  10"x10        Ther Activity             Box lift     Box x10 Box+25# x10       Hip hinging     2x10 2x10       Dirty baby carry     BMB 5 laps BMB 5 laps       1/2 kneel MB chopping      YMB 2x10       U/l carry      25# 2'       Pt education     AF        Gait Training                                       Modalities

## 2023-02-06 ENCOUNTER — OFFICE VISIT (OUTPATIENT)
Dept: PHYSICAL THERAPY | Facility: CLINIC | Age: 48
End: 2023-02-06

## 2023-02-06 DIAGNOSIS — M54.41 RIGHT-SIDED LOW BACK PAIN WITH RIGHT-SIDED SCIATICA, UNSPECIFIED CHRONICITY: Primary | ICD-10-CM

## 2023-02-06 NOTE — PROGRESS NOTES
Daily Note     Today's date: 2023  Patient name: Leodan Garduno  : 1975  MRN: [id number]  Referring provider: Dorys Neal PA-C  Dx:   Encounter Diagnosis     ICD-10-CM    1  Right-sided low back pain with right-sided sciatica, unspecified chronicity  M54 41           Start Time: 0802  Stop Time: 08  Total time in clinic (min): 39 minutes    Subjective: Pt reports that he has some low back pain this morning, he feels tight  Objective: See treatment diary below      Assessment: Pt is able to tolerate progressions well with no increase in pain  Pt has slight R knee valgus with functional squat  Cueing required for posterior weight shift with box lifts and cueing for maintaining neutral spine with quadruped bird dogs, able to maintain  Pt will continue to benefit from skilled PT in order to improve squat form, decrease pain, and improve strength  Plan: Continue per plan of care        Precautions: GERD   Access Code: [email or web address]       Manuals       SIJ MET R anterior tilt - SC            L/S sidelying roll SC GV                                       Neuro Re-Ed             TA contraction 5"x20            TA bridge  2x10  GTB x20   Pball 2x10      TA SKFO  2x10  GTB 2x10         Multifidus press  BTB 2x10 Double GTB 2x10 Double BTB 2x10 ea BTB x30 Blk on foam 2x10 Blk on foam 2x10      Rows + TA   BTB 3x10 BTB x30         STS + TA   Gray mat BMB x15  PB+OH lift BMB 2x10 OH lift BMB 2x10 OH lift PMB 3x10      LPD + TA    BTB x30         plank     24" 30"x3 24" 30"x3 Floor 30"x3      Bird dog     2x10 2x10 2x10                   Ther Ex             Bike  TM 5' 6' 6' 6' 6' 6'      Repeated lumbar ext 3x10 PPU 2x10 PPU 2x20 x20 x20 PPU 2x10 PPU 2x10      Hip abduction  S/l 2x10           Lower trunk rotation 5"x20  5"x15 ea          Cat/camel  x20           Child's pose  10"x10 10'x10          Standing hip abd/ext  GTB 2x10  GTB 2x10         Retro walk c nova  15# x10 15# x15          Open book stretch   5"x10  5"x10  10"x10        Ther Activity             Box lift     Box x10 Box+25# x10 Box + 25# 2x10      Hip hinging     2x10 2x10       Dirty baby carry     BMB 5 laps BMB 5 laps PMB 5 laps       1/2 kneel MB chopping      YMB 2x10 Foam YMB 2x10      U/l carry      25# 2' 25# 1:30 ea       Squats       PBall 2x10      Pt education     AF        Gait Training                                       Modalities

## 2023-02-09 ENCOUNTER — OFFICE VISIT (OUTPATIENT)
Dept: PHYSICAL THERAPY | Facility: CLINIC | Age: 48
End: 2023-02-09

## 2023-02-09 DIAGNOSIS — M54.41 RIGHT-SIDED LOW BACK PAIN WITH RIGHT-SIDED SCIATICA, UNSPECIFIED CHRONICITY: Primary | ICD-10-CM

## 2023-02-09 NOTE — PROGRESS NOTES
Daily Note     Today's date: 2023  Patient name: Toyin Hui  : 1975  MRN: [id number]  Referring provider: Sebastián Weinstein PA-C  Dx:   Encounter Diagnosis     ICD-10-CM    1  Right-sided low back pain with right-sided sciatica, unspecified chronicity  M54 41                      Subjective: Upon presentation, SPR =0/10  Patient denied any significant changes since last visit  Objective: See treatment diary below      Assessment: Tolerated treatment well without limitaitons in exercise performance or reported exacerbation of lumbar symptoms  Patient did note musuclar fatigue post today's visit  Patient exhibited good technique with therapeutic exercises requiring occasional vc's and would benefit from continued PT    Plan: Continue per plan of care  Progress treatment as tolerated         Precautions: GERD   Access Code: [email or web address]       Manuals      SIJ MET R anterior tilt - SC            L/S sidelying roll SC GV                                       Neuro Re-Ed             TA contraction 5"x20            TA bridge  2x10  GTB x20   Pball 2x10 OPB  10x2     TA SKFO  2x10  GTB 2x10         Multifidus press  BTB 2x10 Double GTB 2x10 Double BTB 2x10 ea BTB x30 Blk on foam 2x10 Blk on foam 2x10 Black TB  On foam  B  10x2     Rows + TA   BTB 3x10 BTB x30         STS + TA   Gray mat BMB x15  PB+OH lift BMB 2x10 OH lift BMB 2x10 OH lift PMB 3x10 OH elevation  15x2     LPD + TA    BTB x30         plank     24" 30"x3 24" 30"x3 Floor 30"x3 [place]  30'x3     Bird dog     2x10 2x10 2x10 10x2                  Ther Ex             Bike  TM 5' 6' 6' 6' 6' 6' 6'     Repeated lumbar ext 3x10 PPU 2x10 PPU 2x20 x20 x20 PPU 2x10 PPU 2x10 PPU  10x2     Hip abduction  S/l 2x10           Lower trunk rotation 5"x20  5"x15 ea          Cat/camel  x20           Child's pose  10"x10 10'x10          Standing hip abd/ext  GTB 2x10  GTB 2x10         Retro walk c nova  15# x10 15# x15 Open book stretch   5"x10  5"x10  10"x10        Ther Activity             Box lift     Box x10 Box+25# x10 Box + 25# 2x10 Box  +25 lbs  10x2     Hip hinging     2x10 2x10       Dirty baby carry     BMB 5 laps BMB 5 laps PMB 5 laps  PMB  B UE  5 laps     1/2 kneel MB chopping      YMB 2x10 Foam YMB 2x10 Foam  YMB  B  10x2       U/l carry      25# 2' 25# 1:30 ea  25 lb KB  1:30 each       Squats       PBall 2x10 OPB  10x2     Pt education     AF        Gait Training                                       Modalities

## 2023-02-10 NOTE — PROGRESS NOTES
Portions of the record may have been created with voice recognition software  Occasional wrong word or "sound a like" substitutions may have occurred due to the inherent limitations of voice recognition software  Read the chart carefully and recognize, using context, where substitutions have occurred  Assessment:  1  Lumbar facet arthropathy    2  Myofascial pain syndrome    3  Chronic right-sided low back pain without sciatica        Plan:  No orders of the defined types were placed in this encounter  No orders of the defined types were placed in this encounter  77-year-old gentleman presenting for follow-up with chronic right-sided low back pain which has improved with physical therapy  Pain occurring only intermittent and in the morning and improves throughout the day  No gross motor or sensory deficits noted  Likely etiology secondary myofascial pain, lumbar spondylosis  -Did discuss the option of lumbar medial branch block and RFA but given that the pain is intermittent and improves throughout the day, will hold off on interventions at this time  Continue with conservative care and home exercise  Follow-up in 4 to 6 weeks  My impressions and treatment recommendations were discussed in detail with the patient who verbalized understanding and had no further questions  Discharge instructions were provided  I personally saw and examined the patient and I agree with the above discussed plan of care  History of Present Illness:  Karen Barbosa is a 50 y o  male who presents for a follow up office visit in regards to Follow-up, Back Pain, and Hip Pain (Lower lumbar pain that radiates to right hip with intermitted pain and cramping)  Since last visit, patient has continued physical improvement noticed significant improvement of his chronic low back pain    Currently reporting 6 out of 10 pain that is intermittent and worse in the morning and improves throughout the day with activity and exercise  The pain is localized to the right lumbar paraspinal region without any radiating pain in the right hip or thigh  Other Symptoms:  The patient does not have numbness  The patient does not have weakness  The patient does not have bladder dysfunction  The patient does not have bowel dysfunction  The patient does not have chills and fever, night sweats or unintentional weight loss  Prior Appts:  1-: Const    Specialists Seen:  PT    Current Pain Medications:      Pain Medications Trialed:     Interventional Techniques Employed:    Imaging:  No MRI cervical spine results found in the past 2 years   MRI lumbar spine wo contrast    Result Date: 12/30/2022  Impression     Mild degenerative changes of the lumbar spine, as described above  No significant canal stenosis or foraminal narrowing identified  Workstation performed: [id number]          No MRI thoracic spine results found in the past 2 years   No X-ray cervical spine results found in the past 2 years   XR spine lumbar minimum 4 views non injury    Result Date: 12/20/2022  Impression     No acute osseous abnormality         Workstation performed: [id number]          No X-ray hip/pelvis results found in the past 2 years   No X-ray knee results found in the past 2 years     Lab Results   Component Value Date    CREATININE 0 84 12/26/2022     Lab Results   Component Value Date    ALT 17 12/26/2022     (H) 12/02/2015       I have personally reviewed and/or updated the patient's past medical history, past surgical history, family history, social history, current medications, allergies, and vital signs today  Review of Systems   Musculoskeletal: Positive for back pain, gait problem and joint swelling          Right side        Patient Active Problem List   Diagnosis   • Atypical chest pain   • Chronic pain of left knee   • Depression with anxiety   • GERD (gastroesophageal reflux disease)   • Heel pain, chronic, right   • Joint pain   • Lateral epicondylitis of left elbow   • Migraine headache   • Mild obstructive sleep apnea   • Neck muscle spasm   • Trigger ring finger of right hand   • Trigger middle finger of right hand   • Hypertriglyceridemia   • Mass of left foot   • Right elbow pain   • Cubital tunnel syndrome of both upper extremities   • Olecranon bursitis of right elbow   • Gastritis   • Carpal tunnel syndrome, bilateral   • Colon cancer screening   • Callus of foot   • Umbilical hernia without obstruction and without gangrene   • Atelectasis   • Colon polyp   • Diverticulosis   • Internal hemorrhoid   • PAC (premature atrial contraction)   • Laceration of right index finger without foreign body without damage to nail   • Strain of left Achilles tendon   • Calcaneal spur, left   • Pain of left thumb   • Annual physical exam   • Strain of right shoulder   • Atypical pigmented skin lesion   • Impaired fasting glucose   • Actinic keratoses   • Excessive daytime sleepiness   • Other fatigue   • ROMAINE (obstructive sleep apnea)   • Night sweat   • Right-sided low back pain with right-sided sciatica   • Crushing injury of left hand   • Closed nondisplaced fracture of shaft of fourth metacarpal bone of left hand with routine healing   • Osteoarthritis of spine with radiculopathy, lumbar region       Past Medical History:   Diagnosis Date   • Elbow tendonitis     right possible bursitis   • Gastritis    • GERD (gastroesophageal reflux disease)    • Migraine        Past Surgical History:   Procedure Laterality Date   • COLONOSCOPY     • EGD  2005   • EGD  01/2020   • EYE FOREIGN BODY REMOVAL Left    • IR BIOPSY LOWER LIMB  3/16/2022   • VA TENDON SHEATH INCISION Right 12/26/2018    Procedure: LONG AND RING TRIGGER FINGER RELEASES;  Surgeon: Sho Patrick MD;  Location: [facility];  Service: Orthopedics   • SKIN BIOPSY         Family History   Problem Relation Age of Onset   • Diabetes Mother    • Hypertension Mother    • Breast cancer Mother    • Cancer Mother         Breast   • Lung cancer Mother    • Diabetes Father    • Gout Father    • Hypertension Father    • Cancer Maternal Grandmother    • Rosacea Family        Social History     Occupational History   • Not on file   Tobacco Use   • Smoking status: Never   • Smokeless tobacco: Never   Vaping Use   • Vaping Use: Never used   Substance and Sexual Activity   • Alcohol use: Not Currently     Alcohol/week: 0 0 standard drinks     Comment: Occasional   • Drug use: Never   • Sexual activity: Yes     Partners: Female     Birth control/protection: Female Sterilization       Current Outpatient Medications on File Prior to Visit   Medication Sig   • butalbital-acetaminophen-caffeine ([address] [place] [address]) -40 mg per tablet Take 1 tablet by mouth daily as needed for migraine For on set of headache (Patient not taking: Reported on 1/18/2023)   • dicyclomine (BENTYL) 20 mg tablet Take 1 tablet (20 mg total) by mouth 2 (two) times a day for 10 days   • methocarbamol (ROBAXIN) 500 mg tablet Take 1 tablet (500 mg total) by mouth daily at bedtime as needed for muscle spasms (As needed for spasm (caution may cause sedation)) (Patient not taking: Reported on 1/18/2023)   • multivitamin (THERAGRAN) TABS Take 1 tablet by mouth daily   • Omega-3 Fatty Acids (fish oil) 1,000 mg Take 1 capsule (1,000 mg total) by mouth 2 (two) times a day   • omeprazole (PriLOSEC) 40 MG capsule Take 1 capsule (40 mg total) by mouth daily   • ondansetron (ZOFRAN) 4 mg tablet Take 1 tablet (4 mg total) by mouth every 6 (six) hours for 3 days   • sucralfate (CARAFATE) 1 g tablet Take 1 tablet (1 g total) by mouth 4 (four) times a day for 10 days     No current facility-administered medications on file prior to visit         No Known Allergies    Physical Exam:    /81   Pulse 68   Ht 5' 7" (1 702 m) Comment: verbal  Wt 108 kg (238 lb)   BMI 37 28 kg/m²     Constitutional:normal, well developed, well nourished, alert, in no distress and non-toxic and no overt pain behavior  Eyes:anicteric  HEENT:grossly intact  Neck:supple, symmetric, trachea midline and no masses   Pulmonary:even and unlabored  Cardiovascular:No edema or pitting edema present  Skin:Normal without rashes or lesions and well hydrated  Psychiatric:Mood and affect appropriate  Neurologic:Cranial Nerves II-XII grossly intact  Musculoskeletal:normal    Positive facet loading on the right with tenderness palpation right lumbar paraspinal region  No motor or sensory deficits grossly lower extremities

## 2023-02-13 ENCOUNTER — APPOINTMENT (OUTPATIENT)
Dept: PHYSICAL THERAPY | Facility: CLINIC | Age: 48
End: 2023-02-13

## 2023-02-15 ENCOUNTER — OFFICE VISIT (OUTPATIENT)
Dept: PAIN MEDICINE | Facility: CLINIC | Age: 48
End: 2023-02-15

## 2023-02-15 ENCOUNTER — OFFICE VISIT (OUTPATIENT)
Dept: DERMATOLOGY | Facility: CLINIC | Age: 48
End: 2023-02-15

## 2023-02-15 VITALS
DIASTOLIC BLOOD PRESSURE: 81 MMHG | HEART RATE: 68 BPM | HEIGHT: 67 IN | SYSTOLIC BLOOD PRESSURE: 130 MMHG | WEIGHT: 238 LBS | BODY MASS INDEX: 37.35 KG/M2

## 2023-02-15 VITALS — TEMPERATURE: 98.3 F | BODY MASS INDEX: 37.35 KG/M2 | WEIGHT: 238 LBS | HEIGHT: 67 IN

## 2023-02-15 DIAGNOSIS — G89.29 CHRONIC RIGHT-SIDED LOW BACK PAIN WITHOUT SCIATICA: ICD-10-CM

## 2023-02-15 DIAGNOSIS — Z12.83 SCREENING FOR MALIGNANT NEOPLASM OF SKIN: Primary | ICD-10-CM

## 2023-02-15 DIAGNOSIS — M79.18 MYOFASCIAL PAIN SYNDROME: ICD-10-CM

## 2023-02-15 DIAGNOSIS — M47.816 LUMBAR FACET ARTHROPATHY: Primary | ICD-10-CM

## 2023-02-15 DIAGNOSIS — M54.50 CHRONIC RIGHT-SIDED LOW BACK PAIN WITHOUT SCIATICA: ICD-10-CM

## 2023-02-15 NOTE — PROGRESS NOTES
[facility] [address] [facility] Note     Patient Name: John Gómez  Encounter Date: 2/15/23    Have you been cared for by a [facility] [address] [facility]atologist in the last 3 years and, if so, which description applies to you? Yes  I have been here within the last 3 years, and my medical history has NOT changed since that time  I am MALE/not capable of bearing children  REVIEW OF SYSTEMS:  Have you recently had or currently have any of the following? · No changes in my recent health  PAST MEDICAL HISTORY:  Have you personally ever had or currently have any of the following? If "YES," then please provide more detail  · No changes in my medical history  FAMILY HISTORY:  Any "first degree relatives" (parent, brother, sister, or child) with the following? • No changes in my family's known health  PATIENT EXPERIENCE:    • Do you want the Dermatologist to perform a COMPLETE skin exam today including a clinical examination under the "bra and underwear" areas? Yes  • If necessary, do we have your permission to call and leave a detailed message on your Preferred Phone number that includes your specific medical information?   Yes      No Known Allergies   Current Outpatient Medications:   •  multivitamin (THERAGRAN) TABS, Take 1 tablet by mouth daily, Disp: , Rfl:   •  Omega-3 Fatty Acids (fish oil) 1,000 mg, Take 1 capsule (1,000 mg total) by mouth 2 (two) times a day, Disp: 60 capsule, Rfl: 1  •  omeprazole (PriLOSEC) 40 MG capsule, Take 1 capsule (40 mg total) by mouth daily, Disp: 90 capsule, Rfl: 1  •  sucralfate (CARAFATE) 1 g tablet, Take 1 tablet (1 g total) by mouth 4 (four) times a day for 10 days, Disp: 40 tablet, Rfl: 0  •  butalbital-acetaminophen-caffeine (FIORICET,ESGIC) -40 mg per tablet, Take 1 tablet by mouth daily as needed for migraine For on set of headache (Patient not taking: Reported on 1/18/2023), Disp: 30 tablet, Rfl: 0  •  dicyclomine (BENTYL) 20 mg tablet, Take 1 tablet (20 mg total) by mouth 2 (two) times a day for 10 days, Disp: 20 tablet, Rfl: 0  •  methocarbamol (ROBAXIN) 500 mg tablet, Take 1 tablet (500 mg total) by mouth daily at bedtime as needed for muscle spasms (As needed for spasm (caution may cause sedation)) (Patient not taking: Reported on 1/18/2023), Disp: 20 tablet, Rfl: 0  •  ondansetron (ZOFRAN) 4 mg tablet, Take 1 tablet (4 mg total) by mouth every 6 (six) hours for 3 days, Disp: 12 tablet, Rfl: 0          • Whom besides the patient is providing clinical information about today's encounter?   o NO ADDITIONAL HISTORIAN (patient alone provided history)    Physical Exam and Assessment/Plan by Diagnosis:      SEBORRHEIC KERATOSIS; NON-INFLAMED     Physical Exam:  • Anatomic Location Affected:  face  • Morphological Description:  No such lesions of note  • Pertinent Positives:  • Pertinent Negatives:     Additional History of Present Condition:  Per visit on 2/23/22 patient denies any issues  Still monitoring       Assessment and Plan:  Based on a thorough discussion of this condition and the management approach to it (including a comprehensive discussion of the known risks, side effects and potential benefits of treatment), the patient (family) agrees to implement the following specific plan:  • Reassured benign  • Monitor for changes  • Sun precautions      Seborrheic Keratosis  A seborrheic keratosis is a harmless warty spot that appears during adult life as a common sign of skin aging  Seborrheic keratoses can arise on any area of skin, covered or uncovered, with the usual exception of the palms and soles  They do not arise from mucous membranes  Seborrheic keratoses can have highly variable appearance        Seborrheic keratoses are extremely common  It has been estimated that over 90% of adults over the age of 61 years have one or more of them  They occur in males and females of all races, typically beginning to erupt in the 35s or 45s   They are uncommon under the age of 21 years   The precise cause of seborrhoeic keratoses is not known  Seborrhoeic keratoses are considered degenerative in nature  As time goes by, seborrheic keratoses tend to become more numerous  Some people inherit a tendency to develop a very large number of them; some people may have hundreds of them      The name "seborrheic keratosis" is misleading, because these lesions are not limited to a seborrhoeic distribution (scalp, mid-face, chest, upper back), nor are they formed from sebaceous glands, nor are they associated with sebum -- which is greasy  Seborrheic keratosis may also be called "SK," "Seb K," "basal cell papilloma," "senile wart," or "barnacle "       Researchers have noted:  • Eruptive seborrhoeic keratoses can follow sunburn or dermatitis  • Skin friction may be the reason they appear in body folds  • Viral cause (e g , human papillomavirus) seems unlikely  • Stable and clonal mutations or activation of FRFR3, PIK3CA, DUSTIN, AKT1 and EGFR genes are found in seborrhoeic keratoses  • Seborrhoeic keratosis can arise from solar lentigo  • FRFR3 mutations also arise in solar lentigines  These mutations are associated with increased age and location on the head and neck, suggesting a role of ultraviolet radiation in these lesions  • Seborrheic keratoses do not harbour tumour suppressor gene mutations  • Epidermal growth factor receptor inhibitors, which are used to treat some cancers, often result in an increase in verrucal (warty) keratoses      There is no easy way to remove multiple lesions on a single occasion  Unless a specific lesion is "inflamed" and is causing pain or stinging/burning or is bleeding, most insurance companies do not authorize treatment  BALDERRAMA ANGIOMAS    Physical Exam:  • Anatomic Location Affected:  left arm  • Morphological Description:    • Pertinent Positives:  • Pertinent Negatives:     Additional History of Present Condition:  Patient states that he recently noticed a spot on & was concerned because it sometimes bothers him  Assessment and Plan:  Based on a thorough discussion of this condition and the management approach to it (including a comprehensive discussion of the known risks, side effects and potential benefits of treatment), the patient (family) agrees to implement the following specific plan:  • Discussed that they can be removed if any pain persists    Assessment and Plan:    Cherry angioma, also known as [place] spots, are benign vascular skin lesions  A "cherry angioma" is a firm red, blue or purple papule, 0 1-1 cm in diameter  When thrombosed, they can appear black in colour until evaluated with a dermatoscope when the red or purple colour is more easily seen  Cherry angioma may develop on any part of the body but most often appear on the scalp, face, lips and trunk  An angioma is due to proliferating endothelial cells; these are the cells that line the inside of a blood vessel  Angiomas can arise in early life or later in life; the most common type of angioma is a cherry angioma  Cherry angiomas are very common in males and females of any age or race  They are more noticeable in white skin than in skin of colour  They markedly increase in number from about the age of 36  There may be a family history of similar lesions  Eruptive cherry angiomas have been rarely reported to be associated with internal malignancy  The cause of angiomas is unknown  Genetic analysis of cherry angiomas has shown that they frequently carry specific somatic missense mutations in the GNAQ and GNA11 (Q209H) genes, which are involved in other vascular and melanocytic proliferations  Cherry angioma is usually diagnosed clinically and no investigations are necessary for the majority of lesions  It has a characteristic red-clod or lobular pattern on dermatoscopy (called lacunar pattern using conventional pattern analysis)    When there is uncertainty about the diagnosis, a biopsy may be performed  The angioma is composed of venules in a thickened papillary dermis  Collagen bundles may be prominent between the lobules  Cherry angiomas are harmless, so they do not usually have to be treated  Occasionally, they are removed to exclude a malignant skin lesion such as a nodular melanoma or because they are irritated or bleeding (and a subsequent risk for infection)  To decrease friction over the lesions, we recommend Neutrogena Daily Defense SPF 50+ at least 3 times a day          Scribe Attestation    I,:  Brenda March am acting as a scribe while in the presence of the attending physician :       I,:  Joanne Quezada MD personally performed the services described in this documentation    as scribed in my presence :

## 2023-02-15 NOTE — PATIENT INSTRUCTIONS
SEBORRHEIC KERATOSIS; NON-INFLAMED    Assessment and Plan:  Based on a thorough discussion of this condition and the management approach to it (including a comprehensive discussion of the known risks, side effects and potential benefits of treatment), the patient (family) agrees to implement the following specific plan:  Reassured benign  Monitor for changes  Sun precautions  Seborrheic Keratosis  A seborrheic keratosis is a harmless warty spot that appears during adult life as a common sign of skin aging  Seborrheic keratoses can arise on any area of skin, covered or uncovered, with the usual exception of the palms and soles  They do not arise from mucous membranes  Seborrheic keratoses can have highly variable appearance  Seborrheic keratoses are extremely common  It has been estimated that over 90% of adults over the age of 61 years have one or more of them  They occur in males and females of all races, typically beginning to erupt in the 35s or 45s  They are uncommon under the age of 21 years  The precise cause of seborrhoeic keratoses is not known  Seborrhoeic keratoses are considered degenerative in nature  As time goes by, seborrheic keratoses tend to become more numerous  Some people inherit a tendency to develop a very large number of them; some people may have hundreds of them  The name "seborrheic keratosis" is misleading, because these lesions are not limited to a seborrhoeic distribution (scalp, mid-face, chest, upper back), nor are they formed from sebaceous glands, nor are they associated with sebum -- which is greasy    Seborrheic keratosis may also be called "SK," "Seb K," "basal cell papilloma," "senile wart," or "barnacle "       Researchers have noted:  Eruptive seborrhoeic keratoses can follow sunburn or dermatitis  Skin friction may be the reason they appear in body folds  Viral cause (e g , human papillomavirus) seems unlikely  Stable and clonal mutations or activation of FRFR3, PIK3CA, DUSTIN, AKT1 and EGFR genes are found in seborrhoeic keratoses  Seborrhoeic keratosis can arise from solar lentigo  FRFR3 mutations also arise in solar lentigines  These mutations are associated with increased age and location on the head and neck, suggesting a role of ultraviolet radiation in these lesions  Seborrheic keratoses do not harbour tumour suppressor gene mutations  Epidermal growth factor receptor inhibitors, which are used to treat some cancers, often result in an increase in verrucal (warty) keratoses  There is no easy way to remove multiple lesions on a single occasion  Unless a specific lesion is "inflamed" and is causing pain or stinging/burning or is bleeding, most insurance companies do not authorize treatment  BALDERRAMA ANGIOMAS    Assessment and Plan:  Based on a thorough discussion of this condition and the management approach to it (including a comprehensive discussion of the known risks, side effects and potential benefits of treatment), the patient (family) agrees to implement the following specific plan:  Discussed that they can be removed if any pain persists    Assessment and Plan:    Cherry angioma, also known as [place] spots, are benign vascular skin lesions  A "cherry angioma" is a firm red, blue or purple papule, 0 1-1 cm in diameter  When thrombosed, they can appear black in colour until evaluated with a dermatoscope when the red or purple colour is more easily seen  Cherry angioma may develop on any part of the body but most often appear on the scalp, face, lips and trunk  An angioma is due to proliferating endothelial cells; these are the cells that line the inside of a blood vessel  Angiomas can arise in early life or later in life; the most common type of angioma is a cherry angioma  Cherry angiomas are very common in males and females of any age or race  They are more noticeable in white skin than in skin of colour   They markedly increase in number from about the age of 36  There may be a family history of similar lesions  Eruptive cherry angiomas have been rarely reported to be associated with internal malignancy  The cause of angiomas is unknown  Genetic analysis of cherry angiomas has shown that they frequently carry specific somatic missense mutations in the GNAQ and GNA11 (Q209H) genes, which are involved in other vascular and melanocytic proliferations  Cherry angioma is usually diagnosed clinically and no investigations are necessary for the majority of lesions  It has a characteristic red-clod or lobular pattern on dermatoscopy (called lacunar pattern using conventional pattern analysis)  When there is uncertainty about the diagnosis, a biopsy may be performed  The angioma is composed of venules in a thickened papillary dermis  Collagen bundles may be prominent between the lobules  Cherry angiomas are harmless, so they do not usually have to be treated  Occasionally, they are removed to exclude a malignant skin lesion such as a nodular melanoma or because they are irritated or bleeding (and a subsequent risk for infection)  To decrease friction over the lesions, we recommend Neutrogena Daily Defense SPF 50+ at least 3 times a day

## 2023-02-16 ENCOUNTER — OFFICE VISIT (OUTPATIENT)
Dept: PHYSICAL THERAPY | Facility: CLINIC | Age: 48
End: 2023-02-16

## 2023-02-16 DIAGNOSIS — M54.41 RIGHT-SIDED LOW BACK PAIN WITH RIGHT-SIDED SCIATICA, UNSPECIFIED CHRONICITY: Primary | ICD-10-CM

## 2023-02-16 NOTE — PROGRESS NOTES
Daily Note     Today's date: 2023  Patient name: Hung Claudio  : 1975  MRN: [id number]  Referring provider: Isabella Martínez PA-C  Dx:   Encounter Diagnosis     ICD-10-CM    1  Right-sided low back pain with right-sided sciatica, unspecified chronicity  M54 39           Start Time: 807  Stop Time: 841  Total time in clinic (min): 34 minutes    Subjective: Pt reports that he is feeling good, he states that sometimes in the morning he has back pain still but it loosens up as he gets moving  Objective: See treatment diary below      Assessment: Pt able to perform all exercises without increase in sx  Pt requires minimal cueing for proper form of all charted interventions  Pt will take one week to perform exercises independently, PT will review exercises NV and with demonstration of proper form, d/c to independent HEP  Plan: Continue per plan of care  Progress treatment as tolerated         Precautions: GERD   Access Code: [email or web address]       Manuals     SIJ MET R anterior tilt - SC            L/S sidelying roll SC GV                                       Neuro Re-Ed             TA contraction 5"x20            TA bridge  2x10  GTB x20   Pball 2x10 OPB  10x2     TA SKFO  2x10  GTB 2x10         Multifidus press  BTB 2x10 Double GTB 2x10 Double BTB 2x10 ea BTB x30 Blk on foam 2x10 Blk on foam 2x10 Black TB  On foam  B  10x2     Rows + TA   BTB 3x10 BTB x30         STS + TA   Gray mat BMB x15  PB+OH lift BMB 2x10 OH lift BMB 2x10 OH lift PMB 3x10 OH elevation  15x2 OH elevation 2x15    LPD + TA    BTB x30         plank     24" 30"x3 24" 30"x3 Floor 30"x3 Plynth  30'x3 Plynth 30"x3    Bird dog     2x10 2x10 2x10 10x2 2x10                 Ther Ex             Bike  TM 5' 6' 6' 6' 6' 6' 6' TM 6'    Repeated lumbar ext 3x10 PPU 2x10 PPU 2x20 x20 x20 PPU 2x10 PPU 2x10 PPU  10x2     Hip abduction  S/l 2x10           Lower trunk rotation 5"x20  5"x15 ea Cat/camel  x20           Child's pose  10"x10 10'x10          Standing hip abd/ext  GTB 2x10  GTB 2x10         Retro walk c nova  15# x10 15# x15          Open book stretch   5"x10  5"x10  10"x10        Ther Activity             Box lift     Box x10 Box+25# x10 Box + 25# 2x10 Box  +25 lbs  10x2 Box + 25# 2x10    Hip hinging     2x10 2x10       Dirty baby carry     BMB 5 laps BMB 5 laps PMB 5 laps  PMB  B UE  5 laps PMB 5 laps     1/2 kneel MB chopping      YMB 2x10 Foam YMB 2x10 Foam  YMB  B  10x2       U/l carry      25# 2' 25# 1:30 ea  25 lb KB  1:30 each   25# KB 1:30 ea    Squats       PBall 2x10 OPB  10x2 PB 2x10    Pt education     AF        Gait Training                                       Modalities

## 2023-02-20 ENCOUNTER — APPOINTMENT (OUTPATIENT)
Dept: PHYSICAL THERAPY | Facility: CLINIC | Age: 48
End: 2023-02-20

## 2023-02-23 ENCOUNTER — OFFICE VISIT (OUTPATIENT)
Dept: PHYSICAL THERAPY | Facility: CLINIC | Age: 48
End: 2023-02-23

## 2023-02-23 DIAGNOSIS — M54.41 RIGHT-SIDED LOW BACK PAIN WITH RIGHT-SIDED SCIATICA, UNSPECIFIED CHRONICITY: Primary | ICD-10-CM

## 2023-02-23 NOTE — PROGRESS NOTES
Daily Note     Today's date: 2023  Patient name: Miryam Dutta  : 1975  MRN: [id number]  Referring provider: Jake Guerin PA-C  Dx:   Encounter Diagnosis     ICD-10-CM    1  Right-sided low back pain with right-sided sciatica, unspecified chronicity  M54 39                      Subjective: Pt states he is a little sore from his workout yesterday but he reports his low back feels much better  He is agreeable to DC today  Objective: See treatment diary below      Assessment: Pt performs functional carrying activities with out c/o pain or discomfort  Still requires some VC at times to facilitate power lift during lifting activities  Updated HEP provided today  Patient demonstrates verbal understanding  Plan: Continue per plan of care        Precautions: GERD   Access Code: [email or web address]       Manuals    SIJ MET R anterior tilt - SC            L/S sidelying roll SC GV                                       Neuro Re-Ed             TA contraction 5"x20            TA bridge  2x10  GTB x20   Pball 2x10 OPB  10x2     TA SKFO  2x10  GTB 2x10         Multifidus press  BTB 2x10 Double GTB 2x10 Double BTB 2x10 ea BTB x30 Blk on foam 2x10 Blk on foam 2x10 Black TB  On foam  B  10x2     Rows + TA   BTB 3x10 BTB x30         STS + TA   Gray mat BMB x15  PB+OH lift BMB 2x10 OH lift BMB 2x10 OH lift PMB 3x10 OH elevation  15x2 OH elevation 2x15    LPD + TA    BTB x30         plank     24" 30"x3 24" 30"x3 Floor 30"x3 Plynth  30'x3 Plynth 30"x3 30"x3   Bird dog     2x10 2x10 2x10 10x2 2x10 x30                Ther Ex             Bike  TM 5' 6' 6' 6' 6' 6' 6' TM 6' TM 5'   Repeated lumbar ext 3x10 PPU 2x10 PPU 2x20 x20 x20 PPU 2x10 PPU 2x10 PPU  10x2     Hip abduction  S/l 2x10           Lower trunk rotation 5"x20  5"x15 ea          Cat/camel  x20           Child's pose  10"x10 10'x10          Standing hip abd/ext  GTB 2x10  GTB 2x10         Retro walk c nova 15# x10 15# x15          Open book stretch   5"x10  5"x10  10"x10        Ther Activity             Box lift     Box x10 Box+25# x10 Box + 25# 2x10 Box  +25 lbs  10x2 Box + 25# 2x10 Box + 35# 2x10   Hip hinging     2x10 2x10       Dirty baby carry     BMB 5 laps BMB 5 laps PMB 5 laps  PMB  B UE  5 laps PMB 5 laps  PMB 5 laps   1/2 kneel MB chopping      YMB 2x10 Foam YMB 2x10 Foam  YMB  B  10x2       U/l carry      25# 2' 25# 1:30 ea  25 lb KB  1:30 each   25# KB 1:30 ea    Squats       PBall 2x10 OPB  10x2 PB 2x10 Goblet PMB 3x10   Pt education     AF        Gait Training                                       Modalities

## 2023-02-23 NOTE — PROGRESS NOTES
PT Discharge    Pt has met maximum benefit of skilled physical therapy and will continue to benefit from independent performance of HEP  Pt was informed that if he has any questions or concerns, he is welcome to contact the facility at any time  Pt is discharged from skilled PT at this time

## 2023-02-24 ENCOUNTER — OFFICE VISIT (OUTPATIENT)
Dept: INTERNAL MEDICINE CLINIC | Facility: OTHER | Age: 48
End: 2023-02-24

## 2023-02-24 VITALS
TEMPERATURE: 98.1 F | OXYGEN SATURATION: 97 % | BODY MASS INDEX: 36.57 KG/M2 | HEART RATE: 75 BPM | WEIGHT: 233 LBS | SYSTOLIC BLOOD PRESSURE: 122 MMHG | HEIGHT: 67 IN | DIASTOLIC BLOOD PRESSURE: 78 MMHG

## 2023-02-24 DIAGNOSIS — G47.33 MILD OBSTRUCTIVE SLEEP APNEA: ICD-10-CM

## 2023-02-24 DIAGNOSIS — G47.19 EXCESSIVE DAYTIME SLEEPINESS: ICD-10-CM

## 2023-02-24 DIAGNOSIS — E78.1 HYPERTRIGLYCERIDEMIA: ICD-10-CM

## 2023-02-24 DIAGNOSIS — M54.41 RIGHT-SIDED LOW BACK PAIN WITH RIGHT-SIDED SCIATICA, UNSPECIFIED CHRONICITY: ICD-10-CM

## 2023-02-24 DIAGNOSIS — R73.01 IMPAIRED FASTING GLUCOSE: ICD-10-CM

## 2023-02-24 DIAGNOSIS — R80.9 PROTEINURIA, UNSPECIFIED TYPE: Primary | ICD-10-CM

## 2023-02-24 DIAGNOSIS — E66.09 CLASS 2 OBESITY DUE TO EXCESS CALORIES WITHOUT SERIOUS COMORBIDITY WITH BODY MASS INDEX (BMI) OF 36.0 TO 36.9 IN ADULT: ICD-10-CM

## 2023-02-24 DIAGNOSIS — Z71.85 VACCINE COUNSELING: ICD-10-CM

## 2023-02-24 DIAGNOSIS — K21.9 GASTROESOPHAGEAL REFLUX DISEASE WITHOUT ESOPHAGITIS: ICD-10-CM

## 2023-02-24 DIAGNOSIS — D72.829 LEUKOCYTOSIS, UNSPECIFIED TYPE: ICD-10-CM

## 2023-02-24 PROBLEM — R53.83 OTHER FATIGUE: Status: RESOLVED | Noted: 2022-03-04 | Resolved: 2023-02-24

## 2023-02-24 PROBLEM — R07.89 ATYPICAL CHEST PAIN: Status: RESOLVED | Noted: 2018-01-05 | Resolved: 2023-02-24

## 2023-02-24 PROBLEM — J98.11 ATELECTASIS: Status: RESOLVED | Noted: 2020-08-21 | Resolved: 2023-02-24

## 2023-02-24 PROBLEM — E66.812 CLASS 2 OBESITY DUE TO EXCESS CALORIES WITHOUT SERIOUS COMORBIDITY WITH BODY MASS INDEX (BMI) OF 36.0 TO 36.9 IN ADULT: Status: ACTIVE | Noted: 2023-02-24

## 2023-02-24 NOTE — ASSESSMENT & PLAN NOTE
Heartburn symptoms much improved  No longer taking Carafate  Patient is taking Prilosec 40 mg daily  Advised him to call and schedule his gastroenterology follow-up  Continue to avoid trigger foods  Reduce caffeine  Avoid spicy and acidic foods

## 2023-02-24 NOTE — ASSESSMENT & PLAN NOTE
Recommended flu shot  Discuses risks and benefits  Patient verbalized understanding but is not interested in this at this time

## 2023-02-24 NOTE — ASSESSMENT & PLAN NOTE
Encouraged low carbohydrate diet  We will repeat fasting CMP to monitor glucose  Patient also gets annual hemoglobin A1c through his insurance provider  Last A1c is 5 6

## 2023-02-24 NOTE — PATIENT INSTRUCTIONS
Atelectasis  CXR 12/2022 in ER, resolved  Right-sided low back pain with right-sided sciatica  MRI previously reviewed  Symptoms much improved since last visit  Patient completed physical therapy  Now doing home PT stretches and range of motion exercises  Commended him on starting new daily exercise  Encourage weight loss  Discussed importance of posture and core strengthening  Report back if symptoms return    GERD (gastroesophageal reflux disease)  Heartburn symptoms much improved  No longer taking Carafate  Patient is taking Prilosec 40 mg daily  Advised him to call and schedule his gastroenterology follow-up  Continue to avoid trigger foods  Reduce caffeine  Avoid spicy and acidic foods  Impaired fasting glucose  Encouraged low carbohydrate diet  We will repeat fasting CMP to monitor glucose  Patient also gets annual hemoglobin A1c through his insurance provider  Last A1c is 5 6  Mild obstructive sleep apnea  Patient does have a history of sleep apnea  Previously had machine but only recently started using it because he was waiting on delivery of his CPAP mask  He has since been using it and has seen some improvement  We will continue to follow    Excessive daytime sleepiness  Patient with known history of sleep apnea  Recently started treatment as above  Fatigue improved per patient  Will monitor to see if daytime sleepiness improves  Hypertriglyceridemia  August 2022 Labs reviewed  Encouraged low-fat low-cholesterol diet  We will repeat lipid panel    Vaccine counseling  Recommended flu shot  Discuses risks and benefits  Patient verbalized understanding but is not interested in this at this time  Class 2 obesity due to excess calories without serious comorbidity with body mass index (BMI) of 36 0 to 36 9 in adult  Encouraged weight loss  Discussed gradual increase in daily activity  Be mindful of back with lifting  Low impact exercises can be beneficial as well  1 lb every 1-2  weeks    Discussed weight loss goal

## 2023-02-24 NOTE — ASSESSMENT & PLAN NOTE
MRI previously reviewed  Symptoms much improved since last visit  Patient completed physical therapy  Now doing home PT stretches and range of motion exercises  Commended him on starting new daily exercise  Encourage weight loss  Discussed importance of posture and core strengthening    Report back if symptoms return

## 2023-02-24 NOTE — ASSESSMENT & PLAN NOTE
Patient with known history of sleep apnea  Recently started treatment as above  Fatigue improved per patient  Will monitor to see if daytime sleepiness improves

## 2023-02-24 NOTE — PROGRESS NOTES
[facility] [place]  Patient ID: Candice Carbone    : 1975  Age/Gender: 50 y o  male     DATE: 2023      Assessment/Plan:    Atelectasis  CXR 2022 in ER, resolved  Right-sided low back pain with right-sided sciatica  MRI previously reviewed  Symptoms much improved since last visit  Patient completed physical therapy  Now doing home PT stretches and range of motion exercises  Commended him on starting new daily exercise  Encourage weight loss  Discussed importance of posture and core strengthening  Report back if symptoms return    GERD (gastroesophageal reflux disease)  Heartburn symptoms much improved  No longer taking Carafate  Patient is taking Prilosec 40 mg daily  Advised him to call and schedule his gastroenterology follow-up  Continue to avoid trigger foods  Reduce caffeine  Avoid spicy and acidic foods  Impaired fasting glucose  Encouraged low carbohydrate diet  We will repeat fasting CMP to monitor glucose  Patient also gets annual hemoglobin A1c through his insurance provider  Last A1c is 5 6  Mild obstructive sleep apnea  Patient does have a history of sleep apnea  Previously had machine but only recently started using it because he was waiting on delivery of his CPAP mask  He has since been using it and has seen some improvement  We will continue to follow    Excessive daytime sleepiness  Patient with known history of sleep apnea  Recently started treatment as above  Fatigue improved per patient  Will monitor to see if daytime sleepiness improves  Hypertriglyceridemia  2022 Labs reviewed  Encouraged low-fat low-cholesterol diet  We will repeat lipid panel    Vaccine counseling  Recommended flu shot  Discuses risks and benefits  Patient verbalized understanding but is not interested in this at this time        Class 2 obesity due to excess calories without serious comorbidity with body mass index (BMI) of 36 0 to 36 9 in adult  Encouraged weight loss  Discussed gradual increase in daily activity  Be mindful of back with lifting  Low impact exercises can be beneficial as well  1 lb every 1-2  weeks  Discussed weight loss goal          Diagnoses and all orders for this visit:    Proteinuria, unspecified type  Comments:  She did have some protein noted in the urine in the emergency room  We will repeat urinalysis  Orders:  -     UA (URINE) with reflex to Scope  -     CBC and differential; Future  -     Comprehensive metabolic panel; Future    Gastroesophageal reflux disease without esophagitis  -     CBC and differential; Future    Impaired fasting glucose    Mild obstructive sleep apnea    Right-sided low back pain with right-sided sciatica, unspecified chronicity    Hypertriglyceridemia  -     Comprehensive metabolic panel; Future  -     Lipid Panel with Direct LDL reflex; Future    Excessive daytime sleepiness  -     CBC and differential; Future  -     Comprehensive metabolic panel; Future    Leukocytosis, unspecified type  Comments:  WBC elevated in ER likley stress rxn due to acute gastritis  Repeat CBC ordered  Orders:  -     CBC and differential; Future    Vaccine counseling    Class 2 obesity due to excess calories without serious comorbidity with body mass index (BMI) of 36 0 to 36 9 in adult          BMI Counseling: Body mass index is 36 49 kg/m²  The BMI is above normal  Nutrition recommendations include limiting drinks that contain sugar, moderation in carbohydrate intake, increasing intake of lean protein, reducing intake of saturated and trans fat and reducing intake of cholesterol  Exercise recommendations include exercising 3-5 times per week  No pharmacotherapy was ordered  Patient referred to PCP  Rationale for BMI follow-up plan is due to patient being overweight or obese            Subjective:   Chief Complaint   Patient presents with   • Follow-up 4 week f/u    • HM     BMI          Ashli Webber is a 50 y o  male who presents to the office on 2/24/2023 for     51 yo male for follow up  Back pain much improved  No longer taking any pain meds  Did PT and saw great improvement  At this time no current complaints or concerns  Notes he has been back to work  Able to work without any limiitations  He does AM stretches which have been helping  Has not had GI follow up, unsure when his next GI apt is  No longer taking Carafate  Still gets occasional reflux  He has cut back on trigger foods  Taking Prilosec 40 mg daily  Started exercising  Started last week  Began running  Trying to lose weight  Started lifting weight  Motivated to lose weight  Some of his coworkers go to gym and are encouraging him to exercise with them  Notes he has always struggled with concentration since a child  He notes he is well adapted and has coping mechanisms  Recently stated CPAP, notes he was waiting on the mask  Now has the mask  Sleep improved with machine  Back Pain  This is a chronic problem  The problem occurs intermittently  The problem has been resolved since onset  The pain is present in the lumbar spine  The pain is at a severity of 0/10  The patient is experiencing no pain  Worse during: slight tight in the morning but improves with stretches  Stiffness is present in the morning  Pertinent negatives include no abdominal pain, bladder incontinence, bowel incontinence, chest pain, dysuria, fever, numbness, paresthesias, perianal numbness, tingling or weakness  The following portions of the patient's history were reviewed and updated as appropriate: allergies, current medications, past family history, past medical history, past social history, past surgical history and problem list     Review of Systems   Constitutional: Negative for chills, fever and unexpected weight change (trying to lose weight )     HENT: Negative for rhinorrhea, sinus pressure and sore throat  Eyes: Negative for visual disturbance (Uses glasses, follows with eye doctor but last apt was about 2 years ago, plans to schedule follow up  )  Respiratory: Negative for cough, shortness of breath and wheezing  Cardiovascular: Negative for chest pain, palpitations and leg swelling  Gastrointestinal: Negative for abdominal pain, anal bleeding, blood in stool, bowel incontinence, constipation, diarrhea, nausea and vomiting  Genitourinary: Negative for bladder incontinence, dysuria and frequency  Musculoskeletal: Negative for back pain (much improved  )  Skin: Negative for rash  Neurological: Negative for dizziness, tingling, weakness, light-headedness, numbness and paresthesias  Psychiatric/Behavioral: Negative for agitation           Patient Active Problem List   Diagnosis   • Chronic pain of left knee   • Depression with anxiety   • GERD (gastroesophageal reflux disease)   • Heel pain, chronic, right   • Joint pain   • Lateral epicondylitis of left elbow   • Migraine headache   • Mild obstructive sleep apnea   • Neck muscle spasm   • Trigger ring finger of right hand   • Trigger middle finger of right hand   • Hypertriglyceridemia   • Mass of left foot   • Right elbow pain   • Cubital tunnel syndrome of both upper extremities   • Olecranon bursitis of right elbow   • Gastritis   • Carpal tunnel syndrome, bilateral   • Colon cancer screening   • Callus of foot   • Umbilical hernia without obstruction and without gangrene   • Colon polyp   • Diverticulosis   • Internal hemorrhoid   • PAC (premature atrial contraction)   • Laceration of right index finger without foreign body without damage to nail   • Strain of left Achilles tendon   • Calcaneal spur, left   • Pain of left thumb   • Annual physical exam   • Strain of right shoulder   • Atypical pigmented skin lesion   • Impaired fasting glucose   • Actinic keratoses   • Excessive daytime sleepiness   • ROMAINE (obstructive sleep apnea)   • Night sweat   • Crushing injury of left hand   • Closed nondisplaced fracture of shaft of fourth metacarpal bone of left hand with routine healing   • Osteoarthritis of spine with radiculopathy, lumbar region   • Vaccine counseling   • Class 2 obesity due to excess calories without serious comorbidity with body mass index (BMI) of 36 0 to 36 9 in adult       Past Medical History:   Diagnosis Date   • Elbow tendonitis     right possible bursitis   • Gastritis    • GERD (gastroesophageal reflux disease)    • Migraine    • Right-sided low back pain with right-sided sciatica 9/23/2022       Past Surgical History:   Procedure Laterality Date   • COLONOSCOPY     • EGD  2005   • EGD  01/2020   • EYE FOREIGN BODY REMOVAL Left    • IR BIOPSY LOWER LIMB  3/16/2022   • KS TENDON SHEATH INCISION Right 12/26/2018    Procedure: LONG AND RING TRIGGER FINGER RELEASES;  Surgeon: Aubrey Dinh MD;  Location: [facility];  Service: Orthopedics   • SKIN BIOPSY           Current Outpatient Medications:   •  multivitamin (THERAGRAN) TABS, Take 1 tablet by mouth daily, Disp: , Rfl:   •  Omega-3 Fatty Acids (fish oil) 1,000 mg, Take 1 capsule (1,000 mg total) by mouth 2 (two) times a day, Disp: 60 capsule, Rfl: 1  •  omeprazole (PriLOSEC) 40 MG capsule, Take 1 capsule (40 mg total) by mouth daily, Disp: 90 capsule, Rfl: 1  •  ondansetron (ZOFRAN) 4 mg tablet, Take 1 tablet (4 mg total) by mouth every 6 (six) hours for 3 days, Disp: 12 tablet, Rfl: 0  •  butalbital-acetaminophen-caffeine (FIORICET,ESGIC) -40 mg per tablet, Take 1 tablet by mouth daily as needed for migraine For on set of headache (Patient not taking: Reported on 1/18/2023), Disp: 30 tablet, Rfl: 0    No Known Allergies    Social History     Socioeconomic History   • Marital status: /Civil Union     Spouse name: None   • Number of children: None   • Years of education: None   • Highest education level: None   Occupational History   • None Tobacco Use   • Smoking status: Never   • Smokeless tobacco: Never   Vaping Use   • Vaping Use: Never used   Substance and Sexual Activity   • Alcohol use: Not Currently     Alcohol/week: 0 0 standard drinks     Comment: Occasional   • Drug use: Never   • Sexual activity: Yes     Partners: Female     Birth control/protection: Female Sterilization   Other Topics Concern   • None   Social History Narrative    Caffeine use     Social Determinants of Health     Financial Resource Strain: Not on file   Food Insecurity: Not on file   Transportation Needs: Not on file   Physical Activity: Not on file   Stress: Not on file   Social Connections: Not on file   Intimate Partner Violence: Not on file   Housing Stability: Not on file       Family History   Problem Relation Age of Onset   • Diabetes Mother    • Hypertension Mother    • Breast cancer Mother    • Cancer Mother         Breast   • Lung cancer Mother    • Diabetes Father    • Gout Father    • Hypertension Father    • Cancer Maternal Grandmother    • Rosacea Family        PHQ-2/9 Depression Screening    Little interest or pleasure in doing things: 0 - not at all  Feeling down, depressed, or hopeless: 0 - not at all  Trouble falling or staying asleep, or sleeping too much: 0 - not at all  Feeling tired or having little energy: 0 - not at all  Poor appetite or overeatin - several days  Feeling bad about yourself - or that you are a failure or have let yourself or your family down: 0 - not at all  Trouble concentrating on things, such as reading the newspaper or watching television: 3 - nearly every day  Moving or speaking so slowly that other people could have noticed   Or the opposite - being so fidgety or restless that you have been moving around a lot more than usual: 0 - not at all  Thoughts that you would be better off dead, or of hurting yourself in some way: 0 - not at all  PHQ-9 Score: 4   PHQ-9 Interpretation: No or Minimal depression          Health Maintenance   Topic Date Due   • COVID-19 Vaccine (3 - Booster for Pfizer series) 06/07/2021   • Annual Physical  02/26/2022   • Influenza Vaccine (1) 09/01/2022   • PT PLAN OF CARE  02/15/2023   • BMI: Followup Plan  04/01/2023   • Colorectal Cancer Screening  09/25/2023   • BMI: Adult  02/24/2024   • DTaP,Tdap,and Td Vaccines (6 - Td or Tdap) 10/22/2030   • HIV Screening  Completed   • Hepatitis C Screening  Completed   • Pneumococcal Vaccine: Pediatrics (0 to 5 Years) and At-Risk Patients (6 to 59 Years)  Aged Out   • HIB Vaccine  Aged Out   • IPV Vaccine  Aged Out   • Hepatitis A Vaccine  Aged Out   • Meningococcal ACWY Vaccine  Aged Out   • HPV Vaccine  Aged [employer] History   Administered Date(s) Administered   • COVID-19 PFIZER VACCINE 0 3 ML IM 03/22/2021, 04/12/2021   • INFLUENZA 10/03/2016, 09/05/2017   • Influenza Quadrivalent Preservative Free 3 years and older IM 10/03/2016, 09/05/2017   • Influenza, recombinant, quadrivalent,injectable, preservative free 10/23/2020   • TD (adult) Preservative Free 04/29/2015   • Td (adult), adsorbed 04/29/2015   • Tdap 02/11/2014, 04/29/2015, 12/19/2016, 03/27/2020, 10/22/2020   • Tuberculin Skin Test-PPD Intradermal 04/01/2022, 04/01/2022        Objective:  Vitals:    02/24/23 0748   BP: 122/78   BP Location: Left arm   Patient Position: Sitting   Cuff Size: Large   Pulse: 75   Temp: 98 1 °F (36 7 °C)   TempSrc: Temporal   SpO2: 97%   Weight: 106 kg (233 lb)   Height: 5' 7" (1 702 m)     Wt Readings from Last 3 Encounters:   02/24/23 106 kg (233 lb)   02/15/23 108 kg (238 lb)   02/15/23 108 kg (238 lb)     Body mass index is 36 49 kg/m²  No results found  Physical Exam  Vitals and nursing note reviewed  Constitutional:       General: He is not in acute distress  Appearance: Normal appearance  He is well-developed  He is obese  He is not ill-appearing or diaphoretic  HENT:      Head: Normocephalic and atraumatic        Mouth/Throat: Mouth: Mucous membranes are moist       Pharynx: No oropharyngeal exudate or posterior oropharyngeal erythema  Eyes:      General: No scleral icterus  Right eye: No discharge  Left eye: No discharge  Pupils: Pupils are equal, round, and reactive to light  Cardiovascular:      Rate and Rhythm: Normal rate and regular rhythm  Heart sounds: Normal heart sounds  No murmur heard  Pulmonary:      Effort: Pulmonary effort is normal  No respiratory distress  Breath sounds: Normal breath sounds  No wheezing or rales  Abdominal:      General: Bowel sounds are normal  There is no distension  Palpations: Abdomen is soft  Tenderness: There is no abdominal tenderness  There is no guarding  Comments: + BS, soft, NT/ND  Musculoskeletal:      Cervical back: Neck supple  Thoracic back: No spasms, tenderness or bony tenderness  Normal range of motion  Lumbar back: No spasms, tenderness or bony tenderness  Normal range of motion  Negative right straight leg raise test and negative left straight leg raise test         Back:       Right lower leg: No edema  Left lower leg: No edema  Skin:     General: Skin is warm and dry  Neurological:      General: No focal deficit present  Mental Status: He is alert  Psychiatric:         Behavior: Behavior normal          Thought Content: Thought content normal              Future Appointments   Date Time Provider Otto Ruiz   3/22/2023  2:00 PM Weston Ordonez DO [facility]       Fred Bang PA-C   [address] [place] [address] CARE [address] [place] [address]    Patient Care Team:  Fred Bang PA-C as PCP - General (Internal Medicine)  RONEY Kendrick MD    This note was completed in part utilizing [employer] Software    Grammatical errors, random word insertions, spelling mistakes, and incomplete sentences can be an occasional consequence of this system secondary to software limitations, ambient noise, and hardware issues  If you have any questions or concerns about the content, text, or information contained within the body of this dictation, please contact the provider for clarification

## 2023-02-24 NOTE — ASSESSMENT & PLAN NOTE
Encouraged weight loss  Discussed gradual increase in daily activity  Be mindful of back with lifting  Low impact exercises can be beneficial as well  1 lb every 1-2  weeks    Discussed weight loss goal

## 2023-02-24 NOTE — ASSESSMENT & PLAN NOTE
Patient does have a history of sleep apnea  Previously had machine but only recently started using it because he was waiting on delivery of his CPAP mask  He has since been using it and has seen some improvement    We will continue to follow

## 2023-02-27 ENCOUNTER — APPOINTMENT (OUTPATIENT)
Dept: PHYSICAL THERAPY | Facility: CLINIC | Age: 48
End: 2023-02-27

## 2023-03-24 NOTE — PROGRESS NOTES
"Portions of the record may have been created with voice recognition software  Occasional wrong word or \"sound a like\" substitutions may have occurred due to the inherent limitations of voice recognition software  Read the chart carefully and recognize, using context, where substitutions have occurred  Assessment:  1  Chronic right-sided low back pain without sciatica    2  Lumbar facet arthropathy    3  Myofascial pain syndrome    4  Lumbar spondylosis        Plan:  No orders of the defined types were placed in this encounter  No orders of the defined types were placed in this encounter  64-year-old gentleman presenting for follow-up with chronic right-sided low back pain which has improved with physical therapy  Pain occurring only intermittent and in the morning and improves throughout the day  Has been overall much improved since last visit  No gross motor or sensory deficits noted  Likely etiology secondary myofascial pain, lumbar spondylosis  -Did discuss the option of lumbar medial branch block and RFA but given that the pain is intermittent and improving and not interfering with daily activities, would like to hold off on interventions at this time  Continue with conservative care and home exercise  Follow-up as needed    My impressions and treatment recommendations were discussed in detail with the patient who verbalized understanding and had no further questions  Discharge instructions were provided  I personally saw and examined the patient and I agree with the above discussed plan of care  History of Present Illness:  Geronimo Collier is a 50 y o  male who presents for a follow up office visit in regards to Follow-up (Lower lumbar pain is intermittent with dull-aching and pressure-like pain)  Since last visit, patient has continued physical improvement noticed significant improvement of his chronic low back pain    Currently reporting 6 out of 10 pain that is intermittent and " worse in the morning and improves throughout the day with activity and exercise  The pain is localized to the right lumbar paraspinal region without any radiating pain in the right hip or thigh  Other Symptoms:  The patient does not have numbness  The patient does not have weakness  The patient does not have bladder dysfunction  The patient does not have bowel dysfunction  The patient does not have chills and fever, night sweats or unintentional weight loss  Specialists Seen:  PT    Current Pain Medications:      Pain Medications Trialed:     Interventional Techniques Employed:    Imaging:  No MRI cervical spine results found in the past 2 years   MRI lumbar spine wo contrast    Result Date: 12/30/2022  Impression     Mild degenerative changes of the lumbar spine, as described above  No significant canal stenosis or foraminal narrowing identified  Workstation performed: [id number]          No MRI thoracic spine results found in the past 2 years   No X-ray cervical spine results found in the past 2 years   XR spine lumbar minimum 4 views non injury    Result Date: 12/20/2022  Impression     No acute osseous abnormality         Workstation performed: [id number]          No X-ray hip/pelvis results found in the past 2 years   No X-ray knee results found in the past 2 years     Lab Results   Component Value Date    CREATININE 0 84 12/26/2022     Lab Results   Component Value Date    ALT 17 12/26/2022     (H) 12/02/2015       I have personally reviewed and/or updated the patient's past medical history, past surgical history, family history, social history, current medications, allergies, and vital signs today  Review of Systems   Musculoskeletal: Positive for back pain, gait problem and joint swelling          Right side        Patient Active Problem List   Diagnosis   • Chronic pain of left knee   • Depression with anxiety   • GERD (gastroesophageal reflux disease)   • Heel pain, chronic, right   • Joint pain   • Lateral epicondylitis of left elbow   • Migraine headache   • Mild obstructive sleep apnea   • Neck muscle spasm   • Trigger ring finger of right hand   • Trigger middle finger of right hand   • Hypertriglyceridemia   • Mass of left foot   • Right elbow pain   • Cubital tunnel syndrome of both upper extremities   • Olecranon bursitis of right elbow   • Gastritis   • Carpal tunnel syndrome, bilateral   • Colon cancer screening   • Callus of foot   • Umbilical hernia without obstruction and without gangrene   • Colon polyp   • Diverticulosis   • Internal hemorrhoid   • PAC (premature atrial contraction)   • Laceration of right index finger without foreign body without damage to nail   • Strain of left Achilles tendon   • Calcaneal spur, left   • Pain of left thumb   • Annual physical exam   • Strain of right shoulder   • Atypical pigmented skin lesion   • Impaired fasting glucose   • Actinic keratoses   • Excessive daytime sleepiness   • ROMAINE (obstructive sleep apnea)   • Night sweat   • Crushing injury of left hand   • Closed nondisplaced fracture of shaft of fourth metacarpal bone of left hand with routine healing   • Osteoarthritis of spine with radiculopathy, lumbar region   • Vaccine counseling   • Class 2 obesity due to excess calories without serious comorbidity with body mass index (BMI) of 36 0 to 36 9 in adult       Past Medical History:   Diagnosis Date   • Elbow tendonitis     right possible bursitis   • Gastritis    • GERD (gastroesophageal reflux disease)    • Migraine    • Right-sided low back pain with right-sided sciatica 9/23/2022       Past Surgical History:   Procedure Laterality Date   • COLONOSCOPY     • EGD  2005   • EGD  01/2020   • EYE FOREIGN BODY REMOVAL Left    • IR BIOPSY LOWER LIMB  3/16/2022   • AL TENDON SHEATH INCISION Right 12/26/2018    Procedure: LONG AND RING TRIGGER FINGER RELEASES;  Surgeon: Tia Samano MD;  Location: [facility];  Service: "Orthopedics   • SKIN BIOPSY         Family History   Problem Relation Age of Onset   • Diabetes Mother    • Hypertension Mother    • Breast cancer Mother    • Cancer Mother         Breast   • Lung cancer Mother    • Diabetes Father    • Gout Father    • Hypertension Father    • Cancer Maternal Grandmother    • Rosacea Family        Social History     Occupational History   • Not on file   Tobacco Use   • Smoking status: Never   • Smokeless tobacco: Never   Vaping Use   • Vaping Use: Never used   Substance and Sexual Activity   • Alcohol use: Not Currently     Alcohol/week: 0 0 standard drinks     Comment: Occasional   • Drug use: Never   • Sexual activity: Yes     Partners: Female     Birth control/protection: Female Sterilization       Current Outpatient Medications on File Prior to Visit   Medication Sig   • multivitamin (THERAGRAN) TABS Take 1 tablet by mouth daily   • Omega-3 Fatty Acids (fish oil) 1,000 mg Take 1 capsule (1,000 mg total) by mouth 2 (two) times a day   • omeprazole (PriLOSEC) 40 MG capsule Take 1 capsule (40 mg total) by mouth daily   • butalbital-acetaminophen-caffeine (FIORICET,ESGIC) -40 mg per tablet Take 1 tablet by mouth daily as needed for migraine For on set of headache (Patient not taking: Reported on 1/18/2023)   • ondansetron (ZOFRAN) 4 mg tablet Take 1 tablet (4 mg total) by mouth every 6 (six) hours for 3 days     No current facility-administered medications on file prior to visit  No Known Allergies    Physical Exam:    /78   Pulse 67   Ht 5' 7\" (1 702 m) Comment: verbal  Wt 106 kg (233 lb)   BMI 36 49 kg/m²     Constitutional:normal, well developed, well nourished, alert, in no distress and non-toxic and no overt pain behavior    Eyes:anicteric  HEENT:grossly intact  Neck:supple, symmetric, trachea midline and no masses   Pulmonary:even and unlabored  Cardiovascular:No edema or pitting edema present  Skin:Normal without rashes or lesions and well " hydrated  Psychiatric:Mood and affect appropriate  Neurologic:Cranial Nerves II-XII grossly intact  Musculoskeletal:normal    Positive facet loading on the right with tenderness palpation right lumbar paraspinal region  No motor or sensory deficits grossly lower extremities

## 2023-03-29 ENCOUNTER — OFFICE VISIT (OUTPATIENT)
Dept: PAIN MEDICINE | Facility: CLINIC | Age: 48
End: 2023-03-29

## 2023-03-29 ENCOUNTER — APPOINTMENT (OUTPATIENT)
Dept: LAB | Facility: MEDICAL CENTER | Age: 48
End: 2023-03-29

## 2023-03-29 VITALS
SYSTOLIC BLOOD PRESSURE: 125 MMHG | BODY MASS INDEX: 36.57 KG/M2 | HEART RATE: 67 BPM | DIASTOLIC BLOOD PRESSURE: 78 MMHG | WEIGHT: 233 LBS | HEIGHT: 67 IN

## 2023-03-29 DIAGNOSIS — G47.19 EXCESSIVE DAYTIME SLEEPINESS: ICD-10-CM

## 2023-03-29 DIAGNOSIS — K21.9 GASTROESOPHAGEAL REFLUX DISEASE WITHOUT ESOPHAGITIS: ICD-10-CM

## 2023-03-29 DIAGNOSIS — M54.50 CHRONIC RIGHT-SIDED LOW BACK PAIN WITHOUT SCIATICA: Primary | ICD-10-CM

## 2023-03-29 DIAGNOSIS — M47.816 LUMBAR FACET ARTHROPATHY: ICD-10-CM

## 2023-03-29 DIAGNOSIS — M79.18 MYOFASCIAL PAIN SYNDROME: ICD-10-CM

## 2023-03-29 DIAGNOSIS — G89.29 CHRONIC RIGHT-SIDED LOW BACK PAIN WITHOUT SCIATICA: Primary | ICD-10-CM

## 2023-03-29 DIAGNOSIS — M47.816 LUMBAR SPONDYLOSIS: ICD-10-CM

## 2023-03-29 DIAGNOSIS — R80.9 PROTEINURIA, UNSPECIFIED TYPE: ICD-10-CM

## 2023-03-29 DIAGNOSIS — D72.829 LEUKOCYTOSIS, UNSPECIFIED TYPE: ICD-10-CM

## 2023-03-29 DIAGNOSIS — E78.1 HYPERTRIGLYCERIDEMIA: ICD-10-CM

## 2023-03-29 LAB
ALBUMIN SERPL BCP-MCNC: 4 G/DL (ref 3.5–5)
ALP SERPL-CCNC: 68 U/L (ref 46–116)
ALT SERPL W P-5'-P-CCNC: 53 U/L (ref 12–78)
ANION GAP SERPL CALCULATED.3IONS-SCNC: 3 MMOL/L (ref 4–13)
AST SERPL W P-5'-P-CCNC: 49 U/L (ref 5–45)
BACTERIA UR QL AUTO: NORMAL /HPF
BASOPHILS # BLD AUTO: 0.02 THOUSANDS/ÂΜL (ref 0–0.1)
BASOPHILS NFR BLD AUTO: 0 % (ref 0–1)
BILIRUB SERPL-MCNC: 0.46 MG/DL (ref 0.2–1)
BILIRUB UR QL STRIP: NEGATIVE
BUN SERPL-MCNC: 18 MG/DL (ref 5–25)
CALCIUM SERPL-MCNC: 9.6 MG/DL (ref 8.3–10.1)
CHLORIDE SERPL-SCNC: 107 MMOL/L (ref 96–108)
CHOLEST SERPL-MCNC: 222 MG/DL
CLARITY UR: CLEAR
CO2 SERPL-SCNC: 26 MMOL/L (ref 21–32)
COLOR UR: YELLOW
CREAT SERPL-MCNC: 0.86 MG/DL (ref 0.6–1.3)
EOSINOPHIL # BLD AUTO: 0.18 THOUSAND/ÂΜL (ref 0–0.61)
EOSINOPHIL NFR BLD AUTO: 3 % (ref 0–6)
ERYTHROCYTE [DISTWIDTH] IN BLOOD BY AUTOMATED COUNT: 12.3 % (ref 11.6–15.1)
GFR SERPL CREATININE-BSD FRML MDRD: 101 ML/MIN/1.73SQ M
GLUCOSE P FAST SERPL-MCNC: 102 MG/DL (ref 65–99)
GLUCOSE UR STRIP-MCNC: NEGATIVE MG/DL
HCT VFR BLD AUTO: 48.4 % (ref 36.5–49.3)
HDLC SERPL-MCNC: 45 MG/DL
HGB BLD-MCNC: 15.7 G/DL (ref 12–17)
HGB UR QL STRIP.AUTO: NEGATIVE
IMM GRANULOCYTES # BLD AUTO: 0.01 THOUSAND/UL (ref 0–0.2)
IMM GRANULOCYTES NFR BLD AUTO: 0 % (ref 0–2)
KETONES UR STRIP-MCNC: NEGATIVE MG/DL
LDLC SERPL CALC-MCNC: 119 MG/DL (ref 0–100)
LEUKOCYTE ESTERASE UR QL STRIP: NEGATIVE
LYMPHOCYTES # BLD AUTO: 1.93 THOUSANDS/ÂΜL (ref 0.6–4.47)
LYMPHOCYTES NFR BLD AUTO: 29 % (ref 14–44)
MCH RBC QN AUTO: 29 PG (ref 26.8–34.3)
MCHC RBC AUTO-ENTMCNC: 32.4 G/DL (ref 31.4–37.4)
MCV RBC AUTO: 89 FL (ref 82–98)
MONOCYTES # BLD AUTO: 0.54 THOUSAND/ÂΜL (ref 0.17–1.22)
MONOCYTES NFR BLD AUTO: 8 % (ref 4–12)
NEUTROPHILS # BLD AUTO: 4.04 THOUSANDS/ÂΜL (ref 1.85–7.62)
NEUTS SEG NFR BLD AUTO: 60 % (ref 43–75)
NITRITE UR QL STRIP: NEGATIVE
NON-SQ EPI CELLS URNS QL MICRO: NORMAL /HPF
NRBC BLD AUTO-RTO: 0 /100 WBCS
PH UR STRIP.AUTO: 6.5 [PH]
PLATELET # BLD AUTO: 274 THOUSANDS/UL (ref 149–390)
PMV BLD AUTO: 9.5 FL (ref 8.9–12.7)
POTASSIUM SERPL-SCNC: 4 MMOL/L (ref 3.5–5.3)
PROT SERPL-MCNC: 7.1 G/DL (ref 6.4–8.4)
PROT UR STRIP-MCNC: ABNORMAL MG/DL
RBC # BLD AUTO: 5.42 MILLION/UL (ref 3.88–5.62)
RBC #/AREA URNS AUTO: NORMAL /HPF
SODIUM SERPL-SCNC: 136 MMOL/L (ref 135–147)
SP GR UR STRIP.AUTO: 1.02 (ref 1–1.03)
TRIGL SERPL-MCNC: 292 MG/DL
UROBILINOGEN UR STRIP-ACNC: <2 MG/DL
WBC # BLD AUTO: 6.72 THOUSAND/UL (ref 4.31–10.16)
WBC #/AREA URNS AUTO: NORMAL /HPF

## 2023-04-03 DIAGNOSIS — E78.1 HYPERTRIGLYCERIDEMIA: ICD-10-CM

## 2023-04-03 DIAGNOSIS — R73.01 IMPAIRED FASTING GLUCOSE: Primary | ICD-10-CM

## 2023-08-10 ENCOUNTER — APPOINTMENT (OUTPATIENT)
Dept: LAB | Age: 48
End: 2023-08-10
Payer: COMMERCIAL

## 2023-08-10 DIAGNOSIS — R73.01 IMPAIRED FASTING GLUCOSE: ICD-10-CM

## 2023-08-10 DIAGNOSIS — E78.1 HYPERTRIGLYCERIDEMIA: ICD-10-CM

## 2023-08-10 LAB
ALBUMIN SERPL BCP-MCNC: 3.7 G/DL (ref 3.5–5)
ALP SERPL-CCNC: 88 U/L (ref 46–116)
ALT SERPL W P-5'-P-CCNC: 32 U/L (ref 12–78)
ANION GAP SERPL CALCULATED.3IONS-SCNC: 3 MMOL/L
AST SERPL W P-5'-P-CCNC: 17 U/L (ref 5–45)
BILIRUB SERPL-MCNC: 0.63 MG/DL (ref 0.2–1)
BUN SERPL-MCNC: 21 MG/DL (ref 5–25)
CALCIUM SERPL-MCNC: 9.2 MG/DL (ref 8.3–10.1)
CHLORIDE SERPL-SCNC: 110 MMOL/L (ref 96–108)
CHOLEST SERPL-MCNC: 184 MG/DL
CO2 SERPL-SCNC: 27 MMOL/L (ref 21–32)
CREAT SERPL-MCNC: 1.01 MG/DL (ref 0.6–1.3)
GFR SERPL CREATININE-BSD FRML MDRD: 86 ML/MIN/1.73SQ M
GLUCOSE P FAST SERPL-MCNC: 102 MG/DL (ref 65–99)
HDLC SERPL-MCNC: 42 MG/DL
LDLC SERPL CALC-MCNC: 92 MG/DL (ref 0–100)
POTASSIUM SERPL-SCNC: 4.1 MMOL/L (ref 3.5–5.3)
PROT SERPL-MCNC: 7 G/DL (ref 6.4–8.4)
SODIUM SERPL-SCNC: 140 MMOL/L (ref 135–147)
TRIGL SERPL-MCNC: 252 MG/DL

## 2023-08-10 PROCEDURE — 80061 LIPID PANEL: CPT

## 2023-08-10 PROCEDURE — 80053 COMPREHEN METABOLIC PANEL: CPT

## 2023-08-10 PROCEDURE — 36415 COLL VENOUS BLD VENIPUNCTURE: CPT

## 2023-08-16 ENCOUNTER — OFFICE VISIT (OUTPATIENT)
Dept: INTERNAL MEDICINE CLINIC | Age: 48
End: 2023-08-16
Payer: COMMERCIAL

## 2023-08-16 VITALS
DIASTOLIC BLOOD PRESSURE: 80 MMHG | WEIGHT: 232 LBS | BODY MASS INDEX: 36.41 KG/M2 | SYSTOLIC BLOOD PRESSURE: 128 MMHG | OXYGEN SATURATION: 97 % | TEMPERATURE: 98.2 F | HEART RATE: 68 BPM | HEIGHT: 67 IN

## 2023-08-16 DIAGNOSIS — Z80.1 FAMILY HX OF LUNG CANCER: ICD-10-CM

## 2023-08-16 DIAGNOSIS — E78.1 HYPERTRIGLYCERIDEMIA: Primary | ICD-10-CM

## 2023-08-16 DIAGNOSIS — Z12.5 SCREENING PSA (PROSTATE SPECIFIC ANTIGEN): ICD-10-CM

## 2023-08-16 DIAGNOSIS — R73.01 IMPAIRED FASTING GLUCOSE: ICD-10-CM

## 2023-08-16 DIAGNOSIS — Z13.220 SCREENING CHOLESTEROL LEVEL: ICD-10-CM

## 2023-08-16 PROBLEM — E66.09 CLASS 2 OBESITY DUE TO EXCESS CALORIES WITHOUT SERIOUS COMORBIDITY WITH BODY MASS INDEX (BMI) OF 36.0 TO 36.9 IN ADULT: Status: RESOLVED | Noted: 2023-02-24 | Resolved: 2023-08-16

## 2023-08-16 PROBLEM — M62.838 NECK MUSCLE SPASM: Status: RESOLVED | Noted: 2018-01-05 | Resolved: 2023-08-16

## 2023-08-16 PROBLEM — M25.50 JOINT PAIN: Status: RESOLVED | Noted: 2017-01-11 | Resolved: 2023-08-16

## 2023-08-16 PROBLEM — E66.812 CLASS 2 OBESITY DUE TO EXCESS CALORIES WITHOUT SERIOUS COMORBIDITY WITH BODY MASS INDEX (BMI) OF 36.0 TO 36.9 IN ADULT: Status: RESOLVED | Noted: 2023-02-24 | Resolved: 2023-08-16

## 2023-08-16 PROCEDURE — 99214 OFFICE O/P EST MOD 30 MIN: CPT | Performed by: FAMILY MEDICINE

## 2023-08-16 NOTE — PROGRESS NOTES
Assessment/Plan:    1. Hypertriglyceridemia  -     Lipid Panel with Direct LDL reflex; Future  -     Comprehensive metabolic panel; Future  -     CBC and differential; Future    2. Impaired fasting glucose  -     Comprehensive metabolic panel; Future    3. Screening cholesterol level    4. Screening PSA (prostate specific antigen)  -     PSA, Total Screen; Future    5. Family hx of lung cancer    Patient will call to make an appointment for colonoscopy by Dr. Jerad Turk , he has a number. I advised patient to increase his fish oil to 2000 mg in the morning and 2000 mg at night. Repeat blood work in 6 months        There are no Patient Instructions on file for this visit. Return in about 6 months (around 2/16/2024) for Recheck. Subjective:      Patient ID: Davy Eisenmenger is a 50 y.o. male. Chief Complaint   Patient presents with   • Follow-up     Follow up labs 8/10   •      Colonoscopy- patient will schedule    • Abdominal Pain       HPI        Hypertriglyceridemia, elevated cholesterol and levels. Taking fish oil 1000 mg once a day. Tried to lower his carbs. Improved lipid levels and triglycerides and lipid levels are now normal however triglycerides still elevated in the 200 range. In 2020 and 2019 he had well-controlled triglycerides at 88. At that time he states he was eating less carbs and exercising more. Last year his mother who was a non-smoker was diagnosed with lung cancer, they were going to do a lobectomy however further evaluation revealed that the mass was wrapped around an artery, they ended up doing a total longer movement on one side and now she is completely fine without any symptoms.         The following portions of the patient's history were reviewed and updated as appropriate: allergies, current medications, past family history, past medical history, past social history, past surgical history and problem list.    Review of Systems        Constitutional:  Denies fever or chills Eyes:  Denies double , blurry vision or eye pain  HENT:  Denies nasal congestion, sore throat or new hearing issues  Respiratory:  Denies cough or shortness of breath or wheezing  Cardiovascular:  Denies palpitations or chest pain  GI:  Denies abdominal pain, nausea, or vomiting, no loose stools, no reflux  Integument:  Denies rash , no open areas  Neurologic:  Denies headache or focal weakness, no dizziness  : no dysuria, or hematuria      Current Outpatient Medications   Medication Sig Dispense Refill   • multivitamin (THERAGRAN) TABS Take 1 tablet by mouth daily     • Omega-3 Fatty Acids (fish oil) 1,000 mg Take 1 capsule (1,000 mg total) by mouth 2 (two) times a day 60 capsule 1   • omeprazole (PriLOSEC) 40 MG capsule Take 1 capsule (40 mg total) by mouth daily 90 capsule 1   • ondansetron (ZOFRAN) 4 mg tablet Take 1 tablet (4 mg total) by mouth every 6 (six) hours for 3 days 12 tablet 0   • butalbital-acetaminophen-caffeine (FIORICET,ESGIC) -40 mg per tablet Take 1 tablet by mouth daily as needed for migraine For on set of headache (Patient not taking: Reported on 1/18/2023) 30 tablet 0     No current facility-administered medications for this visit.        Objective:    /80 (BP Location: Left arm, Patient Position: Sitting, Cuff Size: Large)   Pulse 68   Temp 98.2 °F (36.8 °C) (Temporal)   Ht 5' 7" (1.702 m)   Wt 105 kg (232 lb)   SpO2 97%   BMI 36.34 kg/m²        Physical Exam         Constitutional:  Well developed, well nourished, no acute distress, non-toxic appearance   Eyes:  PERRL, conjunctiva normal , non icteric sclera  HENT:  Atraumatic, oropharynx moist. Neck-  supple   Respiratory:  CTA b/l, normal breath sounds, no rales, no wheezing   Cardiovascular:  RRR, no murmurs, no LE edema b/l  GI:  Soft, nondistended, normal bowel sounds x 4, nontender, no organomegaly, no mass, no rebound, no guarding   Neurologic:  no focal deficits noted   Psychiatric:  Speech and behavior appropriate , AAO x 3      Emiliano Barbosa, DO

## 2023-09-08 ENCOUNTER — HOSPITAL ENCOUNTER (OUTPATIENT)
Dept: RADIOLOGY | Facility: HOSPITAL | Age: 48
Discharge: HOME | End: 2023-09-08
Payer: COMMERCIAL

## 2023-09-08 ENCOUNTER — OFFICE VISIT (OUTPATIENT)
Dept: INTERNAL MEDICINE CLINIC | Facility: OTHER | Age: 48
End: 2023-09-08
Payer: COMMERCIAL

## 2023-09-08 ENCOUNTER — APPOINTMENT (OUTPATIENT)
Dept: LAB | Facility: IMAGING CENTER | Age: 48
End: 2023-09-08

## 2023-09-08 VITALS
OXYGEN SATURATION: 96 % | RESPIRATION RATE: 20 BRPM | DIASTOLIC BLOOD PRESSURE: 86 MMHG | HEART RATE: 73 BPM | TEMPERATURE: 98.1 F | WEIGHT: 235.2 LBS | HEIGHT: 67 IN | BODY MASS INDEX: 36.91 KG/M2 | SYSTOLIC BLOOD PRESSURE: 124 MMHG

## 2023-09-08 DIAGNOSIS — Z86.010 HX OF COLONIC POLYP: ICD-10-CM

## 2023-09-08 DIAGNOSIS — Z00.8 HEALTH EXAMINATION IN POPULATION SURVEY: ICD-10-CM

## 2023-09-08 DIAGNOSIS — Z12.11 SCREENING FOR COLON CANCER: Primary | ICD-10-CM

## 2023-09-08 DIAGNOSIS — R10.32 LLQ PAIN: ICD-10-CM

## 2023-09-08 LAB
EST. AVERAGE GLUCOSE BLD GHB EST-MCNC: 117 MG/DL
HBA1C MFR BLD: 5.7 %

## 2023-09-08 PROCEDURE — G1004 CDSM NDSC: HCPCS

## 2023-09-08 PROCEDURE — 36415 COLL VENOUS BLD VENIPUNCTURE: CPT

## 2023-09-08 PROCEDURE — 74177 CT ABD & PELVIS W/CONTRAST: CPT

## 2023-09-08 PROCEDURE — 99213 OFFICE O/P EST LOW 20 MIN: CPT | Performed by: NURSE PRACTITIONER

## 2023-09-08 PROCEDURE — 83036 HEMOGLOBIN GLYCOSYLATED A1C: CPT

## 2023-09-08 RX ADMIN — IOHEXOL 100 ML: 350 INJECTION, SOLUTION INTRAVENOUS at 14:30

## 2023-09-08 NOTE — ASSESSMENT & PLAN NOTE
-concern for diverticulitis   -recommend tylenol for discomfort   -start clear liquid diet and advance as tolerated   -stat CT   -discussed red flag warning signs

## 2023-09-08 NOTE — PROGRESS NOTES
Assessment/Plan:    LLQ pain  -concern for diverticulitis   -recommend tylenol for discomfort   -start clear liquid diet and advance as tolerated   -stat CT   -discussed red flag warning signs               Diagnoses and all orders for this visit:    Screening for colon cancer  -     Ambulatory Referral to Gastroenterology; Future    Hx of colonic polyp  -     Ambulatory Referral to Gastroenterology; Future    LLQ pain  -     CT abdomen pelvis w contrast; Future          Subjective:      Patient ID: Poonam Reese is a 50 y.o. male. Patient presents today with LLQ pain. Started yesterday, got worse over night   Left work today due to discomfort  Denies injury  Reports chronic back pain    LBM: this morning, small amount, normal consistency, no blood in stool, feels constipation     Denies blood in urine, denies change in stream, denies decreased amount    Felt chills, denies fevers    Last colonscopy 2020  IMPRESSION:  1. Three colon polyps. 2. Mild pandiverticulosis. 3. Small internal hemorrhoids. The following portions of the patient's history were reviewed and updated as appropriate: allergies, current medications, past family history, past medical history, past social history, past surgical history and problem list.    Review of Systems   Constitutional: Positive for chills. Negative for activity change, appetite change, diaphoresis and fever. HENT: Negative for congestion, ear discharge, ear pain, postnasal drip, rhinorrhea, sinus pressure, sinus pain and sore throat. Eyes: Negative for pain, discharge, itching and visual disturbance. Respiratory: Negative for cough, chest tightness, shortness of breath and wheezing. Cardiovascular: Negative for chest pain, palpitations and leg swelling. Gastrointestinal: Positive for abdominal pain. Negative for blood in stool, constipation, diarrhea, nausea and vomiting. Endocrine: Negative for polydipsia, polyphagia and polyuria. Genitourinary: Negative for difficulty urinating, dysuria, hematuria and urgency. Musculoskeletal: Negative for arthralgias, back pain and neck pain. Skin: Negative for rash and wound. Neurological: Negative for dizziness, weakness, numbness and headaches.          Past Medical History:   Diagnosis Date   • Class 2 obesity due to excess calories without serious comorbidity with body mass index (BMI) of 36.0 to 36.9 in adult 2/24/2023   • Elbow tendonitis     right possible bursitis   • Gastritis    • GERD (gastroesophageal reflux disease)    • Joint pain 1/11/2017   • Migraine    • Neck muscle spasm 1/5/2018   • Right-sided low back pain with right-sided sciatica 9/23/2022   • Trigger middle finger of right hand 12/4/2014   • Trigger ring finger of right hand 12/4/2014         Current Outpatient Medications:   •  butalbital-acetaminophen-caffeine (FIORICET,ESGIC) -40 mg per tablet, Take 1 tablet by mouth daily as needed for migraine For on set of headache, Disp: 30 tablet, Rfl: 0  •  multivitamin (THERAGRAN) TABS, Take 1 tablet by mouth daily, Disp: , Rfl:   •  Omega-3 Fatty Acids (fish oil) 1,000 mg, Take 1 capsule (1,000 mg total) by mouth 2 (two) times a day, Disp: 60 capsule, Rfl: 1  •  omeprazole (PriLOSEC) 40 MG capsule, Take 1 capsule (40 mg total) by mouth daily, Disp: 90 capsule, Rfl: 1  •  ondansetron (ZOFRAN) 4 mg tablet, Take 1 tablet (4 mg total) by mouth every 6 (six) hours for 3 days, Disp: 12 tablet, Rfl: 0    No Known Allergies    Social History   Past Surgical History:   Procedure Laterality Date   • COLONOSCOPY     • EGD  2005   • EGD  01/2020   • EYE FOREIGN BODY REMOVAL Left    • IR BIOPSY LOWER LIMB  3/16/2022   • AR TENDON SHEATH INCISION Right 12/26/2018    Procedure: LONG AND RING TRIGGER FINGER RELEASES;  Surgeon: Araceli Leung MD;  Location: [facility];  Service: Orthopedics   • SKIN BIOPSY       Family History   Problem Relation Age of Onset   • Diabetes Mother    • Hypertension Mother    • Breast cancer Mother    • Cancer Mother         Breast   • Lung cancer Mother    • Diabetes Father    • Gout Father    • Hypertension Father    • Cancer Maternal Grandmother    • Rosacea Family        Objective:  /86 (BP Location: Left arm, Patient Position: Sitting, Cuff Size: Large)   Pulse 73   Temp 98.1 °F (36.7 °C) (Temporal)   Resp 20   Ht 5' 7" (1.702 m)   Wt 107 kg (235 lb 3.2 oz)   SpO2 96%   BMI 36.84 kg/m²     Recent Results (from the past 1344 hour(s))   Comprehensive metabolic panel    Collection Time: 08/10/23  8:31 AM   Result Value Ref Range    Sodium 140 135 - 147 mmol/L    Potassium 4.1 3.5 - 5.3 mmol/L    Chloride 110 (H) 96 - 108 mmol/L    CO2 27 21 - 32 mmol/L    ANION GAP 3 mmol/L    BUN 21 5 - 25 mg/dL    Creatinine 1.01 0.60 - 1.30 mg/dL    Glucose, Fasting 102 (H) 65 - 99 mg/dL    Calcium 9.2 8.3 - 10.1 mg/dL    AST 17 5 - 45 U/L    ALT 32 12 - 78 U/L    Alkaline Phosphatase 88 46 - 116 U/L    Total Protein 7.0 6.4 - 8.4 g/dL    Albumin 3.7 3.5 - 5.0 g/dL    Total Bilirubin 0.63 0.20 - 1.00 mg/dL    eGFR 86 ml/min/1.73sq m   Lipid Panel with Direct LDL reflex    Collection Time: 08/10/23  8:31 AM   Result Value Ref Range    Cholesterol 184 See Comment mg/dL    Triglycerides 252 (H) See Comment mg/dL    HDL, Direct 42 >=40 mg/dL    LDL Calculated 92 0 - 100 mg/dL            Physical Exam  Constitutional:       General: He is not in acute distress. Appearance: He is well-developed. He is not diaphoretic. HENT:      Head: Normocephalic and atraumatic. Right Ear: External ear normal.      Left Ear: External ear normal.      Nose: Nose normal.      Mouth/Throat:      Pharynx: No oropharyngeal exudate. Eyes:      General:         Right eye: No discharge. Left eye: No discharge. Conjunctiva/sclera: Conjunctivae normal.      Pupils: Pupils are equal, round, and reactive to light. Neck:      Thyroid: No thyromegaly.    Cardiovascular: Rate and Rhythm: Normal rate and regular rhythm. Heart sounds: Normal heart sounds. No murmur heard. No friction rub. No gallop. Pulmonary:      Effort: Pulmonary effort is normal. No respiratory distress. Breath sounds: Normal breath sounds. No stridor. No wheezing or rales. Abdominal:      General: Bowel sounds are normal. There is no distension. Palpations: Abdomen is soft. Tenderness: There is abdominal tenderness in the left lower quadrant. Musculoskeletal:      Cervical back: Normal range of motion and neck supple. Lymphadenopathy:      Cervical: No cervical adenopathy. Skin:     General: Skin is warm and dry. Findings: No erythema or rash. Neurological:      Mental Status: He is alert and oriented to person, place, and time. Psychiatric:         Behavior: Behavior normal.         Thought Content:  Thought content normal.         Judgment: Judgment normal.

## 2023-09-11 ENCOUNTER — OFFICE VISIT (OUTPATIENT)
Age: 48
End: 2023-09-11
Payer: COMMERCIAL

## 2023-09-11 VITALS
HEIGHT: 67 IN | TEMPERATURE: 98.6 F | RESPIRATION RATE: 18 BRPM | BODY MASS INDEX: 36.6 KG/M2 | WEIGHT: 233.2 LBS | SYSTOLIC BLOOD PRESSURE: 120 MMHG | DIASTOLIC BLOOD PRESSURE: 80 MMHG

## 2023-09-11 DIAGNOSIS — K57.90 DIVERTICULOSIS: Primary | ICD-10-CM

## 2023-09-11 DIAGNOSIS — K57.92 DIVERTICULITIS: ICD-10-CM

## 2023-09-11 PROBLEM — R16.0 HEPATOMEGALY: Status: ACTIVE | Noted: 2023-09-11

## 2023-09-11 PROCEDURE — 99213 OFFICE O/P EST LOW 20 MIN: CPT | Performed by: NURSE PRACTITIONER

## 2023-09-11 NOTE — ASSESSMENT & PLAN NOTE
-recommend tylenol for discomfort   -continue clear liquid diet and advance as tolerated   -discussed red flag warning signs

## 2023-09-11 NOTE — PROGRESS NOTES
Assessment/Plan:    Diverticulitis  -recommend tylenol for discomfort   -continue clear liquid diet and advance as tolerated   -discussed red flag warning signs               Diagnoses and all orders for this visit:    Diverticulosis    Diverticulitis          Subjective:      Patient ID: Camille Vee is a 50 y.o. male. Patient presents today for follow up in diverticulitis. Ct showed: Impression:  1. Acute uncomplicated sigmoid diverticulitis. 2. Hepatomegaly with steatosis. The study was marked in EPIC for significant notification. Workstation performed: [id number]     Reports Improvement since starting clear liquid diet, denies any changes in bowel movements, denies blood in stool, denies fevers and chills     Note from last office visit:  Patient presents today with LLQ pain. Started yesterday, got worse over night   Left work today due to discomfort  Denies injury  Reports chronic back pain    LBM: this morning, small amount, normal consistency, no blood in stool, feels constipation     Denies blood in urine, denies change in stream, denies decreased amount    Felt chills, denies fevers    Last colonscopy 2020  IMPRESSION:  1. Three colon polyps. 2. Mild pandiverticulosis. 3. Small internal hemorrhoids. The following portions of the patient's history were reviewed and updated as appropriate: allergies, current medications, past family history, past medical history, past social history, past surgical history and problem list.    Review of Systems   Constitutional: Negative for activity change, appetite change, chills, diaphoresis and fever. HENT: Negative for congestion, ear discharge, ear pain, postnasal drip, rhinorrhea, sinus pressure, sinus pain and sore throat. Eyes: Negative for pain, discharge, itching and visual disturbance. Respiratory: Negative for cough, chest tightness, shortness of breath and wheezing.     Cardiovascular: Negative for chest pain, palpitations and leg swelling. Gastrointestinal: Negative for abdominal pain, constipation, diarrhea, nausea and vomiting. Endocrine: Negative for polydipsia, polyphagia and polyuria. Genitourinary: Negative for difficulty urinating, dysuria and urgency. Musculoskeletal: Negative for arthralgias, back pain and neck pain. Skin: Negative for rash and wound. Neurological: Negative for dizziness, weakness, numbness and headaches.          Past Medical History:   Diagnosis Date   • Class 2 obesity due to excess calories without serious comorbidity with body mass index (BMI) of 36.0 to 36.9 in adult 2/24/2023   • Elbow tendonitis     right possible bursitis   • Gastritis    • GERD (gastroesophageal reflux disease)    • Joint pain 1/11/2017   • Migraine    • Neck muscle spasm 1/5/2018   • Right-sided low back pain with right-sided sciatica 9/23/2022   • Trigger middle finger of right hand 12/4/2014   • Trigger ring finger of right hand 12/4/2014         Current Outpatient Medications:   •  butalbital-acetaminophen-caffeine (FIORICET,ESGIC) -40 mg per tablet, Take 1 tablet by mouth daily as needed for migraine For on set of headache, Disp: 30 tablet, Rfl: 0  •  multivitamin (THERAGRAN) TABS, Take 1 tablet by mouth daily, Disp: , Rfl:   •  Omega-3 Fatty Acids (fish oil) 1,000 mg, Take 1 capsule (1,000 mg total) by mouth 2 (two) times a day, Disp: 60 capsule, Rfl: 1  •  omeprazole (PriLOSEC) 40 MG capsule, Take 1 capsule (40 mg total) by mouth daily, Disp: 90 capsule, Rfl: 1  •  ondansetron (ZOFRAN) 4 mg tablet, Take 1 tablet (4 mg total) by mouth every 6 (six) hours for 3 days, Disp: 12 tablet, Rfl: 0    No Known Allergies    Social History   Past Surgical History:   Procedure Laterality Date   • COLONOSCOPY     • EGD  2005   • EGD  01/2020   • EYE FOREIGN BODY REMOVAL Left    • IR BIOPSY LOWER LIMB  3/16/2022   • ME TENDON SHEATH INCISION Right 12/26/2018    Procedure: LONG AND RING TRIGGER FINGER RELEASES;  Surgeon: Marina Mendes Radha Napoles MD;  Location: [facility];  Service: Orthopedics   • SKIN BIOPSY       Family History   Problem Relation Age of Onset   • Diabetes Mother    • Hypertension Mother    • Breast cancer Mother    • Cancer Mother         Breast   • Lung cancer Mother    • Diabetes Father    • Gout Father    • Hypertension Father    • Cancer Maternal Grandmother    • Rosacea Family        Objective:  /80   Temp 98.6 °F (37 °C)   Resp 18   Ht 5' 7" (1.702 m)   Wt 106 kg (233 lb 3.2 oz)   BMI 36.52 kg/m²     Recent Results (from the past 1344 hour(s))   Comprehensive metabolic panel    Collection Time: 08/10/23  8:31 AM   Result Value Ref Range    Sodium 140 135 - 147 mmol/L    Potassium 4.1 3.5 - 5.3 mmol/L    Chloride 110 (H) 96 - 108 mmol/L    CO2 27 21 - 32 mmol/L    ANION GAP 3 mmol/L    BUN 21 5 - 25 mg/dL    Creatinine 1.01 0.60 - 1.30 mg/dL    Glucose, Fasting 102 (H) 65 - 99 mg/dL    Calcium 9.2 8.3 - 10.1 mg/dL    AST 17 5 - 45 U/L    ALT 32 12 - 78 U/L    Alkaline Phosphatase 88 46 - 116 U/L    Total Protein 7.0 6.4 - 8.4 g/dL    Albumin 3.7 3.5 - 5.0 g/dL    Total Bilirubin 0.63 0.20 - 1.00 mg/dL    eGFR 86 ml/min/1.73sq m   Lipid Panel with Direct LDL reflex    Collection Time: 08/10/23  8:31 AM   Result Value Ref Range    Cholesterol 184 See Comment mg/dL    Triglycerides 252 (H) See Comment mg/dL    HDL, Direct 42 >=40 mg/dL    LDL Calculated 92 0 - 100 mg/dL   Hemoglobin A1C    Collection Time: 09/08/23 10:40 AM   Result Value Ref Range    Hemoglobin A1C 5.7 (H) Normal 4.0-5.6%; PreDiabetic 5.7-6.4%; Diabetic >=6.5%; Glycemic control for adults with diabetes <7.0% %     mg/dl            Physical Exam  Constitutional:       General: He is not in acute distress. Appearance: He is well-developed. He is not diaphoretic. HENT:      Head: Normocephalic and atraumatic.       Right Ear: External ear normal.      Left Ear: External ear normal.      Nose: Nose normal.      Mouth/Throat:      Pharynx: No oropharyngeal exudate. Eyes:      General:         Right eye: No discharge. Left eye: No discharge. Conjunctiva/sclera: Conjunctivae normal.      Pupils: Pupils are equal, round, and reactive to light. Neck:      Thyroid: No thyromegaly. Cardiovascular:      Rate and Rhythm: Normal rate and regular rhythm. Heart sounds: Normal heart sounds. No murmur heard. No friction rub. No gallop. Pulmonary:      Effort: Pulmonary effort is normal. No respiratory distress. Breath sounds: Normal breath sounds. No stridor. No wheezing or rales. Abdominal:      General: Bowel sounds are normal. There is no distension. Palpations: Abdomen is soft. Tenderness: There is no abdominal tenderness. Musculoskeletal:      Cervical back: Normal range of motion and neck supple. Lymphadenopathy:      Cervical: No cervical adenopathy. Skin:     General: Skin is warm and dry. Findings: No erythema or rash. Neurological:      Mental Status: He is alert and oriented to person, place, and time. Psychiatric:         Behavior: Behavior normal.         Thought Content:  Thought content normal.         Judgment: Judgment normal.

## 2023-09-19 ENCOUNTER — OFFICE VISIT (OUTPATIENT)
Dept: GASTROENTEROLOGY | Facility: AMBULARY SURGERY CENTER | Age: 48
End: 2023-09-19
Payer: COMMERCIAL

## 2023-09-19 VITALS
DIASTOLIC BLOOD PRESSURE: 88 MMHG | HEIGHT: 67 IN | SYSTOLIC BLOOD PRESSURE: 132 MMHG | WEIGHT: 234 LBS | BODY MASS INDEX: 36.73 KG/M2 | HEART RATE: 63 BPM

## 2023-09-19 DIAGNOSIS — Z86.010 HX OF COLONIC POLYP: ICD-10-CM

## 2023-09-19 DIAGNOSIS — Z12.11 SCREENING FOR COLON CANCER: ICD-10-CM

## 2023-09-19 DIAGNOSIS — R16.0 HEPATOMEGALY: ICD-10-CM

## 2023-09-19 DIAGNOSIS — K21.9 GASTROESOPHAGEAL REFLUX DISEASE WITHOUT ESOPHAGITIS: Primary | ICD-10-CM

## 2023-09-19 DIAGNOSIS — K57.92 DIVERTICULITIS: ICD-10-CM

## 2023-09-19 PROCEDURE — 99214 OFFICE O/P EST MOD 30 MIN: CPT | Performed by: INTERNAL MEDICINE

## 2023-09-19 RX ORDER — OMEPRAZOLE 40 MG/1
40 CAPSULE, DELAYED RELEASE ORAL DAILY
Qty: 90 CAPSULE | Refills: 1 | Status: SHIPPED | OUTPATIENT
Start: 2023-09-19

## 2023-09-19 NOTE — H&P (VIEW-ONLY)
[facility]  Progress Note - Pura Sandy 50 y.o. male MRN: [id number]    Unit/Bed#:  Encounter: [id number]    Assessment/Plan:    1. Tubular adenomas without high-grade dysplasia-  -will be due for repeat colonoscopy. -Patient was explained about the risks and benefits of the procedure. Risks including but not limited to bleeding, infection, perforation were explained in detail. Also explained about less than 100% sensitivity with the exam and other alternatives. 2.  Acute uncomplicated sigmoid diverticulitis-diagnosed 10 days ago, symptomatic improvement with bowel rest, reports being on liquids for several days, slowly starting to reintroduce foods with improvement in pain.  -Would recommend low residue diet this week followed by reintroduction of high-fiber diet. Would recommend repeat colonoscopy in about 5 to 6 weeks. 3.  Hepatic steatosis/hepatomegaly:  -LFTs within normal range, BMI of 36.  -Had a lengthy discussion with patient regarding importance of healthy weight loss, BMI approaching 37.    4. GERD: Possibly aggravated in setting of hiatal hernia, underwent EGD in 2020 which was notable for moderate gastritis. Did well off of PPI for almost 6 months to however with onset of diverticulitis and has had recurrent symptoms of reflux therefore will renew prescription for omeprazole 40 mg which patient reports is effective.  -Discussed long-term side effects associated PPI use, patient to make dietary modifications and considering tapering off of PPI in the next 3 to 4 months.  -Continue to avoid NSAIDs.  -We will hold off on repeat EGD at this time. Subjective: This is a very pleasant 76-year-old male with history of obstructive sleep apnea, ROMAINE, GERD, who presents after recent episode of diverticulitis. Patient reports that he had left lower quadrant pain almost 10 days ago prompting him to go to the PCP.   He underwent CT of abdomen and pelvis which was notable for uncomplicated sigmoid diverticulitis. He was recommended bowel rest which has resulted in symptomatic improvement. Patient reports that he has not had any fever, reports that the pain has improved. Patient reports that several days ago he started reintroducing some diet without worsening of pain. He denies any rectal bleeding or change in bowel habits otherwise. He does report that symptoms of acid reflux restarted after his episode of diverticulitis. Patient reports that prior to that he had been off of PPI for almost 5 to 6 months. Denies any dysphagia, loss of appetite or early satiety. No melena or hematochezia. Labs were reviewed. Liver enzymes notable for normal AST and ALT,CBC was within normal range. Objective:     Vitals: Blood pressure 132/88, pulse 63, height 5' 7" (1.702 m), weight 106 kg (234 lb). ,Body mass index is 36.65 kg/m². [unfilled]    Physical Exam:    GEN: wn/wd, NAD  HEENT: MMM, no cervical or supraclavicular LAD, anciteric  CV: RRR, no m/r/g  CHEST: CTA b/l, no w/r/r  ABD: +BS, soft, NT/ND, no hepatosplenomegaly  EXT: no c/c/e  SKIN: no rashes  NEURO: aaox3      Invasive Devices       Peripheral Intravenous Line  Duration             Peripheral IV 12/26/22 Left Antecubital 267 days                            Lab, Imaging and other studies:     No visits with results within 1 Day(s) from this visit. Latest known visit with results is:   Appointment on 09/08/2023   Component Date Value    Hemoglobin A1C 09/08/2023 5.7 (H)     EAG 09/08/2023 117          I have personally reviewed pertinent films in PACS    No current facility-administered medications for this visit.              Answers submitted by the patient for this visit:  Abdominal Pain Questionnaire (Submitted on 9/18/2023)  Chief Complaint: Abdominal pain  Chronicity: recurrent  Onset: more than 1 month ago  Onset quality: gradual  Frequency: 2 to 4 times per day  Episode duration: 3 Days  Progression since onset: gradually improving  Pain location: LLQ  Pain - numeric: 10/10  Pain quality: cramping  Radiates to: epigastric region  anorexia: No  arthralgias: No  belching: No  constipation: Yes  diarrhea: No  dysuria: No  fever: No  flatus: No  frequency: No  headaches: No  hematochezia: No  hematuria: No  melena: No  myalgias: No  nausea:  No  weight loss: No  vomiting: No  Aggravated by: eating  Relieved by: bowel movements, certain positions  Diagnostic workup: CT scan

## 2023-09-19 NOTE — PROGRESS NOTES
[facility]  Progress Note - Francesco Ga 50 y.o. male MRN: [id number]    Unit/Bed#:  Encounter: [id number]    Assessment/Plan:    1. Tubular adenomas without high-grade dysplasia-  -will be due for repeat colonoscopy. -Patient was explained about the risks and benefits of the procedure. Risks including but not limited to bleeding, infection, perforation were explained in detail. Also explained about less than 100% sensitivity with the exam and other alternatives. 2.  Acute uncomplicated sigmoid diverticulitis-diagnosed 10 days ago, symptomatic improvement with bowel rest, reports being on liquids for several days, slowly starting to reintroduce foods with improvement in pain.  -Would recommend low residue diet this week followed by reintroduction of high-fiber diet. Would recommend repeat colonoscopy in about 5 to 6 weeks. 3.  Hepatic steatosis/hepatomegaly:  -LFTs within normal range, BMI of 36.  -Had a lengthy discussion with patient regarding importance of healthy weight loss, BMI approaching 37.    4. GERD: Possibly aggravated in setting of hiatal hernia, underwent EGD in 2020 which was notable for moderate gastritis. Did well off of PPI for almost 6 months to however with onset of diverticulitis and has had recurrent symptoms of reflux therefore will renew prescription for omeprazole 40 mg which patient reports is effective.  -Discussed long-term side effects associated PPI use, patient to make dietary modifications and considering tapering off of PPI in the next 3 to 4 months.  -Continue to avoid NSAIDs.  -We will hold off on repeat EGD at this time. Subjective: This is a very pleasant 40-year-old male with history of obstructive sleep apnea, ROMAINE, GERD, who presents after recent episode of diverticulitis. Patient reports that he had left lower quadrant pain almost 10 days ago prompting him to go to the PCP.   He underwent CT of abdomen and pelvis which was notable for uncomplicated sigmoid diverticulitis. He was recommended bowel rest which has resulted in symptomatic improvement. Patient reports that he has not had any fever, reports that the pain has improved. Patient reports that several days ago he started reintroducing some diet without worsening of pain. He denies any rectal bleeding or change in bowel habits otherwise. He does report that symptoms of acid reflux restarted after his episode of diverticulitis. Patient reports that prior to that he had been off of PPI for almost 5 to 6 months. Denies any dysphagia, loss of appetite or early satiety. No melena or hematochezia. Labs were reviewed. Liver enzymes notable for normal AST and ALT,CBC was within normal range. Objective:     Vitals: Blood pressure 132/88, pulse 63, height 5' 7" (1.702 m), weight 106 kg (234 lb). ,Body mass index is 36.65 kg/m². [unfilled]    Physical Exam:    GEN: wn/wd, NAD  HEENT: MMM, no cervical or supraclavicular LAD, anciteric  CV: RRR, no m/r/g  CHEST: CTA b/l, no w/r/r  ABD: +BS, soft, NT/ND, no hepatosplenomegaly  EXT: no c/c/e  SKIN: no rashes  NEURO: aaox3      Invasive Devices       Peripheral Intravenous Line  Duration             Peripheral IV 12/26/22 Left Antecubital 267 days                            Lab, Imaging and other studies:     No visits with results within 1 Day(s) from this visit. Latest known visit with results is:   Appointment on 09/08/2023   Component Date Value    Hemoglobin A1C 09/08/2023 5.7 (H)     EAG 09/08/2023 117          I have personally reviewed pertinent films in PACS    No current facility-administered medications for this visit.              Answers submitted by the patient for this visit:  Abdominal Pain Questionnaire (Submitted on 9/18/2023)  Chief Complaint: Abdominal pain  Chronicity: recurrent  Onset: more than 1 month ago  Onset quality: gradual  Frequency: 2 to 4 times per day  Episode duration: 3 Days  Progression since onset: gradually improving  Pain location: LLQ  Pain - numeric: 10/10  Pain quality: cramping  Radiates to: epigastric region  anorexia: No  arthralgias: No  belching: No  constipation: Yes  diarrhea: No  dysuria: No  fever: No  flatus: No  frequency: No  headaches: No  hematochezia: No  hematuria: No  melena: No  myalgias: No  nausea:  No  weight loss: No  vomiting: No  Aggravated by: eating  Relieved by: bowel movements, certain positions  Diagnostic workup: CT scan

## 2023-09-19 NOTE — PATIENT INSTRUCTIONS
Scheduled date of colonoscopy (as of today):   10/09/23  Physician performing colonoscopy:  Dr Sarahy Phan  Location of colonoscopy:  [facility]  Bowel prep reviewed with patient:  Miralax/Dulcolax  Instructions reviewed with patient by:  Kait HALL  Clearances:  n/a

## 2023-10-09 ENCOUNTER — HOSPITAL ENCOUNTER (OUTPATIENT)
Dept: GASTROENTEROLOGY | Facility: AMBULATORY SURGERY CENTER | Age: 48
Discharge: HOME | End: 2023-10-09
Attending: INTERNAL MEDICINE
Payer: COMMERCIAL

## 2023-10-09 ENCOUNTER — ANESTHESIA EVENT (OUTPATIENT)
Dept: GASTROENTEROLOGY | Facility: AMBULATORY SURGERY CENTER | Age: 48
End: 2023-10-09

## 2023-10-09 ENCOUNTER — ANESTHESIA (OUTPATIENT)
Dept: GASTROENTEROLOGY | Facility: AMBULATORY SURGERY CENTER | Age: 48
End: 2023-10-09

## 2023-10-09 VITALS
WEIGHT: 234 LBS | SYSTOLIC BLOOD PRESSURE: 104 MMHG | BODY MASS INDEX: 36.73 KG/M2 | TEMPERATURE: 96.8 F | DIASTOLIC BLOOD PRESSURE: 68 MMHG | HEART RATE: 67 BPM | OXYGEN SATURATION: 99 % | HEIGHT: 67 IN | RESPIRATION RATE: 18 BRPM

## 2023-10-09 DIAGNOSIS — K57.92 DIVERTICULITIS: ICD-10-CM

## 2023-10-09 PROCEDURE — 45378 DIAGNOSTIC COLONOSCOPY: CPT | Performed by: INTERNAL MEDICINE

## 2023-10-09 RX ORDER — SODIUM CHLORIDE, SODIUM LACTATE, POTASSIUM CHLORIDE, CALCIUM CHLORIDE 600; 310; 30; 20 MG/100ML; MG/100ML; MG/100ML; MG/100ML
INJECTION, SOLUTION INTRAVENOUS CONTINUOUS PRN
Status: DISCONTINUED | OUTPATIENT
Start: 2023-10-09 | End: 2023-10-09

## 2023-10-09 RX ORDER — LIDOCAINE HYDROCHLORIDE 10 MG/ML
INJECTION, SOLUTION EPIDURAL; INFILTRATION; INTRACAUDAL; PERINEURAL AS NEEDED
Status: DISCONTINUED | OUTPATIENT
Start: 2023-10-09 | End: 2023-10-09

## 2023-10-09 RX ORDER — PROPOFOL 10 MG/ML
INJECTION, EMULSION INTRAVENOUS AS NEEDED
Status: DISCONTINUED | OUTPATIENT
Start: 2023-10-09 | End: 2023-10-09

## 2023-10-09 RX ADMIN — PROPOFOL 30 MG: 10 INJECTION, EMULSION INTRAVENOUS at 09:44

## 2023-10-09 RX ADMIN — PROPOFOL 50 MG: 10 INJECTION, EMULSION INTRAVENOUS at 09:40

## 2023-10-09 RX ADMIN — PROPOFOL 50 MG: 10 INJECTION, EMULSION INTRAVENOUS at 09:37

## 2023-10-09 RX ADMIN — LIDOCAINE HYDROCHLORIDE 50 MG: 10 INJECTION, SOLUTION EPIDURAL; INFILTRATION; INTRACAUDAL; PERINEURAL at 09:35

## 2023-10-09 RX ADMIN — PROPOFOL 100 MG: 10 INJECTION, EMULSION INTRAVENOUS at 09:35

## 2023-10-09 RX ADMIN — PROPOFOL 50 MG: 10 INJECTION, EMULSION INTRAVENOUS at 09:47

## 2023-10-09 RX ADMIN — SODIUM CHLORIDE, SODIUM LACTATE, POTASSIUM CHLORIDE, CALCIUM CHLORIDE: 600; 310; 30; 20 INJECTION, SOLUTION INTRAVENOUS at 09:29

## 2023-10-09 NOTE — INTERVAL H&P NOTE
H&P reviewed. After examining the patient I find no changes in the patients condition since the H&P had been written.     Vitals:    10/09/23 0913   BP: 112/60   Pulse: (!) 49   Resp: 16   Temp: (!) 96.8 °F (36 °C)   SpO2: 98%

## 2023-10-09 NOTE — ANESTHESIA PREPROCEDURE EVALUATION
Procedure:  COLONOSCOPY    Relevant Problems   CARDIO   (+) Hypertriglyceridemia   (+) Migraine headache   (+) PAC (premature atrial contraction)      GI/HEPATIC   (+) GERD (gastroesophageal reflux disease)   (+) Hepatomegaly      MUSCULOSKELETAL   (+) Lateral epicondylitis of left elbow   (+) Osteoarthritis of spine with radiculopathy, lumbar region      NEURO/PSYCH   (+) Depression with anxiety   (+) Heel pain, chronic, right   (+) Migraine headache      PULMONARY   (+) Mild obstructive sleep apnea   (+) ROMAINE (obstructive sleep apnea)        Physical Exam    Airway    Mallampati score: III  TM Distance: >3 FB  Neck ROM: full     Dental   No notable dental hx     Cardiovascular  Cardiovascular exam normal    Pulmonary  Pulmonary exam normal     Other Findings        Anesthesia Plan  ASA Score- 2     Anesthesia Type- IV sedation with anesthesia with ASA Monitors. Additional Monitors:   Airway Plan:           Plan Factors-Exercise tolerance (METS): >4 METS. Chart reviewed. Patient summary reviewed. Patient is not a current smoker. Induction- intravenous. Postoperative Plan-     Informed Consent- Anesthetic plan and risks discussed with patient.

## 2023-10-09 NOTE — ANESTHESIA POSTPROCEDURE EVALUATION
Post-Op Assessment Note    CV Status:  Stable  Pain Score: 0    Pain management: adequate     Mental Status:  Sleepy   Hydration Status:  Stable   PONV Controlled:  None   Airway Patency:  Patent      Post Op Vitals Reviewed: Yes      Staff: Anesthesiologist         No notable events documented.     /66 (10/09/23 0953)    Temp  97.4   Pulse 58 (10/09/23 0953)   Resp 16 (10/09/23 0953)    SpO2 97 % (10/09/23 0953)

## 2024-01-12 DIAGNOSIS — K21.9 GASTROESOPHAGEAL REFLUX DISEASE WITHOUT ESOPHAGITIS: ICD-10-CM

## 2024-01-12 RX ORDER — OMEPRAZOLE 40 MG/1
40 CAPSULE, DELAYED RELEASE ORAL DAILY
Qty: 90 CAPSULE | Refills: 1 | Status: SHIPPED | OUTPATIENT
Start: 2024-01-12

## 2024-02-17 ENCOUNTER — APPOINTMENT (EMERGENCY)
Dept: CT IMAGING | Facility: HOSPITAL | Age: 49
End: 2024-02-17
Payer: COMMERCIAL

## 2024-02-17 ENCOUNTER — TELEPHONE (OUTPATIENT)
Dept: OTHER | Facility: OTHER | Age: 49
End: 2024-02-17

## 2024-02-17 ENCOUNTER — HOSPITAL ENCOUNTER (EMERGENCY)
Facility: HOSPITAL | Age: 49
Discharge: HOME | End: 2024-02-17
Attending: STUDENT IN AN ORGANIZED HEALTH CARE EDUCATION/TRAINING PROGRAM
Payer: COMMERCIAL

## 2024-02-17 VITALS
HEART RATE: 52 BPM | SYSTOLIC BLOOD PRESSURE: 129 MMHG | DIASTOLIC BLOOD PRESSURE: 78 MMHG | RESPIRATION RATE: 12 BRPM | OXYGEN SATURATION: 96 % | TEMPERATURE: 98.4 F

## 2024-02-17 DIAGNOSIS — R10.9 ABDOMINAL PAIN: Primary | ICD-10-CM

## 2024-02-17 LAB
ALBUMIN SERPL BCP-MCNC: 4.1 G/DL (ref 3.5–5)
ALP SERPL-CCNC: 74 U/L (ref 34–104)
ALT SERPL W P-5'-P-CCNC: 21 U/L (ref 7–52)
ANION GAP SERPL CALCULATED.3IONS-SCNC: 7 MMOL/L
AST SERPL W P-5'-P-CCNC: 15 U/L (ref 13–39)
BASOPHILS # BLD AUTO: 0.03 THOUSANDS/ÂΜL (ref 0–0.1)
BASOPHILS NFR BLD AUTO: 0 % (ref 0–1)
BILIRUB SERPL-MCNC: 0.4 MG/DL (ref 0.2–1)
BUN SERPL-MCNC: 19 MG/DL (ref 5–25)
CALCIUM SERPL-MCNC: 9.1 MG/DL (ref 8.4–10.2)
CHLORIDE SERPL-SCNC: 105 MMOL/L (ref 96–108)
CO2 SERPL-SCNC: 23 MMOL/L (ref 21–32)
CREAT SERPL-MCNC: 0.9 MG/DL (ref 0.6–1.3)
EOSINOPHIL # BLD AUTO: 0.18 THOUSAND/ÂΜL (ref 0–0.61)
EOSINOPHIL NFR BLD AUTO: 2 % (ref 0–6)
ERYTHROCYTE [DISTWIDTH] IN BLOOD BY AUTOMATED COUNT: 12.2 % (ref 11.6–15.1)
GFR SERPL CREATININE-BSD FRML MDRD: 99 ML/MIN/1.73SQ M
GLUCOSE SERPL-MCNC: 121 MG/DL (ref 65–140)
HCT VFR BLD AUTO: 44.9 % (ref 36.5–49.3)
HGB BLD-MCNC: 15.7 G/DL (ref 12–17)
IMM GRANULOCYTES # BLD AUTO: 0.03 THOUSAND/UL (ref 0–0.2)
IMM GRANULOCYTES NFR BLD AUTO: 0 % (ref 0–2)
LACTATE SERPL-SCNC: 1.3 MMOL/L (ref 0.5–2)
LIPASE SERPL-CCNC: 41 U/L (ref 11–82)
LYMPHOCYTES # BLD AUTO: 2 THOUSANDS/ÂΜL (ref 0.6–4.47)
LYMPHOCYTES NFR BLD AUTO: 21 % (ref 14–44)
MCH RBC QN AUTO: 29.8 PG (ref 26.8–34.3)
MCHC RBC AUTO-ENTMCNC: 35 G/DL (ref 31.4–37.4)
MCV RBC AUTO: 85 FL (ref 82–98)
MONOCYTES # BLD AUTO: 0.68 THOUSAND/ÂΜL (ref 0.17–1.22)
MONOCYTES NFR BLD AUTO: 7 % (ref 4–12)
NEUTROPHILS # BLD AUTO: 6.51 THOUSANDS/ÂΜL (ref 1.85–7.62)
NEUTS SEG NFR BLD AUTO: 70 % (ref 43–75)
NRBC BLD AUTO-RTO: 0 /100 WBCS
PLATELET # BLD AUTO: 262 THOUSANDS/UL (ref 149–390)
PMV BLD AUTO: 9.1 FL (ref 8.9–12.7)
POTASSIUM SERPL-SCNC: 3.7 MMOL/L (ref 3.5–5.3)
PROT SERPL-MCNC: 6.7 G/DL (ref 6.4–8.4)
RBC # BLD AUTO: 5.26 MILLION/UL (ref 3.88–5.62)
SODIUM SERPL-SCNC: 135 MMOL/L (ref 135–147)
WBC # BLD AUTO: 9.43 THOUSAND/UL (ref 4.31–10.16)

## 2024-02-17 PROCEDURE — 96375 TX/PRO/DX INJ NEW DRUG ADDON: CPT

## 2024-02-17 PROCEDURE — 83690 ASSAY OF LIPASE: CPT

## 2024-02-17 PROCEDURE — 74177 CT ABD & PELVIS W/CONTRAST: CPT

## 2024-02-17 PROCEDURE — 83605 ASSAY OF LACTIC ACID: CPT

## 2024-02-17 PROCEDURE — 99284 EMERGENCY DEPT VISIT MOD MDM: CPT

## 2024-02-17 PROCEDURE — 96365 THER/PROPH/DIAG IV INF INIT: CPT

## 2024-02-17 PROCEDURE — 99285 EMERGENCY DEPT VISIT HI MDM: CPT

## 2024-02-17 PROCEDURE — 80053 COMPREHEN METABOLIC PANEL: CPT

## 2024-02-17 PROCEDURE — 36415 COLL VENOUS BLD VENIPUNCTURE: CPT

## 2024-02-17 PROCEDURE — 85025 COMPLETE CBC W/AUTO DIFF WBC: CPT

## 2024-02-17 RX ORDER — KETOROLAC TROMETHAMINE 30 MG/ML
15 INJECTION, SOLUTION INTRAMUSCULAR; INTRAVENOUS ONCE
Status: COMPLETED | OUTPATIENT
Start: 2024-02-17 | End: 2024-02-17

## 2024-02-17 RX ORDER — DICYCLOMINE HCL 20 MG
20 TABLET ORAL ONCE
Status: COMPLETED | OUTPATIENT
Start: 2024-02-17 | End: 2024-02-17

## 2024-02-17 RX ORDER — ACETAMINOPHEN 10 MG/ML
1000 INJECTION, SOLUTION INTRAVENOUS ONCE
Status: COMPLETED | OUTPATIENT
Start: 2024-02-17 | End: 2024-02-17

## 2024-02-17 RX ADMIN — SODIUM CHLORIDE 1000 ML: 0.9 INJECTION, SOLUTION INTRAVENOUS at 03:29

## 2024-02-17 RX ADMIN — KETOROLAC TROMETHAMINE 15 MG: 30 INJECTION, SOLUTION INTRAMUSCULAR; INTRAVENOUS at 03:28

## 2024-02-17 RX ADMIN — ACETAMINOPHEN 1000 MG: 10 INJECTION INTRAVENOUS at 03:29

## 2024-02-17 RX ADMIN — IOHEXOL 100 ML: 350 INJECTION, SOLUTION INTRAVENOUS at 04:08

## 2024-02-17 RX ADMIN — DICYCLOMINE HYDROCHLORIDE 20 MG: 20 TABLET ORAL at 05:51

## 2024-02-17 NOTE — DISCHARGE INSTRUCTIONS
"Your CT scan shows, \"IMPRESSION:     Colonic diverticulosis, no discrete evidence of acute diverticulitis.     No acute process seen.     Other findings as above\".  I have also given you copy of your result.    Follow-up with GI and your primary care provider.  Return to the emergency room for any worsening symptoms.  "

## 2024-02-17 NOTE — ED PROVIDER NOTES
History  Chief Complaint   Patient presents with    Abdominal Pain     Pt reports abdominal pain x 2 days. Denies nausea/vomiting. Hx of diverticulosis. Denies fevers.      Patient is a 48-year-old male with a history of diverticulosis, who presents to the emergency room today for abdominal pain.  Patient reports left lower quadrant pain that has been waxing and waning over the past 3 days.  Patient reports pain is worse tonight.  Denies any other symptoms at this time.  Last bowel movement this afternoon and normal.  Patient took omeprazole and sucralfate around 8 PM tonight.          Prior to Admission Medications   Prescriptions Last Dose Informant Patient Reported? Taking?   Omega-3 Fatty Acids (fish oil) 1,000 mg  Self No No   Sig: Take 1 capsule (1,000 mg total) by mouth 2 (two) times a day   butalbital-acetaminophen-caffeine (FIORICET,ESGIC) -40 mg per tablet  Self No No   Sig: Take 1 tablet by mouth daily as needed for migraine For on set of headache   multivitamin (THERAGRAN) TABS  Self Yes No   Sig: Take 1 tablet by mouth daily   omeprazole (PriLOSEC) 40 MG capsule   No No   Sig: Take 1 capsule (40 mg total) by mouth daily   ondansetron (ZOFRAN) 4 mg tablet  Self No No   Sig: Take 1 tablet (4 mg total) by mouth every 6 (six) hours for 3 days      Facility-Administered Medications: None       Past Medical History:   Diagnosis Date    Class 2 obesity due to excess calories without serious comorbidity with body mass index (BMI) of 36.0 to 36.9 in adult 02/24/2023    Colon polyp     Elbow tendonitis     right possible bursitis    Gastritis     GERD (gastroesophageal reflux disease)     Joint pain 01/11/2017    Migraine     Neck muscle spasm 01/05/2018    Right-sided low back pain with right-sided sciatica 09/23/2022    Sleep apnea     no longer using CPAP    Trigger middle finger of right hand 12/04/2014    Trigger ring finger of right hand 12/04/2014       Past Surgical History:   Procedure Laterality  Date    COLONOSCOPY      EGD  2005    EGD  01/2020    EYE FOREIGN BODY REMOVAL Left     IR BIOPSY LOWER LIMB  3/16/2022    [place] TENDON SHEATH INCISION Right 12/26/2018    Procedure: LONG AND RING TRIGGER FINGER RELEASES;  Surgeon: Duy Greco MD;  Location: [facility];  Service: Orthopedics    SKIN BIOPSY         Family History   Problem Relation Age of Onset    Diabetes Mother     Hypertension Mother     Breast cancer Mother     Cancer Mother         Breast & Lung    Lung cancer Mother     Diabetes Father     Gout Father     Hypertension Father     Cancer Maternal Grandmother     Rosacea Family      I have reviewed and agree with the history as documented.    E-Cigarette/Vaping    E-Cigarette Use Never User      E-Cigarette/Vaping Substances    Nicotine No     THC No     CBD No     Flavoring No     Other No     Unknown No      Social History     Tobacco Use    Smoking status: Never    Smokeless tobacco: Never   Vaping Use    Vaping status: Never Used   Substance Use Topics    Alcohol use: Not Currently     Comment: Occasional    Drug use: Never       Review of Systems   Constitutional:  Negative for chills and fever.   HENT:  Negative for ear pain, sore throat, trouble swallowing and voice change.    Eyes:  Negative for pain and visual disturbance.   Respiratory:  Negative for cough and shortness of breath.    Cardiovascular:  Negative for chest pain and palpitations.   Gastrointestinal:  Positive for abdominal pain. Negative for blood in stool, constipation, diarrhea, nausea and vomiting.   Genitourinary:  Negative for dysuria, flank pain and hematuria.   Musculoskeletal:  Negative for arthralgias and back pain.   Skin:  Negative for color change and rash.   Neurological:  Negative for seizures and syncope.   Psychiatric/Behavioral:  Negative for confusion.    All other systems reviewed and are negative.      Physical Exam  Physical Exam  Vitals and nursing note reviewed.   Constitutional:       General: He is  not in acute distress.     Appearance: He is well-developed.   HENT:      Head: Normocephalic and atraumatic.   Eyes:      Conjunctiva/sclera: Conjunctivae normal.   Cardiovascular:      Rate and Rhythm: Normal rate and regular rhythm.      Heart sounds: No murmur heard.  Pulmonary:      Effort: Pulmonary effort is normal. No respiratory distress.      Breath sounds: Normal breath sounds.   Abdominal:      General: Abdomen is flat.      Palpations: Abdomen is soft.      Tenderness: There is no abdominal tenderness.   Musculoskeletal:         General: No swelling.      Cervical back: Neck supple.   Skin:     General: Skin is warm and dry.      Capillary Refill: Capillary refill takes less than 2 seconds.   Neurological:      Mental Status: He is alert.   Psychiatric:         Mood and Affect: Mood normal.         Vital Signs  ED Triage Vitals [02/17/24 0246]   Temperature Pulse Respirations Blood Pressure SpO2   98.4 °F (36.9 °C) 69 16 123/72 98 %      Temp Source Heart Rate Source Patient Position - Orthostatic VS BP Location FiO2 (%)   Temporal Monitor Sitting Left arm --      Pain Score       --           Vitals:    02/17/24 0330 02/17/24 0400 02/17/24 0430 02/17/24 0500   BP: 139/79 123/73 134/82 129/78   Pulse: 57 59 56 (!) 52   Patient Position - Orthostatic VS:             Visual Acuity      ED Medications  Medications   sodium chloride 0.9 % bolus 1,000 mL (0 mL Intravenous Stopped 2/17/24 0432)   acetaminophen (Ofirmev) injection 1,000 mg (0 mg Intravenous Stopped 2/17/24 0432)   ketorolac (TORADOL) injection 15 mg (15 mg Intravenous Given 2/17/24 0328)   iohexol (OMNIPAQUE) 350 MG/ML injection (MULTI-DOSE) 100 mL (100 mL Intravenous Given 2/17/24 0408)   dicyclomine (BENTYL) tablet 20 mg (20 mg Oral Given 2/17/24 3848)       Diagnostic Studies  Results Reviewed       Procedure Component Value Units Date/Time    Comprehensive metabolic panel [[id number]] Collected: 02/17/24 0328    Lab Status: Final result  Specimen: Blood from Arm, Left Updated: 02/17/24 0353     Sodium 135 mmol/L      Potassium 3.7 mmol/L      Chloride 105 mmol/L      CO2 23 mmol/L      ANION GAP 7 mmol/L      BUN 19 mg/dL      Creatinine 0.90 mg/dL      Glucose 121 mg/dL      Calcium 9.1 mg/dL      AST 15 U/L      ALT 21 U/L      Alkaline Phosphatase 74 U/L      Total Protein 6.7 g/dL      Albumin 4.1 g/dL      Total Bilirubin 0.40 mg/dL      eGFR 99 ml/min/1.73sq m     Narrative:      National Kidney Disease Foundation guidelines for Chronic Kidney Disease (CKD):     Stage 1 with normal or high GFR (GFR > 90 mL/min/1.73 square meters)    Stage 2 Mild CKD (GFR = 60-89 mL/min/1.73 square meters)    Stage 3A Moderate CKD (GFR = 45-59 mL/min/1.73 square meters)    Stage 3B Moderate CKD (GFR = 30-44 mL/min/1.73 square meters)    Stage 4 Severe CKD (GFR = 15-29 mL/min/1.73 square meters)    Stage 5 End Stage CKD (GFR <15 mL/min/1.73 square meters)  Note: GFR calculation is accurate only with a steady state creatinine    Lipase [[id number]]  (Normal) Collected: 02/17/24 0328    Lab Status: Final result Specimen: Blood from Arm, Left Updated: 02/17/24 0353     Lipase 41 u/L     Lactic acid, plasma (w/reflex if result > 2.0) [[id number]]  (Normal) Collected: 02/17/24 0328    Lab Status: Final result Specimen: Blood from Arm, Left Updated: 02/17/24 0352     LACTIC ACID 1.3 mmol/L     Narrative:      Result may be elevated if tourniquet was used during collection.    CBC and differential [[id number]] Collected: 02/17/24 0328    Lab Status: Final result Specimen: Blood from Arm, Left Updated: 02/17/24 0335     WBC 9.43 Thousand/uL      RBC 5.26 Million/uL      Hemoglobin 15.7 g/dL      Hematocrit 44.9 %      MCV 85 fL      MCH 29.8 pg      MCHC 35.0 g/dL      RDW 12.2 %      MPV 9.1 fL      Platelets 262 Thousands/uL      nRBC 0 /100 WBCs      Neutrophils Relative 70 %      Immat GRANS % 0 %      Lymphocytes Relative 21 %      Monocytes Relative 7 %       "Eosinophils Relative 2 %      Basophils Relative 0 %      Neutrophils Absolute 6.51 Thousands/µL      Immature Grans Absolute 0.03 Thousand/uL      Lymphocytes Absolute 2.00 Thousands/µL      Monocytes Absolute 0.68 Thousand/µL      Eosinophils Absolute 0.18 Thousand/µL      Basophils Absolute 0.03 Thousands/µL                    CT abdomen pelvis with contrast   Final Result by Timmy Nicholson DO (02/17 0534)      Colonic diverticulosis, no discrete evidence of acute diverticulitis.      No acute process seen.      Other findings as above.         Workstation performed: [id number]                    Procedures  Procedures         ED Course                               SBIRT 22yo+      Flowsheet Row Most Recent Value   Initial Alcohol Screen: US AUDIT-C     1. How often do you have a drink containing alcohol? 0 Filed at: 02/17/2024 0332   2. How many drinks containing alcohol do you have on a typical day you are drinking?  0 Filed at: 02/17/2024 0332   3a. Male UNDER 65: How often do you have five or more drinks on one occasion? 0 Filed at: 02/17/2024 0332   Audit-C Score 0 Filed at: 02/17/2024 0332   MANUEL: How many times in the past year have you...    Used an illegal drug or used a prescription medication for non-medical reasons? Never Filed at: 02/17/2024 0332                      Medical Decision Making  48-year-old male presenting for left lower quadrant pain.  Vital signs stable throughout ED course.  On exam, no abdominal tenderness appreciated.  Patient reported symptom improvement after medications during ED course.  Lab work unremarkable.  CT abdomen pelvis with contrast shows, \"IMPRESSION:     Colonic diverticulosis, no discrete evidence of acute diverticulitis.     No acute process seen.     Other findings as above\".  Patient to follow-up with GI, ambulatory referral given.  Patient to also follow-up with his primary care provider and return to the emergency room for any worsening symptoms.  " Patient understands and agrees to discharge plan at this time.  All questions answered appropriately.    Problems Addressed:  Abdominal pain: acute illness or injury    Amount and/or Complexity of Data Reviewed  Labs: ordered.  Radiology: ordered.    Risk  Prescription drug management.             Disposition  Final diagnoses:   Abdominal pain     Time reflects when diagnosis was documented in both MDM as applicable and the Disposition within this note       Time User Action Codes Description Comment    2/17/2024  5:42 AM Gabby Le Add [R10.9] Abdominal pain           ED Disposition       ED Disposition   Discharge    Condition   Stable    Date/Time   Sat Feb 17, 2024 0542    Comment   Misha Maher discharge to home/self care.                   Follow-up Information       Follow up With Specialties Details Why Contact Info Additional Information    Baron Penn DO Family Medicine   [address]  [address]  [place] [address]  [phone]       [facility] Emergency Department Emergency Medicine Go to  If symptoms worsen [address] [address]  [phone] [facility] Emergency Department, [address] [place], [place], [address]    [facility]   [address]  [address]  [facility] [id number] [phone] [facility] [place], [address] [place] [address], [address] [place], [place], [id number] [phone]             Discharge Medication List as of 2/17/2024  5:43 AM        CONTINUE these medications which have NOT CHANGED    Details   butalbital-acetaminophen-caffeine (FIORICET,ESGIC) -40 mg per tablet Take 1 tablet by mouth daily as needed for migraine For on set of headache, Starting Thu 3/8/2018, Print      multivitamin (THERAGRAN) TABS Take 1 tablet by mouth daily, Historical Med       Omega-3 Fatty Acids (fish oil) 1,000 mg Take 1 capsule (1,000 mg total) by mouth 2 (two) times a day, Starting Fri 4/1/2022, No Print      omeprazole (PriLOSEC) 40 MG capsule Take 1 capsule (40 mg total) by mouth daily, Starting Fri 1/12/2024, Normal      ondansetron (ZOFRAN) 4 mg tablet Take 1 tablet (4 mg total) by mouth every 6 (six) hours for 3 days, Starting Mon 12/26/2022, Until Wed 8/16/2023, Normal                 PDMP Review       None            ED Provider  Electronically Signed by             Gabby Le PA-C  02/17/24 0558

## 2024-02-17 NOTE — TELEPHONE ENCOUNTER
Patient wife called to get his scheduled as soon as possible. He was referred by ED. Please give him a call to schedule.

## 2024-02-19 ENCOUNTER — NURSE TRIAGE (OUTPATIENT)
Age: 49
End: 2024-02-19

## 2024-02-19 ENCOUNTER — TELEPHONE (OUTPATIENT)
Age: 49
End: 2024-02-19

## 2024-02-19 NOTE — TELEPHONE ENCOUNTER
Patients GI provider:  Dr. Foy    Number to return call: [phone]    Reason for call: Pts wife Cata called in to schedule for her . offered appt she refused stated to get asap appt for her  he was in ER and has lot of pain and was advised in ER to have ASAP appt with GI. She also stated no matter in what location with Dr. Foy. Please call pt for the earliest appt.    Scheduled procedure/appointment date if applicable: Apt/procedure N/A

## 2024-02-19 NOTE — TELEPHONE ENCOUNTER
"Last ov 9/19/23 currently scheduled for appt. 2/21/24 at [facility]/Dr. Foy. ER [facility] 2/17/24 Labs, Imaging CT A/P w/contrast     I returned call to Yolanda and advised she is not listed on communication sheet. She states they were already contacted and patient has appointment 2/21/24. I asked that they update the communication sheet to add her for the future.      Answer Assessment - Initial Assessment Questions  1. REASON FOR CALL or QUESTION: \"What is your reason for calling today?\" or \"How can I best help you?\" or \"What question do you have that I can help answer?\"      Call for ER follow up appointment.    Protocols used: Information Only Call - No Triage-ADULT-[place]    "

## 2024-02-19 NOTE — TELEPHONE ENCOUNTER
----- Message from Marleny Washington sent at 2/19/2024  9:57 AM EST -----  Pt's sister Yolanda Maher calling wanting to schedule hospital fu w/ Dr. Foy or any PA's at [facility] location. There is no availability at all, Theresa Diaz has urg appts on 2/26/24 & 2/27/24. Pt is still having severe abdominal when he eats.

## 2024-02-21 ENCOUNTER — OFFICE VISIT (OUTPATIENT)
Dept: GASTROENTEROLOGY | Facility: CLINIC | Age: 49
End: 2024-02-21
Payer: COMMERCIAL

## 2024-02-21 VITALS
HEART RATE: 72 BPM | HEIGHT: 67 IN | DIASTOLIC BLOOD PRESSURE: 93 MMHG | WEIGHT: 238.6 LBS | BODY MASS INDEX: 37.45 KG/M2 | SYSTOLIC BLOOD PRESSURE: 138 MMHG

## 2024-02-21 DIAGNOSIS — R10.13 EPIGASTRIC PAIN: Primary | ICD-10-CM

## 2024-02-21 DIAGNOSIS — K21.9 GASTROESOPHAGEAL REFLUX DISEASE, UNSPECIFIED WHETHER ESOPHAGITIS PRESENT: ICD-10-CM

## 2024-02-21 PROBLEM — S61.210A LACERATION OF RIGHT INDEX FINGER WITHOUT FOREIGN BODY WITHOUT DAMAGE TO NAIL: Status: RESOLVED | Noted: 2020-10-23 | Resolved: 2024-02-21

## 2024-02-21 PROBLEM — Z12.11 COLON CANCER SCREENING: Status: RESOLVED | Noted: 2020-07-22 | Resolved: 2024-02-21

## 2024-02-21 PROCEDURE — 99214 OFFICE O/P EST MOD 30 MIN: CPT | Performed by: INTERNAL MEDICINE

## 2024-02-21 RX ORDER — SUCRALFATE 1 G/1
1 TABLET ORAL 4 TIMES DAILY
COMMUNITY
End: 2024-02-21 | Stop reason: SDUPTHER

## 2024-02-21 RX ORDER — SUCRALFATE 1 G/1
1 TABLET ORAL 2 TIMES DAILY
Qty: 180 TABLET | Refills: 0 | Status: SHIPPED | OUTPATIENT
Start: 2024-02-21 | End: 2024-05-21

## 2024-02-21 RX ORDER — FAMOTIDINE 40 MG/1
40 TABLET, FILM COATED ORAL
Qty: 90 TABLET | Refills: 0 | Status: SHIPPED | OUTPATIENT
Start: 2024-02-21 | End: 2024-05-21

## 2024-02-21 RX ORDER — PANTOPRAZOLE SODIUM 40 MG/1
40 TABLET, DELAYED RELEASE ORAL DAILY
Qty: 90 TABLET | Refills: 0 | Status: SHIPPED | OUTPATIENT
Start: 2024-02-21

## 2024-02-21 NOTE — PROGRESS NOTES
[facility]  Progress Note - Misha Maher 48 y.o. male MRN: [id number]    Unit/Bed#:  Encounter: [id number]    Assessment/Plan:    1.  GERD/epigastric abdominal pain: Currently on omeprazole and Carafate with ongoing symptoms.  Blood work and lipase levels were within normal range in the ER last week.  CAT scan without any acute process.      Personally reviewed the images in PACS.  Would recommend switching from omeprazole to pantoprazole 40 mg to be taken 20 minutes before breakfast as omeprazole is no longer effective.  Would also recommend to continue Carafate which she recently restarted.  Would also recommend Pepcid at bedtime if needed for breakthrough symptoms.  Will plan for EGD to assess for peptic ulcer disease/gastritis.  Obtain right upper quadrant ultrasound to assess for gallstones as well.    2.  Diverticulosis: Has had history of diverticulitis.  Currently not experiencing any lower abdominal pain, having regular bowel movements.  No evidence of diverticulitis on recent CAT scan.  Would recommend high-fiber diet with 25 to 30 g of fiber on daily basis.    3.  Labs are reviewed, images were personally reviewed in PACS.      Subjective:     48-year-old male with history of ROMAINE, GERD, history of diverticulitis, presents for evaluation of ongoing epigastric pain.  He was recently in the emergency room with symptoms of epigastric pain.  Lipase was normal, CBC and CMP were normal.  Lactate was normal.  CAT scan showed diverticulosis only, no other findings including diverticulitis.  He underwent EGD in 2020 which was notable for mild to moderate gastritis.  Biopsies were negative for celiac disease and H. pylori bacteria.  No evidence of intestinal metaplasia.  He underwent colonoscopy in October which was notable for diverticulosis and hemorrhoids only.  He was subsequently recommended to follow-up with GI.  Patient reports that he had some Carafate left at home therefore he  "restarted this.  He has been taking omeprazole 40 mg on daily basis but only reports very minimal relief with this.  He denies any recent NSAID use, no new medications, no active stressors.  No black stools or bloody stools.  No unintentional weight loss.  No change in bowel habits, no diarrhea or constipation.  No lower abdominal pain.  Objective:     Vitals: Blood pressure 138/93, pulse 72, height 5' 7\" (1.702 m), weight 108 kg (238 lb 9.6 oz).,Body mass index is 37.37 kg/m².    [unfilled]    Physical Exam:    GEN: wn/wd, NAD  HEENT: MMM, no cervical or supraclavicular LAD, anciteric  CV: RRR, no m/r/g  CHEST: CTA b/l, no w/r/r  ABD: +BS, soft, NT/ND, no hepatosplenomegaly  EXT: no c/c/e  SKIN: no rashes  NEURO: aaox3      Invasive Devices       None                           Lab, Imaging and other studies:     No visits with results within 1 Day(s) from this visit.   Latest known visit with results is:   Admission on 02/17/2024, Discharged on 02/17/2024   Component Date Value    WBC 02/17/2024 9.43     RBC 02/17/2024 5.26     Hemoglobin 02/17/2024 15.7     Hematocrit 02/17/2024 44.9     MCV 02/17/2024 85     MCH 02/17/2024 29.8     MCHC 02/17/2024 35.0     RDW 02/17/2024 12.2     MPV 02/17/2024 9.1     Platelets 02/17/2024 262     nRBC 02/17/2024 0     Neutrophils Relative 02/17/2024 70     Immat GRANS % 02/17/2024 0     Lymphocytes Relative 02/17/2024 21     Monocytes Relative 02/17/2024 7     Eosinophils Relative 02/17/2024 2     Basophils Relative 02/17/2024 0     Neutrophils Absolute 02/17/2024 6.51     Immature Grans Absolute 02/17/2024 0.03     Lymphocytes Absolute 02/17/2024 2.00     Monocytes Absolute 02/17/2024 0.68     Eosinophils Absolute 02/17/2024 0.18     Basophils Absolute 02/17/2024 0.03     Sodium 02/17/2024 135     Potassium 02/17/2024 3.7     Chloride 02/17/2024 105     CO2 02/17/2024 23     ANION GAP 02/17/2024 7     BUN 02/17/2024 19     Creatinine 02/17/2024 0.90     Glucose 02/17/2024 121  "    Calcium 02/17/2024 9.1     AST 02/17/2024 15     ALT 02/17/2024 21     Alkaline Phosphatase 02/17/2024 74     Total Protein 02/17/2024 6.7     Albumin 02/17/2024 4.1     Total Bilirubin 02/17/2024 0.40     eGFR 02/17/2024 99     Lipase 02/17/2024 41     LACTIC ACID 02/17/2024 1.3          I have personally reviewed pertinent films in PACS    No current facility-administered medications for this visit.             Answers submitted by the patient for this visit:  Abdominal Pain Questionnaire (Submitted on 2/20/2024)  Chief Complaint: Abdominal pain  Chronicity: new  Onset: in the past 7 days  Onset quality: sudden  Frequency: 2 to 4 times per day  Episode duration: 5 Hours  Progression since onset: unchanged  Pain location: LLQ, left flank  Pain - numeric: 9/10  Pain quality: aching, cramping  Radiates to: does not radiate  anorexia: No  arthralgias: Yes  belching: No  constipation: Yes  diarrhea: No  dysuria: No  fever: No  flatus: No  frequency: No  headaches: No  hematochezia: No  hematuria: No  melena: No  myalgias: Yes  nausea: Yes  weight loss: No  vomiting: No  Aggravated by: certain positions, eating, movement  Relieved by: nothing  Diagnostic workup: CT scan, lower endoscopy, upper endoscopy

## 2024-02-21 NOTE — PATIENT INSTRUCTIONS
Scheduled date of EGD(as of today): 4/3/24  Physician performing EGD: Law  Location of EGD: [facility]  Instructions reviewed with patient by: Kamilla YU  Clearances: n/a

## 2024-02-24 ENCOUNTER — HOSPITAL ENCOUNTER (OUTPATIENT)
Dept: RADIOLOGY | Facility: HOSPITAL | Age: 49
Discharge: HOME | End: 2024-02-24
Attending: INTERNAL MEDICINE
Payer: COMMERCIAL

## 2024-02-24 DIAGNOSIS — R10.13 EPIGASTRIC PAIN: ICD-10-CM

## 2024-02-24 PROCEDURE — 76705 ECHO EXAM OF ABDOMEN: CPT

## 2024-03-05 ENCOUNTER — APPOINTMENT (OUTPATIENT)
Age: 49
End: 2024-03-05
Payer: COMMERCIAL

## 2024-03-05 DIAGNOSIS — E78.1 HYPERTRIGLYCERIDEMIA: ICD-10-CM

## 2024-03-05 DIAGNOSIS — Z12.5 SCREENING PSA (PROSTATE SPECIFIC ANTIGEN): ICD-10-CM

## 2024-03-05 DIAGNOSIS — R16.0 HEPATOMEGALY: Primary | ICD-10-CM

## 2024-03-05 DIAGNOSIS — R73.01 IMPAIRED FASTING GLUCOSE: ICD-10-CM

## 2024-03-05 LAB
ALBUMIN SERPL BCP-MCNC: 4.3 G/DL (ref 3.5–5)
ALP SERPL-CCNC: 71 U/L (ref 34–104)
ALT SERPL W P-5'-P-CCNC: 19 U/L (ref 7–52)
ANION GAP SERPL CALCULATED.3IONS-SCNC: 8 MMOL/L
AST SERPL W P-5'-P-CCNC: 15 U/L (ref 13–39)
BASOPHILS # BLD AUTO: 0.03 THOUSANDS/ÂΜL (ref 0–0.1)
BASOPHILS NFR BLD AUTO: 1 % (ref 0–1)
BILIRUB SERPL-MCNC: 0.56 MG/DL (ref 0.2–1)
BUN SERPL-MCNC: 16 MG/DL (ref 5–25)
CALCIUM SERPL-MCNC: 9.4 MG/DL (ref 8.4–10.2)
CHLORIDE SERPL-SCNC: 103 MMOL/L (ref 96–108)
CHOLEST SERPL-MCNC: 227 MG/DL
CO2 SERPL-SCNC: 28 MMOL/L (ref 21–32)
CREAT SERPL-MCNC: 0.86 MG/DL (ref 0.6–1.3)
EOSINOPHIL # BLD AUTO: 0.17 THOUSAND/ÂΜL (ref 0–0.61)
EOSINOPHIL NFR BLD AUTO: 3 % (ref 0–6)
ERYTHROCYTE [DISTWIDTH] IN BLOOD BY AUTOMATED COUNT: 12 % (ref 11.6–15.1)
GFR SERPL CREATININE-BSD FRML MDRD: 101 ML/MIN/1.73SQ M
GLUCOSE P FAST SERPL-MCNC: 97 MG/DL (ref 65–99)
HCT VFR BLD AUTO: 48.3 % (ref 36.5–49.3)
HDLC SERPL-MCNC: 42 MG/DL
HGB BLD-MCNC: 15.9 G/DL (ref 12–17)
IMM GRANULOCYTES # BLD AUTO: 0.03 THOUSAND/UL (ref 0–0.2)
IMM GRANULOCYTES NFR BLD AUTO: 1 % (ref 0–2)
LDLC SERPL CALC-MCNC: 124 MG/DL (ref 0–100)
LYMPHOCYTES # BLD AUTO: 1.99 THOUSANDS/ÂΜL (ref 0.6–4.47)
LYMPHOCYTES NFR BLD AUTO: 31 % (ref 14–44)
MCH RBC QN AUTO: 28.7 PG (ref 26.8–34.3)
MCHC RBC AUTO-ENTMCNC: 32.9 G/DL (ref 31.4–37.4)
MCV RBC AUTO: 87 FL (ref 82–98)
MONOCYTES # BLD AUTO: 0.47 THOUSAND/ÂΜL (ref 0.17–1.22)
MONOCYTES NFR BLD AUTO: 7 % (ref 4–12)
NEUTROPHILS # BLD AUTO: 3.7 THOUSANDS/ÂΜL (ref 1.85–7.62)
NEUTS SEG NFR BLD AUTO: 57 % (ref 43–75)
NRBC BLD AUTO-RTO: 0 /100 WBCS
PLATELET # BLD AUTO: 281 THOUSANDS/UL (ref 149–390)
PMV BLD AUTO: 9.4 FL (ref 8.9–12.7)
POTASSIUM SERPL-SCNC: 4.1 MMOL/L (ref 3.5–5.3)
PROT SERPL-MCNC: 6.9 G/DL (ref 6.4–8.4)
PSA SERPL-MCNC: 0.66 NG/ML (ref 0–4)
RBC # BLD AUTO: 5.54 MILLION/UL (ref 3.88–5.62)
SODIUM SERPL-SCNC: 139 MMOL/L (ref 135–147)
TRIGL SERPL-MCNC: 306 MG/DL
WBC # BLD AUTO: 6.39 THOUSAND/UL (ref 4.31–10.16)

## 2024-03-05 PROCEDURE — 80061 LIPID PANEL: CPT

## 2024-03-05 PROCEDURE — 80053 COMPREHEN METABOLIC PANEL: CPT

## 2024-03-05 PROCEDURE — 36415 COLL VENOUS BLD VENIPUNCTURE: CPT

## 2024-03-05 PROCEDURE — 85025 COMPLETE CBC W/AUTO DIFF WBC: CPT

## 2024-03-05 PROCEDURE — G0103 PSA SCREENING: HCPCS

## 2024-03-20 ENCOUNTER — ANESTHESIA (OUTPATIENT)
Dept: ANESTHESIOLOGY | Facility: HOSPITAL | Age: 49
End: 2024-03-20

## 2024-03-20 ENCOUNTER — ANESTHESIA EVENT (OUTPATIENT)
Dept: ANESTHESIOLOGY | Facility: HOSPITAL | Age: 49
End: 2024-03-20

## 2024-04-03 ENCOUNTER — HOSPITAL ENCOUNTER (OUTPATIENT)
Dept: GASTROENTEROLOGY | Facility: AMBULARY SURGERY CENTER | Age: 49
Setting detail: OUTPATIENT SURGERY
Discharge: HOME | End: 2024-04-03
Attending: INTERNAL MEDICINE
Payer: COMMERCIAL

## 2024-04-03 ENCOUNTER — ANESTHESIA (OUTPATIENT)
Dept: GASTROENTEROLOGY | Facility: AMBULARY SURGERY CENTER | Age: 49
End: 2024-04-03

## 2024-04-03 ENCOUNTER — ANESTHESIA EVENT (OUTPATIENT)
Dept: GASTROENTEROLOGY | Facility: AMBULARY SURGERY CENTER | Age: 49
End: 2024-04-03

## 2024-04-03 VITALS
OXYGEN SATURATION: 97 % | SYSTOLIC BLOOD PRESSURE: 121 MMHG | TEMPERATURE: 97.8 F | DIASTOLIC BLOOD PRESSURE: 80 MMHG | RESPIRATION RATE: 18 BRPM | HEART RATE: 65 BPM

## 2024-04-03 DIAGNOSIS — R10.13 EPIGASTRIC PAIN: ICD-10-CM

## 2024-04-03 PROCEDURE — 88305 TISSUE EXAM BY PATHOLOGIST: CPT | Performed by: PATHOLOGY

## 2024-04-03 RX ORDER — SODIUM CHLORIDE, SODIUM LACTATE, POTASSIUM CHLORIDE, CALCIUM CHLORIDE 600; 310; 30; 20 MG/100ML; MG/100ML; MG/100ML; MG/100ML
125 INJECTION, SOLUTION INTRAVENOUS CONTINUOUS
Status: DISCONTINUED | OUTPATIENT
Start: 2024-04-03 | End: 2024-04-07 | Stop reason: HOSPADM

## 2024-04-03 RX ORDER — PROPOFOL 10 MG/ML
INJECTION, EMULSION INTRAVENOUS AS NEEDED
Status: DISCONTINUED | OUTPATIENT
Start: 2024-04-03 | End: 2024-04-03

## 2024-04-03 RX ORDER — LIDOCAINE HYDROCHLORIDE 20 MG/ML
INJECTION, SOLUTION EPIDURAL; INFILTRATION; INTRACAUDAL; PERINEURAL AS NEEDED
Status: DISCONTINUED | OUTPATIENT
Start: 2024-04-03 | End: 2024-04-03

## 2024-04-03 RX ADMIN — PROPOFOL 50 MG: 10 INJECTION, EMULSION INTRAVENOUS at 12:09

## 2024-04-03 RX ADMIN — LIDOCAINE HYDROCHLORIDE 80 MG: 20 INJECTION, SOLUTION EPIDURAL; INFILTRATION; INTRACAUDAL; PERINEURAL at 12:08

## 2024-04-03 RX ADMIN — PROPOFOL 50 MG: 10 INJECTION, EMULSION INTRAVENOUS at 12:12

## 2024-04-03 RX ADMIN — PROPOFOL 100 MG: 10 INJECTION, EMULSION INTRAVENOUS at 12:08

## 2024-04-03 RX ADMIN — SODIUM CHLORIDE, SODIUM LACTATE, POTASSIUM CHLORIDE, AND CALCIUM CHLORIDE: .6; .31; .03; .02 INJECTION, SOLUTION INTRAVENOUS at 11:28

## 2024-04-03 RX ADMIN — PROPOFOL 50 MG: 10 INJECTION, EMULSION INTRAVENOUS at 12:10

## 2024-04-03 NOTE — H&P
History and Physical - [facility]  Misha Maher 48 y.o. male MRN: [id number]    HPI: Misha Maher is a 48 y.o. year old male who presents for evaluation of GERD.      Review of Systems    Historical Information   Past Medical History:   Diagnosis Date    Class 2 obesity due to excess calories without serious comorbidity with body mass index (BMI) of 36.0 to 36.9 in adult 02/24/2023    Colon polyp     Elbow tendonitis     right possible bursitis    Gastritis     GERD (gastroesophageal reflux disease)     Joint pain 01/11/2017    Migraine     Neck muscle spasm 01/05/2018    Right-sided low back pain with right-sided sciatica 09/23/2022    Sleep apnea     no longer using CPAP    Trigger middle finger of right hand 12/04/2014    Trigger ring finger of right hand 12/04/2014     Past Surgical History:   Procedure Laterality Date    COLONOSCOPY      EGD  2005    EGD  01/2020    EYE FOREIGN BODY REMOVAL Left     IR BIOPSY LOWER LIMB  3/16/2022    [place] TENDON SHEATH INCISION Right 12/26/2018    Procedure: LONG AND RING TRIGGER FINGER RELEASES;  Surgeon: Duy Greco MD;  Location: [facility];  Service: Orthopedics    SKIN BIOPSY       Social History   Social History     Substance and Sexual Activity   Alcohol Use Not Currently    Comment: Occasional     Social History     Substance and Sexual Activity   Drug Use Never     Social History     Tobacco Use   Smoking Status Never   Smokeless Tobacco Never     Family History   Problem Relation Age of Onset    Diabetes Mother     Hypertension Mother     Breast cancer Mother     Cancer Mother         Breast & Lung    Lung cancer Mother     Diabetes Father     Gout Father     Hypertension Father     Cancer Maternal Grandmother     Rosacea Family        Meds/Allergies     (Not in a hospital admission)      No Known Allergies    Objective     /73   Pulse 59   Temp 97.8 °F (36.6 °C) (Temporal)   Resp 16   SpO2 98%       PHYSICAL EXAM    Gen: NAD  CV:  RRR  CHEST: Clear  ABD: soft, NT/ND  EXT: no edema  Neuro: AAO      ASSESSMENT/PLAN:  This is a 48 y.o. year old male here for evaluation of GERD.    PLAN:   Procedure: EGD.

## 2024-04-03 NOTE — ANESTHESIA PREPROCEDURE EVALUATION
Procedure:  EGD    Relevant Problems   CARDIO   (+) Hypertriglyceridemia   (+) Migraine headache   (+) PAC (premature atrial contraction)      GI/HEPATIC   (+) GERD (gastroesophageal reflux disease)   (+) Hepatomegaly      MUSCULOSKELETAL   (+) Lateral epicondylitis of left elbow   (+) Osteoarthritis of spine with radiculopathy, lumbar region      NEURO/PSYCH   (+) Depression with anxiety   (+) Heel pain, chronic, right   (+) Migraine headache      PULMONARY   (+) Mild obstructive sleep apnea   (+) ROMAINE (obstructive sleep apnea)        Physical Exam    Airway    Mallampati score: III  TM Distance: >3 FB  Neck ROM: full     Dental   No notable dental hx     Cardiovascular  Cardiovascular exam normal    Pulmonary  Pulmonary exam normal     Other Findings        Anesthesia Plan  ASA Score- 3     Anesthesia Type- IV sedation with anesthesia with ASA Monitors.         Additional Monitors:     Airway Plan:            Plan Factors-Exercise tolerance (METS): >4 METS.    Chart reviewed.    Patient summary reviewed.    Patient is not a current smoker.              Induction- intravenous.    Postoperative Plan-     Informed Consent- Anesthetic plan and risks discussed with patient.  I personally reviewed this patient with the CRNA. Discussed and agreed on the Anesthesia Plan with the CRNA..

## 2024-04-03 NOTE — ANESTHESIA POSTPROCEDURE EVALUATION
Post-Op Assessment Note    CV Status:  Stable  Pain Score: 0    Pain management: adequate       Mental Status:  Sleepy   Hydration Status:  Stable   PONV Controlled:  None   Airway Patency:  Patent     Post Op Vitals Reviewed: Yes    No anethesia notable event occurred.    Staff: Anesthesiologist, CRNA               BP   103/60   Temp      Pulse  70   Resp   14   SpO2   98

## 2024-04-08 PROCEDURE — 88305 TISSUE EXAM BY PATHOLOGIST: CPT | Performed by: PATHOLOGY

## 2024-04-16 ENCOUNTER — TELEPHONE (OUTPATIENT)
Age: 49
End: 2024-04-16

## 2024-04-16 NOTE — TELEPHONE ENCOUNTER
"----- Message from Maritza Everett RN sent at 4/15/2024  3:15 PM EDT -----  Regarding: FW: Sucralfate  Contact: [phone]  Please advise if prior auth is required for sucralfate ?   ----- Message -----  From: Misha Maher \"Connor\"  Sent: 4/15/2024   2:28 PM EDT  To: [facility]  Subject: Sucralfate                                       Hello,   I got a notification that my sucralfate is not being covered by my insurance. I have [employer] insurance and it has been covered before. Not sure if it’s because it has been sent to [facility] and now needs to be [facility] or because it may need a prior authorization. Thank you in advance for your help with this as I am still having GI issues and need this medication.     "

## 2024-04-16 NOTE — TELEPHONE ENCOUNTER
If maintenance medication therapy, the medication will need to go to [facility].   Routing to GI office  Thank you

## 2024-04-16 NOTE — TELEPHONE ENCOUNTER
PA for sucralfate tabs    Submitted via    [x]CMM-KEY [id number] - PA Case ID: [id number]  []Surescripts-Case ID #   []Faxed to plan   []Other website   []Phone call Case ID #     Office notes sent, clinical questions answered. Awaiting determination    Turnaround time for your insurance to make a decision on your Prior Authorization can take 7-21 business days.

## 2024-04-17 DIAGNOSIS — R10.13 EPIGASTRIC PAIN: ICD-10-CM

## 2024-04-17 RX ORDER — SUCRALFATE 1 G/1
1 TABLET ORAL 2 TIMES DAILY
Qty: 180 TABLET | Refills: 0 | Status: SHIPPED | OUTPATIENT
Start: 2024-04-17 | End: 2024-07-16

## 2024-04-25 ENCOUNTER — OFFICE VISIT (OUTPATIENT)
Age: 49
End: 2024-04-25
Payer: COMMERCIAL

## 2024-04-25 VITALS
DIASTOLIC BLOOD PRESSURE: 80 MMHG | HEART RATE: 67 BPM | SYSTOLIC BLOOD PRESSURE: 106 MMHG | WEIGHT: 232.2 LBS | BODY MASS INDEX: 35.19 KG/M2 | HEIGHT: 68 IN | OXYGEN SATURATION: 97 % | TEMPERATURE: 98.3 F

## 2024-04-25 DIAGNOSIS — G47.33 OSA (OBSTRUCTIVE SLEEP APNEA): Primary | ICD-10-CM

## 2024-04-25 DIAGNOSIS — Z23 NEED FOR SHINGLES VACCINE: ICD-10-CM

## 2024-04-25 DIAGNOSIS — Z23 ENCOUNTER FOR IMMUNIZATION: ICD-10-CM

## 2024-04-25 DIAGNOSIS — M62.838 MUSCLE SPASM OF LEFT SHOULDER: ICD-10-CM

## 2024-04-25 DIAGNOSIS — E78.1 HYPERTRIGLYCERIDEMIA: ICD-10-CM

## 2024-04-25 DIAGNOSIS — K21.9 GASTROESOPHAGEAL REFLUX DISEASE WITHOUT ESOPHAGITIS: ICD-10-CM

## 2024-04-25 PROBLEM — Z71.85 VACCINE COUNSELING: Status: RESOLVED | Noted: 2023-02-24 | Resolved: 2024-04-25

## 2024-04-25 PROBLEM — R22.42 MASS OF LEFT FOOT: Status: RESOLVED | Noted: 2019-10-07 | Resolved: 2024-04-25

## 2024-04-25 PROBLEM — L81.9 ATYPICAL PIGMENTED SKIN LESION: Status: RESOLVED | Noted: 2021-08-24 | Resolved: 2024-04-25

## 2024-04-25 PROBLEM — K29.70 GASTRITIS: Status: RESOLVED | Noted: 2020-01-24 | Resolved: 2024-04-25

## 2024-04-25 PROBLEM — R10.13 EPIGASTRIC PAIN: Status: RESOLVED | Noted: 2020-07-22 | Resolved: 2024-04-25

## 2024-04-25 PROCEDURE — 99214 OFFICE O/P EST MOD 30 MIN: CPT | Performed by: FAMILY MEDICINE

## 2024-04-25 PROCEDURE — 90471 IMMUNIZATION ADMIN: CPT

## 2024-04-25 PROCEDURE — 90750 HZV VACC RECOMBINANT IM: CPT

## 2024-04-25 RX ORDER — METHOCARBAMOL 500 MG/1
500 TABLET, FILM COATED ORAL
Qty: 30 TABLET | Refills: 3 | Status: SHIPPED | OUTPATIENT
Start: 2024-04-25

## 2024-04-25 NOTE — PROGRESS NOTES
Assessment/Plan:    1. ROMAINE (obstructive sleep apnea)  -     Ambulatory Referral to Sleep Medicine; Future    2. Hypertriglyceridemia  -     Lipid Panel with Direct LDL reflex; Future    3. Gastroesophageal reflux disease without esophagitis    4. Muscle spasm of left shoulder  -     methocarbamol (ROBAXIN) 500 mg tablet; Take 1 tablet (500 mg total) by mouth daily at bedtime            There are no Patient Instructions on file for this visit.    Return in about 6 months (around 10/25/2024).    Subjective:      Patient ID: Misha Maher is a 48 y.o. male.    Chief Complaint   Patient presents with    Follow-up     6 m f/u visit for hyperlipidemia, review labs from 3/4/24.    Muscle Pain     He c/o pain in trapezoid muscles x 1 month       HPI      hypertriglyceridemia, elevated cholesterol and levels.  Taking fish oil 1000 mg once a day.  Tried to lower his carbs.  Improved lipid levels and triglycerides and lipid levels are now normal however triglycerides still elevated in the 200 range.  In 2020 and 2019 he had well-controlled triglycerides at 88.  At that time he states he was eating less carbs and exercising more.  April 2024, worsening levels, triglycerides now 306.  Taking fish oil 1000 mg twice a day.  He states he is having more desk time at his [employer] job which is affecting his exercising.  In 2020 he was not on any medication and his triglycerides were 88.  He states he was running at that time and weight about 15 pounds last.     left-sided shoulder and neck pain.  Patient is right-handed and does radha with his right hand but lifts the shingles up on his left shoulder.  Started 1 month ago.  Pain is not worse or better but just persistent and worse at night when he lays down.  He feels a knot in that area         The following portions of the patient's history were reviewed and updated as appropriate: allergies, current medications, past family history, past medical history, past social history, past  "surgical history and problem list.    Review of Systems        Constitutional:  Denies fever or chills   Eyes:  Denies double , blurry vision or eye pain  HENT:  Denies nasal congestion, sore throat or new hearing issues  Respiratory:  Denies cough or shortness of breath or wheezing  Cardiovascular:  Denies palpitations or chest pain  GI:  Denies abdominal pain, nausea, or vomiting, no loose stools, no reflux  Integument:  Denies rash , no open areas  Neurologic:  Denies headache or focal weakness, no dizziness  : no dysuria, or hematuria      Current Outpatient Medications   Medication Sig Dispense Refill    famotidine (PEPCID) 40 MG tablet Take 1 tablet (40 mg total) by mouth daily at bedtime 90 tablet 0    methocarbamol (ROBAXIN) 500 mg tablet Take 1 tablet (500 mg total) by mouth daily at bedtime 30 tablet 3    multivitamin (THERAGRAN) TABS Take 1 tablet by mouth daily      pantoprazole (PROTONIX) 40 mg tablet Take 1 tablet (40 mg total) by mouth daily 90 tablet 0    sucralfate (Carafate) 1 g tablet Take 1 tablet (1 g total) by mouth 2 (two) times a day 180 tablet 0     No current facility-administered medications for this visit.       Objective:    /80 (BP Location: Left arm, Patient Position: Sitting, Cuff Size: Large)   Pulse 67   Temp 98.3 °F (36.8 °C) (Temporal)   Ht 5' 8\" (1.727 m)   Wt 105 kg (232 lb 3.2 oz)   SpO2 97%   BMI 35.31 kg/m²        Physical Exam       Constitutional:  Well developed, well nourished, no acute distress, non-toxic appearance   Eyes:  PERRL, conjunctiva normal , non icteric sclera  HENT:  Atraumatic, oropharynx moist. Neck-  supple   Respiratory:  CTA b/l, normal breath sounds, no rales, no wheezing   Cardiovascular:  RRR, no murmurs, no LE edema b/l  GI:  Soft, nondistended, normal bowel sounds x 4, nontender, no organomegaly, no mass, no rebound, no guarding   Neurologic:  no focal deficits noted   Psychiatric:  Speech and behavior appropriate , AAO x " 3  Musculoskeletal no bony pain to palpation in C-spine or T-spine.  Full range of motion in neck.  Left-sided trapezius muscle with palpable hypertonicity and tender to palpation.    Baron Penn DO

## 2024-05-12 DIAGNOSIS — R10.13 EPIGASTRIC PAIN: ICD-10-CM

## 2024-05-13 RX ORDER — PANTOPRAZOLE SODIUM 40 MG/1
40 TABLET, DELAYED RELEASE ORAL DAILY
Qty: 30 TABLET | Refills: 5 | Status: SHIPPED | OUTPATIENT
Start: 2024-05-13

## 2024-05-15 ENCOUNTER — OFFICE VISIT (OUTPATIENT)
Dept: INTERNAL MEDICINE CLINIC | Age: 49
End: 2024-05-15
Payer: COMMERCIAL

## 2024-05-15 VITALS
OXYGEN SATURATION: 99 % | TEMPERATURE: 98 F | HEART RATE: 70 BPM | DIASTOLIC BLOOD PRESSURE: 80 MMHG | BODY MASS INDEX: 35.16 KG/M2 | WEIGHT: 232 LBS | HEIGHT: 68 IN | SYSTOLIC BLOOD PRESSURE: 110 MMHG

## 2024-05-15 DIAGNOSIS — M25.512 ACUTE PAIN OF LEFT SHOULDER: ICD-10-CM

## 2024-05-15 DIAGNOSIS — R10.13 EPIGASTRIC PAIN: ICD-10-CM

## 2024-05-15 DIAGNOSIS — K04.7 DENTAL INFECTION: ICD-10-CM

## 2024-05-15 DIAGNOSIS — K21.9 GASTROESOPHAGEAL REFLUX DISEASE WITHOUT ESOPHAGITIS: Primary | ICD-10-CM

## 2024-05-15 PROCEDURE — 99214 OFFICE O/P EST MOD 30 MIN: CPT | Performed by: INTERNAL MEDICINE

## 2024-05-15 RX ORDER — AMOXICILLIN AND CLAVULANATE POTASSIUM 875; 125 MG/1; MG/1
1 TABLET, FILM COATED ORAL EVERY 12 HOURS SCHEDULED
Qty: 10 TABLET | Refills: 0 | Status: SHIPPED | OUTPATIENT
Start: 2024-05-15 | End: 2024-05-20

## 2024-05-15 RX ORDER — FAMOTIDINE 40 MG/1
40 TABLET, FILM COATED ORAL
Qty: 30 TABLET | Refills: 5 | Status: SHIPPED | OUTPATIENT
Start: 2024-05-15

## 2024-05-15 NOTE — PROGRESS NOTES
Assessment/Plan:     Diagnoses and all orders for this visit:    Gastroesophageal reflux disease without esophagitis    Dental infection  -     amoxicillin-clavulanate (AUGMENTIN) 875-125 mg per tablet; Take 1 tablet by mouth every 12 (twelve) hours for 5 days    Acute pain of left shoulder  -     Ambulatory referral to Spine & Pain Management; Future  -     Ambulatory Referral to Physical Therapy; Future           M*Modal software was used to dictate this note.  It may contain errors with dictating incorrect words or incorrect spelling. Please contact the provider directly with any questions.    Subjective:      Patient ID: Misha Maher is a 48 y.o. male.    HPI    The following portions of the patient's history were reviewed and updated as appropriate: allergies, current medications, past family history, past medical history, past social history, past surgical history, and problem list.    Review of Systems   Constitutional:  Negative for activity change, fatigue and fever.   HENT:  Positive for dental problem (Left upper posterior molar area pain radiating to the upper jaw). Negative for congestion, sinus pain and sore throat.    Eyes:  Negative for discharge.   Respiratory:  Negative for cough and shortness of breath.    Cardiovascular:  Negative for chest pain and palpitations.   Gastrointestinal:  Negative for constipation, diarrhea and nausea.   Genitourinary:  Negative for hematuria and urgency.   Musculoskeletal:  Positive for arthralgias (Left shoulder pain) and neck pain. Negative for back pain and gait problem.   Neurological:  Negative for dizziness, weakness and light-headedness.   Psychiatric/Behavioral:  Positive for sleep disturbance. The patient is not nervous/anxious.          Past Medical History:   Diagnosis Date    Class 2 obesity due to excess calories without serious comorbidity with body mass index (BMI) of 36.0 to 36.9 in adult 02/24/2023    Colon polyp     Elbow tendonitis     right  "possible bursitis    Gastritis     GERD (gastroesophageal reflux disease)     Joint pain 01/11/2017    Migraine     Neck muscle spasm 01/05/2018    Right-sided low back pain with right-sided sciatica 09/23/2022    Sleep apnea     no longer using CPAP    Trigger middle finger of right hand 12/04/2014    Trigger ring finger of right hand 12/04/2014         Current Outpatient Medications:     amoxicillin-clavulanate (AUGMENTIN) 875-125 mg per tablet, Take 1 tablet by mouth every 12 (twelve) hours for 5 days, Disp: 10 tablet, Rfl: 0    famotidine (PEPCID) 40 MG tablet, Take 1 tablet (40 mg total) by mouth daily at bedtime, Disp: 90 tablet, Rfl: 0    methocarbamol (ROBAXIN) 500 mg tablet, Take 1 tablet (500 mg total) by mouth daily at bedtime, Disp: 30 tablet, Rfl: 3    multivitamin (THERAGRAN) TABS, Take 1 tablet by mouth daily, Disp: , Rfl:     pantoprazole (PROTONIX) 40 mg tablet, TAKE 1 TABLET BY MOUTH EVERY DAY, Disp: 30 tablet, Rfl: 5    sucralfate (Carafate) 1 g tablet, Take 1 tablet (1 g total) by mouth 2 (two) times a day, Disp: 180 tablet, Rfl: 0    No Known Allergies    Social History   Past Surgical History:   Procedure Laterality Date    COLONOSCOPY      EGD  2005    EGD  01/2020    EYE FOREIGN BODY REMOVAL Left     IR BIOPSY LOWER LIMB  3/16/2022    MD TENDON SHEATH INCISION Right 12/26/2018    Procedure: LONG AND RING TRIGGER FINGER RELEASES;  Surgeon: Duy Greco MD;  Location: [facility];  Service: Orthopedics    SKIN BIOPSY       Family History   Problem Relation Age of Onset    Diabetes Mother     Hypertension Mother     Breast cancer Mother     Cancer Mother         Breast & Lung    Lung cancer Mother     Diabetes Father     Gout Father     Hypertension Father     Cancer Maternal Grandmother     Rosacea Family        Objective:  /80 (BP Location: Left arm, Patient Position: Sitting, Cuff Size: Large)   Pulse 70   Temp 98 °F (36.7 °C) (Temporal)   Ht 5' 8\" (1.727 m)   Wt 105 kg (232 lb)   " SpO2 99%   BMI 35.28 kg/m²        Physical Exam  HENT:      Head:      Jaw: Tenderness and swelling present.      Mouth/Throat:        Comments: Tenderness and swelling

## 2024-06-13 ENCOUNTER — HOSPITAL ENCOUNTER (OUTPATIENT)
Dept: RADIOLOGY | Facility: HOSPITAL | Age: 49
End: 2024-06-13
Payer: COMMERCIAL

## 2024-06-13 ENCOUNTER — CONSULT (OUTPATIENT)
Dept: PAIN MEDICINE | Facility: CLINIC | Age: 49
End: 2024-06-13
Payer: COMMERCIAL

## 2024-06-13 VITALS — BODY MASS INDEX: 34.65 KG/M2 | WEIGHT: 228.6 LBS | HEIGHT: 68 IN

## 2024-06-13 DIAGNOSIS — M54.12 CERVICAL RADICULOPATHY: Primary | ICD-10-CM

## 2024-06-13 DIAGNOSIS — M25.512 ACUTE PAIN OF LEFT SHOULDER: ICD-10-CM

## 2024-06-13 DIAGNOSIS — M54.12 CERVICAL RADICULOPATHY: ICD-10-CM

## 2024-06-13 DIAGNOSIS — M54.2 NECK PAIN: ICD-10-CM

## 2024-06-13 PROCEDURE — 72050 X-RAY EXAM NECK SPINE 4/5VWS: CPT

## 2024-06-13 PROCEDURE — 20552 NJX 1/MLT TRIGGER POINT 1/2: CPT | Performed by: PAIN MEDICINE

## 2024-06-13 PROCEDURE — 99244 OFF/OP CNSLTJ NEW/EST MOD 40: CPT | Performed by: PAIN MEDICINE

## 2024-06-13 RX ORDER — BUPIVACAINE HYDROCHLORIDE 2.5 MG/ML
5 INJECTION, SOLUTION EPIDURAL; INFILTRATION; INTRACAUDAL
Status: COMPLETED | OUTPATIENT
Start: 2024-06-13 | End: 2024-06-13

## 2024-06-13 RX ORDER — METHYLPREDNISOLONE 4 MG/1
TABLET ORAL
Qty: 1 EACH | Refills: 0 | Status: SHIPPED | OUTPATIENT
Start: 2024-06-13

## 2024-06-13 RX ORDER — AMITRIPTYLINE HYDROCHLORIDE 10 MG/1
10 TABLET, FILM COATED ORAL
Qty: 30 TABLET | Refills: 1 | Status: SHIPPED | OUTPATIENT
Start: 2024-06-13

## 2024-06-13 RX ORDER — LIDOCAINE HYDROCHLORIDE 10 MG/ML
4 INJECTION, SOLUTION INFILTRATION; PERINEURAL
Status: COMPLETED | OUTPATIENT
Start: 2024-06-13 | End: 2024-06-13

## 2024-06-13 RX ORDER — KETOROLAC TROMETHAMINE 30 MG/ML
30 INJECTION, SOLUTION INTRAMUSCULAR; INTRAVENOUS
Status: COMPLETED | OUTPATIENT
Start: 2024-06-13 | End: 2024-06-13

## 2024-06-13 RX ADMIN — BUPIVACAINE HYDROCHLORIDE 5 ML: 2.5 INJECTION, SOLUTION EPIDURAL; INFILTRATION; INTRACAUDAL at 10:30

## 2024-06-13 RX ADMIN — KETOROLAC TROMETHAMINE 30 MG: 30 INJECTION, SOLUTION INTRAMUSCULAR; INTRAVENOUS at 10:30

## 2024-06-13 RX ADMIN — LIDOCAINE HYDROCHLORIDE 4 ML: 10 INJECTION, SOLUTION INFILTRATION; PERINEURAL at 10:30

## 2024-06-13 NOTE — PROGRESS NOTES
" Universal Protocol:  Consent: Verbal consent obtained. Written consent obtained.  Risks and benefits: risks, benefits and alternatives were discussed  Consent given by: patient  Time out: Immediately prior to procedure a \"time out\" was called to verify the correct patient, procedure, equipment, support staff and site/side marked as required.  Patient understanding: patient states understanding of the procedure being performed  Patient consent: the patient's understanding of the procedure matches consent given  Procedure consent: procedure consent matches procedure scheduled  Relevant documents: relevant documents present and verified  Test results: test results available and properly labeled  Site marked: the operative site was marked  Supporting Documentation  Indications: pain   Trigger Point Injections: single/multiple trigger point(s): 1-2 muscle groups    Injection site identified by: ultrasound  Procedure Details  Location(s):    Neck/Upper Back: L upper trapezius, L levator scapulae and L rhomboid     Prep: patient was prepped and draped in usual sterile fashion  Needle size: 25 G  Medications: 5 mL bupivacaine (PF) 0.25 %; 4 mL lidocaine 1 %; 30 mg ketorolac 30 mg/mL          "

## 2024-06-13 NOTE — PROGRESS NOTES
Assessment  1. Cervical radiculopathy  -     XR spine cervical complete 4 or 5 vw non injury; Future; Expected date: 06/13/2024  -     Ambulatory Referral to Physical Therapy; Future  -     diclofenac sodium (VOLTAREN) 50 mg EC tablet; Take one tablet twice a day with meals, Start after steroids are completed  -     amitriptyline (ELAVIL) 10 mg tablet; Take 1 tablet (10 mg total) by mouth daily at bedtime  -     methylPREDNISolone 4 MG tablet therapy pack; Use as directed on package  2. Acute pain of left shoulder  -     Ambulatory referral to Spine & Pain Management  3. Neck pain  -     Inj Trigger Point Single/Multiple  -     bupivacaine (PF) (MARCAINE) 0.25 % injection 5 mL  -     ketorolac (TORADOL) injection 30 mg  -     lidocaine (XYLOCAINE) 1 % injection 4 mL        Connor is a pleasant 48-year-old male with history of neck pain that radiates to the left shoulder occasionally down to the fingers digits 4 and 5 on the left-hand side he is right-handed presenting pain symptoms ongoing for the past 2 months increasing in severity with pain difficulty with lying down or worse in the morning.  Burning aching throbbing pain likely in the setting of cervical degenerative disc disease will recommend obtaining a cervical x-ray starting physical therapy if no improvement will obtain a cervical MRI.  For medications we will start him on diclofenac 50 mg twice daily and oral steroids to start diclofenac after oral steroids are completed.  Will do a trigger point today in the office      My impressions and treatment recommendations were discussed in detail with the patient who verbalized understanding and had no further questions.  Discharge instructions were provided. I personally saw and examined the patient and I agree with the above discussed plan of care.    Plan      New Medications Ordered This Visit   Medications   • diclofenac sodium (VOLTAREN) 50 mg EC tablet     Sig: Take one tablet twice a day with meals,  Start after steroids are completed     Dispense:  60 tablet     Refill:  1   • amitriptyline (ELAVIL) 10 mg tablet     Sig: Take 1 tablet (10 mg total) by mouth daily at bedtime     Dispense:  30 tablet     Refill:  1   • methylPREDNISolone 4 MG tablet therapy pack     Sig: Use as directed on package     Dispense:  1 each     Refill:  0   • bupivacaine (PF) (MARCAINE) 0.25 % injection 5 mL   • ketorolac (TORADOL) injection 30 mg   • lidocaine (XYLOCAINE) 1 % injection 4 mL         Orders Placed This Encounter   Procedures   • Inj Trigger Point Single/Multiple     This order was created via procedure documentation   • XR spine cervical complete 4 or 5 vw non injury     Standing Status:   Future     Standing Expiration Date:   6/13/2028     Scheduling Instructions:      Bring along any outside films relating to this procedure.           Order Specific Question:   Reason for Exam:     Answer:   neck pain   • Ambulatory Referral to Physical Therapy     Standing Status:   Future     Standing Expiration Date:   6/13/2025     Referral Priority:   Routine     Referral Type:   Physical Therapy     Referral Reason:   Specialty Services Required     Requested Specialty:   Physical Therapy     Number of Visits Requested:   1     Expiration Date:   6/13/2025         History of Present Illness    Misha Maher is a 48 y.o. male with relevant PMH of PAC, ROMAINE, referred by     Presented to [facility] spine and [facility] for chief complaint of left-sided shoulder pain cannot sleep or lay down on his left side pain ongoing for 2 months he is right-handed pain just started abruptly no injuries he is moderate to severe in intensity 8 out of 10 pain nearly constant 60 to 95% of time pain is mostly on the left shoulder trapezial region pain is worse with morning and night pain is burning and cramping pressure-like symptoms he does not use assistive devices reports that he gets numbness in digits 4 and 5 on the left fingers.  Lying  down increases the pain.  He is taking methocarbamol 500 mg just taking once per day this is not helping he has not started oral course of steroids or anti-inflammatories.    Not started therapy for his neck no allergies to contrast dye she is not on blood thinners.  No bowel or bladder incontinence.    I have personally reviewed and/or updated the patient's past medical history, past surgical history, family history, social history, current medications, allergies, and vital signs today.     Review of Systems   Musculoskeletal:  Positive for joint swelling, neck pain and neck stiffness.   All other systems reviewed and are negative.      Patient Active Problem List   Diagnosis   • Chronic pain of left knee   • Depression with anxiety   • GERD (gastroesophageal reflux disease)   • Heel pain, chronic, right   • Lateral epicondylitis of left elbow   • Migraine headache   • Mild obstructive sleep apnea   • Hypertriglyceridemia   • Right elbow pain   • Cubital tunnel syndrome of both upper extremities   • Olecranon bursitis of right elbow   • Carpal tunnel syndrome, bilateral   • Callus of foot   • Umbilical hernia without obstruction and without gangrene   • Colon polyp   • Diverticulosis   • Internal hemorrhoid   • PAC (premature atrial contraction)   • Strain of left Achilles tendon   • Calcaneal spur, left   • Pain of left thumb   • Annual physical exam   • Strain of right shoulder   • Impaired fasting glucose   • Actinic keratoses   • Excessive daytime sleepiness   • ROMAINE (obstructive sleep apnea)   • Night sweat   • Crushing injury of left hand   • Closed nondisplaced fracture of shaft of fourth metacarpal bone of left hand with routine healing   • Osteoarthritis of spine with radiculopathy, lumbar region   • Family hx of lung cancer   • LLQ pain   • Diverticulitis   • Hepatomegaly   • Muscle spasm of left shoulder       Past Medical History:   Diagnosis Date   • Class 2 obesity due to excess calories without  serious comorbidity with body mass index (BMI) of 36.0 to 36.9 in adult 02/24/2023   • Colon polyp    • Elbow tendonitis     right possible bursitis   • Gastritis    • GERD (gastroesophageal reflux disease)    • Joint pain 01/11/2017   • Migraine    • Neck muscle spasm 01/05/2018   • Right-sided low back pain with right-sided sciatica 09/23/2022   • Sleep apnea     no longer using CPAP   • Trigger middle finger of right hand 12/04/2014   • Trigger ring finger of right hand 12/04/2014       Past Surgical History:   Procedure Laterality Date   • COLONOSCOPY     • EGD  2005   • EGD  01/2020   • EYE FOREIGN BODY REMOVAL Left    • IR BIOPSY LOWER LIMB  3/16/2022   • ME TENDON SHEATH INCISION Right 12/26/2018    Procedure: LONG AND RING TRIGGER FINGER RELEASES;  Surgeon: Duy Greco MD;  Location: [facility];  Service: Orthopedics   • SKIN BIOPSY         Family History   Problem Relation Age of Onset   • Diabetes Mother    • Hypertension Mother    • Breast cancer Mother    • Cancer Mother         Breast & Lung   • Lung cancer Mother    • Diabetes Father    • Gout Father    • Hypertension Father    • Cancer Maternal Grandmother    • Rosacea Family        Social History     Occupational History   • Not on file   Tobacco Use   • Smoking status: Never   • Smokeless tobacco: Never   Vaping Use   • Vaping status: Never Used   Substance and Sexual Activity   • Alcohol use: Yes     Comment: 2 beers on occasion   • Drug use: Never   • Sexual activity: Yes     Partners: Female     Birth control/protection: Female Sterilization       Current Outpatient Medications on File Prior to Visit   Medication Sig   • famotidine (PEPCID) 40 MG tablet TAKE 1 TABLET BY MOUTH DAILY AT BEDTIME   • methocarbamol (ROBAXIN) 500 mg tablet Take 1 tablet (500 mg total) by mouth daily at bedtime   • multivitamin (THERAGRAN) TABS Take 1 tablet by mouth daily   • pantoprazole (PROTONIX) 40 mg tablet TAKE 1 TABLET BY MOUTH EVERY DAY   • sucralfate  "(Carafate) 1 g tablet Take 1 tablet (1 g total) by mouth 2 (two) times a day     No current facility-administered medications on file prior to visit.       No Known Allergies      Physical Exam    Ht 5' 8\" (1.727 m)   Wt 104 kg (228 lb 9.6 oz)   BMI 34.76 kg/m²     Constitutional: normal, well developed, well nourished, alert, in no distress and non-toxic and no overt pain behavior.  Eyes: anicteric  HEENT: grossly intact  Neck: supple, symmetric, trachea midline and no masses   Pulmonary:even and unlabored  Cardiovascular:No edema or pitting edema present  Skin:Normal without rashes or lesions and well hydrated  Psychiatric:Mood and affect appropriate  Neurologic:Cranial Nerves II-XII grossly intact Sensation grossly intact; no clonus negative de la garza's.      MSK:      Cervical Spine:  No masses or atrophy, No TTP cervical facets   Range of motion - full rom  Spurling's - negative    Strength Right Left   Elbow flexion/deltoid (C5) 5 5   Wrist extension (c6) 5 5   Elbow /finger extension (c7) 5 5   Finger flexion (c8) 5 5   Intrinsics (t1) 5 5        Reflexes:     Right Left   Biceps 2+ 2+   Triceps 2+ 2+   Brachioradialis 2+ 2+   Patellar 3+ 3+   Achilles 2+ 2+   Babinski neg neg        Sensory Exam: Full and equal sensation to light touch throughout bilateral UE  Reflexes: De La Garza negative Bilaterally; Clonus negative bilaterally      Gait normal          Imaging        "

## 2024-06-26 ENCOUNTER — CLINICAL SUPPORT (OUTPATIENT)
Age: 49
End: 2024-06-26
Payer: COMMERCIAL

## 2024-06-26 DIAGNOSIS — Z23 ENCOUNTER FOR IMMUNIZATION: Primary | ICD-10-CM

## 2024-06-26 PROCEDURE — 90750 HZV VACC RECOMBINANT IM: CPT

## 2024-06-26 PROCEDURE — 90471 IMMUNIZATION ADMIN: CPT

## 2024-07-05 DIAGNOSIS — M54.12 CERVICAL RADICULOPATHY: ICD-10-CM

## 2024-07-05 RX ORDER — AMITRIPTYLINE HYDROCHLORIDE 10 MG/1
10 TABLET, FILM COATED ORAL
Qty: 90 TABLET | Refills: 1 | Status: SHIPPED | OUTPATIENT
Start: 2024-07-05

## 2024-07-15 ENCOUNTER — OFFICE VISIT (OUTPATIENT)
Dept: GASTROENTEROLOGY | Facility: CLINIC | Age: 49
End: 2024-07-15
Payer: COMMERCIAL

## 2024-07-15 VITALS
BODY MASS INDEX: 35.55 KG/M2 | WEIGHT: 234.6 LBS | SYSTOLIC BLOOD PRESSURE: 137 MMHG | DIASTOLIC BLOOD PRESSURE: 89 MMHG | HEIGHT: 68 IN | HEART RATE: 59 BPM

## 2024-07-15 DIAGNOSIS — K21.9 GASTROESOPHAGEAL REFLUX DISEASE WITHOUT ESOPHAGITIS: Primary | ICD-10-CM

## 2024-07-15 PROCEDURE — 99213 OFFICE O/P EST LOW 20 MIN: CPT | Performed by: PHYSICIAN ASSISTANT

## 2024-07-15 NOTE — ASSESSMENT & PLAN NOTE
GERD without evidence of Wild's on recent EGD, no alarm signs at this time, appears to have responded very well to PPI therapy    -Advised patient about risks and benefits of long-term PPI therapy, may continue this for the time being but if he continues to do clinically well advised him that he can try to take the medication once every other day for 3 to 4 weeks and then taper off the medication entirely if he continues to do well with that.    -I did advise patient that if his reflux symptomatology returns despite PPI therapy or during/after the tapering process as outlined above, he can resume taking it once daily    -Advised about importance of dietary and lifestyle modification strategies for the mitigation of GERD    -Can tentatively plan to follow-up in 1 year, or sooner as needed    -Regarding history of colon polyps he will be due for colonoscopy in 2028

## 2024-07-15 NOTE — PROGRESS NOTES
Follow-up Note -  [facility]  Misha Maher 1975 48 y.o. male         ASSESSMENT & PLAN:    GERD (gastroesophageal reflux disease)  GERD without evidence of Wild's on recent EGD, no alarm signs at this time, appears to have responded very well to PPI therapy    -Advised patient about risks and benefits of long-term PPI therapy, may continue this for the time being but if he continues to do clinically well advised him that he can try to take the medication once every other day for 3 to 4 weeks and then taper off the medication entirely if he continues to do well with that.    -I did advise patient that if his reflux symptomatology returns despite PPI therapy or during/after the tapering process as outlined above, he can resume taking it once daily    -Advised about importance of dietary and lifestyle modification strategies for the mitigation of GERD    -Can tentatively plan to follow-up in 1 year, or sooner as needed    -Regarding history of colon polyps he will be due for colonoscopy in 2028      Reason: Follow-up; epigastric pain and GERD    HPI: 48-year-old male with history of sleep apnea, GERD who presents for follow-up, he was seen in our office earlier this year in February for evaluation of ongoing epigastric pain, recent emergency room visits for such with unremarkable lab work and CT scan findings.  He had also had colonoscopy recently in October 2023 only showing diverticulosis and hemorrhoids.  We scheduled him for EGD which was carried out April 2024, noting moderate duodenitis, mild gastritis.  No evidence of H. pylori or celiac disease was noted on the biopsies.    At this time, the patient says he is feeling remarkably better than last time we saw him, he says he only very infrequently experiences GERD/reflux symptoms and epigastric discomfort; much less frequently and severely than he was at last appointment.  He says he finished Pepcid and Carafate and for the last 2  months has been only taking Protonix once daily.    He otherwise denies any disturbances in his stool habits or stool appearance including any rectal bleeding or melena.  Reports no unintentional weight loss.  No swallowing dysfunction or swallowing discomfort.    REVIEW OF SYSTEMS:      CONSTITUTIONAL: Denies any fever, chills, or rigors. Good appetite, and no recent weight loss.  HEENT: No earache or tinnitus. Denies hearing loss or visual disturbances.  CARDIOVASCULAR: No chest pain or palpitations.   RESPIRATORY: Denies any cough, hemoptysis, shortness of breath or dyspnea on exertion.  GASTROINTESTINAL: As noted in the History of Present Illness.   GENITOURINARY: No problems with urination. Denies any hematuria or dysuria.  NEUROLOGIC: No dizziness or vertigo, denies headaches.   MUSCULOSKELETAL: Denies any muscle or joint pain.   SKIN: Denies skin rashes or itching.   ENDOCRINE: Denies excessive thirst. Denies intolerance to heat or cold.  PSYCHOSOCIAL: Denies depression or anxiety. Denies any recent memory loss.     Past Medical History:   Diagnosis Date    Class 2 obesity due to excess calories without serious comorbidity with body mass index (BMI) of 36.0 to 36.9 in adult 02/24/2023    Colon polyp     Elbow tendonitis     right possible bursitis    Gastritis     GERD (gastroesophageal reflux disease)     Joint pain 01/11/2017    Migraine     Neck muscle spasm 01/05/2018    Right-sided low back pain with right-sided sciatica 09/23/2022    Sleep apnea     no longer using CPAP    Trigger middle finger of right hand 12/04/2014    Trigger ring finger of right hand 12/04/2014      Past Surgical History:   Procedure Laterality Date    COLONOSCOPY      COLONOSCOPY      EGD  2005    EGD  01/2020    EYE FOREIGN BODY REMOVAL Left     IR BIOPSY LOWER LIMB  03/16/2022    [place] TENDON SHEATH INCISION Right 12/26/2018    Procedure: LONG AND RING TRIGGER FINGER RELEASES;  Surgeon: Duy Greco MD;  Location: [facility];   Service: Orthopedics    SKIN BIOPSY      UPPER GASTROINTESTINAL ENDOSCOPY       Social History     Socioeconomic History    Marital status: /Civil Union     Spouse name: Not on file    Number of children: Not on file    Years of education: Not on file    Highest education level: Not on file   Occupational History    Not on file   Tobacco Use    Smoking status: Never    Smokeless tobacco: Never   Vaping Use    Vaping status: Never Used   Substance and Sexual Activity    Alcohol use: Yes     Comment: 2 beers on occasion    Drug use: Never    Sexual activity: Yes     Partners: Female     Birth control/protection: Female Sterilization   Other Topics Concern    Not on file   Social History Narrative    Caffeine use     Social Determinants of Health     Financial Resource Strain: Not on file   Food Insecurity: Not on file   Transportation Needs: Not on file   Physical Activity: Not on file   Stress: Not on file   Social Connections: Not on file   Intimate Partner Violence: Not on file   Housing Stability: Not on file     Family History   Problem Relation Age of Onset    Diabetes Mother     Hypertension Mother     Breast cancer Mother     Cancer Mother         Breast & Lung    Lung cancer Mother     Diabetes Father     Gout Father     Hypertension Father     Cancer Maternal Grandmother     Rosacea Family      Patient has no known allergies.  Current Outpatient Medications   Medication Sig Dispense Refill    amitriptyline (ELAVIL) 10 mg tablet TAKE 1 TABLET BY MOUTH DAILY AT BEDTIME 90 tablet 1    diclofenac sodium (VOLTAREN) 50 mg EC tablet Take one tablet twice a day with meals, Start after steroids are completed 60 tablet 1    methocarbamol (ROBAXIN) 500 mg tablet Take 1 tablet (500 mg total) by mouth daily at bedtime 30 tablet 3    multivitamin (THERAGRAN) TABS Take 1 tablet by mouth daily      pantoprazole (PROTONIX) 40 mg tablet TAKE 1 TABLET BY MOUTH EVERY DAY 30 tablet 5    famotidine (PEPCID) 40 MG tablet  "TAKE 1 TABLET BY MOUTH DAILY AT BEDTIME (Patient not taking: Reported on 7/15/2024) 30 tablet 5    methylPREDNISolone 4 MG tablet therapy pack Use as directed on package (Patient not taking: Reported on 7/15/2024) 1 each 0    sucralfate (Carafate) 1 g tablet Take 1 tablet (1 g total) by mouth 2 (two) times a day (Patient not taking: Reported on 7/15/2024) 180 tablet 0     No current facility-administered medications for this visit.       Blood pressure 137/89, pulse 59, height 5' 8\" (1.727 m), weight 106 kg (234 lb 9.6 oz).    PHYSICAL EXAM:      General Appearance:   Alert, cooperative, no distress, appears stated age    HEENT:   Normocephalic, atraumatic, anicteric.     Neck:  Supple, symmetrical, trachea midline, no adenopathy;    thyroid: no enlargement/tenderness/nodules; no carotid  bruit or JVD    Lungs:   Clear to auscultation bilaterally; no rales, rhonchi or wheezing; respirations unlabored    Heart::   S1 and S2 normal; regular rate and rhythm; no murmur, rub, or gallop.   Abdomen:   Soft, non-tender, non-distended; normal bowel sounds; no masses, no organomegaly    Extremities: No edema, erythema, wounds, rashes   Rectal:   Deferred                      Lab Results   Component Value Date    WBC 6.39 03/05/2024    HGB 15.9 03/05/2024    HCT 48.3 03/05/2024    MCV 87 03/05/2024     03/05/2024     Lab Results   Component Value Date    GLUCOSE 120 12/02/2015    CALCIUM 9.4 03/05/2024     12/02/2015    K 4.1 03/05/2024    CO2 28 03/05/2024     03/05/2024    BUN 16 03/05/2024    CREATININE 0.86 03/05/2024     Lab Results   Component Value Date    ALT 19 03/05/2024    AST 15 03/05/2024    ALKPHOS 71 03/05/2024    BILITOT 0.57 12/02/2015     Lab Results   Component Value Date    INR 1.01 07/18/2019    INR 0.96 12/24/2018    INR 1.02 01/18/2017    PROTIME 12.9 07/18/2019    PROTIME 12.7 12/24/2018    PROTIME 13.5 01/18/2017       XR spine cervical complete 4 or 5 vw non injury    Result Date: " 6/13/2024  Impression: Unremarkable cervical spine. Electronically signed: 06/13/2024 08:58 PM Joselito Concepcion MD

## 2024-08-07 ENCOUNTER — TELEPHONE (OUTPATIENT)
Age: 49
End: 2024-08-07

## 2024-08-07 ENCOUNTER — OFFICE VISIT (OUTPATIENT)
Dept: PAIN MEDICINE | Facility: CLINIC | Age: 49
End: 2024-08-07
Payer: COMMERCIAL

## 2024-08-07 VITALS
HEIGHT: 68 IN | WEIGHT: 234 LBS | SYSTOLIC BLOOD PRESSURE: 118 MMHG | DIASTOLIC BLOOD PRESSURE: 84 MMHG | BODY MASS INDEX: 35.46 KG/M2

## 2024-08-07 DIAGNOSIS — M54.12 CERVICAL RADICULOPATHY: Primary | ICD-10-CM

## 2024-08-07 DIAGNOSIS — M54.12 CERVICAL RADICULOPATHY: ICD-10-CM

## 2024-08-07 DIAGNOSIS — M47.812 CERVICAL SPONDYLOSIS: ICD-10-CM

## 2024-08-07 PROCEDURE — 99213 OFFICE O/P EST LOW 20 MIN: CPT | Performed by: PAIN MEDICINE

## 2024-08-07 PROCEDURE — 20552 NJX 1/MLT TRIGGER POINT 1/2: CPT | Performed by: PAIN MEDICINE

## 2024-08-07 RX ORDER — TRIAMCINOLONE ACETONIDE 40 MG/ML
20 INJECTION, SUSPENSION INTRA-ARTICULAR; INTRAMUSCULAR
Status: COMPLETED | OUTPATIENT
Start: 2024-08-07 | End: 2024-08-07

## 2024-08-07 RX ORDER — KETOROLAC TROMETHAMINE 30 MG/ML
30 INJECTION, SOLUTION INTRAMUSCULAR; INTRAVENOUS
Status: COMPLETED | OUTPATIENT
Start: 2024-08-07 | End: 2024-08-07

## 2024-08-07 RX ORDER — BUPIVACAINE HYDROCHLORIDE 2.5 MG/ML
5 INJECTION, SOLUTION EPIDURAL; INFILTRATION; INTRACAUDAL
Status: COMPLETED | OUTPATIENT
Start: 2024-08-07 | End: 2024-08-07

## 2024-08-07 RX ADMIN — KETOROLAC TROMETHAMINE 30 MG: 30 INJECTION, SOLUTION INTRAMUSCULAR; INTRAVENOUS at 16:15

## 2024-08-07 RX ADMIN — BUPIVACAINE HYDROCHLORIDE 5 ML: 2.5 INJECTION, SOLUTION EPIDURAL; INFILTRATION; INTRACAUDAL at 16:15

## 2024-08-07 RX ADMIN — TRIAMCINOLONE ACETONIDE 20 MG: 40 INJECTION, SUSPENSION INTRA-ARTICULAR; INTRAMUSCULAR at 16:15
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
## 2024-08-07 NOTE — PROGRESS NOTES
" Universal Protocol:  Procedure performed by:  Consent: Verbal consent obtained. Written consent obtained.  Risks and benefits: risks, benefits and alternatives were discussed  Consent given by: patient  Time out: Immediately prior to procedure a \"time out\" was called to verify the correct patient, procedure, equipment, support staff and site/side marked as required.  Patient understanding: patient states understanding of the procedure being performed  Patient consent: the patient's understanding of the procedure matches consent given  Procedure consent: procedure consent matches procedure scheduled  Relevant documents: relevant documents present and verified  Test results: test results available and properly labeled  Site marked: the operative site was marked  Radiology Images displayed and confirmed. If images not available, report reviewed: imaging studies available  Patient identity confirmed: verbally with patient  Supporting Documentation  Indications: pain   Trigger Point Injections: single/multiple trigger point(s): 1-2 muscle groups    Injection site identified by: ultrasound  Procedure Details  Location(s):    Neck/Upper Back: L upper trapezius, L levator scapulae and L rhomboid     Prep: patient was prepped and draped in usual sterile fashion  Needle size: 22 G  Medications: 30 mg ketorolac 30 mg/mL; 20 mg triamcinolone acetonide 40 mg/mL; 5 mL bupivacaine (PF) 0.25 %          "

## 2024-08-07 NOTE — PROGRESS NOTES
Assessment  1. Cervical radiculopathy  -     MRI cervical spine wo contrast; Future; Expected date: 08/07/2024  -     Inj Trigger Point Single/Multiple  -     bupivacaine (PF) (MARCAINE) 0.25 % injection 5 mL  -     ketorolac (TORADOL) injection 30 mg  -     triamcinolone acetonide (KENALOG-40) 40 mg/mL injection 20 mg  2. Cervical spondylosis  -     Inj Trigger Point Single/Multiple  -     bupivacaine (PF) (MARCAINE) 0.25 % injection 5 mL  -     ketorolac (TORADOL) injection 30 mg  -     triamcinolone acetonide (KENALOG-40) 40 mg/mL injection 20 mg      Connor is a pleasant 48-year-old male with history of neck pain that radiates to the left shoulder occasionally down to fingers 4 and 5 left hand side pain with palpation left trapezius region will offer trigger point today as he is failed conservative therapy including 6 weeks of home exercise programs over physician directed at last visit we will recommend obtaining an MRI of his cervical spine.  Follow-up after MRI is completed.      My impressions and treatment recommendations were discussed in detail with the patient who verbalized understanding and had no further questions.  Discharge instructions were provided. I personally saw and examined the patient and I agree with the above discussed plan of care.    Plan      New Medications Ordered This Visit   Medications   • bupivacaine (PF) (MARCAINE) 0.25 % injection 5 mL   • ketorolac (TORADOL) injection 30 mg   • triamcinolone acetonide (KENALOG-40) 40 mg/mL injection 20 mg           Orders Placed This Encounter   Procedures   • Inj Trigger Point Single/Multiple     This order was created via procedure documentation   • MRI cervical spine wo contrast     Standing Status:   Future     Standing Expiration Date:   8/7/2028     Scheduling Instructions:      There is no preparation for this test. Please leave your jewelry and valuables at home, wedding rings are the exception. Please bring your physician order,  insurance cards, a form of photo ID and a list of your medications with you. Arrive 15 minutes prior to your appointment time in order to       register. Please bring any prior CT or MRI studies of this area that were not performed at a [facility] facility.            To schedule this appointment, please contact Central Scheduling at [phone].     Order Specific Question:   What is the patient's sedation requirement?     Answer:   No Sedation     Order Specific Question:   Does the patient have metallic implants, such as a pacemaker or shunt?     Answer:   No           History of Present Illness    Follow-up 8/7/2020 Mario Ryan continues to have left-sided neck pain with turning neck to the left 60% of the time he had benefit with trigger point at last visit doing well with the methocarbamol and diclofenac pain symptoms currently 6-7 of 10 with tightness in the left shoulder and trapezius region.  Trigger point lasted about 3 weeks for him.  He has been doing home exercise program since last visit 6/13/2024 to current date 8/7/2020 for 30 minutes a day to 3 times a week focusing on range of motion and strengthening of his neck.    Initial consult  Misha Maher is a 48 y.o. male with relevant PMH of PAC, ROMAINE, referred by     Presented to [facility] spine and pain for chief complaint of left-sided shoulder pain cannot sleep or lay down on his left side pain ongoing for 2 months he is right-handed pain just started abruptly no injuries he is moderate to severe in intensity 8 out of 10 pain nearly constant 60 to 95% of time pain is mostly on the left shoulder trapezial region pain is worse with morning and night pain is burning and cramping pressure-like symptoms he does not use assistive devices reports that he gets numbness in digits 4 and 5 on the left fingers.  Lying down increases the pain.  He is taking methocarbamol 500 mg just taking once per day this is not helping he has not started oral course  of steroids or anti-inflammatories.    Not started therapy for his neck no allergies to contrast dye she is not on blood thinners.  No bowel or bladder incontinence.    I have personally reviewed and/or updated the patient's past medical history, past surgical history, family history, social history, current medications, allergies, and vital signs today.     Review of Systems   Musculoskeletal:  Positive for joint swelling, neck pain and neck stiffness.   All other systems reviewed and are negative.      Patient Active Problem List   Diagnosis   • Chronic pain of left knee   • Depression with anxiety   • GERD (gastroesophageal reflux disease)   • Heel pain, chronic, right   • Lateral epicondylitis of left elbow   • Migraine headache   • Mild obstructive sleep apnea   • Hypertriglyceridemia   • Right elbow pain   • Cubital tunnel syndrome of both upper extremities   • Olecranon bursitis of right elbow   • Carpal tunnel syndrome, bilateral   • Callus of foot   • Umbilical hernia without obstruction and without gangrene   • Colon polyp   • Diverticulosis   • Internal hemorrhoid   • PAC (premature atrial contraction)   • Strain of left Achilles tendon   • Calcaneal spur, left   • Pain of left thumb   • Annual physical exam   • Strain of right shoulder   • Impaired fasting glucose   • Actinic keratoses   • Excessive daytime sleepiness   • ROMAINE (obstructive sleep apnea)   • Night sweat   • Crushing injury of left hand   • Closed nondisplaced fracture of shaft of fourth metacarpal bone of left hand with routine healing   • Osteoarthritis of spine with radiculopathy, lumbar region   • Family hx of lung cancer   • LLQ pain   • Diverticulitis   • Hepatomegaly   • Muscle spasm of left shoulder       Past Medical History:   Diagnosis Date   • Class 2 obesity due to excess calories without serious comorbidity with body mass index (BMI) of 36.0 to 36.9 in adult 02/24/2023   • Colon polyp    • Elbow tendonitis     right possible  bursitis   • Gastritis    • GERD (gastroesophageal reflux disease)    • Joint pain 01/11/2017   • Migraine    • Neck muscle spasm 01/05/2018   • Right-sided low back pain with right-sided sciatica 09/23/2022   • Sleep apnea     no longer using CPAP   • Trigger middle finger of right hand 12/04/2014   • Trigger ring finger of right hand 12/04/2014       Past Surgical History:   Procedure Laterality Date   • COLONOSCOPY     • COLONOSCOPY     • EGD  2005   • EGD  01/2020   • EYE FOREIGN BODY REMOVAL Left    • IR BIOPSY LOWER LIMB  03/16/2022   • [place] TENDON SHEATH INCISION Right 12/26/2018    Procedure: LONG AND RING TRIGGER FINGER RELEASES;  Surgeon: Duy Greco MD;  Location: [facility];  Service: Orthopedics   • SKIN BIOPSY     • UPPER GASTROINTESTINAL ENDOSCOPY         Family History   Problem Relation Age of Onset   • Diabetes Mother    • Hypertension Mother    • Breast cancer Mother    • Cancer Mother         Breast & Lung   • Lung cancer Mother    • Diabetes Father    • Gout Father    • Hypertension Father    • Cancer Maternal Grandmother    • Rosacea Family        Social History     Occupational History   • Not on file   Tobacco Use   • Smoking status: Never   • Smokeless tobacco: Never   Vaping Use   • Vaping status: Never Used   Substance and Sexual Activity   • Alcohol use: Yes     Comment: 2 beers on occasion   • Drug use: Never   • Sexual activity: Yes     Partners: Female     Birth control/protection: Female Sterilization       Current Outpatient Medications on File Prior to Visit   Medication Sig   • amitriptyline (ELAVIL) 10 mg tablet TAKE 1 TABLET BY MOUTH DAILY AT BEDTIME   • methocarbamol (ROBAXIN) 500 mg tablet Take 1 tablet (500 mg total) by mouth daily at bedtime   • multivitamin (THERAGRAN) TABS Take 1 tablet by mouth daily   • pantoprazole (PROTONIX) 40 mg tablet TAKE 1 TABLET BY MOUTH EVERY DAY   • famotidine (PEPCID) 40 MG tablet TAKE 1 TABLET BY MOUTH DAILY AT BEDTIME (Patient not taking:  "Reported on 7/15/2024)   • methylPREDNISolone 4 MG tablet therapy pack Use as directed on package (Patient not taking: Reported on 7/15/2024)   • sucralfate (Carafate) 1 g tablet Take 1 tablet (1 g total) by mouth 2 (two) times a day (Patient not taking: Reported on 7/15/2024)   • [DISCONTINUED] diclofenac sodium (VOLTAREN) 50 mg EC tablet Take one tablet twice a day with meals, Start after steroids are completed     No current facility-administered medications on file prior to visit.       No Known Allergies      Physical Exam    /84   Ht 5' 8\" (1.727 m)   Wt 106 kg (234 lb)   BMI 35.58 kg/m²     Constitutional: normal, well developed, well nourished, alert, in no distress and non-toxic and no overt pain behavior.  Eyes: anicteric  HEENT: grossly intact  Neck: supple, symmetric, trachea midline and no masses   Pulmonary:even and unlabored  Cardiovascular:No edema or pitting edema present  Skin:Normal without rashes or lesions and well hydrated  Psychiatric:Mood and affect appropriate  Neurologic:Cranial Nerves II-XII grossly intact Sensation grossly intact; no clonus negative de la garza's.      MSK:      Cervical Spine:  No masses or atrophy, No TTP cervical facets   Range of motion - full rom  Spurling's - negative    Strength Right Left   Elbow flexion/deltoid (C5) 5 5   Wrist extension (c6) 5 5   Elbow /finger extension (c7) 5 5   Finger flexion (c8) 5 5   Intrinsics (t1) 5 5        Reflexes:     Right Left   Biceps 2+ 2+   Triceps 2+ 2+   Brachioradialis 2+ 2+   Patellar 2+ 2+   Achilles 2+ 2+   Babinski neg neg        Sensory Exam: Full and equal sensation to light touch throughout bilateral UE  Reflexes: De La Garza negative Bilaterally; Clonus negative bilaterally      Gait normal          Imaging      C spine x ray  Mild DDD  "

## 2024-08-15 ENCOUNTER — APPOINTMENT (OUTPATIENT)
Age: 49
End: 2024-08-15

## 2024-08-15 DIAGNOSIS — E78.1 HYPERTRIGLYCERIDEMIA: ICD-10-CM

## 2024-08-15 LAB
CHOLEST SERPL-MCNC: 221 MG/DL
HDLC SERPL-MCNC: 52 MG/DL
LDLC SERPL CALC-MCNC: 123 MG/DL (ref 0–100)
TRIGL SERPL-MCNC: 228 MG/DL

## 2024-08-15 PROCEDURE — 36415 COLL VENOUS BLD VENIPUNCTURE: CPT

## 2024-08-15 PROCEDURE — 80061 LIPID PANEL: CPT

## 2024-08-16 DIAGNOSIS — E78.1 HYPERTRIGLYCERIDEMIA: Primary | ICD-10-CM

## 2024-08-18 ENCOUNTER — HOSPITAL ENCOUNTER (OUTPATIENT)
Dept: MRI IMAGING | Facility: HOSPITAL | Age: 49
Discharge: HOME | End: 2024-08-18
Attending: PAIN MEDICINE
Payer: COMMERCIAL

## 2024-08-18 DIAGNOSIS — M54.12 CERVICAL RADICULOPATHY: ICD-10-CM

## 2024-08-18 PROCEDURE — 72141 MRI NECK SPINE W/O DYE: CPT

## 2024-08-19 ENCOUNTER — TELEPHONE (OUTPATIENT)
Dept: PAIN MEDICINE | Facility: CLINIC | Age: 49
End: 2024-08-19

## 2024-08-19 NOTE — TELEPHONE ENCOUNTER
Murali Lynn MD  [facility]  C5-C6: Mild disc bulge. No canal stenosis. Arthritis mainly in the spine    Lets dicuss in the office next steps

## 2024-08-25 DIAGNOSIS — M62.838 MUSCLE SPASM OF LEFT SHOULDER: ICD-10-CM

## 2024-08-26 RX ORDER — METHOCARBAMOL 500 MG/1
500 TABLET, FILM COATED ORAL
Qty: 30 TABLET | Refills: 3 | Status: SHIPPED | OUTPATIENT
Start: 2024-08-26

## 2024-08-31 ENCOUNTER — HOSPITAL ENCOUNTER (OUTPATIENT)
Dept: ULTRASOUND IMAGING | Facility: HOSPITAL | Age: 49
Discharge: HOME | End: 2024-08-31
Attending: INTERNAL MEDICINE
Payer: COMMERCIAL

## 2024-08-31 DIAGNOSIS — R10.13 EPIGASTRIC PAIN: ICD-10-CM

## 2024-08-31 DIAGNOSIS — R16.0 HEPATOMEGALY: ICD-10-CM

## 2024-08-31 PROCEDURE — 76981 USE PARENCHYMA: CPT

## 2024-09-01 RX ORDER — SUCRALFATE 1 G/1
1 TABLET ORAL 2 TIMES DAILY
Qty: 180 TABLET | Refills: 0 | Status: SHIPPED | OUTPATIENT
Start: 2024-09-01 | End: 2024-11-30

## 2024-09-03 ENCOUNTER — OFFICE VISIT (OUTPATIENT)
Dept: PAIN MEDICINE | Facility: CLINIC | Age: 49
End: 2024-09-03
Payer: COMMERCIAL

## 2024-09-03 VITALS
HEIGHT: 68 IN | HEART RATE: 71 BPM | SYSTOLIC BLOOD PRESSURE: 126 MMHG | WEIGHT: 234 LBS | DIASTOLIC BLOOD PRESSURE: 75 MMHG | BODY MASS INDEX: 35.46 KG/M2

## 2024-09-03 DIAGNOSIS — M47.812 CERVICAL SPONDYLOSIS: Primary | ICD-10-CM

## 2024-09-03 DIAGNOSIS — M79.18 MYOFASCIAL PAIN SYNDROME: ICD-10-CM

## 2024-09-03 PROCEDURE — 99214 OFFICE O/P EST MOD 30 MIN: CPT | Performed by: PAIN MEDICINE

## 2024-09-03 NOTE — PROGRESS NOTES
Assessment  1. Cervical spondylosis  -     FL spine and pain procedure; Future; Expected date: 09/03/2024  2. Myofascial pain syndrome      Is a pleasant 48-year-old male with history of neck pain rating to the left shoulder, pain is axial in nature MRI of the cervical spine was reviewed he has C5-6 disc bulge with facet arthropathy most notably at 5 6 and 6 7 due to his current pain symptoms will recommend left C4-5-6 diagnostic medial branch block of the cervical region as his pain is probably axial if benefit   No benefit after medial branch block consider cervical epidural.    The risks of the procedure were discussed in detail.  These risks include infection, increased pain, paralysis, bleeding.  Patient understands the risks and is willing to pursue with the procedure      My impressions and treatment recommendations were discussed in detail with the patient who verbalized understanding and had no further questions.  Discharge instructions were provided. I personally saw and examined the patient and I agree with the above discussed plan of care.    Plan      No orders of the defined types were placed in this encounter.          Orders Placed This Encounter   Procedures   • FL spine and pain procedure     Standing Status:   Future     Standing Expiration Date:   9/3/2028     Order Specific Question:   Reason for Exam:     Answer:   Left C456 diagnostic medial branch block     Order Specific Question:   Anticoagulant hold needed?     Answer:   none             History of Present Illness  9/3/2024  Connor continues to have left-sided neck pain that radiates into his left trapezius and shoulder region pain with rotation to the left and facet loading he had some minor response with a trigger point at last visit 8/7/2020 complete his MRI of his neck.  Here to review and discuss next steps.        Follow-up 8/7/2020 499 Connor continues to have left-sided neck pain with turning neck to the left 60% of the time he had  benefit with trigger point at last visit doing well with the methocarbamol and diclofenac pain symptoms currently 6-7 of 10 with tightness in the left shoulder and trapezius region.  Trigger point lasted about 3 weeks for him.  He has been doing home exercise program since last visit 6/13/2024 to current date 8/7/2020 for 30 minutes a day to 3 times a week focusing on range of motion and strengthening of his neck.    Initial consult  Misha Maher is a 48 y.o. male with relevant PMH of PAC, ROMAINE, referred by     Presented to [facility] spine and pain for chief complaint of left-sided shoulder pain cannot sleep or lay down on his left side pain ongoing for 2 months he is right-handed pain just started abruptly no injuries he is moderate to severe in intensity 8 out of 10 pain nearly constant 60 to 95% of time pain is mostly on the left shoulder trapezial region pain is worse with morning and night pain is burning and cramping pressure-like symptoms he does not use assistive devices reports that he gets numbness in digits 4 and 5 on the left fingers.  Lying down increases the pain.  He is taking methocarbamol 500 mg just taking once per day this is not helping he has not started oral course of steroids or anti-inflammatories.    Not started therapy for his neck no allergies to contrast dye she is not on blood thinners.  No bowel or bladder incontinence.    I have personally reviewed and/or updated the patient's past medical history, past surgical history, family history, social history, current medications, allergies, and vital signs today.     Review of Systems   Musculoskeletal:  Positive for joint swelling, neck pain and neck stiffness.   All other systems reviewed and are negative.      Patient Active Problem List   Diagnosis   • Chronic pain of left knee   • Depression with anxiety   • GERD (gastroesophageal reflux disease)   • Heel pain, chronic, right   • Lateral epicondylitis of left elbow   •  Migraine headache   • Mild obstructive sleep apnea   • Hypertriglyceridemia   • Right elbow pain   • Cubital tunnel syndrome of both upper extremities   • Olecranon bursitis of right elbow   • Carpal tunnel syndrome, bilateral   • Callus of foot   • Umbilical hernia without obstruction and without gangrene   • Colon polyp   • Diverticulosis   • Internal hemorrhoid   • PAC (premature atrial contraction)   • Strain of left Achilles tendon   • Calcaneal spur, left   • Pain of left thumb   • Annual physical exam   • Strain of right shoulder   • Impaired fasting glucose   • Actinic keratoses   • Excessive daytime sleepiness   • ROMAINE (obstructive sleep apnea)   • Night sweat   • Crushing injury of left hand   • Closed nondisplaced fracture of shaft of fourth metacarpal bone of left hand with routine healing   • Osteoarthritis of spine with radiculopathy, lumbar region   • Family hx of lung cancer   • LLQ pain   • Diverticulitis   • Hepatomegaly   • Muscle spasm of left shoulder       Past Medical History:   Diagnosis Date   • Class 2 obesity due to excess calories without serious comorbidity with body mass index (BMI) of 36.0 to 36.9 in adult 02/24/2023   • Colon polyp    • Elbow tendonitis     right possible bursitis   • Gastritis    • GERD (gastroesophageal reflux disease)    • Joint pain 01/11/2017   • Migraine    • Neck muscle spasm 01/05/2018   • Right-sided low back pain with right-sided sciatica 09/23/2022   • Sleep apnea     no longer using CPAP   • Trigger middle finger of right hand 12/04/2014   • Trigger ring finger of right hand 12/04/2014       Past Surgical History:   Procedure Laterality Date   • COLONOSCOPY     • COLONOSCOPY     • EGD  2005   • EGD  01/2020   • EYE FOREIGN BODY REMOVAL Left    • IR BIOPSY LOWER LIMB  03/16/2022   • GA TENDON SHEATH INCISION Right 12/26/2018    Procedure: LONG AND RING TRIGGER FINGER RELEASES;  Surgeon: Duy Greco MD;  Location: [facility];  Service: Orthopedics   • SKIN  "BIOPSY     • UPPER GASTROINTESTINAL ENDOSCOPY         Family History   Problem Relation Age of Onset   • Diabetes Mother    • Hypertension Mother    • Breast cancer Mother    • Cancer Mother         Breast & Lung   • Lung cancer Mother    • Diabetes Father    • Gout Father    • Hypertension Father    • Cancer Maternal Grandmother    • Rosacea Family        Social History     Occupational History   • Not on file   Tobacco Use   • Smoking status: Never   • Smokeless tobacco: Never   Vaping Use   • Vaping status: Never Used   Substance and Sexual Activity   • Alcohol use: Yes     Comment: 2 beers on occasion   • Drug use: Never   • Sexual activity: Yes     Partners: Female     Birth control/protection: Female Sterilization       Current Outpatient Medications on File Prior to Visit   Medication Sig   • amitriptyline (ELAVIL) 10 mg tablet TAKE 1 TABLET BY MOUTH DAILY AT BEDTIME   • diclofenac sodium (VOLTAREN) 50 mg EC tablet TAKE 1 TABLET TWICE A DAY WITH MEALS, START AFTER STEROIDS ARE COMPLETED   • methocarbamol (ROBAXIN) 500 mg tablet TAKE 1 TABLET (500 MG TOTAL) BY MOUTH DAILY AT BEDTIME   • multivitamin (THERAGRAN) TABS Take 1 tablet by mouth daily   • pantoprazole (PROTONIX) 40 mg tablet TAKE 1 TABLET BY MOUTH EVERY DAY   • sucralfate (Carafate) 1 g tablet Take 1 tablet (1 g total) by mouth 2 (two) times a day   • famotidine (PEPCID) 40 MG tablet TAKE 1 TABLET BY MOUTH DAILY AT BEDTIME (Patient not taking: Reported on 7/15/2024)   • methylPREDNISolone 4 MG tablet therapy pack Use as directed on package (Patient not taking: Reported on 7/15/2024)     No current facility-administered medications on file prior to visit.       No Known Allergies      Physical Exam    /75   Pulse 71   Ht 5' 8\" (1.727 m)   Wt 106 kg (234 lb)   BMI 35.58 kg/m²     Constitutional: normal, well developed, well nourished, alert, in no distress and non-toxic and no overt pain behavior.  Eyes: anicteric  HEENT: grossly " intact  Neck: supple, symmetric, trachea midline and no masses   Pulmonary:even and unlabored  Cardiovascular:No edema or pitting edema present  Skin:Normal without rashes or lesions and well hydrated  Psychiatric:Mood and affect appropriate  Neurologic:Cranial Nerves II-XII grossly intact Sensation grossly intact; no clonus negative de la garza's.      MSK:      Cervical Spine:  No masses or atrophy, No TTP cervical facets   Range of motion - full rom  Spurling's - negative    Strength Right Left   Elbow flexion/deltoid (C5) 5 5   Wrist extension (c6) 5 5   Elbow /finger extension (c7) 5 5   Finger flexion (c8) 5 5   Intrinsics (t1) 5 5        Reflexes:     Right Left   Biceps 2+ 2+   Triceps 2+ 2+   Brachioradialis 2+ 2+   Patellar 2+ 2+   Achilles 2+ 2+   Babinski neg neg        Sensory Exam: Full and equal sensation to light touch throughout bilateral UE  Reflexes: De La Garza negative Bilaterally; Clonus negative bilaterally      Gait normal          Imaging      C spine x ray  Mild DDD    MRI C spine  C2-C3:  Normal.     C3-C4: Mild facet arthropathy. No canal or neuroforaminal stenosis identified.     C4-C5:  Normal.     C5-C6: Mild disc bulge. No canal stenosis. Mild bilateral uncovertebral joint arthropathy and facet arthropathy. No foraminal stenosis.     C6-C7: Right facet arthropathy. Otherwise normal.     C7-T1: Bilateral facet arthropathy. No canal or neuroforaminal stenosis.

## 2024-09-05 ENCOUNTER — EVALUATION (OUTPATIENT)
Dept: PHYSICAL THERAPY | Facility: CLINIC | Age: 49
End: 2024-09-05
Payer: COMMERCIAL

## 2024-09-05 DIAGNOSIS — G89.29 CHRONIC LEFT SHOULDER PAIN: Primary | ICD-10-CM

## 2024-09-05 DIAGNOSIS — M25.512 CHRONIC LEFT SHOULDER PAIN: Primary | ICD-10-CM

## 2024-09-05 PROCEDURE — 97110 THERAPEUTIC EXERCISES: CPT | Performed by: PHYSICAL THERAPIST

## 2024-09-05 PROCEDURE — 97161 PT EVAL LOW COMPLEX 20 MIN: CPT | Performed by: PHYSICAL THERAPIST

## 2024-09-05 PROCEDURE — 97140 MANUAL THERAPY 1/> REGIONS: CPT | Performed by: PHYSICAL THERAPIST

## 2024-09-09 ENCOUNTER — OFFICE VISIT (OUTPATIENT)
Dept: PHYSICAL THERAPY | Facility: CLINIC | Age: 49
End: 2024-09-09
Payer: COMMERCIAL

## 2024-09-09 DIAGNOSIS — G89.29 CHRONIC LEFT SHOULDER PAIN: Primary | ICD-10-CM

## 2024-09-09 DIAGNOSIS — M25.512 CHRONIC LEFT SHOULDER PAIN: Primary | ICD-10-CM

## 2024-09-09 PROCEDURE — 97140 MANUAL THERAPY 1/> REGIONS: CPT | Performed by: PHYSICAL THERAPIST

## 2024-09-09 PROCEDURE — 97110 THERAPEUTIC EXERCISES: CPT | Performed by: PHYSICAL THERAPIST

## 2024-09-09 PROCEDURE — 97112 NEUROMUSCULAR REEDUCATION: CPT | Performed by: PHYSICAL THERAPIST

## 2024-09-10 NOTE — PROGRESS NOTES
"Daily Note     Today's date: 2024  Patient name: Misha Maher  : 1975  MRN: [id number]  Referring provider: Earlene Fields MD  Dx:   Encounter Diagnosis     ICD-10-CM    1. Chronic left shoulder pain  M25.512     G89.29                      Subjective: Patient reports 5/10 pain. States he may have been \"overdoing\" the exercises, because he was sore. However, he also states he slept better last night with less pain.       Objective: See treatment diary below      Assessment: Tolerated treatment well. Patient does well with session without increase in pain. He progresses exercises and updated written HEP provided to him.  No pain noted post session. Patient would benefit from continued PT      Plan: Continue per plan of care.      Precautions: none.     POC expires Unit limit Auth Expiration date PT/OT/ST + Visit Limit?   10/31/24 BOMN N/a 30                           Visit/Unit Tracking  AUTH Status:  Date               Not required  Used 1 2              Remaining  29 28                   Manuals            Gr 3-4 L GHJ post/inf mobs  KS KS                                                  Neuro Re-Ed             PSR HEP            Scap retraction  HEP             TB row/ext   Red 2x10 ea           TB ER   NV           RetroUBE  4 min                                     Ther Ex             Pt Edu  KS  KS           Supine cane flex  3\"x20             Doorway streth  4x15\"            PROM L shoulder  KS  KS           Repeated cervical retraction  3x10             Pulleys  3 min           TRX flexion   3\"x20            Cane ext   3\"x20           IR stretch w strap   10\"X10                         Ther Activity                                       Gait Training                                       Modalities                                          "

## 2024-09-11 ENCOUNTER — OFFICE VISIT (OUTPATIENT)
Dept: PHYSICAL THERAPY | Facility: CLINIC | Age: 49
End: 2024-09-11
Payer: COMMERCIAL

## 2024-09-11 DIAGNOSIS — G89.29 CHRONIC LEFT SHOULDER PAIN: Primary | ICD-10-CM

## 2024-09-11 DIAGNOSIS — M25.512 CHRONIC LEFT SHOULDER PAIN: Primary | ICD-10-CM

## 2024-09-11 PROCEDURE — 97140 MANUAL THERAPY 1/> REGIONS: CPT | Performed by: PHYSICAL THERAPIST

## 2024-09-11 PROCEDURE — 97112 NEUROMUSCULAR REEDUCATION: CPT | Performed by: PHYSICAL THERAPIST

## 2024-09-11 PROCEDURE — 97110 THERAPEUTIC EXERCISES: CPT | Performed by: PHYSICAL THERAPIST

## 2024-09-11 NOTE — PROGRESS NOTES
"Daily Note     Today's date: 2024  Patient name: Misha Maher  : 1975  MRN: [id number]  Referring provider: Earlene Fields MD  Dx:   Encounter Diagnosis     ICD-10-CM    1. Chronic left shoulder pain  M25.512     G89.29             Start Time: 1801  Stop Time: 1850  Total time in clinic (min): 49 minutes    Subjective: Patient states he was doing pretty well, but then last night he had more pain.        Objective: See treatment diary below      Assessment: Tolerated treatment well. Able to progress exercises this visit to include standing abduction/scaption, BOR, TB IR/ER. Updated written HEP provided to patient.  Minimal to no restriction noted with PROM or joint mobs this visit.   Patient would benefit from continued PT      Plan: Continue per plan of care.      Precautions: none.     POC expires Unit limit Auth Expiration date PT/OT/ST + Visit Limit?   10/31/24 BOMN N/a 30                           Visit/Unit Tracking  AUTH Status:  Date             Not required  Used 1 2 3             Remaining  29 28 27                 [email or web address]  Access Code: [email or web address]      Manuals           Gr 3-4 L GHJ post/inf mobs  KS KS KS                                                 Neuro Re-Ed             PSR HEP            Scap retraction  HEP             TB row/ext   Red 2x10 ea Blue 2x10 ea           TB ER/IR  NV Green 2x10 ea          RetroUBE  4 min Alt 5'                                     Ther Ex             Pt Edu  KS  KS KS          Supine cane flex  3\"x20             Doorway streth  4x15\"            PROM L shoulder  KS  KS KS          Repeated cervical retraction  3x10             Pulleys  3 min 2 min flex; 2 min IR           TRX flexion   3\"x20  3\"X20           Cane ext   3\"x20           IR stretch w strap   10\"X10            Standing scaption/abduction    5# 2x10 ea           Ther Activity                                       Gait Training                            "            Modalities

## 2024-09-16 ENCOUNTER — OFFICE VISIT (OUTPATIENT)
Dept: PHYSICAL THERAPY | Facility: CLINIC | Age: 49
End: 2024-09-16
Payer: COMMERCIAL

## 2024-09-16 DIAGNOSIS — G89.29 CHRONIC LEFT SHOULDER PAIN: Primary | ICD-10-CM

## 2024-09-16 DIAGNOSIS — M25.512 CHRONIC LEFT SHOULDER PAIN: Primary | ICD-10-CM

## 2024-09-16 PROCEDURE — 97110 THERAPEUTIC EXERCISES: CPT | Performed by: PHYSICAL THERAPIST

## 2024-09-16 PROCEDURE — 97140 MANUAL THERAPY 1/> REGIONS: CPT | Performed by: PHYSICAL THERAPIST

## 2024-09-16 NOTE — PROGRESS NOTES
"Daily Note     Today's date: 2024  Patient name: Misha Maher  : 1975  MRN: [id number]  Referring provider: Earlene Fields MD  Dx:   Encounter Diagnosis     ICD-10-CM    1. Chronic left shoulder pain  M25.512     G89.29                        Subjective: Patient states his pain during the day is improved, but he still has pain at night but is unchanging.  He states he has knots in his upper trap muscle.      Objective: See treatment diary below      Assessment: Tolerated treatment well.  Progressed Thera-Band exercises to blue band without difficulty with increased resistance.  Continued with left shoulder joint mobilization, but DC passive range of motion due to no restriction with passive range of motion.  Addition of soft tissue release to left upper trap this visit with reduced pain following.  Will reassess its effect.  Patient would benefit from continued PT      Plan: Continue per plan of care.      Precautions: none.     POC expires Unit limit Auth Expiration date PT/OT/ST + Visit Limit?   10/31/24 BOMN N/a 30                           Visit/Unit Tracking  AUTH Status:  Date             Not required  Used 1 2 3 4            Remaining  29 28 27  26               [email or web address]  Access Code: [email or web address]      Manuals           Gr 3-4 L GHJ post/inf mobs  KS KS KS KS         STR L UT     10'                                    Neuro Re-Ed             PSR HEP            Scap retraction  HEP             TB row/ext   Red 2x10 ea Blue 2x10 ea  Blue 2x10 ea          TB ER/IR  NV Green 2x10 ea          RetroUBE  4 min Alt 5'  Alt 6'          TB ER w shld flex (serratus)     Red 2x10                       Ther Ex             Pt Edu  KS  KS KS KS         Supine cane flex  3\"x20             Doorway stretch  4x15\"            PROM L shoulder  KS  KS KS D/C         Repeated cervical retraction  3x10             Pulleys  3 min 2 min flex; 2 min IR           TRX " "flexion   3\"x20  3\"X20  3\"X20          Cane ext   3\"x20           IR stretch w strap   10\"X10            Standing scaption/abduction    5# 2x10 ea           Ther Activity                                       Gait Training                                       Modalities                                          "

## 2024-09-18 ENCOUNTER — APPOINTMENT (OUTPATIENT)
Dept: PHYSICAL THERAPY | Facility: CLINIC | Age: 49
End: 2024-09-18
Payer: COMMERCIAL

## 2024-09-23 ENCOUNTER — HOSPITAL ENCOUNTER (OUTPATIENT)
Dept: RADIOLOGY | Facility: HOSPITAL | Age: 49
Discharge: HOME | End: 2024-09-23
Attending: PAIN MEDICINE | Admitting: PAIN MEDICINE
Payer: COMMERCIAL

## 2024-09-23 ENCOUNTER — APPOINTMENT (OUTPATIENT)
Dept: PHYSICAL THERAPY | Facility: CLINIC | Age: 49
End: 2024-09-23
Payer: COMMERCIAL

## 2024-09-23 VITALS
RESPIRATION RATE: 17 BRPM | OXYGEN SATURATION: 98 % | DIASTOLIC BLOOD PRESSURE: 78 MMHG | HEART RATE: 71 BPM | TEMPERATURE: 97.6 F | SYSTOLIC BLOOD PRESSURE: 124 MMHG

## 2024-09-23 DIAGNOSIS — M47.812 CERVICAL SPONDYLOSIS: ICD-10-CM

## 2024-09-23 RX ORDER — BUPIVACAINE HCL/PF 2.5 MG/ML
3 VIAL (ML) INJECTION ONCE
Status: COMPLETED | OUTPATIENT
Start: 2024-09-23 | End: 2024-09-23

## 2024-09-23 RX ADMIN — Medication 1.5 ML: at 15:07

## 2024-09-23 NOTE — DISCHARGE INSTR - LAB

## 2024-09-25 ENCOUNTER — APPOINTMENT (OUTPATIENT)
Dept: PHYSICAL THERAPY | Facility: CLINIC | Age: 49
End: 2024-09-25
Payer: COMMERCIAL

## 2024-09-29 ENCOUNTER — APPOINTMENT (EMERGENCY)
Dept: CT IMAGING | Facility: HOSPITAL | Age: 49
End: 2024-09-29
Payer: COMMERCIAL

## 2024-09-29 ENCOUNTER — HOSPITAL ENCOUNTER (EMERGENCY)
Facility: HOSPITAL | Age: 49
Discharge: HOME | End: 2024-09-29
Attending: EMERGENCY MEDICINE
Payer: COMMERCIAL

## 2024-09-29 VITALS
WEIGHT: 235.01 LBS | BODY MASS INDEX: 35.62 KG/M2 | HEIGHT: 68 IN | DIASTOLIC BLOOD PRESSURE: 76 MMHG | SYSTOLIC BLOOD PRESSURE: 120 MMHG | RESPIRATION RATE: 13 BRPM | TEMPERATURE: 97.5 F | HEART RATE: 62 BPM | OXYGEN SATURATION: 98 %

## 2024-09-29 DIAGNOSIS — R10.9 ABDOMINAL PAIN: Primary | ICD-10-CM

## 2024-09-29 LAB
ALBUMIN SERPL BCG-MCNC: 4.3 G/DL (ref 3.5–5)
ALP SERPL-CCNC: 101 U/L (ref 34–104)
ALT SERPL W P-5'-P-CCNC: 16 U/L (ref 7–52)
ANION GAP SERPL CALCULATED.3IONS-SCNC: 7 MMOL/L (ref 4–13)
AST SERPL W P-5'-P-CCNC: 25 U/L (ref 13–39)
BASOPHILS # BLD AUTO: 0.04 THOUSANDS/ÂΜL (ref 0–0.1)
BASOPHILS NFR BLD AUTO: 0 % (ref 0–1)
BILIRUB SERPL-MCNC: 0.36 MG/DL (ref 0.2–1)
BILIRUB UR QL STRIP: NEGATIVE
BUN SERPL-MCNC: 22 MG/DL (ref 5–25)
CALCIUM SERPL-MCNC: 9.5 MG/DL (ref 8.4–10.2)
CHLORIDE SERPL-SCNC: 104 MMOL/L (ref 96–108)
CLARITY UR: CLEAR
CO2 SERPL-SCNC: 25 MMOL/L (ref 21–32)
COLOR UR: COLORLESS
CREAT SERPL-MCNC: 0.83 MG/DL (ref 0.6–1.3)
EOSINOPHIL # BLD AUTO: 0.96 THOUSAND/ÂΜL (ref 0–0.61)
EOSINOPHIL NFR BLD AUTO: 10 % (ref 0–6)
ERYTHROCYTE [DISTWIDTH] IN BLOOD BY AUTOMATED COUNT: 12 % (ref 11.6–15.1)
GFR SERPL CREATININE-BSD FRML MDRD: 102 ML/MIN/1.73SQ M
GLUCOSE SERPL-MCNC: 111 MG/DL (ref 65–140)
GLUCOSE UR STRIP-MCNC: NEGATIVE MG/DL
HCT VFR BLD AUTO: 45.6 % (ref 36.5–49.3)
HGB BLD-MCNC: 15.4 G/DL (ref 12–17)
HGB UR QL STRIP.AUTO: NEGATIVE
IMM GRANULOCYTES # BLD AUTO: 0.03 THOUSAND/UL (ref 0–0.2)
IMM GRANULOCYTES NFR BLD AUTO: 0 % (ref 0–2)
KETONES UR STRIP-MCNC: NEGATIVE MG/DL
LEUKOCYTE ESTERASE UR QL STRIP: NEGATIVE
LIPASE SERPL-CCNC: 59 U/L (ref 11–82)
LYMPHOCYTES # BLD AUTO: 2.24 THOUSANDS/ÂΜL (ref 0.6–4.47)
LYMPHOCYTES NFR BLD AUTO: 23 % (ref 14–44)
MCH RBC QN AUTO: 29.4 PG (ref 26.8–34.3)
MCHC RBC AUTO-ENTMCNC: 33.8 G/DL (ref 31.4–37.4)
MCV RBC AUTO: 87 FL (ref 82–98)
MONOCYTES # BLD AUTO: 0.68 THOUSAND/ÂΜL (ref 0.17–1.22)
MONOCYTES NFR BLD AUTO: 7 % (ref 4–12)
NEUTROPHILS # BLD AUTO: 5.87 THOUSANDS/ÂΜL (ref 1.85–7.62)
NEUTS SEG NFR BLD AUTO: 60 % (ref 43–75)
NITRITE UR QL STRIP: NEGATIVE
NRBC BLD AUTO-RTO: 0 /100 WBCS
PH UR STRIP.AUTO: 6 [PH]
PLATELET # BLD AUTO: 260 THOUSANDS/UL (ref 149–390)
PMV BLD AUTO: 9.3 FL (ref 8.9–12.7)
POTASSIUM SERPL-SCNC: 4.5 MMOL/L (ref 3.5–5.3)
PROT SERPL-MCNC: 7.1 G/DL (ref 6.4–8.4)
PROT UR STRIP-MCNC: NEGATIVE MG/DL
RBC # BLD AUTO: 5.24 MILLION/UL (ref 3.88–5.62)
SODIUM SERPL-SCNC: 136 MMOL/L (ref 135–147)
SP GR UR STRIP.AUTO: 1.04 (ref 1–1.03)
UROBILINOGEN UR STRIP-ACNC: <2 MG/DL
WBC # BLD AUTO: 9.82 THOUSAND/UL (ref 4.31–10.16)

## 2024-09-29 PROCEDURE — 74177 CT ABD & PELVIS W/CONTRAST: CPT

## 2024-09-29 PROCEDURE — 96374 THER/PROPH/DIAG INJ IV PUSH: CPT

## 2024-09-29 PROCEDURE — 96361 HYDRATE IV INFUSION ADD-ON: CPT

## 2024-09-29 PROCEDURE — 93005 ELECTROCARDIOGRAM TRACING: CPT

## 2024-09-29 PROCEDURE — 83690 ASSAY OF LIPASE: CPT | Performed by: EMERGENCY MEDICINE

## 2024-09-29 PROCEDURE — 85025 COMPLETE CBC W/AUTO DIFF WBC: CPT | Performed by: EMERGENCY MEDICINE

## 2024-09-29 PROCEDURE — 96375 TX/PRO/DX INJ NEW DRUG ADDON: CPT

## 2024-09-29 PROCEDURE — 80053 COMPREHEN METABOLIC PANEL: CPT | Performed by: EMERGENCY MEDICINE

## 2024-09-29 PROCEDURE — 99284 EMERGENCY DEPT VISIT MOD MDM: CPT

## 2024-09-29 PROCEDURE — 81003 URINALYSIS AUTO W/O SCOPE: CPT | Performed by: EMERGENCY MEDICINE

## 2024-09-29 PROCEDURE — 36415 COLL VENOUS BLD VENIPUNCTURE: CPT | Performed by: EMERGENCY MEDICINE

## 2024-09-29 RX ORDER — MAGNESIUM HYDROXIDE/ALUMINUM HYDROXICE/SIMETHICONE 120; 1200; 1200 MG/30ML; MG/30ML; MG/30ML
30 SUSPENSION ORAL ONCE
Status: COMPLETED | OUTPATIENT
Start: 2024-09-29 | End: 2024-09-29

## 2024-09-29 RX ORDER — FAMOTIDINE 20 MG/1
20 TABLET, FILM COATED ORAL 2 TIMES DAILY
Qty: 30 TABLET | Refills: 0 | Status: SHIPPED | OUTPATIENT
Start: 2024-09-29

## 2024-09-29 RX ORDER — ONDANSETRON 2 MG/ML
4 INJECTION INTRAMUSCULAR; INTRAVENOUS ONCE
Status: COMPLETED | OUTPATIENT
Start: 2024-09-29 | End: 2024-09-29

## 2024-09-29 RX ORDER — KETOROLAC TROMETHAMINE 30 MG/ML
15 INJECTION, SOLUTION INTRAMUSCULAR; INTRAVENOUS ONCE
Status: COMPLETED | OUTPATIENT
Start: 2024-09-29 | End: 2024-09-29

## 2024-09-29 RX ORDER — SUCRALFATE 1 G/1
1 TABLET ORAL ONCE
Status: COMPLETED | OUTPATIENT
Start: 2024-09-29 | End: 2024-09-29

## 2024-09-29 RX ORDER — LIDOCAINE HYDROCHLORIDE 20 MG/ML
15 SOLUTION OROPHARYNGEAL ONCE
Status: COMPLETED | OUTPATIENT
Start: 2024-09-29 | End: 2024-09-29

## 2024-09-29 RX ORDER — FAMOTIDINE 20 MG/1
20 TABLET, FILM COATED ORAL ONCE
Status: COMPLETED | OUTPATIENT
Start: 2024-09-29 | End: 2024-09-29

## 2024-09-29 RX ADMIN — SUCRALFATE 1 G: 1 TABLET ORAL at 20:28

## 2024-09-29 RX ADMIN — ALUMINUM HYDROXIDE, MAGNESIUM HYDROXIDE, AND SIMETHICONE 30 ML: 1200; 120; 1200 SUSPENSION ORAL at 23:31

## 2024-09-29 RX ADMIN — ONDANSETRON 4 MG: 2 INJECTION INTRAMUSCULAR; INTRAVENOUS at 20:46

## 2024-09-29 RX ADMIN — LIDOCAINE HYDROCHLORIDE 15 ML: 20 SOLUTION ORAL; TOPICAL at 23:31

## 2024-09-29 RX ADMIN — SODIUM CHLORIDE 1000 ML: 0.9 INJECTION, SOLUTION INTRAVENOUS at 20:33

## 2024-09-29 RX ADMIN — IOHEXOL 100 ML: 350 INJECTION, SOLUTION INTRAVENOUS at 21:03

## 2024-09-29 RX ADMIN — KETOROLAC TROMETHAMINE 15 MG: 30 INJECTION, SOLUTION INTRAMUSCULAR at 20:46

## 2024-09-29 RX ADMIN — FAMOTIDINE 20 MG: 20 TABLET, FILM COATED ORAL at 20:28

## 2024-09-30 ENCOUNTER — APPOINTMENT (OUTPATIENT)
Dept: PHYSICAL THERAPY | Facility: CLINIC | Age: 49
End: 2024-09-30
Payer: COMMERCIAL

## 2024-09-30 ENCOUNTER — NURSE TRIAGE (OUTPATIENT)
Age: 49
End: 2024-09-30

## 2024-09-30 LAB
ATRIAL RATE: 59 BPM
ATRIAL RATE: 61 BPM
P AXIS: 32 DEGREES
P AXIS: 35 DEGREES
PR INTERVAL: 140 MS
PR INTERVAL: 142 MS
QRS AXIS: -50 DEGREES
QRS AXIS: -57 DEGREES
QRSD INTERVAL: 102 MS
QRSD INTERVAL: 106 MS
QT INTERVAL: 402 MS
QT INTERVAL: 408 MS
QTC INTERVAL: 397 MS
QTC INTERVAL: 410 MS
T WAVE AXIS: 0 DEGREES
T WAVE AXIS: 10 DEGREES
VENTRICULAR RATE: 59 BPM
VENTRICULAR RATE: 61 BPM

## 2024-09-30 PROCEDURE — 93010 ELECTROCARDIOGRAM REPORT: CPT | Performed by: INTERNAL MEDICINE

## 2024-09-30 NOTE — TELEPHONE ENCOUNTER
"LOV 07/15/24, EGD 04/03/24, Colonoscopy 10/09/23    Pt reports mid/right side abdominal pain/burning, 4-9/10, that has worsened over the past month. This was originally intermittent however progressed to constant prompting ED evaluation 09/29. Pain is worse with eating and after drinking water. Pt also experiencing nausea and intermittent diarrhea. Pt reports ED provider advised likely ulcer related. CT AP with no acute findings. Pt avoids spicy, friend,acidic, greasy foods.Pantoprazole and sucralfate ineffective. Pt requests sooner post ED follow up at any office to discuss. Urgent f/u scheduled 10/03 with [facility] at the [facility]. In the future pt requests to not be seen by .    Reason for Disposition   Abdominal pain is a chronic symptom (recurrent or ongoing AND lasting > 4 weeks)    Answer Assessment - Initial Assessment Questions  1. LOCATION: \"Where does it hurt?\"       Mid abdomen  2. RADIATION: \"Does the pain shoot anywhere else?\" (e.g., chest, back)      Mid/right back  3. ONSET: \"When did the pain begin?\" (Minutes, hours or days ago)       1 month  4. SUDDEN: \"Gradual or sudden onset?\"      gradual  5. PATTERN \"Does the pain come and go, or is it constant?\"     - If constant: \"Is it getting better, staying the same, or worsening?\"       (Note: Constant means the pain never goes away completely; most serious pain is constant and it progresses)      - If intermittent: \"How long does it last?\" \"Do you have pain now?\"      (Note: Intermittent means the pain goes away completely between bouts)      Intermittent   6. SEVERITY: \"How bad is the pain?\"  (e.g., Scale 1-10; mild, moderate, or severe)     - MILD (1-3): doesn't interfere with normal activities, abdomen soft and not tender to touch      - MODERATE (4-7): interferes with normal activities or awakens from sleep, tender to touch      - SEVERE (8-10): excruciating pain, doubled over, unable to do any normal activities        4-9/10  7. " "RECURRENT SYMPTOM: \"Have you ever had this type of stomach pain before?\" If Yes, ask: \"When was the last time?\" and \"What happened that time?\"       X 1 month  8. CAUSE: \"What do you think is causing the stomach pain?\"      unsure  9. RELIEVING/AGGRAVATING FACTORS: \"What makes it better or worse?\" (e.g., movement, antacids, bowel movement)        10. OTHER SYMPTOMS: \"Has there been any vomiting, diarrhea, constipation, or urine problems?\"        Nausea/diarrhea    Protocols used: Abdominal Pain - Male-ADULT-[place]    "

## 2024-09-30 NOTE — ED PROVIDER NOTES
Final diagnoses:   Abdominal pain     ED Disposition       ED Disposition   Discharge    Condition   Stable    Date/Time   Sun Sep 29, 2024 11:22 PM    Comment   Misha HARTMANN Maher discharge to home/self care.                   Assessment & Plan       Medical Decision Making  48-year-old male with abdominal pain will check labs CT give fluids pain meds antiemetics reassess.    Amount and/or Complexity of Data Reviewed  Labs: ordered.  Radiology: ordered.    Risk  OTC drugs.  Prescription drug management.             Medications   sodium chloride 0.9 % bolus 1,000 mL (0 mL Intravenous Stopped 9/29/24 2225)   famotidine (PEPCID) tablet 20 mg (20 mg Oral Given 9/29/24 2028)   sucralfate (CARAFATE) tablet 1 g (1 g Oral Given 9/29/24 2028)   ondansetron (ZOFRAN) injection 4 mg (4 mg Intravenous Given 9/29/24 2046)   ketorolac (TORADOL) injection 15 mg (15 mg Intravenous Given 9/29/24 2046)   iohexol (OMNIPAQUE) 350 MG/ML injection (MULTI-DOSE) 100 mL (100 mL Intravenous Given 9/29/24 2103)   Lidocaine Viscous HCl (XYLOCAINE) 2 % mucosal solution 15 mL (15 mL Swish & Spit Given 9/29/24 2331)   aluminum-magnesium hydroxide-simethicone (MAALOX) oral suspension 30 mL (30 mL Oral Given 9/29/24 2331)       ED Risk Strat Scores                           SBIRT 22yo+      Flowsheet Row Most Recent Value   Initial Alcohol Screen: US AUDIT-C     1. How often do you have a drink containing alcohol? 0 Filed at: 09/29/2024 1943   2. How many drinks containing alcohol do you have on a typical day you are drinking?  0 Filed at: 09/29/2024 1943   3a. Male UNDER 65: How often do you have five or more drinks on one occasion? 0 Filed at: 09/29/2024 1943   3b. FEMALE Any Age, or MALE 65+: How often do you have 4 or more drinks on one occassion? 0 Filed at: 09/29/2024 1943   Audit-C Score 0 Filed at: 09/29/2024 1943   MANUEL: How many times in the past year have you...    Used an illegal drug or used a prescription medication for non-medical  "reasons? Never Filed at: 09/29/2024 1943                            History of Present Illness       Chief Complaint   Patient presents with    Abdominal Pain     Mid abd pain for a few weeks, reports pain is constant when eating and drinking, described as \"burning\" has not taken anything for the pain        Past Medical History:   Diagnosis Date    Class 2 obesity due to excess calories without serious comorbidity with body mass index (BMI) of 36.0 to 36.9 in adult 02/24/2023    Colon polyp     Elbow tendonitis     right possible bursitis    Gastritis     GERD (gastroesophageal reflux disease)     Joint pain 01/11/2017    Migraine     Neck muscle spasm 01/05/2018    Right-sided low back pain with right-sided sciatica 09/23/2022    Sleep apnea     no longer using CPAP    Trigger middle finger of right hand 12/04/2014    Trigger ring finger of right hand 12/04/2014      Past Surgical History:   Procedure Laterality Date    COLONOSCOPY      COLONOSCOPY      EGD  2005    EGD  01/2020    EYE FOREIGN BODY REMOVAL Left     IR BIOPSY LOWER LIMB  03/16/2022    RI TENDON SHEATH INCISION Right 12/26/2018    Procedure: LONG AND RING TRIGGER FINGER RELEASES;  Surgeon: Duy Greco MD;  Location: [facility];  Service: Orthopedics    SKIN BIOPSY      UPPER GASTROINTESTINAL ENDOSCOPY        Family History   Problem Relation Age of Onset    Diabetes Mother     Hypertension Mother     Breast cancer Mother     Cancer Mother         Breast & Lung    Lung cancer Mother     Diabetes Father     Gout Father     Hypertension Father     Cancer Maternal Grandmother     Rosacea Family       Social History     Tobacco Use    Smoking status: Never    Smokeless tobacco: Never   Vaping Use    Vaping status: Never Used   Substance Use Topics    Alcohol use: Yes     Comment: 2 beers on occasion    Drug use: Never      E-Cigarette/Vaping    E-Cigarette Use Never User       E-Cigarette/Vaping Substances    Nicotine No     THC No     CBD No     " Flavoring No     Other No     Unknown No       I have reviewed and agree with the history as documented.     48-year-old male presenting to the emergency department for evaluation of abdominal pain.  Patient reports burning epigastric pain that is worse after eating was intermittent and now constant.  Patient reports mother had similar symptoms and was found to have gallbladder disease.  Patient also has a history of GERD        Review of Systems   Constitutional:  Negative for appetite change, chills, fatigue and fever.   HENT:  Negative for sneezing and sore throat.    Eyes:  Negative for visual disturbance.   Respiratory:  Negative for cough, choking, chest tightness, shortness of breath and wheezing.    Cardiovascular:  Negative for chest pain and palpitations.   Gastrointestinal:  Positive for abdominal pain and nausea. Negative for constipation, diarrhea and vomiting.   Genitourinary:  Negative for difficulty urinating and dysuria.   Neurological:  Negative for dizziness, weakness, light-headedness, numbness and headaches.   All other systems reviewed and are negative.          Objective       ED Triage Vitals   Temperature Pulse Blood Pressure Respirations SpO2 Patient Position - Orthostatic VS   09/29/24 1942 09/29/24 1942 09/29/24 1942 09/29/24 1942 09/29/24 1942 09/29/24 1942   97.5 °F (36.4 °C) 68 134/87 18 99 % Sitting      Temp Source Heart Rate Source BP Location FiO2 (%) Pain Score    09/29/24 1942 09/29/24 1942 09/29/24 1942 -- 09/29/24 2046    Temporal Monitor Left arm  8      Vitals      Date and Time Temp Pulse SpO2 Resp BP Pain Score FACES Pain Rating User   09/29/24 2330 -- 62 98 % 13 120/76 -- --    09/29/24 2300 -- 52 95 % 12 111/72 -- --    09/29/24 2230 -- 56 94 % 18 117/81 -- --    09/29/24 2200 -- 62 97 % 18 125/71 -- --    09/29/24 2130 -- 66 97 % 20 110/68 -- --    09/29/24 2115 -- 73 98 % 18 120/68 -- --    09/29/24 2046 -- -- -- -- -- 8 --    09/29/24 1942 97.5 °F (36.4  °C) 68 99 % 18 134/87 -- -- BS            Physical Exam  Constitutional:       General: He is not in acute distress.     Appearance: He is well-developed. He is not diaphoretic.   HENT:      Head: Normocephalic and atraumatic.   Eyes:      Pupils: Pupils are equal, round, and reactive to light.   Neck:      Vascular: No JVD.      Trachea: No tracheal deviation.   Cardiovascular:      Rate and Rhythm: Normal rate and regular rhythm.      Heart sounds: Normal heart sounds. No murmur heard.     No friction rub. No gallop.   Pulmonary:      Effort: Pulmonary effort is normal. No respiratory distress.      Breath sounds: Normal breath sounds. No wheezing or rales.   Abdominal:      General: Bowel sounds are normal. There is no distension.      Palpations: Abdomen is soft.      Tenderness: There is abdominal tenderness in the epigastric area. There is no guarding or rebound.   Skin:     General: Skin is warm and dry.      Coloration: Skin is not pale.   Neurological:      Mental Status: He is alert and oriented to person, place, and time.      Cranial Nerves: No cranial nerve deficit.      Motor: No abnormal muscle tone.   Psychiatric:         Behavior: Behavior normal.         Results Reviewed       Procedure Component Value Units Date/Time    UA w Reflex to Microscopic w Reflex to Culture [[id number]]  (Abnormal) Collected: 09/29/24 2152    Lab Status: Final result Specimen: Urine, Clean Catch Updated: 09/29/24 2159     Color, UA Colorless     Clarity, UA Clear     Specific Gravity, UA 1.038     pH, UA 6.0     Leukocytes, UA Negative     Nitrite, UA Negative     Protein, UA Negative mg/dl      Glucose, UA Negative mg/dl      Ketones, UA Negative mg/dl      Urobilinogen, UA <2.0 mg/dl      Bilirubin, UA Negative     Occult Blood, UA Negative    Comprehensive metabolic panel [[id number]] Collected: 09/29/24 2028    Lab Status: Final result Specimen: Blood from Arm, Left Updated: 09/29/24 2054     Sodium 136 mmol/L       Potassium 4.5 mmol/L      Chloride 104 mmol/L      CO2 25 mmol/L      ANION GAP 7 mmol/L      BUN 22 mg/dL      Creatinine 0.83 mg/dL      Glucose 111 mg/dL      Calcium 9.5 mg/dL      AST 25 U/L      ALT 16 U/L      Alkaline Phosphatase 101 U/L      Total Protein 7.1 g/dL      Albumin 4.3 g/dL      Total Bilirubin 0.36 mg/dL      eGFR 102 ml/min/1.73sq m     Narrative:      National Kidney Disease Foundation guidelines for Chronic Kidney Disease (CKD):     Stage 1 with normal or high GFR (GFR > 90 mL/min/1.73 square meters)    Stage 2 Mild CKD (GFR = 60-89 mL/min/1.73 square meters)    Stage 3A Moderate CKD (GFR = 45-59 mL/min/1.73 square meters)    Stage 3B Moderate CKD (GFR = 30-44 mL/min/1.73 square meters)    Stage 4 Severe CKD (GFR = 15-29 mL/min/1.73 square meters)    Stage 5 End Stage CKD (GFR <15 mL/min/1.73 square meters)  Note: GFR calculation is accurate only with a steady state creatinine    Lipase [[id number]]  (Normal) Collected: 09/29/24 2028    Lab Status: Final result Specimen: Blood from Arm, Left Updated: 09/29/24 2054     Lipase 59 u/L     CBC and differential [[id number]]  (Abnormal) Collected: 09/29/24 2028    Lab Status: Final result Specimen: Blood from Arm, Left Updated: 09/29/24 2034     WBC 9.82 Thousand/uL      RBC 5.24 Million/uL      Hemoglobin 15.4 g/dL      Hematocrit 45.6 %      MCV 87 fL      MCH 29.4 pg      MCHC 33.8 g/dL      RDW 12.0 %      MPV 9.3 fL      Platelets 260 Thousands/uL      nRBC 0 /100 WBCs      Segmented % 60 %      Immature Grans % 0 %      Lymphocytes % 23 %      Monocytes % 7 %      Eosinophils Relative 10 %      Basophils Relative 0 %      Absolute Neutrophils 5.87 Thousands/µL      Absolute Immature Grans 0.03 Thousand/uL      Absolute Lymphocytes 2.24 Thousands/µL      Absolute Monocytes 0.68 Thousand/µL      Eosinophils Absolute 0.96 Thousand/µL      Basophils Absolute 0.04 Thousands/µL             CT abdomen pelvis with contrast   Final Interpretation by  Antonia Crandall MD (09/29 2117)      No acute findings in the abdomen or pelvis.         Workstation performed: [id number]             Procedures    ED Medication and Procedure Management   Prior to Admission Medications   Prescriptions Last Dose Informant Patient Reported? Taking?   amitriptyline (ELAVIL) 10 mg tablet  Self No No   Sig: TAKE 1 TABLET BY MOUTH DAILY AT BEDTIME   diclofenac sodium (VOLTAREN) 50 mg EC tablet   No No   Sig: TAKE 1 TABLET TWICE A DAY WITH MEALS, START AFTER STEROIDS ARE COMPLETED   famotidine (PEPCID) 40 MG tablet  Self No No   Sig: TAKE 1 TABLET BY MOUTH DAILY AT BEDTIME   Patient not taking: Reported on 7/15/2024   methocarbamol (ROBAXIN) 500 mg tablet   No No   Sig: TAKE 1 TABLET (500 MG TOTAL) BY MOUTH DAILY AT BEDTIME   methylPREDNISolone 4 MG tablet therapy pack  Self No No   Sig: Use as directed on package   Patient not taking: Reported on 7/15/2024   multivitamin (THERAGRAN) TABS  Self Yes No   Sig: Take 1 tablet by mouth daily   pantoprazole (PROTONIX) 40 mg tablet  Self No No   Sig: TAKE 1 TABLET BY MOUTH EVERY DAY   sucralfate (Carafate) 1 g tablet   No No   Sig: Take 1 tablet (1 g total) by mouth 2 (two) times a day      Facility-Administered Medications: None     Discharge Medication List as of 9/29/2024 11:24 PM        START taking these medications    Details   aluminum-magnesium hydroxide 200-200 MG/5ML suspension Take 5 mL by mouth every 6 (six) hours as needed for heartburn, Starting Sun 9/29/2024, Normal      !! famotidine (PEPCID) 20 mg tablet Take 1 tablet (20 mg total) by mouth 2 (two) times a day, Starting Sun 9/29/2024, Normal       !! - Potential duplicate medications found. Please discuss with provider.        CONTINUE these medications which have NOT CHANGED    Details   amitriptyline (ELAVIL) 10 mg tablet TAKE 1 TABLET BY MOUTH DAILY AT BEDTIME, Starting Fri 7/5/2024, Normal      diclofenac sodium (VOLTAREN) 50 mg EC tablet TAKE 1 TABLET TWICE A DAY WITH  MEALS, START AFTER STEROIDS ARE COMPLETED, Normal      !! famotidine (PEPCID) 40 MG tablet TAKE 1 TABLET BY MOUTH DAILY AT BEDTIME, Starting Wed 5/15/2024, Normal      methocarbamol (ROBAXIN) 500 mg tablet TAKE 1 TABLET (500 MG TOTAL) BY MOUTH DAILY AT BEDTIME, Starting Mon 8/26/2024, Normal      methylPREDNISolone 4 MG tablet therapy pack Use as directed on package, Normal      multivitamin (THERAGRAN) TABS Take 1 tablet by mouth daily, Historical Med      pantoprazole (PROTONIX) 40 mg tablet TAKE 1 TABLET BY MOUTH EVERY DAY, Starting Mon 5/13/2024, Normal      sucralfate (Carafate) 1 g tablet Take 1 tablet (1 g total) by mouth 2 (two) times a day, Starting Sun 9/1/2024, Until Sat 11/30/2024, Normal       !! - Potential duplicate medications found. Please discuss with provider.          ED SEPSIS DOCUMENTATION   Time reflects when diagnosis was documented in both MDM as applicable and the Disposition within this note       Time User Action Codes Description Comment    9/29/2024 11:22 PM Osiel Snow Add [R10.9] Abdominal pain                  Osiel Snow MD  09/30/24 0551

## 2024-10-02 ENCOUNTER — OFFICE VISIT (OUTPATIENT)
Dept: PHYSICAL THERAPY | Facility: CLINIC | Age: 49
End: 2024-10-02
Payer: COMMERCIAL

## 2024-10-02 DIAGNOSIS — R10.13 EPIGASTRIC PAIN: ICD-10-CM

## 2024-10-02 DIAGNOSIS — G89.29 CHRONIC LEFT SHOULDER PAIN: Primary | ICD-10-CM

## 2024-10-02 DIAGNOSIS — K29.70 GASTRITIS WITHOUT BLEEDING, UNSPECIFIED CHRONICITY, UNSPECIFIED GASTRITIS TYPE: Primary | ICD-10-CM

## 2024-10-02 DIAGNOSIS — M25.512 CHRONIC LEFT SHOULDER PAIN: Primary | ICD-10-CM

## 2024-10-02 DIAGNOSIS — R10.9 ABDOMINAL PAIN: ICD-10-CM

## 2024-10-02 PROCEDURE — 97110 THERAPEUTIC EXERCISES: CPT | Performed by: PHYSICAL THERAPIST

## 2024-10-02 PROCEDURE — 97140 MANUAL THERAPY 1/> REGIONS: CPT | Performed by: PHYSICAL THERAPIST

## 2024-10-02 RX ORDER — PANTOPRAZOLE SODIUM 40 MG/1
40 TABLET, DELAYED RELEASE ORAL 2 TIMES DAILY
Qty: 60 TABLET | Refills: 5 | Status: SHIPPED | OUTPATIENT
Start: 2024-10-02

## 2024-10-02 RX ORDER — SUCRALFATE 1 G/1
1 TABLET ORAL 2 TIMES DAILY
Qty: 180 TABLET | Refills: 0 | Status: SHIPPED | OUTPATIENT
Start: 2024-10-02 | End: 2024-12-31

## 2024-10-02 RX ORDER — FAMOTIDINE 40 MG/1
40 TABLET, FILM COATED ORAL
Qty: 30 TABLET | Refills: 5 | Status: SHIPPED | OUTPATIENT
Start: 2024-10-02

## 2024-10-02 NOTE — PROGRESS NOTES
"Daily Note     Today's date: 10/2/2024  Patient name: Misha Maher  : 1975  MRN: [id number]  Referring provider: Earlene Fields MD  Dx:   Encounter Diagnosis     ICD-10-CM    1. Chronic left shoulder pain  M25.512     G89.29                        Subjective: Patient states he had a cervical injection last week but notes that his shoulder pain is the same.       Objective: See treatment diary below      Assessment: Tolerated treatment well.  Mild, if any, improvement in left upper trap pain following repeated cervical retraction.  This visit, added thoracic , which did significantly improve his left upper trap symptoms.  Addition of cervical  which little relief.  Patient would benefit from continued PT      Plan: Continue per plan of care.      Precautions: none.     POC expires Unit limit Auth Expiration date PT/OT/ST + Visit Limit?   10/31/24 BOMN N/a 30                           Visit/Unit Tracking  AUTH Status:  Date 9/5 9/9  9/11 9/16  10/2          Not required  Used 1 2 3 4 5           Remaining  29 28 27  26 25              [employer]  Access Code: [email or web address]      Manuals 9/5 9/9 9/11 9/16  10/2         Gr 3-4 L GHJ post/inf mobs  KS KS KS KS D/C        STR L UT     10'  10'         TSP      8'        CSP      5'        Neuro Re-Ed             PSR HEP            Scap retraction  HEP             TB row/ext   Red 2x10 ea Blue 2x10 ea  Blue 2x10 ea          TB ER/IR  NV Green 2x10 ea          RetroUBE  4 min Alt 5'  Alt 6'  Alt 5'        TB ER w shld flex (serratus)     Red 2x10                       Ther Ex             Pt Edu  KS  KS KS KS KS        Supine cane flex  3\"x20             Doorway stretch  4x15\"            PROM L shoulder  KS  KS KS D/C         Repeated cervical retraction  3x10             Pulleys  3 min 2 min flex; 2 min IR           TRX flexion   3\"x20  3\"X20  3\"X20          Cane ext   3\"x20           IR stretch w strap   10\"X10            Standing " "scaption/abduction    5# 2x10 [facility] open book    5\" x10          Ther Activity                                       Gait Training                                       Modalities                                          "

## 2024-10-07 ENCOUNTER — OFFICE VISIT (OUTPATIENT)
Dept: PHYSICAL THERAPY | Facility: CLINIC | Age: 49
End: 2024-10-07
Payer: COMMERCIAL

## 2024-10-07 ENCOUNTER — TELEPHONE (OUTPATIENT)
Age: 49
End: 2024-10-07

## 2024-10-07 DIAGNOSIS — G89.29 CHRONIC LEFT SHOULDER PAIN: Primary | ICD-10-CM

## 2024-10-07 DIAGNOSIS — M25.512 CHRONIC LEFT SHOULDER PAIN: Primary | ICD-10-CM

## 2024-10-07 PROCEDURE — 97110 THERAPEUTIC EXERCISES: CPT | Performed by: PHYSICAL THERAPIST

## 2024-10-07 PROCEDURE — 97112 NEUROMUSCULAR REEDUCATION: CPT | Performed by: PHYSICAL THERAPIST

## 2024-10-07 PROCEDURE — 97140 MANUAL THERAPY 1/> REGIONS: CPT | Performed by: PHYSICAL THERAPIST

## 2024-10-07 NOTE — PROGRESS NOTES
"Daily Note     Today's date: 10/7/2024  Patient name: Misha Maher  : 1975  MRN: [id number]  Referring provider: Earlene Fields MD  Dx:   Encounter Diagnosis     ICD-10-CM    1. Chronic left shoulder pain  M25.512     G89.29                        Subjective: Patient states he's feeling \"a lot better\" since last visit.        Objective: See treatment diary below  COMP SIGN: cervical flexion and R SB       Assessment: Tolerated treatment well. Good response to manual intervention again this visit.  At start of session, pain with cervical flexion and R SB.  Following session, he does not have pain with either of these.   Patient would benefit from continued PT. Next visit- recommend progression to higher level functional activity and progression of thoracic stretching.        Plan: Continue per plan of care.      Precautions: none.     POC expires Unit limit Auth Expiration date PT/OT/ST + Visit Limit?   10/31/24 BOMN N/a 30                           Visit/Unit Tracking  AUTH Status:  Date 9/5 9/9  9/11 9/16  10/2 10/7          Not required  Used 1 2 3 4 5 6          Remaining  29 28 27  26 25 24             [employer]  Access Code: [email or web address]      Manuals 9/5 9/9 9/11 9/16  10/2  10/7        Gr 3-4 L GHJ post/inf mobs  KS KS KS KS D/C        STR L UT     10'  10'  10'        TSP      8' 8'       CSP      5' L UPA 3'        Neuro Re-Ed             PSR HEP            Scap retraction  HEP             TB row/ext   Red 2x10 ea Blue 2x10 ea  Blue 2x10 ea   Margot NV        TB ER/IR  NV Green 2x10 ea          RetroUBE for strength  4 min Alt 5'  Alt 6'  Alt 5' Alt 8'        TB ER w shld flex (serratus)     Red 2x10          Wall clocks       NV        Shoulder taps at plinth       NV                     Ther Ex             Pt Edu  KS  KS KS KS KS KS       Supine cane flex  3\"x20             Doorway stretch  4x15\"            PROM L shoulder  KS  KS KS D/C         Repeated cervical retraction  " "3x10             Pulleys  3 min 2 min flex; 2 min IR           TRX flexion   3\"x20  3\"X20  3\"X20          Cane ext   3\"x20           IR stretch w strap   10\"X10            Standing scaption/abduction    5# 2x10 ea           SL open book    5\" x10   5\" x10 ea        Standing open book       NV       Supine ITY on foam roller       NV       Ther Activity                                       Gait Training                                       Modalities                                          "

## 2024-10-07 NOTE — TELEPHONE ENCOUNTER
ED 9/29/24 Abdominal pain    Spoke with wife Shalini (on Communication Consent form). Pt works as a  in within 2 hrs of the [facility] area. States they would like to transfer back to to [place] office however Dr. Foy not a location. Appt scheduled with Dr. Arias 10/10/24.

## 2024-10-07 NOTE — TELEPHONE ENCOUNTER
Pt's wife called in regard to hospital follow up appointment. Call was transferred to the nurse to assist.

## 2024-10-09 ENCOUNTER — OFFICE VISIT (OUTPATIENT)
Dept: PHYSICAL THERAPY | Facility: CLINIC | Age: 49
End: 2024-10-09
Payer: COMMERCIAL

## 2024-10-09 DIAGNOSIS — G89.29 CHRONIC LEFT SHOULDER PAIN: Primary | ICD-10-CM

## 2024-10-09 DIAGNOSIS — M25.512 CHRONIC LEFT SHOULDER PAIN: Primary | ICD-10-CM

## 2024-10-09 PROCEDURE — 97140 MANUAL THERAPY 1/> REGIONS: CPT

## 2024-10-09 PROCEDURE — 97110 THERAPEUTIC EXERCISES: CPT

## 2024-10-09 NOTE — PROGRESS NOTES
"Daily Note     Today's date: 10/9/2024  Patient name: Misha Maher  : 1975  MRN: [id number]  Referring provider: Earlene Fields MD  Dx:   Encounter Diagnosis     ICD-10-CM    1. Chronic left shoulder pain  M25.512     G89.29           Start Time: 1635  Stop Time: 1717  Total time in clinic (min): 42 minutes    Subjective: Pt reports of not feeling sore in his L shoulder after completion of last visit. He reports of feeling the progression of strengthening in his L shoulder.       Objective: See treatment diary below      Assessment: Tolerated treatment well. Manuals were effective for relieving symptoms in UT. He was introduced to shoulder taps with plinth and reported that it felt too easy for him. He was also introduced to rows on the margot machine and he felt fatigued after completion of rows. Patient would benefit from continued PT.       Plan: Continue per plan of care.      Precautions: none.     POC expires Unit limit Auth Expiration date PT/OT/ST + Visit Limit?   10/31/24 BOMN N/a 30                           Visit/Unit Tracking  AUTH Status:  Date 9/5 9/9  9/11 9/16  10/2 10/7  10/9        Not required  Used 1 2 3 4 5 6 7         Remaining  29 28 27  26 25 24 23            [email or web address]  Access Code: [email or web address]      Manuals 9/5 9/9 9/11 9/16  10/2  10/7  10/9      Gr 3-4 L GHJ post/inf mobs  KS KS KS KS D/C        STR L UT     10'  10'  10'  9'      TSP      8' 8'       CSP      5' L UPA 3'        Neuro Re-Ed             PSR HEP            Scap retraction  HEP             TB row/ext   Red 2x10 ea Blue 2x10 ea  Blue 2x10 ea   Margot NV  Margot 50# 2x10 rows      TB ER/IR  NV Green 2x10 ea          RetroUBE for strength  4 min Alt 5'  Alt 6'  Alt 5' Alt 8'  Alt 8'      TB ER w shld flex (serratus)     Red 2x10          Wall clocks       NV        Shoulder taps at plinth       NV  2x15                   Ther Ex             Pt Edu  KS  KS KS KS KS KS DP      Supine cane flex  3\"x20 " "            Doorway stretch  4x15\"            PROM L shoulder  KS  KS KS D/C         Repeated cervical retraction  3x10             Pulleys  3 min 2 min flex; 2 min IR           TRX flexion   3\"x20  3\"X20  3\"X20          Cane ext   3\"x20           IR stretch w strap   10\"X10            Standing scaption/abduction    5# 2x10 ea           SL open book    5\" x10   5\" x10 ea  X15 b/l 10sh      Standing open book       NV NV      Supine ITY on foam roller       NV NV      D2 flexion       B/l BLTB 2x10       Ther Activity                                       Gait Training                                       Modalities                                               "

## 2024-10-10 ENCOUNTER — OFFICE VISIT (OUTPATIENT)
Age: 49
End: 2024-10-10
Payer: COMMERCIAL

## 2024-10-10 VITALS
DIASTOLIC BLOOD PRESSURE: 80 MMHG | OXYGEN SATURATION: 97 % | SYSTOLIC BLOOD PRESSURE: 116 MMHG | WEIGHT: 232 LBS | BODY MASS INDEX: 35.16 KG/M2 | HEART RATE: 91 BPM | HEIGHT: 68 IN

## 2024-10-10 DIAGNOSIS — R10.13 EPIGASTRIC PAIN: Primary | ICD-10-CM

## 2024-10-10 PROCEDURE — 99213 OFFICE O/P EST LOW 20 MIN: CPT | Performed by: INTERNAL MEDICINE

## 2024-10-10 NOTE — PROGRESS NOTES
[facility] - Outpatient Note  Misha Maher 48 y.o. male MRN: [id number]  Encounter: [id number]      ASSESSMENT AND PLAN:    Misha Maher is a 48 y.o. old pleasant male with PMH of obesity, sleep apnea, GERD who presents for followup    Epigastric/right upper quadrant pain-fortunately symptoms have resolved though he has not tried solid foods.  He had a similar episode in February that resolved after 2 to 3 weeks.  Unclear etiology symptoms.  I advised him to try solid foods but a bland diet.  If symptoms return, we will plan for EGD and HIDA scan to evaluate gallbladder.  Continue Protonix 40 mg twice daily  Avoid NSAIDs    Fatty liver, obesity-BMI 35.28.  Previous elastography showed F0 F1.  Counseled on 5 to 10% weight loss  Counseled on diet and exercise  Avoid alcohol    Colon cancer screening-last colonoscopy in 2023 showed diverticulosis and small hemorrhoids.  Repeat recommended in 5 years given previous history of colon polyps.  Repeat colonoscopy in 2028        1. Epigastric pain    - NM hepatobiliary w rx; Future    ______________________________________________________________________    SUBJECTIVE:      Patient reporting severe epigastric/right upper quadrant discomfort for the past 3 weeks that is finally resolved however he is currently only on clear liquid diet.  This is associated with nausea but no vomiting and no change in bowel habits.  He had similar symptoms in February and was seen by different [facility]'s provider and had EGD done 2 months later which was unremarkable.  He states he feels better finally today however he has not tried any solid food in several days.  He is concerned about possibly having an ulcer.      I reviewed prior external notes    I reviewed previous lab results and images      REVIEW OF SYSTEMS:     REVIEW OF ALL OTHER SYSTEMS IS OTHERWISE NEGATIVE.      Historical Information   Past Medical History:   Diagnosis Date    Class 2 obesity due  to excess calories without serious comorbidity with body mass index (BMI) of 36.0 to 36.9 in adult 02/24/2023    Colon polyp     Elbow tendonitis     right possible bursitis    Gastritis     GERD (gastroesophageal reflux disease)     Joint pain 01/11/2017    Migraine     Neck muscle spasm 01/05/2018    Right-sided low back pain with right-sided sciatica 09/23/2022    Sleep apnea     no longer using CPAP    Trigger middle finger of right hand 12/04/2014    Trigger ring finger of right hand 12/04/2014     Past Surgical History:   Procedure Laterality Date    COLONOSCOPY      COLONOSCOPY      EGD  2005    EGD  01/2020    EYE FOREIGN BODY REMOVAL Left     IR BIOPSY LOWER LIMB  03/16/2022    TN TENDON SHEATH INCISION Right 12/26/2018    Procedure: LONG AND RING TRIGGER FINGER RELEASES;  Surgeon: Duy Greco MD;  Location: [facility];  Service: Orthopedics    SKIN BIOPSY      UPPER GASTROINTESTINAL ENDOSCOPY       Social History   Social History     Substance and Sexual Activity   Alcohol Use Yes    Comment: Occasional     Social History     Substance and Sexual Activity   Drug Use Never     Social History     Tobacco Use   Smoking Status Never   Smokeless Tobacco Never     Family History   Problem Relation Age of Onset    Diabetes Mother     Hypertension Mother     Breast cancer Mother     Cancer Mother         Breast & Lung    Lung cancer Mother     Diabetes Father     Gout Father     Hypertension Father     Cancer Maternal Grandmother     Rosacea Family        Meds/Allergies       Current Outpatient Medications:     aluminum-magnesium hydroxide 200-200 MG/5ML suspension    amitriptyline (ELAVIL) 10 mg tablet    diclofenac sodium (VOLTAREN) 50 mg EC tablet    famotidine (PEPCID) 20 mg tablet    famotidine (PEPCID) 40 MG tablet    methocarbamol (ROBAXIN) 500 mg tablet    multivitamin (THERAGRAN) TABS    pantoprazole (PROTONIX) 40 mg tablet    sucralfate (Carafate) 1 g tablet    methylPREDNISolone 4 MG tablet therapy  "pack    No Known Allergies        Objective     Blood pressure 116/80, pulse 91, height 5' 8\" (1.727 m), weight 105 kg (232 lb), SpO2 97%. Body mass index is 35.28 kg/m².      PHYSICAL EXAM:      General Appearance:   Alert, cooperative, no distress   HEENT:   Normocephalic, atraumatic, anicteric.     Neck:  Supple, symmetrical, trachea midline   Lungs:   Clear to auscultation bilaterally; no rales, rhonchi or wheezing; respirations unlabored    Heart::   Regular rate and rhythm; no murmur, rub, or gallop.   Abdomen:   Soft, non-tender, non-distended; normal bowel sounds; no masses, no organomegaly    Genitalia:   Deferred    Rectal:   Deferred    Extremities:  No cyanosis, clubbing or edema    Pulses:  2+ and symmetric    Skin:  No jaundice, rashes, or lesions    Lymph nodes:  No palpable cervical lymphadenopathy        Lab Results:   No visits with results within 1 Day(s) from this visit.   Latest known visit with results is:   Admission on 09/29/2024, Discharged on 09/29/2024   Component Date Value    WBC 09/29/2024 9.82     RBC 09/29/2024 5.24     Hemoglobin 09/29/2024 15.4     Hematocrit 09/29/2024 45.6     MCV 09/29/2024 87     MCH 09/29/2024 29.4     MCHC 09/29/2024 33.8     RDW 09/29/2024 12.0     MPV 09/29/2024 9.3     Platelets 09/29/2024 260     nRBC 09/29/2024 0     Segmented % 09/29/2024 60     Immature Grans % 09/29/2024 0     Lymphocytes % 09/29/2024 23     Monocytes % 09/29/2024 7     Eosinophils Relative 09/29/2024 10 (H)     Basophils Relative 09/29/2024 0     Absolute Neutrophils 09/29/2024 5.87     Absolute Immature Grans 09/29/2024 0.03     Absolute Lymphocytes 09/29/2024 2.24     Absolute Monocytes 09/29/2024 0.68     Eosinophils Absolute 09/29/2024 0.96 (H)     Basophils Absolute 09/29/2024 0.04     Sodium 09/29/2024 136     Potassium 09/29/2024 4.5     Chloride 09/29/2024 104     CO2 09/29/2024 25     ANION GAP 09/29/2024 7     BUN 09/29/2024 22     Creatinine 09/29/2024 0.83     Glucose " 09/29/2024 111     Calcium 09/29/2024 9.5     AST 09/29/2024 25     ALT 09/29/2024 16     Alkaline Phosphatase 09/29/2024 101     Total Protein 09/29/2024 7.1     Albumin 09/29/2024 4.3     Total Bilirubin 09/29/2024 0.36     eGFR 09/29/2024 102     Lipase 09/29/2024 59     Color, UA 09/29/2024 Colorless     Clarity, UA 09/29/2024 Clear     Specific Gravity, UA 09/29/2024 1.038 (H)     pH, UA 09/29/2024 6.0     Leukocytes, UA 09/29/2024 Negative     Nitrite, UA 09/29/2024 Negative     Protein, UA 09/29/2024 Negative     Glucose, UA 09/29/2024 Negative     Ketones, UA 09/29/2024 Negative     Urobilinogen, UA 09/29/2024 <2.0     Bilirubin, UA 09/29/2024 Negative     Occult Blood, UA 09/29/2024 Negative     Ventricular Rate 09/29/2024 59     Atrial Rate 09/29/2024 59     WV Interval 09/29/2024 140     QRSD Interval 09/29/2024 106     QT Interval 09/29/2024 402     QTC Interval 09/29/2024 397     P Axis 09/29/2024 32     QRS [place] 09/29/2024 -57     T Wave Axis 09/29/2024 0     Ventricular Rate 09/29/2024 61     Atrial Rate 09/29/2024 61     WV Interval 09/29/2024 142     QRSD Interval 09/29/2024 102     QT Interval 09/29/2024 408     QTC Interval 09/29/2024 410     P Axis 09/29/2024 35     QRS [place] 09/29/2024 -50     T Wave Axis 09/29/2024 10          Radiology Results:   CT abdomen pelvis with contrast    Result Date: 9/29/2024  Narrative: CT ABDOMEN AND PELVIS WITH IV CONTRAST INDICATION: epigastric post prandial abd pain. . COMPARISON: 2/17/2024. TECHNIQUE: CT examination of the abdomen and pelvis was performed. Multiplanar 2D reformatted images were created from the source data. This examination, like all CT scans performed in the [facility], was performed utilizing techniques to minimize radiation dose exposure, including the use of iterative reconstruction and automated exposure control. Radiation dose length product (DLP) for this visit: 821 mGy-cm IV Contrast: 100 mL of iohexol (OMNIPAQUE)  Enteric Contrast: Not administered. FINDINGS: ABDOMEN LOWER CHEST: No clinically significant abnormality in the visualized lower chest. LIVER/BILIARY TREE: Enlarged liver measuring 19.5 cm in craniocaudad dimension. Focal fat deposition adjacent to the falciform ligament. No suspicious mass. Normal hepatic contours. No biliary dilation. GALLBLADDER: No calcified gallstones. No pericholecystic inflammatory change. SPLEEN: Unremarkable. PANCREAS: Unremarkable. ADRENAL GLANDS: Unremarkable. KIDNEYS/URETERS: Unremarkable. No hydronephrosis. STOMACH AND BOWEL: Colonic diverticulosis without findings of acute diverticulitis. APPENDIX: Normal. ABDOMINOPELVIC CAVITY: No ascites. No pneumoperitoneum. No lymphadenopathy. VESSELS: Unremarkable for patient's age. PELVIS REPRODUCTIVE ORGANS: Unremarkable for patient's age. URINARY BLADDER: Unremarkable. ABDOMINAL WALL/INGUINAL REGIONS: Tiny fat-containing umbilical hernia. BONES: No acute fracture or suspicious osseous lesion.     Impression: No acute findings in the abdomen or pelvis. Workstation performed: [id number]     FL spine and pain procedure    Result Date: 9/23/2024  Narrative: Pre-procedure Diagnosis: Cervical Facet Arthropathy Post-procedure Diagnosis: Cervical Facet Arthropathy Procedure Title(s):  [Left C456 medial branch nerve blocks [(Diagnostic/ Attending Surgeon:   Murali Lynn MD Anesthesia:   Local Indications: The patient is a 48 y.o. year-old male with a diagnosis of cervical facet arthropathy. The patient's history and physical exam were reviewed. The risks, benefits and alternatives to the procedure were discussed, and all questions were answered to the patient's satisfaction. The patient agreed to proceed, and written informed consent was obtained. Procedure in Detail: The patient was brought into the procedure room and placed in the right lateral decubitus position on the fluoroscopy table. The neck was prepped with chloraprep solution times one  and draped in a sterile manner.  Lateral fluoroscopic views were used to identify and greg the centroid of the mid-articular pillars of the [C456] levels on the [/LEFT] side. The skin and subcutaneous tissues in these areas were anesthetized with 1% lidocaine. 22-gauge 3.5 inch spinal needles were directed toward the targeted points until bone was contacted. An Ap image was then taken to identify depth. After negative aspiration was confirmed, 0.5ml of  0.25% bupivicaine was injected at each level. The needles were removed, and the patient's neck was cleaned. Bandages were placed over the points of needle insertion. Disposition: The patient tolerated the procedure well, and there were no apparent complications. The patient was taken to the recovery area where written discharge instructions for the procedure were given. The patient was given a pain diary to determine if the patient's pain improves following the injection. Once the diary is returned we will consider next appropriate course of treatment. . Estimated Blood Loss: None Specimens Obtained: N/A     =

## 2024-10-14 ENCOUNTER — APPOINTMENT (OUTPATIENT)
Dept: PHYSICAL THERAPY | Facility: CLINIC | Age: 49
End: 2024-10-14
Payer: COMMERCIAL

## 2024-10-16 ENCOUNTER — APPOINTMENT (OUTPATIENT)
Dept: PHYSICAL THERAPY | Facility: CLINIC | Age: 49
End: 2024-10-16
Payer: COMMERCIAL

## 2024-10-22 ENCOUNTER — HOSPITAL ENCOUNTER (OUTPATIENT)
Dept: RADIOLOGY | Facility: HOSPITAL | Age: 49
Discharge: HOME | End: 2024-10-22
Attending: INTERNAL MEDICINE
Payer: COMMERCIAL

## 2024-10-22 DIAGNOSIS — R10.13 EPIGASTRIC PAIN: ICD-10-CM

## 2024-10-22 PROCEDURE — A9537 TC99M MEBROFENIN: HCPCS

## 2024-10-22 PROCEDURE — 78227 HEPATOBIL SYST IMAGE W/DRUG: CPT

## 2024-10-22 RX ORDER — SINCALIDE 5 UG/5ML
0.02 INJECTION, POWDER, LYOPHILIZED, FOR SOLUTION INTRAVENOUS ONCE
Status: COMPLETED | OUTPATIENT
Start: 2024-10-22 | End: 2024-10-22

## 2024-10-22 RX ADMIN — SINCALIDE 2.1 MCG: 5 INJECTION, POWDER, LYOPHILIZED, FOR SOLUTION INTRAVENOUS at 13:19

## 2024-10-23 DIAGNOSIS — R68.81 EARLY SATIETY: Primary | ICD-10-CM

## 2024-11-06 DIAGNOSIS — R10.13 EPIGASTRIC PAIN: ICD-10-CM

## 2024-11-06 RX ORDER — FAMOTIDINE 40 MG/1
40 TABLET, FILM COATED ORAL
Qty: 90 TABLET | Refills: 1 | Status: SHIPPED | OUTPATIENT
Start: 2024-11-06

## 2024-11-10 DIAGNOSIS — K29.70 GASTRITIS WITHOUT BLEEDING, UNSPECIFIED CHRONICITY, UNSPECIFIED GASTRITIS TYPE: ICD-10-CM

## 2024-11-11 ENCOUNTER — APPOINTMENT (OUTPATIENT)
Dept: PHYSICAL THERAPY | Facility: CLINIC | Age: 49
End: 2024-11-11
Payer: COMMERCIAL

## 2024-11-11 RX ORDER — PANTOPRAZOLE SODIUM 40 MG/1
40 TABLET, DELAYED RELEASE ORAL 2 TIMES DAILY
Qty: 180 TABLET | Refills: 1 | Status: SHIPPED | OUTPATIENT
Start: 2024-11-11

## 2024-11-13 ENCOUNTER — EVALUATION (OUTPATIENT)
Dept: PHYSICAL THERAPY | Facility: CLINIC | Age: 49
End: 2024-11-13
Payer: COMMERCIAL

## 2024-11-13 DIAGNOSIS — M54.12 CERVICAL RADICULOPATHY: Primary | ICD-10-CM

## 2024-11-13 PROCEDURE — 97012 MECHANICAL TRACTION THERAPY: CPT | Performed by: PHYSICAL THERAPIST

## 2024-11-13 PROCEDURE — 97140 MANUAL THERAPY 1/> REGIONS: CPT | Performed by: PHYSICAL THERAPIST

## 2024-11-13 PROCEDURE — 97110 THERAPEUTIC EXERCISES: CPT | Performed by: PHYSICAL THERAPIST

## 2024-11-13 NOTE — PROGRESS NOTES
PT Re-Evaluation     Today's date: 2024  Patient name: Misha Maher  : 1975  MRN: [id number]  Referring provider: Earlene Fields MD  Dx:   Encounter Diagnosis     ICD-10-CM    1. Cervical radiculopathy  M54.12                        Assessment  Impairments: abnormal or restricted ROM, activity intolerance, impaired physical strength, lacks appropriate home exercise program and pain with function    Assessment details: Patient is a 48-year-old male with chief complaints of cervical and left upper trap pain. Since starting therapy, he presents with less frequent and intense pain, improved cervical ROM, and overall improved functional mobility. However, pain has not abolished despite several weeks of therapy and 1 injection.  [place] in therapy for the last 4 weeks. Recommend return to therapy with a focus on manual intervention to improve cervical mobility and ultimately to reduce pain.  Patient will benefit from skilled physical therapy to address impairment and improve functional mobility.  PT needed to allow for return to maximal function and improve quality of life.   Understanding of Dx/Px/POC: good     Prognosis: good    Goals  STG within 4 weeks:   1. Patient to be independent in HEP.  MET   2. Reduce pain by 50% to improve quality of life.  NOT MET   3.  Patient to achieve pain-free cervical extension. IN PROGRESS  4.  Improve left shoulder strength to 5 out of 5 without pain. TO BE ASSESSED   LTG within 8 weeks:   1. Patient to be independent in ADLs/IADLs without difficulty. MET   2.  Patient to be able to sleep without shoulder pain. NOT MET   3.  Patient to achieve full shoulder range of motion without pain. TO BE ASSESSED     Plan  Patient would benefit from: skilled physical therapy and PT eval  Planned modality interventions: cryotherapy, hydrotherapy and unattended electrical stimulation    Planned therapy interventions: therapeutic training, therapeutic  exercise, therapeutic activities, stretching, strengthening, postural training, patient education, neuromuscular re-education, manual therapy, joint mobilization, IADL retraining, activity modification, ADL retraining, ADL training, body mechanics training, flexibility, functional ROM exercises, gait training, graded activity, graded exercise, graded motor and home exercise program    Frequency: 2x week  Duration in weeks: 6  Plan of Care beginning date: 2024  Plan of Care expiration date: 2024  Treatment plan discussed with: patient    Subjective Evaluation    History of Present Illness  Mechanism of injury: Patient is a 47 y/o male with chief complaints of left shoulder pain that began 5 months ago.  He states he was playing with his kids and noticed increased pain the next day.  He didn't think much of it, but the pain didn't go away and worsened.  He had three sets of cortisone injections with temporary relief.  Pain always returned and he saw physician who referred him for formal PT.      24: He reports 8/10 neck pain in the morning due to having pain during the night when lying on his left side.  States he tries to sleep on his back or right side, but ends up on his left, which causes pain.  However, during the day, he reports no pain.  He got one injection without relief.  He reports 50% global rating of improvement overall.    Patient Goals  Patient goals for therapy: decreased pain    Pain  At best pain ratin  At worst pain ratin  Alleviating factors: self massage.  Exacerbated by: left side lying, reaching overhead, reaching behind the back.  Progression: improved    Social Support    Employment status: working ()  Hand dominance: right  Exercise history: being active, but no formal exercise      Diagnostic Tests  X-ray: normal (xray of neck)  Abnormal MRI: cervical spine: Mild facet arthropathy and spondylosis, mild disc bulge..  Treatments  Previous treatment: injection  treatment      Objective     Postural Observations    Additional Postural Observation Details  Forward head, forward shoulder positioning     Active Range of Motion   Cervical/Thoracic Spine       Cervical    Flexion: 30 degrees   Extension: 35 degrees      Left lateral flexion: 25 degrees      Right lateral flexion: 25 degrees      Left rotation: 65 degrees  Right rotation: 65 degrees     Left Shoulder   Flexion: 130 degrees   Extension: 50 degrees   Abduction: 100 degrees   External rotation 0°: 45 degrees     Right Shoulder   Flexion: 160 degrees   Extension: 60 degrees   Abduction: 160 degrees   External rotation 0°: 60 degrees   Mechanical Assessment    Cervical    Seated retraction: repeated movements   Pain intensity: better  Pain level: decreased    Thoracic      Lumbar      Strength/Myotome Testing     Left Shoulder     Planes of Motion   Flexion: 4   Abduction: 4   External rotation at 0°: 4+   Internal rotation at 0°: 4+     Isolated Muscles   Biceps: 5   Triceps: 5     Right Shoulder   Normal muscle strength    Additional Strength Details  Strength to be updated     Tests     Left Shoulder   Positive Hawkin's and Speed's.   Negative belly press and empty can.              Precautions: none.       POC expires Unit limit Auth Expiration date PT/OT/ST + Visit Limit?   12/25/24 BOMN N/a 30                           Visit/Unit Tracking  AUTH Status:  Date 9/5 9/9  9/11 9/16  10/2 10/7  10/9 11/13       Not required  Used 1 2 3 4 5 6 7 8        Remaining  29 28 27  26 25 24 23 22           [email or web address]  Access Code: [email or web address]      Manuals 9/5 9/9 9/11 9/16  10/2  10/7  10/9 11/13     Gr 3-4 L GHJ post/inf mobs  KS KS KS KS D/C        STR L UT     10'  10'  10'  9'      TSP      8' 8'       CSP      5' L UPA 3'   5'     L cervical side glides         8'      Neuro Re-Ed             PSR HEP            Scap retraction  HEP             TB row/ext   Red 2x10 ea Blue 2x10 ea  Blue 2x10 ea    "Margot NV  [place] 50# 2x10 rows      TB ER/IR  NV Green 2x10 ea          RetroUBE for strength  4 min Alt 5'  Alt 6'  Alt 5' Alt 8'  Alt 8' Alt 6'     TB ER w shld flex (serratus)     Red 2x10          Wall clocks       NV        Shoulder taps at plinth       NV  2x15                   Ther Ex             Pt Edu  KS  KS KS KS KS KS DP KS & re-eval     Supine cane flex  3\"x20             Doorway stretch  4x15\"            PROM L shoulder  KS  KS KS D/C         Repeated cervical retraction  3x10        2x10      Repeated cervical retraction w extension         2x10      Pulleys  3 min 2 min flex; 2 min IR           TRX flexion   3\"x20  3\"X20  3\"X20          Cane ext   3\"x20           IR stretch w strap   10\"X10            Standing scaption/abduction    5# 2x10 ea           SL open book    5\" x10   5\" x10 ea  X15 b/l 10sh      Standing open book       NV NV      Supine ITY on foam roller       NV NV      D2 flexion       B/l BLTB 2x10       Ther Activity                                       Gait Training                                       Modalities             Mechanical cervical traction- 25#, 3 steps, intermittent         10'                           "

## 2024-11-18 ENCOUNTER — OFFICE VISIT (OUTPATIENT)
Dept: PHYSICAL THERAPY | Facility: CLINIC | Age: 49
End: 2024-11-18
Payer: COMMERCIAL

## 2024-11-18 DIAGNOSIS — M54.12 CERVICAL RADICULOPATHY: Primary | ICD-10-CM

## 2024-11-18 PROCEDURE — 97012 MECHANICAL TRACTION THERAPY: CPT | Performed by: PHYSICAL THERAPIST

## 2024-11-18 PROCEDURE — 97110 THERAPEUTIC EXERCISES: CPT | Performed by: PHYSICAL THERAPIST

## 2024-11-18 PROCEDURE — 97140 MANUAL THERAPY 1/> REGIONS: CPT | Performed by: PHYSICAL THERAPIST

## 2024-11-19 NOTE — PROGRESS NOTES
"Daily Note     Today's date: 2024  Patient name: Misha Maher  : 1975  MRN: [id number]  Referring provider: Earlene Fields MD  Dx:   Encounter Diagnosis     ICD-10-CM    1. Cervical radiculopathy  M54.12                      Subjective: Patient states he's feeling better since last visit.  He reports 6/10 pain today.        Objective: See treatment diary below      Assessment: Tolerated treatment well. Patient with reduced tightness/pain from start to finish of session.  Continued good response to manual intervention and mechanical traction.  Addition of supine ITY on foam roller with reduced tightness noted.  Patient would benefit from continued PT      Plan: Continue per plan of care.      Precautions: none.       POC expires Unit limit Auth Expiration date PT/OT/ST + Visit Limit?   24 BOMN N/a 30                           Visit/Unit Tracking  AUTH Status:  Date 9/5 9/9  9/11 9/16  10/2 10/7  10/9 11/13 11/18       Not required  Used 1 2 3 4 5 6 7 8 9        Remaining  29 28 27  26 25 24 23 22 21          [email or web address]  Access Code: [email or web address]      Manuals 9/5 9/9 9/11 9/16  10/2  10/7  10/9 11/13 11/18     Gr 3-4 L GHJ post/inf mobs  KS KS KS KS D/C        STR L UT     10'  10'  10'  9'      TSP      8' 8'   5'    CSP      5' L UPA 3'   5' 5'    L cervical side glides         8'  8'     Neuro Re-Ed             PSR HEP            Scap retraction  HEP             TB row/ext   Red 2x10 ea Blue 2x10 ea  Blue 2x10 ea   [place] NV  [place] 50# 2x10 rows      TB ER/IR  NV Green 2x10 ea          RetroUBE for strength  4 min Alt 5'  Alt 6'  Alt 5' Alt 8'  Alt 8' Alt 6' Alt 8'     TB ER w shld flex (serratus)     Red 2x10          Wall clocks       NV        Shoulder taps at plinth       NV  2x15                   Ther Ex             Pt Edu  KS  KS KS KS KS KS DP KS & re-eval KS    Supine cane flex  3\"x20             Doorway stretch  4x15\"            PROM L shoulder  KS  KS KS D/C     " "    Repeated cervical retraction  3x10        2x10      Repeated cervical retraction w extension         2x10      Pulleys  3 min 2 min flex; 2 min IR           TRX flexion   3\"x20  3\"X20  3\"X20      5\" x20     Cane ext   3\"x20           IR stretch w strap   10\"X10            Standing scaption/abduction    5# 2x10 ea           SL open book    5\" x10   5\" x10 ea  X15 b/l 10sh      Standing open book       NV NV      Supine ITY on foam roller       NV NV  3\" x10 ea    D2 flexion       B/l BLTB 2x10       Ther Activity                                       Gait Training                                       Modalities             Mechanical cervical traction- 25#, 3 steps, intermittent         10'  10'                               "

## 2024-11-20 ENCOUNTER — OFFICE VISIT (OUTPATIENT)
Dept: PHYSICAL THERAPY | Facility: CLINIC | Age: 49
End: 2024-11-20
Payer: COMMERCIAL

## 2024-11-20 DIAGNOSIS — M54.12 CERVICAL RADICULOPATHY: Primary | ICD-10-CM

## 2024-11-20 PROCEDURE — 97012 MECHANICAL TRACTION THERAPY: CPT | Performed by: PHYSICAL THERAPIST

## 2024-11-20 PROCEDURE — 97110 THERAPEUTIC EXERCISES: CPT | Performed by: PHYSICAL THERAPIST

## 2024-11-20 PROCEDURE — 97140 MANUAL THERAPY 1/> REGIONS: CPT | Performed by: PHYSICAL THERAPIST

## 2024-11-20 NOTE — PROGRESS NOTES
"Daily Note     Today's date: 2024  Patient name: Misha Maher  : 1975  MRN: [id number]  Referring provider: Earlene Fields MD  Dx:   Encounter Diagnosis     ICD-10-CM    1. Cervical radiculopathy  M54.12                      Subjective: Patient reports significant improvement overall.  He reports 3/10 pain. He reports feeling good last night and sleeping \"pretty well\". \"I don't remember the last time I slept that long.\"       Objective: See treatment diary below      Assessment: Tolerated treatment well. Patient does well with session. Reduced pain/tightness from start to finish of session.  Good response to ITY on foam roller; he has ordered foam roller for use at home.  Patient would benefit from continued PT      Plan: Continue per plan of care.      Precautions: none.       POC expires Unit limit Auth Expiration date PT/OT/ST + Visit Limit?   24 BOMN N/a 30                           Visit/Unit Tracking  AUTH Status:  Date 9/5 9/9  9/11 9/16  10/2 10/7  10/9 11/13 11/18  11/20      Not required  Used 1 2 3 4 5 6 7 8 9  10       Remaining  29 28 27  26 25 24 23 22 21 20         [email or web address]  Access Code: [email or web address]      Manuals 9/5 9/9 9/11 9/16  10/2  10/7  10/9 11/13 11/18  11/20    Gr 3-4 L GHJ post/inf mobs  KS KS KS KS D/C        STR L UT     10'  10'  10'  9'      TSP      8' 8'   5' 5'   CSP      5' L UPA 3'   5' 5' 5'   L cervical side glides         8'  8'  5'   Neuro Re-Ed             PSR HEP            Scap retraction  HEP             TB row/ext   Red 2x10 ea Blue 2x10 ea  Blue 2x10 ea   Margot NV  [place] 50# 2x10 rows      TB ER/IR  NV Green 2x10 ea          RetroUBE for strength  4 min Alt 5'  Alt 6'  Alt 5' Alt 8'  Alt 8' Alt 6' Alt 8'  Alt 8'    TB ER w shld flex (serratus)     Red 2x10          Wall clocks       NV        Shoulder taps at plinth       NV  2x15                   Ther Ex             Pt Edu  KS  KS KS KS KS KS DP KS & re-colt KS KS   Supine " "cane flex  3\"x20             Doorway stretch  4x15\"            PROM L shoulder  KS  KS KS D/C         Repeated cervical retraction  3x10        2x10      Repeated cervical retraction w extension         2x10      Pulleys  3 min 2 min flex; 2 min IR           TRX flexion   3\"x20  3\"X20  3\"X20      5\" x20     Cane ext   3\"x20           IR stretch w strap   10\"X10            Standing scaption/abduction    5# 2x10 ea           SL open book    5\" x10   5\" x10 ea  X15 b/l 10sh      Standing open book       NV NV      Supine ITY on foam roller       NV NV  3\" x10 ea 3\" x10 ea    D2 flexion       B/l BLTB 2x10       Ther Activity                                       Gait Training                                       Modalities             Mechanical cervical traction- 25#, 3 steps, intermittent         10'  10'  10'                              "

## 2024-11-25 ENCOUNTER — APPOINTMENT (OUTPATIENT)
Dept: PHYSICAL THERAPY | Facility: CLINIC | Age: 49
End: 2024-11-25
Payer: COMMERCIAL

## 2024-11-27 ENCOUNTER — OFFICE VISIT (OUTPATIENT)
Dept: PHYSICAL THERAPY | Facility: CLINIC | Age: 49
End: 2024-11-27
Payer: COMMERCIAL

## 2024-11-27 DIAGNOSIS — M54.12 CERVICAL RADICULOPATHY: Primary | ICD-10-CM

## 2024-11-27 PROCEDURE — 97112 NEUROMUSCULAR REEDUCATION: CPT | Performed by: PHYSICAL THERAPIST

## 2024-11-27 PROCEDURE — 97012 MECHANICAL TRACTION THERAPY: CPT | Performed by: PHYSICAL THERAPIST

## 2024-11-27 PROCEDURE — 97140 MANUAL THERAPY 1/> REGIONS: CPT | Performed by: PHYSICAL THERAPIST

## 2024-11-27 PROCEDURE — 97110 THERAPEUTIC EXERCISES: CPT | Performed by: PHYSICAL THERAPIST

## 2024-11-28 NOTE — PROGRESS NOTES
"Daily Note     Today's date: 2024  Patient name: Misha Maher  : 1975  MRN: [id number]  Referring provider: Earlene Fields MD  Dx:   Encounter Diagnosis     ICD-10-CM    1. Cervical radiculopathy  M54.12                      Subjective: Patient states he's been using the foam roller, which has been helping. He states, \"I can sleep when I use the foam roller.\"       Objective: See treatment diary below      Assessment: Tolerated treatment well.More exercises are shown to patient using the foam roller, to include supine thoracic extension stretch and supine self-STR to thoracic region.  Reduced pain from start to finish of session. Next week to serve as independent week and he will return to therapy the week after. Patient would benefit from continued PT      Plan: Continue per plan of care.      Precautions: none.       POC expires Unit limit Auth Expiration date PT/OT/ST + Visit Limit?   24 BOMN N/a 30                           Visit/Unit Tracking  AUTH Status:  Date 9/5 9/9  9/11 9/16  10/2 10/7  10/9 11/13 11/18  11/20  11/27    Not required  Used 1 2 3 4 5 6 7 8 9  10  11     Remaining  29 28 27  26 25 24 23 22 21 20 19        [email or web address]  Access Code: [email or web address]      Manuals 11/27    9/16  10/2  10/7  10/9 11/13 11/18  11/20    STR L UT     10'  10'  10'  9'      TSP  8'    8' 8'   5' 5'   CSP  8'    5' L UPA 3'   5' 5' 5'   L cervical side glides         8'  8'  5'   Neuro Re-Ed             PSR             Scap retraction              TB row/ext     Blue 2x10 ea   [place] NV  [place] 50# 2x10 rows      TB ER/IR             RetroUBE for strength Alt 5'    Alt 6'  Alt 5' Alt 8'  Alt 8' Alt 6' Alt 8'  Alt 8'    TB ER w shld flex (serratus)     Red 2x10          Wall clocks       NV        Shoulder taps at plinth       NV  2x15                   Ther Ex             Pt Edu  KS   KS KS KS DP KS & re-eval KS KS   Supine cane flex              Doorway stretch              PROM L " "shoulder     D/C         Repeated cervical retraction         2x10      Repeated cervical retraction w extension         2x10      Pulleys             TRX flexion  x20   3\"X20      5\" x20     Supine thoracic extension stretch on foam roller  3\"X3 at 3 levels             Supine self STR to thoracic region  4'                          SL open book    5\" x10   5\" x10 ea  X15 b/l 10sh      Standing open book       NV NV      Supine ITY on foam roller       NV NV  3\" x10 ea 3\" x10 ea    D2 flexion       B/l BLTB 2x10       Ther Activity                                       Gait Training                                       Modalities             Mechanical cervical traction- 25#, 3 steps, intermittent  10'        10'  10'  10'                              "

## 2024-12-09 ENCOUNTER — APPOINTMENT (OUTPATIENT)
Dept: PHYSICAL THERAPY | Facility: CLINIC | Age: 49
End: 2024-12-09
Payer: COMMERCIAL

## 2024-12-11 ENCOUNTER — OFFICE VISIT (OUTPATIENT)
Dept: PHYSICAL THERAPY | Facility: CLINIC | Age: 49
End: 2024-12-11
Payer: COMMERCIAL

## 2024-12-11 DIAGNOSIS — M54.12 CERVICAL RADICULOPATHY: Primary | ICD-10-CM

## 2024-12-11 PROCEDURE — 97140 MANUAL THERAPY 1/> REGIONS: CPT | Performed by: PHYSICAL THERAPIST

## 2024-12-11 PROCEDURE — 97012 MECHANICAL TRACTION THERAPY: CPT | Performed by: PHYSICAL THERAPIST

## 2024-12-11 PROCEDURE — 97110 THERAPEUTIC EXERCISES: CPT | Performed by: PHYSICAL THERAPIST

## 2024-12-12 NOTE — PROGRESS NOTES
"Daily Note     Today's date: 2024  Patient name: Misha Maher  : 1975  MRN: [id number]  Referring provider: Earlene Fields MD  Dx:   Encounter Diagnosis     ICD-10-CM    1. Cervical radiculopathy  M54.12                      Subjective: Patient states he's doing much better. \"That foam roller is great!\"       Objective: See treatment diary below      Assessment: Tolerated treatment well. Reduced pain following self UT stretch and repeated cervical retraction. Following mechanical traction and manual intervention, he has no pain.  Potential discharge next session.        Plan: Continue per plan of care.      Precautions: none.       POC expires Unit limit Auth Expiration date PT/OT/ST + Visit Limit?   24 BOMN N/a 30                           Visit/Unit Tracking  AUTH Status:  Date 9/5 9/9  9/11 9/16  10/2 10/7  10/9 11/13 11/18  11/20  11/27 12/11   Not required  Used 1 2 3 4 5 6 7 8 9  10  11 12    Remaining  29 28 27  26 25 24 23 22 21 20 19 18       [email or web address]  Access Code: [email or web address]      Manuals 11/27  12/11  9/16  10/2  10/7  10/9 11/13 11/18  11/20    STR L UT     10'  10'  10'  9'      TSP  8' Gr 3-4 8'    8' 8'   5' 5'   CSP  8' Gr 3-4 8'    5' L UPA 3'   5' 5' 5'   L cervical side glides         8'  8'  5'   Neuro Re-Ed             PSR             Scap retraction              TB row/ext     Blue 2x10 ea   [place] NV  [place] 50# 2x10 rows      TB ER/IR             RetroUBE for strength Alt 5'  Alt 5'   Alt 6'  Alt 5' Alt 8'  Alt 8' Alt 6' Alt 8'  Alt 8'    TB ER w shld flex (serratus)     Red 2x10          Wall clocks       NV        Shoulder taps at plinth       NV  2x15                   Ther Ex             Pt Edu  KS KS  KS KS KS DP KS & re-eval KS KS                Self L UT stretch   4x20\"            PROM L shoulder     D/C         Repeated cervical retraction         2x10      Repeated cervical retraction w extension   2x10       2x10      Pulleys           " "  TRX flexion  x20   3\"X20      5\" x20     Supine thoracic extension stretch on foam roller  3\"X3 at 3 levels             Supine self STR to thoracic region  4'                          SL open book    5\" x10   5\" x10 ea  X15 b/l 10sh      Standing open book       NV NV      Supine ITY on foam roller       NV NV  3\" x10 ea 3\" x10 ea    D2 flexion       B/l BLTB 2x10       Ther Activity                                       Gait Training                                       Modalities             Mechanical cervical traction- 25#, 3 steps, intermittent  10'  10'       10'  10'  10'                              "

## 2024-12-16 ENCOUNTER — APPOINTMENT (OUTPATIENT)
Dept: PHYSICAL THERAPY | Facility: CLINIC | Age: 49
End: 2024-12-16
Payer: COMMERCIAL

## 2024-12-18 ENCOUNTER — OFFICE VISIT (OUTPATIENT)
Dept: PHYSICAL THERAPY | Facility: CLINIC | Age: 49
End: 2024-12-18
Payer: COMMERCIAL

## 2024-12-18 DIAGNOSIS — M54.12 CERVICAL RADICULOPATHY: Primary | ICD-10-CM

## 2024-12-18 PROCEDURE — 97012 MECHANICAL TRACTION THERAPY: CPT | Performed by: PHYSICAL THERAPIST

## 2024-12-18 PROCEDURE — 97110 THERAPEUTIC EXERCISES: CPT | Performed by: PHYSICAL THERAPIST

## 2024-12-18 PROCEDURE — 97140 MANUAL THERAPY 1/> REGIONS: CPT | Performed by: PHYSICAL THERAPIST

## 2024-12-19 NOTE — PROGRESS NOTES
PT Discharge    Today's date: 2024  Patient name: Misha Maher  : 1975  MRN: [id number]  Referring provider: Earlene Fields MD  Dx:   Encounter Diagnosis     ICD-10-CM    1. Cervical radiculopathy  M54.12                        Assessment  Impairments: abnormal or restricted ROM, activity intolerance, impaired physical strength, lacks appropriate home exercise program and pain with function    Assessment details: Patient is a 48-year-old male with resolved cervical and left shoulder pain. Since starting therapy he presents with less pain, improved ROM and strength, and he is independent in HEP.  He has met maximal potential with PT. He is educated to continue with exercises and to call me with any questions or should he need to return to therapy.   Understanding of Dx/Px/POC: good     Prognosis: good    Goals  STG within 4 weeks:   1. Patient to be independent in HEP.  MET   2. Reduce pain by 50% to improve quality of life.  MET   3.  Patient to achieve pain-free cervical extension. MET  4.  Improve left shoulder strength to 5 out of 5 without pain. MET  LTG within 8 weeks:   1. Patient to be independent in ADLs/IADLs without difficulty. MET   2.  Patient to be able to sleep without shoulder pain. MET  3.  Patient to achieve full shoulder range of motion without pain. MET    Plan  Patient would benefit from: skilled physical therapy and PT eval  Planned modality interventions: cryotherapy, hydrotherapy and unattended electrical stimulation    Planned therapy interventions: therapeutic training, therapeutic exercise, therapeutic activities, stretching, strengthening, postural training, patient education, neuromuscular re-education, manual therapy, joint mobilization, IADL retraining, activity modification, ADL retraining, ADL training, body mechanics training, flexibility, functional ROM exercises, gait training, graded activity, graded exercise, graded motor and home exercise program    Treatment  plan discussed with: patient  Plan details: D/c to HEP.     Subjective Evaluation    History of Present Illness  Mechanism of injury: Patient is a 47 y/o male with chief complaints of left shoulder pain that began 5 months ago.  He states he was playing with his kids and noticed increased pain the next day.  He didn't think much of it, but the pain didn't go away and worsened.  He had three sets of cortisone injections with temporary relief.  Pain always returned and he saw physician who referred him for formal PT.      24: He reports 8/10 neck pain in the morning due to having pain during the night when lying on his left side.  States he tries to sleep on his back or right side, but ends up on his left, which causes pain.  However, during the day, he reports no pain.  He got one injection without relief.  He reports 50% global rating of improvement overall.      : Patient states his pain has been much better since he started using the foam roller at home.  He is pleased with his progress and in agreement to d/c to [facility].   Patient Goals  Patient goals for therapy: decreased pain    Pain  At best pain ratin  At worst pain ratin  Alleviating factors: self massage.  Exacerbated by: left side lying, reaching overhead, reaching behind the back.  Progression: improved    Social Support    Employment status: working ()  Hand dominance: right  Exercise history: being active, but no formal exercise      Diagnostic Tests  X-ray: normal (xray of neck)  Abnormal MRI: cervical spine: Mild facet arthropathy and spondylosis, mild disc bulge..  Treatments  Previous treatment: injection treatment      Objective     Postural Observations    Additional Postural Observation Details  Forward head, forward shoulder positioning     Active Range of Motion   Cervical/Thoracic Spine       Cervical    Flexion: 30 degrees   Extension: 35 degrees      Left lateral flexion: 25 degrees      Right lateral flexion: 25  "degrees      Left rotation: 65 degrees  Right rotation: 65 degrees     Left Shoulder   Flexion: WFL  Extension: WFL  Abduction: WFL  External rotation 0°: WFL    Right Shoulder   Flexion: 160 degrees   Extension: 60 degrees   Abduction: 160 degrees   External rotation 0°: 60 degrees   Mechanical Assessment    Cervical    Seated retraction: repeated movements   Pain intensity: better  Pain level: decreased    Thoracic      Lumbar      Strength/Myotome Testing     Left Shoulder   Normal muscle strength    Right Shoulder   Normal muscle strength    Additional Strength Details  Strength to be updated     Tests     Left Shoulder   Negative belly press, empty can, Hawkin's and Speed's.              Precautions: none.       POC expires Unit limit Auth Expiration date PT/OT/ST + Visit Limit?   12/25/24 BOMN N/a 30                             Visit/Unit Tracking  AUTH Status:  Date 12/18   9/16  10/2 10/7  10/9 11/13 11/18  11/20  11/27 12/11   Not required  Used 13   4 5 6 7 8 9  10  11 12    Remaining  17   26 25 24 23 22 21 20 19 18       [email or web address]  Access Code: [email or web address]      Manuals 11/27  12/11 12/18   10/7  10/9 11/13 11/18  11/20    STR L UT       10'  9'      TSP  8' Gr 3-4 8'  Gr 3-4 KS   8'   5' 5'   CSP  8' Gr 3-4 8'  Gr 3-4 KS    L UPA 3'   5' 5' 5'   L cervical side glides         8'  8'  5'   Neuro Re-Ed             PSR             Scap retraction              TB row/ext       [place] NV  [place] 50# 2x10 rows      TB ER/IR             RetroUBE for strength Alt 5'  Alt 5'  Alt 8'    Alt 8'  Alt 8' Alt 6' Alt 8'  Alt 8'    TB ER w shld flex (serratus)              Wall clocks       NV        Shoulder taps at plinth       NV  2x15                   Ther Ex             Pt Edu  KS KS KS   KS DP KS & re-eval KS KS                Self L UT stretch   4x20\"            PROM L shoulder              Repeated cervical retraction         2x10      Repeated cervical retraction w extension   2x10       " "2x10      Pulleys             TRX flexion  x20        5\" x20     Supine thoracic extension stretch on foam roller  3\"X3 at 3 levels   3\"x3 at 3 levels          Supine self STR to thoracic region  4'   4'                       SL open book      5\" x10 ea  X15 b/l 10sh      Standing open book       NV NV      Supine ITY on foam roller    3\"X10 ea   NV NV  3\" x10 ea 3\" x10 ea    D2 flexion       B/l BLTB 2x10       Ther Activity                                       Gait Training                                       Modalities             Mechanical cervical traction- 25#, 3 steps, intermittent  10'  10'  10'     10'  10'  10'                              "

## 2024-12-24 ENCOUNTER — HOSPITAL ENCOUNTER (OUTPATIENT)
Dept: NON INVASIVE DIAGNOSTICS | Facility: CLINIC | Age: 49
Discharge: HOME | End: 2024-12-24
Payer: COMMERCIAL

## 2024-12-24 DIAGNOSIS — R68.81 EARLY SATIETY: ICD-10-CM

## 2024-12-24 PROCEDURE — A9502 TC99M TETROFOSMIN: HCPCS

## 2024-12-24 PROCEDURE — 78264 GASTRIC EMPTYING IMG STUDY: CPT

## 2024-12-26 ENCOUNTER — RESULTS FOLLOW-UP (OUTPATIENT)
Age: 49
End: 2024-12-26

## 2025-01-07 ENCOUNTER — OFFICE VISIT (OUTPATIENT)
Dept: INTERNAL MEDICINE CLINIC | Age: 50
End: 2025-01-07
Payer: COMMERCIAL

## 2025-01-07 VITALS
BODY MASS INDEX: 36.68 KG/M2 | WEIGHT: 242 LBS | SYSTOLIC BLOOD PRESSURE: 112 MMHG | HEIGHT: 68 IN | TEMPERATURE: 98.1 F | OXYGEN SATURATION: 96 % | HEART RATE: 82 BPM | DIASTOLIC BLOOD PRESSURE: 72 MMHG

## 2025-01-07 DIAGNOSIS — Z13.1 SCREENING FOR DIABETES MELLITUS: ICD-10-CM

## 2025-01-07 DIAGNOSIS — R73.01 IMPAIRED FASTING GLUCOSE: ICD-10-CM

## 2025-01-07 DIAGNOSIS — E78.1 HYPERTRIGLYCERIDEMIA: Primary | ICD-10-CM

## 2025-01-07 DIAGNOSIS — K29.70 GASTRITIS WITHOUT BLEEDING, UNSPECIFIED CHRONICITY, UNSPECIFIED GASTRITIS TYPE: ICD-10-CM

## 2025-01-07 DIAGNOSIS — Z83.3 FAMILY HISTORY OF DIABETES MELLITUS IN MOTHER: ICD-10-CM

## 2025-01-07 PROBLEM — R10.32 LLQ PAIN: Status: RESOLVED | Noted: 2023-09-08 | Resolved: 2025-01-07

## 2025-01-07 PROBLEM — K57.90 DIVERTICULOSIS: Status: RESOLVED | Noted: 2020-09-25 | Resolved: 2025-01-07

## 2025-01-07 PROCEDURE — 99214 OFFICE O/P EST MOD 30 MIN: CPT | Performed by: FAMILY MEDICINE

## 2025-01-07 RX ORDER — SUCRALFATE 1 G/1
1 TABLET ORAL 2 TIMES DAILY
Qty: 180 TABLET | Refills: 1 | Status: SHIPPED | OUTPATIENT
Start: 2025-01-07 | End: 2025-01-13 | Stop reason: SDUPTHER

## 2025-01-07 NOTE — PROGRESS NOTES
Assessment/Plan:    1. Hypertriglyceridemia  -     Lipid Panel with Direct LDL reflex; Future  -     Comprehensive metabolic panel; Future  -     CBC and differential; Future  2. Gastritis without bleeding, unspecified chronicity, unspecified gastritis type  -     sucralfate (Carafate) 1 g tablet; Take 1 tablet (1 g total) by mouth 2 (two) times a day  3. Impaired fasting glucose  -     Hemoglobin A1C; Future  4. Screening for diabetes mellitus  -     Hemoglobin A1C; Future  5. Family history of diabetes mellitus in mother          There are no Patient Instructions on file for this visit.    Return in about 4 months (around 5/7/2025).    Subjective:      Patient ID: Misha Maher is a 49 y.o. male.    Chief Complaint   Patient presents with    Follow-up     Follow up         HPI    The following portions of the patient's history were reviewed and updated as appropriate: allergies, current medications, past family history, past medical history, past social history, past surgical history and problem list.    Review of Systems        Constitutional:  Denies fever or chills   Eyes:  Denies double , blurry vision or eye pain  HENT:  Denies nasal congestion, sore throat or new hearing issues  Respiratory:  Denies cough or shortness of breath or wheezing  Cardiovascular:  Denies palpitations or chest pain  GI:  Denies abdominal pain, nausea, or vomiting, no loose stools, no reflux  Integument:  Denies rash , no open areas  Neurologic:  Denies headache or focal weakness, no dizziness  : no dysuria, or hematuria      Current Outpatient Medications   Medication Sig Dispense Refill    famotidine (PEPCID) 40 MG tablet TAKE 1 TABLET BY MOUTH DAILY AT BEDTIME 90 tablet 1    multivitamin (THERAGRAN) TABS Take 1 tablet by mouth daily      sucralfate (Carafate) 1 g tablet Take 1 tablet (1 g total) by mouth 2 (two) times a day 180 tablet 1     No current facility-administered medications for this visit.       Objective:    BP  "112/72 (BP Location: Left arm, Patient Position: Sitting, Cuff Size: Large)   Pulse 82   Temp 98.1 °F (36.7 °C) (Temporal)   Ht 5' 8\" (1.727 m)   Wt 110 kg (242 lb)   SpO2 96%   BMI 36.80 kg/m²        Physical Exam      Constitutional:  Well developed, well nourished, no acute distress, non-toxic appearance   Eyes:  PERRL, conjunctiva normal , non icteric sclera  HENT:  Atraumatic, oropharynx moist. Neck-  supple   Respiratory:  CTA b/l, normal breath sounds, no rales, no wheezing   Cardiovascular:  RRR, no murmurs, no LE edema b/l  GI:  Soft, nondistended, normal bowel sounds x 4, nontender, no organomegaly, no mass, no rebound, no guarding   Neurologic:  no focal deficits noted   Psychiatric:  Speech and behavior appropriate , AAO x 3      Draper Fili,   "

## 2025-01-08 ENCOUNTER — APPOINTMENT (OUTPATIENT)
Age: 50
End: 2025-01-08
Payer: COMMERCIAL

## 2025-01-08 DIAGNOSIS — Z13.1 SCREENING FOR DIABETES MELLITUS: ICD-10-CM

## 2025-01-08 DIAGNOSIS — R73.01 IMPAIRED FASTING GLUCOSE: ICD-10-CM

## 2025-01-08 DIAGNOSIS — E78.1 HYPERTRIGLYCERIDEMIA: ICD-10-CM

## 2025-01-08 LAB
ALBUMIN SERPL BCG-MCNC: 4.6 G/DL (ref 3.5–5)
ALP SERPL-CCNC: 75 U/L (ref 34–104)
ALT SERPL W P-5'-P-CCNC: 28 U/L (ref 7–52)
ANION GAP SERPL CALCULATED.3IONS-SCNC: 9 MMOL/L (ref 4–13)
AST SERPL W P-5'-P-CCNC: 18 U/L (ref 13–39)
BASOPHILS # BLD AUTO: 0.04 THOUSANDS/ΜL (ref 0–0.1)
BASOPHILS NFR BLD AUTO: 1 % (ref 0–1)
BILIRUB SERPL-MCNC: 0.62 MG/DL (ref 0.2–1)
BUN SERPL-MCNC: 18 MG/DL (ref 5–25)
CALCIUM SERPL-MCNC: 10 MG/DL (ref 8.4–10.2)
CHLORIDE SERPL-SCNC: 104 MMOL/L (ref 96–108)
CHOLEST SERPL-MCNC: 233 MG/DL (ref ?–200)
CO2 SERPL-SCNC: 29 MMOL/L (ref 21–32)
CREAT SERPL-MCNC: 0.87 MG/DL (ref 0.6–1.3)
EOSINOPHIL # BLD AUTO: 0.37 THOUSAND/ΜL (ref 0–0.61)
EOSINOPHIL NFR BLD AUTO: 6 % (ref 0–6)
ERYTHROCYTE [DISTWIDTH] IN BLOOD BY AUTOMATED COUNT: 11.9 % (ref 11.6–15.1)
EST. AVERAGE GLUCOSE BLD GHB EST-MCNC: 114 MG/DL
GFR SERPL CREATININE-BSD FRML MDRD: 99 ML/MIN/1.73SQ M
GLUCOSE P FAST SERPL-MCNC: 87 MG/DL (ref 65–99)
HBA1C MFR BLD: 5.6 %
HCT VFR BLD AUTO: 48.5 % (ref 36.5–49.3)
HDLC SERPL-MCNC: 44 MG/DL
HGB BLD-MCNC: 16.2 G/DL (ref 12–17)
IMM GRANULOCYTES # BLD AUTO: 0.01 THOUSAND/UL (ref 0–0.2)
IMM GRANULOCYTES NFR BLD AUTO: 0 % (ref 0–2)
LDLC SERPL CALC-MCNC: 133 MG/DL (ref 0–100)
LYMPHOCYTES # BLD AUTO: 1.77 THOUSANDS/ΜL (ref 0.6–4.47)
LYMPHOCYTES NFR BLD AUTO: 30 % (ref 14–44)
MCH RBC QN AUTO: 29.2 PG (ref 26.8–34.3)
MCHC RBC AUTO-ENTMCNC: 33.4 G/DL (ref 31.4–37.4)
MCV RBC AUTO: 87 FL (ref 82–98)
MONOCYTES # BLD AUTO: 0.37 THOUSAND/ΜL (ref 0.17–1.22)
MONOCYTES NFR BLD AUTO: 6 % (ref 4–12)
NEUTROPHILS # BLD AUTO: 3.26 THOUSANDS/ΜL (ref 1.85–7.62)
NEUTS SEG NFR BLD AUTO: 57 % (ref 43–75)
NRBC BLD AUTO-RTO: 0 /100 WBCS
PLATELET # BLD AUTO: 286 THOUSANDS/UL (ref 149–390)
PMV BLD AUTO: 9.8 FL (ref 8.9–12.7)
POTASSIUM SERPL-SCNC: 4.3 MMOL/L (ref 3.5–5.3)
PROT SERPL-MCNC: 7.2 G/DL (ref 6.4–8.4)
RBC # BLD AUTO: 5.55 MILLION/UL (ref 3.88–5.62)
SODIUM SERPL-SCNC: 142 MMOL/L (ref 135–147)
TRIGL SERPL-MCNC: 278 MG/DL (ref ?–150)
WBC # BLD AUTO: 5.82 THOUSAND/UL (ref 4.31–10.16)

## 2025-01-08 PROCEDURE — 83036 HEMOGLOBIN GLYCOSYLATED A1C: CPT

## 2025-01-08 PROCEDURE — 36415 COLL VENOUS BLD VENIPUNCTURE: CPT

## 2025-01-08 PROCEDURE — 80053 COMPREHEN METABOLIC PANEL: CPT

## 2025-01-08 PROCEDURE — 80061 LIPID PANEL: CPT

## 2025-01-08 PROCEDURE — 85025 COMPLETE CBC W/AUTO DIFF WBC: CPT

## 2025-01-09 DIAGNOSIS — E78.2 MIXED HYPERLIPIDEMIA: Primary | ICD-10-CM

## 2025-01-09 DIAGNOSIS — K29.70 GASTRITIS WITHOUT BLEEDING, UNSPECIFIED CHRONICITY, UNSPECIFIED GASTRITIS TYPE: ICD-10-CM

## 2025-01-09 RX ORDER — ATORVASTATIN CALCIUM 10 MG/1
10 TABLET, FILM COATED ORAL
Qty: 90 TABLET | Refills: 1 | Status: SHIPPED | OUTPATIENT
Start: 2025-01-09

## 2025-01-09 RX ORDER — SUCRALFATE 1 G/1
1 TABLET ORAL 2 TIMES DAILY
Qty: 180 TABLET | Refills: 0 | Status: CANCELLED | OUTPATIENT
Start: 2025-01-09 | End: 2025-04-09

## 2025-01-10 DIAGNOSIS — K29.70 GASTRITIS WITHOUT BLEEDING, UNSPECIFIED CHRONICITY, UNSPECIFIED GASTRITIS TYPE: ICD-10-CM

## 2025-01-10 NOTE — TELEPHONE ENCOUNTER
Patient called stating that his insurance is not covering his sucralfate 1 g tablet anymore. He stated that he has been on this medication for a while and would like a prior autherization to be done

## 2025-01-13 RX ORDER — SUCRALFATE 1 G/1
1 TABLET ORAL 2 TIMES DAILY
Qty: 180 TABLET | Refills: 1 | Status: SHIPPED | OUTPATIENT
Start: 2025-01-13

## 2025-05-14 ENCOUNTER — OFFICE VISIT (OUTPATIENT)
Age: 50
End: 2025-05-14
Payer: COMMERCIAL

## 2025-05-14 VITALS
HEART RATE: 69 BPM | TEMPERATURE: 97.8 F | BODY MASS INDEX: 36.8 KG/M2 | SYSTOLIC BLOOD PRESSURE: 120 MMHG | DIASTOLIC BLOOD PRESSURE: 72 MMHG | OXYGEN SATURATION: 99 % | HEIGHT: 68 IN

## 2025-05-14 DIAGNOSIS — R73.01 IMPAIRED FASTING GLUCOSE: ICD-10-CM

## 2025-05-14 DIAGNOSIS — Z12.5 SCREENING PSA (PROSTATE SPECIFIC ANTIGEN): ICD-10-CM

## 2025-05-14 DIAGNOSIS — E78.2 MIXED HYPERLIPIDEMIA: ICD-10-CM

## 2025-05-14 DIAGNOSIS — R10.30 LOWER ABDOMINAL PAIN: Primary | ICD-10-CM

## 2025-05-14 DIAGNOSIS — K57.92 DIVERTICULITIS: Primary | ICD-10-CM

## 2025-05-14 PROCEDURE — 99214 OFFICE O/P EST MOD 30 MIN: CPT | Performed by: INTERNAL MEDICINE

## 2025-05-14 RX ORDER — ATORVASTATIN CALCIUM 10 MG/1
10 TABLET, FILM COATED ORAL
Qty: 90 TABLET | Refills: 1 | Status: SHIPPED | OUTPATIENT
Start: 2025-05-14

## 2025-05-14 RX ORDER — METRONIDAZOLE 500 MG/1
500 TABLET ORAL EVERY 8 HOURS SCHEDULED
Qty: 30 TABLET | Refills: 0 | Status: SHIPPED | OUTPATIENT
Start: 2025-05-14 | End: 2025-05-24

## 2025-05-14 NOTE — ASSESSMENT & PLAN NOTE
Orders:    amoxicillin-clavulanate (AUGMENTIN) 875-125 mg per tablet; Take 1 tablet by mouth every 12 (twelve) hours for 10 days    metroNIDAZOLE (FLAGYL) 500 mg tablet; Take 1 tablet (500 mg total) by mouth every 8 (eight) hours for 10 days

## 2025-05-14 NOTE — PROGRESS NOTES
Name: Misha Maher      : 1975      MRN: [id number]  Encounter Provider: Maribel Ruth MD  Encounter Date: 2025   [facility]    :  Assessment & Plan  Diverticulitis    Orders:    amoxicillin-clavulanate (AUGMENTIN) 875-125 mg per tablet; Take 1 tablet by mouth every 12 (twelve) hours for 10 days    metroNIDAZOLE (FLAGYL) 500 mg tablet; Take 1 tablet (500 mg total) by mouth every 8 (eight) hours for 10 days    Mixed hyperlipidemia    Orders:    atorvastatin (LIPITOR) 10 mg tablet; Take 1 tablet (10 mg total) by mouth daily at bedtime      Assessment & Plan  1. Diverticulitis.  - Symptoms include pain that started yesterday and has been worsening, without fever or blood in the stool.  - Physical examination revealed tenderness in the epigastric area; no abnormalities noted on a recent CT scan from 2024.  - Discussion included the potential dietary trigger (fried chicken and pizza) and the importance of monitoring for fever or worsening pain.  - Augmentin prescribed for 10 days, with instructions to discontinue after 7 days if symptoms resolve. Over-the-counter milk of magnesia recommended for constipation. If no improvement within 7 days, a CT scan will be ordered. Immediate notification required if fever or worsening pain occurs.    I do suspect that the abdominal pain may be related to delayed gastric emptying.  Recommend 5 small meals daily rather than 2 large ones.      2. Hypercholesterolemia.  - Patient has a known history of elevated cholesterol levels.  - No current medication for cholesterol; advised to start cholesterol-lowering medication due to family history of heart disease.  - Counseling provided on the importance of medication adherence to reduce 10-year heart risk.  - Encouraged to maintain an active lifestyle and avoid smoking.         Chief Complaint   Patient presents with    Follow-up     Patient having abdominal  pain started yesterday has a history of diverticulitis    Health Screening     Annual physical needs to be scheduled      History of Present Illness     History of Present Illness  The patient presents for evaluation of abdominal pain.    He has a history of diverticulitis and is currently experiencing similar symptoms. The onset of pain was noted on 05/13/2025, which has progressively worsened to the point of causing discomfort during ambulation. He reports no febrile episodes or hematochezia. His last bowel movement was recorded on 05/13/2025. He typically maintains regular bowel movements and reports no specific food triggers for his symptoms. He recalls consuming fried chicken and pizza at work on 05/13/2025, which deviates from his usual diet. He has been making efforts to lose weight and has been prescribed sucralfate, although he has not yet started this medication. He occasionally takes Pepcid at night. He also reports that stress can exacerbate his symptoms, but he ensures adequate hydration. He has previously taken Augmentin without any adverse reactions. He has undergone a colonoscopy in the past, which yielded normal results. He was previously admitted to the hospital for a 1-day stay due to severe pain, during which he received medication.    He has elevated cholesterol levels but is not currently on any medication for this condition. He was supposed to receive a prescription, but it was never sent. He has a family history of heart disease in his father.    SOCIAL HISTORY  He does not smoke.    FAMILY HISTORY  His father has a history of heart disease.  Review of Systems   Gastrointestinal:  Positive for abdominal pain.   Musculoskeletal:  Positive for back pain.        Intermittent   All other systems reviewed and are negative.        Past Medical History:   Diagnosis Date    Class 2 obesity due to excess calories without serious comorbidity with body mass index (BMI) of 36.0 to 36.9 in adult  02/24/2023    Colon polyp     Diverticulosis 09/25/2020    Elbow tendonitis     right possible bursitis    Gastritis     GERD (gastroesophageal reflux disease)     Joint pain 01/11/2017    LLQ pain 09/08/2023    Migraine     Neck muscle spasm 01/05/2018    Right-sided low back pain with right-sided sciatica 09/23/2022    Sleep apnea     no longer using CPAP    Trigger middle finger of right hand 12/04/2014    Trigger ring finger of right hand 12/04/2014     Past Surgical History:   Procedure Laterality Date    COLONOSCOPY      COLONOSCOPY      EGD  2005    EGD  01/2020    EYE FOREIGN BODY REMOVAL Left     IR BIOPSY LOWER LIMB  03/16/2022    MI TENDON SHEATH INCISION Right 12/26/2018    Procedure: LONG AND RING TRIGGER FINGER RELEASES;  Surgeon: Duy Greco MD;  Location: [facility];  Service: Orthopedics    SKIN BIOPSY      UPPER GASTROINTESTINAL ENDOSCOPY       Family History   Problem Relation Age of Onset    Diabetes Mother     Hypertension Mother     Breast cancer Mother     Cancer Mother         Breast & Lung    Lung cancer Mother     Diabetes Father     Gout Father     Hypertension Father     Cancer Maternal Grandmother     Rosacea Family      Social History     Tobacco Use    Smoking status: Never    Smokeless tobacco: Never   Vaping Use    Vaping status: Never Used   Substance and Sexual Activity    Alcohol use: Yes     Comment: Occasional    Drug use: Never    Sexual activity: Yes     Partners: Female     Birth control/protection: Female Sterilization     Medications[1]  No Known Allergies  Immunization History   Administered Date(s) Administered    COVID-19 PFIZER VACCINE 0.3 ML IM 03/22/2021, 04/12/2021    INFLUENZA 10/03/2016, 09/05/2017    Influenza Quadrivalent Preservative Free 3 years and older IM 10/03/2016, 09/05/2017    Influenza, recombinant, quadrivalent,injectable, preservative free 10/23/2020    TD (adult) Preservative Free 04/29/2015    Td (adult), adsorbed 04/29/2015    Tdap 02/11/2014,  "04/29/2015, 12/19/2016, 03/27/2020, 10/22/2020    Tuberculin Skin Test-PPD Intradermal 04/01/2022, 04/01/2022    Zoster Vaccine Recombinant 04/25/2024, 06/26/2024     Objective   /72 (BP Location: Left arm, Patient Position: Sitting, Cuff Size: Large)   Pulse 69   Temp 97.8 °F (36.6 °C) (Temporal)   Ht 5' 8\" (1.727 m)   SpO2 99%   BMI 36.80 kg/m²     Physical Exam    Physical Exam    Gen: NAD  Heent: atraumatic, normocephalic  Mouth: is moist  Neck: is supple, no JVD, no carotid bruits.   Heart: is regular Normal s1, s2,  Lungs: are clear to auscultation  Abd: soft, mild tenderness in left lower quadrant.  Ext: no edema, distal pulses normal  Skin: no rashes, ulcers  Mood: normal affect  Neuro: AAOx3          [1]   Current Outpatient Medications on File Prior to Visit   Medication Sig    famotidine (PEPCID) 40 MG tablet TAKE 1 TABLET BY MOUTH DAILY AT BEDTIME    multivitamin (THERAGRAN) TABS Take 1 tablet by mouth in the morning.    [DISCONTINUED] atorvastatin (LIPITOR) 10 mg tablet Take 1 tablet (10 mg total) by mouth daily at bedtime (Patient not taking: Reported on 5/14/2025)    [DISCONTINUED] sucralfate (Carafate) 1 g tablet Take 1 tablet (1 g total) by mouth 2 (two) times a day (Patient not taking: Reported on 5/14/2025)     "

## 2025-05-27 ENCOUNTER — HOSPITAL ENCOUNTER (OUTPATIENT)
Dept: CT IMAGING | Facility: HOSPITAL | Age: 50
Discharge: HOME | End: 2025-05-27
Attending: INTERNAL MEDICINE
Payer: COMMERCIAL

## 2025-05-27 DIAGNOSIS — R10.30 LOWER ABDOMINAL PAIN: ICD-10-CM

## 2025-05-27 PROCEDURE — 74177 CT ABD & PELVIS W/CONTRAST: CPT

## 2025-05-27 RX ADMIN — IOHEXOL 100 ML: 350 INJECTION, SOLUTION INTRAVENOUS at 18:36

## 2025-06-04 ENCOUNTER — RESULTS FOLLOW-UP (OUTPATIENT)
Dept: OTHER | Facility: HOSPITAL | Age: 50
End: 2025-06-04

## 2025-06-04 DIAGNOSIS — N28.9 KIDNEY LESION: Primary | ICD-10-CM

## 2025-07-22 ENCOUNTER — OFFICE VISIT (OUTPATIENT)
Age: 50
End: 2025-07-22
Payer: COMMERCIAL

## 2025-07-22 VITALS
OXYGEN SATURATION: 99 % | SYSTOLIC BLOOD PRESSURE: 110 MMHG | WEIGHT: 224.8 LBS | BODY MASS INDEX: 34.07 KG/M2 | DIASTOLIC BLOOD PRESSURE: 64 MMHG | HEART RATE: 69 BPM | HEIGHT: 68 IN | TEMPERATURE: 97.7 F

## 2025-07-22 DIAGNOSIS — G47.33 OSA (OBSTRUCTIVE SLEEP APNEA): ICD-10-CM

## 2025-07-22 DIAGNOSIS — E78.00 HYPERCHOLESTEREMIA: ICD-10-CM

## 2025-07-22 DIAGNOSIS — R94.31 LEFT AXIS DEVIATION: ICD-10-CM

## 2025-07-22 DIAGNOSIS — E78.2 MIXED HYPERLIPIDEMIA: ICD-10-CM

## 2025-07-22 DIAGNOSIS — Z23 ENCOUNTER FOR IMMUNIZATION: ICD-10-CM

## 2025-07-22 DIAGNOSIS — L02.92 BOIL: ICD-10-CM

## 2025-07-22 DIAGNOSIS — Z00.00 ANNUAL PHYSICAL EXAM: Primary | ICD-10-CM

## 2025-07-22 PROCEDURE — 90471 IMMUNIZATION ADMIN: CPT

## 2025-07-22 PROCEDURE — 93000 ELECTROCARDIOGRAM COMPLETE: CPT | Performed by: INTERNAL MEDICINE

## 2025-07-22 PROCEDURE — 90715 TDAP VACCINE 7 YRS/> IM: CPT

## 2025-07-22 RX ORDER — CEPHALEXIN 500 MG/1
500 CAPSULE ORAL 2 TIMES DAILY
Qty: 20 CAPSULE | Refills: 0 | Status: SHIPPED | OUTPATIENT
Start: 2025-07-22 | End: 2025-08-01

## 2025-07-22 NOTE — PROGRESS NOTES
Adult Annual Physical  Name: Misha Maher      : 1975      MRN: [id number]  Encounter Provider: Maribel Ruth MD  Encounter Date: 2025   Encounter department: [facility] [place]    :  Assessment & Plan  Hypercholesteremia       EKG Normal  Sinus bradycardia.  Mild left axis deviation.  Given history of sleep apnea, recommend continued weight loss.  Also, recommend echocardiogram to evaluate LV function and possible ventricular hypertrophy          Encounter for immunization         Boil         Assessment & Plan  1. Abdominal pain/ Resolved  - Abdominal pain has subsided, likely due to dietary changes.  - CAT scan results were normal, indicating proper stomach emptying and colon movement.  - A small benign finding was noted in the kidney, which does not require follow-up.  - Advised to maintain a healthy diet and continue with weight loss efforts; repeat scan can be considered if concerns arise.    2. Elevated cholesterol.  - Cholesterol levels are elevated.  - Advised to complete fasting blood work before starting medication to determine if dosage adjustment is needed.  - Blood work ordered during the last visit was not completed.  - Prescription for cholesterol medication will be provided.    3. Groin boil.  - Reports a painful boil in the groin area that has not improved with topical antibiotics.  - Physical exam indicates the need for oral antibiotics.  - Oral antibiotic prescribed, to be taken twice daily.  - If the boil does not resolve, surgical intervention may be considered.    4. Health maintenance.  - Last Tdap vaccine was administered in .  - Electrolytes, kidney function, and liver function are all normal.  - UA showed a good profile.  - EKG results are normal.  - Tetanus shot will be administered today as part of the physical examination.      Annual Physical Plan       History of Present Illness     Adult Annual Physical  History of Present  "Illness  The patient presents for a physical exam and evaluation of abdominal pain, elevated cholesterol, and groin boil.    He reports an improvement in his abdominal condition, attributing it to a reduction in food intake and increased physical activity. He has lost approximately 5 pounds. He has not undergone any recent blood tests. His work involves significant physical exertion.    He mentions a sudden sensation of blood flow in his groin area during work, which is accompanied by pain upon touch. He has been on antibiotics for the past week, but the issue persists.  Review of Systems          Medical History Reviewed by provider this encounter:  Tobacco  Allergies  Meds  Problems  Med Hx  Surg Hx  Fam Hx     .  Past Medical History   Past Medical History[1]  Past Surgical History[1]  Family History[1]   reports that he has never smoked. He has never used smokeless tobacco. He reports current alcohol use. He reports that he does not use drugs.  Current Outpatient Medications   Medication Instructions   • atorvastatin (LIPITOR) 10 mg, Oral, Daily at bedtime   • cephalexin (KEFLEX) 500 mg, Oral, 2 times daily   • famotidine (PEPCID) 40 mg, Oral, Daily at bedtime   • multivitamin (THERAGRAN) TABS 1 tablet, Daily   Allergies[1]   Medications Ordered Prior to Encounter[1]   Social History[1]    Objective   /64 (BP Location: Left arm, Patient Position: Sitting, Cuff Size: Large)   Pulse 69   Temp 97.7 °F (36.5 °C) (Temporal)   Ht 5' 8\" (1.727 m)   Wt 102 kg (224 lb 12.8 oz)   SpO2 99%   BMI 34.18 kg/m²     Physical Exam  Respiratory: Clear to auscultation, no wheezing, rales or rhonchi  Cardiovascular: Regular rate and rhythm, no murmurs, rubs, or gallops  Physical Exam  Adult Annual Physical  Name: Misha Maher      : 1975      MRN: [id number]  Encounter Provider: Maribel Ruth MD  Encounter Date: 2025   Encounter department: [facility] " [facility]    :  Assessment & Plan  Annual physical exam         Hypercholesteremia    Orders:  •  POCT ECG    Boil    Orders:  •  cephalexin (KEFLEX) 500 mg capsule; Take 1 capsule (500 mg total) by mouth in the morning and 1 capsule (500 mg total) before bedtime. Do all this for 10 days.    Encounter for immunization    Orders:  •  TDAP VACCINE GREATER THAN OR EQUAL TO 8YO IM    Mixed hyperlipidemia         ROMAINE (obstructive sleep apnea)         Left axis deviation    Orders:  •  Echo complete w/ contrast if indicated; Future        Preventive Screenings:  - Diabetes Screening: screening up-to-date  - Cholesterol Screening: screening not indicated and has hyperlipidemia   - Hepatitis C screening: screening up-to-date   - HIV screening: screening up-to-date   - Colon cancer screening: screening up-to-date   - Lung cancer screening: screening not indicated     Immunizations:  - Immunizations due: Prevnar 20    Counseling/Anticipatory Guidance:  - Alcohol: discussed moderation in alcohol intake and recommendations for healthy alcohol use.   - Dental health: discussed importance of regular tooth brushing, flossing, and dental visits.   - Diet: discussed recommendations for a healthy/well-balanced diet.   - Exercise: the importance of regular exercise/physical activity was discussed. Recommend exercise 3-5 times per week for at least 30 minutes.   - Injury prevention: discussed safety/seat belts, safety helmets, smoke detectors, carbon monoxide detectors, and smoking near bedding or upholstery.       Depression Screening and Follow-up Plan: Patient was screened for depression during today's encounter. They screened negative with a PHQ-9 score of 1.          History of Present Illness     Adult Annual Physical:  Patient presents for annual physical.     Diet and Physical Activity:  - Diet/Nutrition: portion control, limited junk food, consuming 3-5 servings of fruits/vegetables daily, adequate fiber intake and adequate  "whole grain intake.  - Exercise: walking, 3-4 times a week on average and 30-60 minutes on average.    Depression Screening:    - PHQ-9 Score: 1    General Health:  - Sleep: sleeps well and 4-6 hours of sleep on average.  - Hearing: normal hearing bilateral ears.  - Vision: wears glasses and most recent eye exam > 1 year ago.  - Dental: no dental visits for > 1 year.     Health:  - History of STDs: no.   - Urinary symptoms: none.     Advanced Care Planning:  - Has an advanced directive?: no    - Has a durable medical POA?: no      Review of Systems   Skin:         Boil in the groin area, noted recently. Mild discharge, clear, yellowish   All other systems reviewed and are negative.    Pertinent Medical History           Medical History Reviewed by provider this encounter:  Tobacco  Allergies  Meds  Problems  Med Hx  Surg Hx  Fam Hx     .  Past Medical History   Past Medical History[1]  Past Surgical History[1]  Family History[1]   reports that he has never smoked. He has never used smokeless tobacco. He reports current alcohol use. He reports that he does not use drugs.  Current Outpatient Medications   Medication Instructions   • atorvastatin (LIPITOR) 10 mg, Oral, Daily at bedtime   • cephalexin (KEFLEX) 500 mg, Oral, 2 times daily   • famotidine (PEPCID) 40 mg, Oral, Daily at bedtime   • multivitamin (THERAGRAN) TABS 1 tablet, Daily   Allergies[1]   Medications Ordered Prior to Encounter[1]   Social History[1]    Objective   /64 (BP Location: Left arm, Patient Position: Sitting, Cuff Size: Large)   Pulse 69   Temp 97.7 °F (36.5 °C) (Temporal)   Ht 5' 8\" (1.727 m)   Wt 102 kg (224 lb 12.8 oz)   SpO2 99%   BMI 34.18 kg/m²     Physical Exam    Gen: NAD  Heent: atraumatic, normocephalic  Mouth: is moist  Neck: is supple, no JVD, no carotid bruits.   Heart: is regular Normal s1, s2,  Lungs: are clear to auscultation  Abd: soft, non tender, NABS  Ext: no edema, distal pulses normal  Skin: small " boil in the groi area  Mood: normal affect  Neuro: AAOx3              [1]  Past Medical History:  Diagnosis Date   • Class 2 obesity due to excess calories without serious comorbidity with body mass index (BMI) of 36.0 to 36.9 in adult 02/24/2023   • Colon polyp    • Diverticulosis 09/25/2020   • Elbow tendonitis     right possible bursitis   • Gastritis    • GERD (gastroesophageal reflux disease)    • Joint pain 01/11/2017   • LLQ pain 09/08/2023   • Migraine    • Neck muscle spasm 01/05/2018   • Right-sided low back pain with right-sided sciatica 09/23/2022   • Sleep apnea     no longer using CPAP   • Trigger middle finger of right hand 12/04/2014   • Trigger ring finger of right hand 12/04/2014   [1]  Past Surgical History:  Procedure Laterality Date   • COLONOSCOPY     • COLONOSCOPY     • EGD  2005   • EGD  01/2020   • EYE FOREIGN BODY REMOVAL Left    • IR BIOPSY LOWER LIMB  03/16/2022   • ID TENDON SHEATH INCISION Right 12/26/2018    Procedure: LONG AND RING TRIGGER FINGER RELEASES;  Surgeon: Duy Greco MD;  Location: [facility];  Service: Orthopedics   • SKIN BIOPSY     • UPPER GASTROINTESTINAL ENDOSCOPY     [1]  Family History  Problem Relation Name Age of Onset   • Diabetes Mother Mother    • Hypertension Mother Mother    • Breast cancer Mother Mother    • Cancer Mother Mother         Breast & Lung   • Lung cancer Mother Mother    • Diabetes Father Misha    • Gout Father Misha    • Hypertension Father Misha    • Cancer Maternal Grandmother Cathy    • Rosacea Family     [1]  No Known Allergies[1]  Current Outpatient Medications on File Prior to Visit   Medication Sig Dispense Refill   • atorvastatin (LIPITOR) 10 mg tablet Take 1 tablet (10 mg total) by mouth daily at bedtime 90 tablet 1   • famotidine (PEPCID) 40 MG tablet TAKE 1 TABLET BY MOUTH DAILY AT BEDTIME 90 tablet 1   • multivitamin (THERAGRAN) TABS Take 1 tablet by mouth in the morning.       No current facility-administered medications on file  prior to visit.   [1]  Social History  Tobacco Use   • Smoking status: Never   • Smokeless tobacco: Never   Vaping Use   • Vaping status: Never Used   Substance and Sexual Activity   • Alcohol use: Yes     Comment: Occasional   • Drug use: Never   • Sexual activity: Yes     Partners: Female     Birth control/protection: Female Sterilization   [1]  Past Medical History:  Diagnosis Date   • Class 2 obesity due to excess calories without serious comorbidity with body mass index (BMI) of 36.0 to 36.9 in adult 02/24/2023   • Colon polyp    • Diverticulosis 09/25/2020   • Elbow tendonitis     right possible bursitis   • Gastritis    • GERD (gastroesophageal reflux disease)    • Joint pain 01/11/2017   • LLQ pain 09/08/2023   • Migraine    • Neck muscle spasm 01/05/2018   • Right-sided low back pain with right-sided sciatica 09/23/2022   • Sleep apnea     no longer using CPAP   • Trigger middle finger of right hand 12/04/2014   • Trigger ring finger of right hand 12/04/2014   [1]  Past Surgical History:  Procedure Laterality Date   • COLONOSCOPY     • COLONOSCOPY     • EGD  2005   • EGD  01/2020   • EYE FOREIGN BODY REMOVAL Left    • IR BIOPSY LOWER LIMB  03/16/2022   • VT TENDON SHEATH INCISION Right 12/26/2018    Procedure: LONG AND RING TRIGGER FINGER RELEASES;  Surgeon: Duy Greco MD;  Location: [facility];  Service: Orthopedics   • SKIN BIOPSY     • UPPER GASTROINTESTINAL ENDOSCOPY     [1]  Family History  Problem Relation Name Age of Onset   • Diabetes Mother Mother    • Hypertension Mother Mother    • Breast cancer Mother Mother    • Cancer Mother Mother         Breast & Lung   • Lung cancer Mother Mother    • Diabetes Father Misha    • Gout Father Misha    • Hypertension Father Misha    • Cancer Maternal Grandmother Cathy    • Rosacea Family     [1]  No Known Allergies[1]  Current Outpatient Medications on File Prior to Visit   Medication Sig Dispense Refill   • atorvastatin (LIPITOR) 10 mg tablet Take 1 tablet  (10 mg total) by mouth daily at bedtime 90 tablet 1   • famotidine (PEPCID) 40 MG tablet TAKE 1 TABLET BY MOUTH DAILY AT BEDTIME 90 tablet 1   • multivitamin (THERAGRAN) TABS Take 1 tablet by mouth in the morning.       No current facility-administered medications on file prior to visit.   [1]  Social History  Tobacco Use   • Smoking status: Never   • Smokeless tobacco: Never   Vaping Use   • Vaping status: Never Used   Substance and Sexual Activity   • Alcohol use: Yes     Comment: Occasional   • Drug use: Never   • Sexual activity: Yes     Partners: Female     Birth control/protection: Female Sterilization

## 2025-07-23 NOTE — PATIENT INSTRUCTIONS
"Patient Education     Routine physical for adults   The Basics   Written by the doctors and editors at [facility]   What is a physical? -- A physical is a routine visit, or \"check-up,\" with your doctor. You might also hear it called a \"wellness visit\" or \"preventive visit.\"  During each visit, the doctor will:   Ask about your physical and mental health   Ask about your habits, behaviors, and lifestyle   Do an exam   Give you vaccines if needed   Talk to you about any medicines you take   Give advice about your health   Answer your questions  Getting regular check-ups is an important part of taking care of your health. It can help your doctor find and treat any problems you have. But it's also important for preventing health problems.  A routine physical is different from a \"sick visit.\" A sick visit is when you see a doctor because of a health concern or problem. Since physicals are scheduled ahead of time, you can think about what you want to ask the doctor.  How often should I get a physical? -- It depends on your age and health. In general, for people age 21 years and older:   If you are younger than 50 years, you might be able to get a physical every 3 years.   If you are 50 years or older, your doctor might recommend a physical every year.  If you have an ongoing health condition, like diabetes or high blood pressure, your doctor will probably want to see you more often.  What happens during a physical? -- In general, each visit will include:   Physical exam - The doctor or nurse will check your height, weight, heart rate, and blood pressure. They will also look at your eyes and ears. They will ask about how you are feeling and whether you have any symptoms that bother you.   Medicines - It's a good idea to bring a list of all the medicines you take to each doctor visit. Your doctor will talk to you about your medicines and answer any questions. Tell them if you are having any side effects that bother you. You " "should also tell them if you are having trouble paying for any of your medicines.   Habits and behaviors - This includes:   Your diet   Your exercise habits   Whether you smoke, drink alcohol, or use drugs   Whether you are sexually active   Whether you feel safe at home  Your doctor will talk to you about things you can do to improve your health and lower your risk of health problems. They will also offer help and support. For example, if you want to quit smoking, they can give you advice and might prescribe medicines. If you want to improve your diet or get more physical activity, they can help you with this, too.   Lab tests, if needed - The tests you get will depend on your age and situation. For example, your doctor might want to check your:   Cholesterol   Blood sugar   Iron level   Vaccines - The recommended vaccines will depend on your age, health, and what vaccines you already had. Vaccines are very important because they can prevent certain serious or deadly infections.   Discussion of screening - \"Screening\" means checking for diseases or other health problems before they cause symptoms. Your doctor can recommend screening based on your age, risk, and preferences. This might include tests to check for:   Cancer, such as breast, prostate, cervical, ovarian, colorectal, prostate, lung, or skin cancer   Sexually transmitted infections, such as chlamydia and gonorrhea   Mental health conditions like depression and anxiety  Your doctor will talk to you about the different types of screening tests. They can help you decide which screenings to have. They can also explain what the results might mean.   Answering questions - The physical is a good time to ask the doctor or nurse questions about your health. If needed, they can refer you to other doctors or specialists, too.  Adults older than 65 years often need other care, too. As you get older, your doctor will talk to you about:   How to prevent falling at " home   Hearing or vision tests   Memory testing   How to take your medicines safely   Making sure that you have the help and support you need at home  All topics are updated as new evidence becomes available and our peer review process is complete.  This topic retrieved from [employer] on: May 02, 2024.  Topic 325281 Version 1.0  Release: 32.4.3 - C32.122  © 2024 UpToDate, Inc. and/or its affiliates. All rights reserved.  Consumer Information Use and Disclaimer   Disclaimer: This generalized information is a limited summary of diagnosis, treatment, and/or medication information. It is not meant to be comprehensive and should be used as a tool to help the user understand and/or assess potential diagnostic and treatment options. It does NOT include all information about conditions, treatments, medications, side effects, or risks that may apply to a specific patient. It is not intended to be medical advice or a substitute for the medical advice, diagnosis, or treatment of a health care provider based on the health care provider's examination and assessment of a patient's specific and unique circumstances. Patients must speak with a health care provider for complete information about their health, medical questions, and treatment options, including any risks or benefits regarding use of medications. This information does not endorse any treatments or medications as safe, effective, or approved for treating a specific patient. UpToDate, Inc. and its affiliates disclaim any warranty or liability relating to this information or the use thereof.The use of this information is governed by the Terms of Use, available at https://www.[employer]tersAgito Networksuwer.com/en/know/clinical-effectiveness-terms. 2024© UpToDate, Inc. and its affiliates and/or licensors. All rights reserved.  Copyright   © 2024 UpToDate, Inc. and/or its affiliates. All rights reserved.

## 2025-07-31 ENCOUNTER — APPOINTMENT (OUTPATIENT)
Dept: LAB | Age: 50
End: 2025-07-31
Attending: INTERNAL MEDICINE

## 2025-07-31 ENCOUNTER — APPOINTMENT (OUTPATIENT)
Dept: LAB | Age: 50
End: 2025-07-31
Attending: PREVENTIVE MEDICINE

## 2025-07-31 DIAGNOSIS — K57.92 DIVERTICULITIS: ICD-10-CM

## 2025-07-31 DIAGNOSIS — Z00.8 HEALTH EXAMINATION IN POPULATION SURVEY: ICD-10-CM

## 2025-07-31 DIAGNOSIS — E78.2 MIXED HYPERLIPIDEMIA: ICD-10-CM

## 2025-07-31 DIAGNOSIS — Z12.5 SCREENING PSA (PROSTATE SPECIFIC ANTIGEN): ICD-10-CM

## 2025-07-31 DIAGNOSIS — R73.01 IMPAIRED FASTING GLUCOSE: ICD-10-CM

## 2025-07-31 LAB
ALBUMIN SERPL BCG-MCNC: 4.2 G/DL (ref 3.5–5)
ALP SERPL-CCNC: 78 U/L (ref 34–104)
ALT SERPL W P-5'-P-CCNC: 47 U/L (ref 7–52)
ANION GAP SERPL CALCULATED.3IONS-SCNC: 9 MMOL/L (ref 4–13)
AST SERPL W P-5'-P-CCNC: 30 U/L (ref 13–39)
BASOPHILS # BLD AUTO: 0.02 THOUSANDS/ÂΜL (ref 0–0.1)
BASOPHILS NFR BLD AUTO: 0 % (ref 0–1)
BILIRUB SERPL-MCNC: 0.66 MG/DL (ref 0.2–1)
BUN SERPL-MCNC: 21 MG/DL (ref 5–25)
CALCIUM SERPL-MCNC: 9.4 MG/DL (ref 8.4–10.2)
CHLORIDE SERPL-SCNC: 103 MMOL/L (ref 96–108)
CHOLEST SERPL-MCNC: 221 MG/DL (ref ?–200)
CO2 SERPL-SCNC: 27 MMOL/L (ref 21–32)
CREAT SERPL-MCNC: 0.74 MG/DL (ref 0.6–1.3)
EOSINOPHIL # BLD AUTO: 0.12 THOUSAND/ÂΜL (ref 0–0.61)
EOSINOPHIL NFR BLD AUTO: 2 % (ref 0–6)
ERYTHROCYTE [DISTWIDTH] IN BLOOD BY AUTOMATED COUNT: 12.7 % (ref 11.6–15.1)
EST. AVERAGE GLUCOSE BLD GHB EST-MCNC: 111 MG/DL
GFR SERPL CREATININE-BSD FRML MDRD: 106 ML/MIN/1.73SQ M
GLUCOSE P FAST SERPL-MCNC: 98 MG/DL (ref 65–99)
HBA1C MFR BLD: 5.5 %
HCT VFR BLD AUTO: 47 % (ref 36.5–49.3)
HDLC SERPL-MCNC: 50 MG/DL
HGB BLD-MCNC: 15.8 G/DL (ref 12–17)
IMM GRANULOCYTES # BLD AUTO: 0.02 THOUSAND/UL (ref 0–0.2)
IMM GRANULOCYTES NFR BLD AUTO: 0 % (ref 0–2)
LDLC SERPL CALC-MCNC: 119 MG/DL (ref 0–100)
LYMPHOCYTES # BLD AUTO: 1.36 THOUSANDS/ÂΜL (ref 0.6–4.47)
LYMPHOCYTES NFR BLD AUTO: 24 % (ref 14–44)
MCH RBC QN AUTO: 29.6 PG (ref 26.8–34.3)
MCHC RBC AUTO-ENTMCNC: 33.6 G/DL (ref 31.4–37.4)
MCV RBC AUTO: 88 FL (ref 82–98)
MONOCYTES # BLD AUTO: 0.42 THOUSAND/ÂΜL (ref 0.17–1.22)
MONOCYTES NFR BLD AUTO: 8 % (ref 4–12)
NEUTROPHILS # BLD AUTO: 3.63 THOUSANDS/ÂΜL (ref 1.85–7.62)
NEUTS SEG NFR BLD AUTO: 66 % (ref 43–75)
NRBC BLD AUTO-RTO: 0 /100 WBCS
PLATELET # BLD AUTO: 262 THOUSANDS/UL (ref 149–390)
PMV BLD AUTO: 9.5 FL (ref 8.9–12.7)
POTASSIUM SERPL-SCNC: 4.5 MMOL/L (ref 3.5–5.3)
PROT SERPL-MCNC: 6.5 G/DL (ref 6.4–8.4)
PSA SERPL-MCNC: 0.54 NG/ML (ref 0–4)
RBC # BLD AUTO: 5.34 MILLION/UL (ref 3.88–5.62)
SODIUM SERPL-SCNC: 139 MMOL/L (ref 135–147)
TRIGL SERPL-MCNC: 259 MG/DL (ref ?–150)
TSH SERPL DL<=0.05 MIU/L-ACNC: 1.85 UIU/ML (ref 0.45–4.5)
WBC # BLD AUTO: 5.57 THOUSAND/UL (ref 4.31–10.16)

## 2025-07-31 PROCEDURE — 80053 COMPREHEN METABOLIC PANEL: CPT

## 2025-07-31 PROCEDURE — 83036 HEMOGLOBIN GLYCOSYLATED A1C: CPT

## 2025-07-31 PROCEDURE — 84443 ASSAY THYROID STIM HORMONE: CPT

## 2025-07-31 PROCEDURE — 85025 COMPLETE CBC W/AUTO DIFF WBC: CPT

## 2025-07-31 PROCEDURE — 84153 ASSAY OF PSA TOTAL: CPT

## 2025-07-31 PROCEDURE — 36415 COLL VENOUS BLD VENIPUNCTURE: CPT

## 2025-07-31 PROCEDURE — 80061 LIPID PANEL: CPT

## 2025-08-03 DIAGNOSIS — E78.2 MIXED HYPERLIPIDEMIA: ICD-10-CM

## 2025-08-05 RX ORDER — ATORVASTATIN CALCIUM 10 MG/1
10 TABLET, FILM COATED ORAL
Qty: 90 TABLET | Refills: 1 | Status: SHIPPED | OUTPATIENT
Start: 2025-08-05

## 2025-08-06 ENCOUNTER — HOSPITAL ENCOUNTER (OUTPATIENT)
Dept: ULTRASOUND IMAGING | Facility: HOSPITAL | Age: 50
Discharge: HOME | End: 2025-08-06
Payer: COMMERCIAL

## 2025-08-15 ENCOUNTER — HOSPITAL ENCOUNTER (OUTPATIENT)
Dept: NON INVASIVE DIAGNOSTICS | Facility: CLINIC | Age: 50
Discharge: HOME | End: 2025-08-15
Payer: COMMERCIAL

## (undated) DEVICE — ACE WRAP 4 IN UNSTERILE

## (undated) DEVICE — REM POLYHESIVE ADULT PATIENT RETURN ELECTRODE: Brand: VALLEYLAB

## (undated) DEVICE — CURITY NON-ADHERENT STRIPS: Brand: CURITY

## (undated) DEVICE — TUBING SUCTION 5MM X 12 FT

## (undated) DEVICE — COBAN 2 IN UNSTERILE

## (undated) DEVICE — SUT ETHILON 4-0 PS-2 18 IN 1667H

## (undated) DEVICE — NEEDLE 25G X 1 1/2

## (undated) DEVICE — STERILE BETHLEHEM PLASTIC HAND: Brand: CARDINAL HEALTH

## (undated) DEVICE — GLOVE SRG BIOGEL 8

## (undated) DEVICE — GAUZE SPONGES,16 PLY: Brand: CURITY

## (undated) DEVICE — INTENDED FOR TISSUE SEPARATION, AND OTHER PROCEDURES THAT REQUIRE A SHARP SURGICAL BLADE TO PUNCTURE OR CUT.: Brand: BARD-PARKER ® CARBON RIB-BACK BLADES

## (undated) DEVICE — LIGHT HANDLE COVER SLEEVE DISP BLUE STELLAR

## (undated) DEVICE — GLOVE SRG BIOGEL 6.5

## (undated) DEVICE — GLOVE INDICATOR PI UNDERGLOVE SZ 6.5 BLUE

## (undated) DEVICE — STRETCH BANDAGE: Brand: CURITY

## (undated) DEVICE — DRAPE SHEET THREE QUARTER